# Patient Record
Sex: MALE | Race: WHITE | URBAN - METROPOLITAN AREA
[De-identification: names, ages, dates, MRNs, and addresses within clinical notes are randomized per-mention and may not be internally consistent; named-entity substitution may affect disease eponyms.]

---

## 2018-01-01 ENCOUNTER — ANESTHESIA (OUTPATIENT)
Dept: SURGERY | Facility: CLINIC | Age: 63
DRG: 003 | End: 2018-01-01
Payer: COMMERCIAL

## 2018-01-01 ENCOUNTER — APPOINTMENT (OUTPATIENT)
Dept: GENERAL RADIOLOGY | Facility: CLINIC | Age: 63
DRG: 003 | End: 2018-01-01
Attending: SURGERY
Payer: COMMERCIAL

## 2018-01-01 ENCOUNTER — ANESTHESIA EVENT (OUTPATIENT)
Dept: SURGERY | Facility: CLINIC | Age: 63
DRG: 003 | End: 2018-01-01
Payer: COMMERCIAL

## 2018-01-01 ENCOUNTER — APPOINTMENT (OUTPATIENT)
Dept: GENERAL RADIOLOGY | Facility: CLINIC | Age: 63
DRG: 003 | End: 2018-01-01
Attending: THORACIC SURGERY (CARDIOTHORACIC VASCULAR SURGERY)
Payer: COMMERCIAL

## 2018-01-01 ENCOUNTER — APPOINTMENT (OUTPATIENT)
Dept: GENERAL RADIOLOGY | Facility: CLINIC | Age: 63
DRG: 003 | End: 2018-01-01
Attending: INTERNAL MEDICINE
Payer: COMMERCIAL

## 2018-01-01 ENCOUNTER — APPOINTMENT (OUTPATIENT)
Dept: CARDIOLOGY | Facility: CLINIC | Age: 63
DRG: 003 | End: 2018-01-01
Attending: INTERNAL MEDICINE
Payer: COMMERCIAL

## 2018-01-01 ENCOUNTER — HOSPITAL ENCOUNTER (INPATIENT)
Facility: CLINIC | Age: 63
LOS: 11 days | DRG: 003 | End: 2018-11-24
Attending: SURGERY | Admitting: SURGERY
Payer: COMMERCIAL

## 2018-01-01 ENCOUNTER — APPOINTMENT (OUTPATIENT)
Dept: CARDIOLOGY | Facility: CLINIC | Age: 63
DRG: 003 | End: 2018-01-01
Attending: SURGERY
Payer: COMMERCIAL

## 2018-01-01 VITALS
OXYGEN SATURATION: 96 % | RESPIRATION RATE: 15 BRPM | DIASTOLIC BLOOD PRESSURE: 33 MMHG | BODY MASS INDEX: 34.01 KG/M2 | TEMPERATURE: 98 F | SYSTOLIC BLOOD PRESSURE: 83 MMHG | HEIGHT: 67 IN | WEIGHT: 216.71 LBS

## 2018-01-01 DIAGNOSIS — Z92.81 H/O EXTRACORPOREAL MEMBRANE OXYGENATION TREATMENT: Primary | ICD-10-CM

## 2018-01-01 LAB
ABO + RH BLD: NORMAL
ALBUMIN SERPL-MCNC: 2.1 G/DL (ref 3.4–5)
ALBUMIN SERPL-MCNC: 2.2 G/DL (ref 3.4–5)
ALBUMIN SERPL-MCNC: 2.2 G/DL (ref 3.4–5)
ALBUMIN SERPL-MCNC: 2.3 G/DL (ref 3.4–5)
ALBUMIN SERPL-MCNC: 2.4 G/DL (ref 3.4–5)
ALBUMIN SERPL-MCNC: 2.5 G/DL (ref 3.4–5)
ALBUMIN SERPL-MCNC: 2.6 G/DL (ref 3.4–5)
ALBUMIN SERPL-MCNC: 2.7 G/DL (ref 3.4–5)
ALBUMIN SERPL-MCNC: 2.8 G/DL (ref 3.4–5)
ALBUMIN SERPL-MCNC: 2.9 G/DL (ref 3.4–5)
ALBUMIN SERPL-MCNC: 3 G/DL (ref 3.4–5)
ALBUMIN SERPL-MCNC: 3.1 G/DL (ref 3.4–5)
ALBUMIN SERPL-MCNC: 3.1 G/DL (ref 3.4–5)
ALBUMIN SERPL-MCNC: 3.2 G/DL (ref 3.4–5)
ALBUMIN SERPL-MCNC: 3.3 G/DL (ref 3.4–5)
ALBUMIN SERPL-MCNC: 3.6 G/DL (ref 3.4–5)
ALBUMIN SERPL-MCNC: NORMAL G/DL (ref 3.4–5)
ALP SERPL-CCNC: 100 U/L (ref 40–150)
ALP SERPL-CCNC: 102 U/L (ref 40–150)
ALP SERPL-CCNC: 104 U/L (ref 40–150)
ALP SERPL-CCNC: 106 U/L (ref 40–150)
ALP SERPL-CCNC: 108 U/L (ref 40–150)
ALP SERPL-CCNC: 128 U/L (ref 40–150)
ALP SERPL-CCNC: 156 U/L (ref 40–150)
ALP SERPL-CCNC: 274 U/L (ref 40–150)
ALP SERPL-CCNC: 37 U/L (ref 40–150)
ALP SERPL-CCNC: 40 U/L (ref 40–150)
ALP SERPL-CCNC: 43 U/L (ref 40–150)
ALP SERPL-CCNC: 44 U/L (ref 40–150)
ALP SERPL-CCNC: 46 U/L (ref 40–150)
ALP SERPL-CCNC: 485 U/L (ref 40–150)
ALP SERPL-CCNC: 49 U/L (ref 40–150)
ALP SERPL-CCNC: 50 U/L (ref 40–150)
ALP SERPL-CCNC: 50 U/L (ref 40–150)
ALP SERPL-CCNC: 52 U/L (ref 40–150)
ALP SERPL-CCNC: 52 U/L (ref 40–150)
ALP SERPL-CCNC: 54 U/L (ref 40–150)
ALP SERPL-CCNC: 55 U/L (ref 40–150)
ALP SERPL-CCNC: 56 U/L (ref 40–150)
ALP SERPL-CCNC: 56 U/L (ref 40–150)
ALP SERPL-CCNC: 58 U/L (ref 40–150)
ALP SERPL-CCNC: 60 U/L (ref 40–150)
ALP SERPL-CCNC: 607 U/L (ref 40–150)
ALP SERPL-CCNC: 61 U/L (ref 40–150)
ALP SERPL-CCNC: 63 U/L (ref 40–150)
ALP SERPL-CCNC: 63 U/L (ref 40–150)
ALP SERPL-CCNC: 66 U/L (ref 40–150)
ALP SERPL-CCNC: 670 U/L (ref 40–150)
ALP SERPL-CCNC: 68 U/L (ref 40–150)
ALP SERPL-CCNC: 70 U/L (ref 40–150)
ALP SERPL-CCNC: 75 U/L (ref 40–150)
ALP SERPL-CCNC: 83 U/L (ref 40–150)
ALP SERPL-CCNC: 87 U/L (ref 40–150)
ALP SERPL-CCNC: 88 U/L (ref 40–150)
ALP SERPL-CCNC: 98 U/L (ref 40–150)
ALP SERPL-CCNC: 99 U/L (ref 40–150)
ALP SERPL-CCNC: NORMAL U/L (ref 40–150)
ALT SERPL W P-5'-P-CCNC: 101 U/L (ref 0–70)
ALT SERPL W P-5'-P-CCNC: 104 U/L (ref 0–70)
ALT SERPL W P-5'-P-CCNC: 113 U/L (ref 0–70)
ALT SERPL W P-5'-P-CCNC: 115 U/L (ref 0–70)
ALT SERPL W P-5'-P-CCNC: 118 U/L (ref 0–70)
ALT SERPL W P-5'-P-CCNC: 122 U/L (ref 0–70)
ALT SERPL W P-5'-P-CCNC: 124 U/L (ref 0–70)
ALT SERPL W P-5'-P-CCNC: 126 U/L (ref 0–70)
ALT SERPL W P-5'-P-CCNC: 129 U/L (ref 0–70)
ALT SERPL W P-5'-P-CCNC: 129 U/L (ref 0–70)
ALT SERPL W P-5'-P-CCNC: 133 U/L (ref 0–70)
ALT SERPL W P-5'-P-CCNC: 146 U/L (ref 0–70)
ALT SERPL W P-5'-P-CCNC: 150 U/L (ref 0–70)
ALT SERPL W P-5'-P-CCNC: 158 U/L (ref 0–70)
ALT SERPL W P-5'-P-CCNC: 170 U/L (ref 0–70)
ALT SERPL W P-5'-P-CCNC: 207 U/L (ref 0–70)
ALT SERPL W P-5'-P-CCNC: 225 U/L (ref 0–70)
ALT SERPL W P-5'-P-CCNC: 2326 U/L (ref 0–70)
ALT SERPL W P-5'-P-CCNC: 239 U/L (ref 0–70)
ALT SERPL W P-5'-P-CCNC: 275 U/L (ref 0–70)
ALT SERPL W P-5'-P-CCNC: 318 U/L (ref 0–70)
ALT SERPL W P-5'-P-CCNC: 337 U/L (ref 0–70)
ALT SERPL W P-5'-P-CCNC: 341 U/L (ref 0–70)
ALT SERPL W P-5'-P-CCNC: 396 U/L (ref 0–70)
ALT SERPL W P-5'-P-CCNC: 4059 U/L (ref 0–70)
ALT SERPL W P-5'-P-CCNC: 450 U/L (ref 0–70)
ALT SERPL W P-5'-P-CCNC: 459 U/L (ref 0–70)
ALT SERPL W P-5'-P-CCNC: 491 U/L (ref 0–70)
ALT SERPL W P-5'-P-CCNC: 544 U/L (ref 0–70)
ALT SERPL W P-5'-P-CCNC: 5566 U/L (ref 0–70)
ALT SERPL W P-5'-P-CCNC: 5805 U/L (ref 0–70)
ALT SERPL W P-5'-P-CCNC: 5913 U/L (ref 0–70)
ALT SERPL W P-5'-P-CCNC: 594 U/L (ref 0–70)
ALT SERPL W P-5'-P-CCNC: 630 U/L (ref 0–70)
ALT SERPL W P-5'-P-CCNC: 704 U/L (ref 0–70)
ALT SERPL W P-5'-P-CCNC: 762 U/L (ref 0–70)
ALT SERPL W P-5'-P-CCNC: 764 U/L (ref 0–70)
ALT SERPL W P-5'-P-CCNC: 821 U/L (ref 0–70)
ALT SERPL W P-5'-P-CCNC: 828 U/L (ref 0–70)
ALT SERPL W P-5'-P-CCNC: 869 U/L (ref 0–70)
ALT SERPL W P-5'-P-CCNC: 884 U/L (ref 0–70)
ALT SERPL W P-5'-P-CCNC: 962 U/L (ref 0–70)
ALT SERPL W P-5'-P-CCNC: NORMAL U/L (ref 0–70)
ANGLE RATE OF CLOT GROWTH: 63.8 DEG (ref 59–74)
ANGLE RATE OF CLOT GROWTH: 66.7 DEG (ref 59–74)
ANGLE RATE OF CLOT GROWTH: 68.8 DEG (ref 59–74)
ANGLE RATE OF CLOT GROWTH: 69.8 DEG (ref 59–74)
ANGLE RATE OF CLOT GROWTH: 71 DEG (ref 59–74)
ANGLE RATE OF CLOT STRENGTH: 65.6 DEGREES (ref 53–72)
ANGLE RATE OF CLOT STRENGTH: 68.1 DEGREES (ref 53–72)
ANION GAP SERPL CALCULATED.3IONS-SCNC: 10 MMOL/L (ref 3–14)
ANION GAP SERPL CALCULATED.3IONS-SCNC: 11 MMOL/L (ref 3–14)
ANION GAP SERPL CALCULATED.3IONS-SCNC: 12 MMOL/L (ref 3–14)
ANION GAP SERPL CALCULATED.3IONS-SCNC: 12 MMOL/L (ref 3–14)
ANION GAP SERPL CALCULATED.3IONS-SCNC: 13 MMOL/L (ref 3–14)
ANION GAP SERPL CALCULATED.3IONS-SCNC: 14 MMOL/L (ref 3–14)
ANION GAP SERPL CALCULATED.3IONS-SCNC: 15 MMOL/L (ref 3–14)
ANION GAP SERPL CALCULATED.3IONS-SCNC: 17 MMOL/L (ref 3–14)
ANION GAP SERPL CALCULATED.3IONS-SCNC: 18 MMOL/L (ref 3–14)
ANION GAP SERPL CALCULATED.3IONS-SCNC: 21 MMOL/L (ref 3–14)
ANION GAP SERPL CALCULATED.3IONS-SCNC: 22 MMOL/L (ref 3–14)
ANION GAP SERPL CALCULATED.3IONS-SCNC: 23 MMOL/L (ref 3–14)
ANION GAP SERPL CALCULATED.3IONS-SCNC: 24 MMOL/L (ref 3–14)
ANION GAP SERPL CALCULATED.3IONS-SCNC: 26 MMOL/L (ref 3–14)
ANION GAP SERPL CALCULATED.3IONS-SCNC: 4 MMOL/L (ref 3–14)
ANION GAP SERPL CALCULATED.3IONS-SCNC: 6 MMOL/L (ref 3–14)
ANION GAP SERPL CALCULATED.3IONS-SCNC: 7 MMOL/L (ref 3–14)
ANION GAP SERPL CALCULATED.3IONS-SCNC: 8 MMOL/L (ref 3–14)
ANION GAP SERPL CALCULATED.3IONS-SCNC: 9 MMOL/L (ref 3–14)
ANION GAP SERPL CALCULATED.3IONS-SCNC: NORMAL MMOL/L (ref 6–17)
ANISOCYTOSIS BLD QL SMEAR: ABNORMAL
ANISOCYTOSIS BLD QL SMEAR: SLIGHT
APTT PPP: 213 SEC (ref 22–37)
APTT PPP: 30 SEC (ref 22–37)
APTT PPP: 30 SEC (ref 22–37)
APTT PPP: 31 SEC (ref 22–37)
APTT PPP: 32 SEC (ref 22–37)
APTT PPP: 33 SEC (ref 22–37)
APTT PPP: 34 SEC (ref 22–37)
APTT PPP: 34 SEC (ref 22–37)
APTT PPP: 36 SEC (ref 22–37)
APTT PPP: 37 SEC (ref 22–37)
APTT PPP: 38 SEC (ref 22–37)
APTT PPP: 39 SEC (ref 22–37)
APTT PPP: 40 SEC (ref 22–37)
APTT PPP: 40 SEC (ref 22–37)
APTT PPP: 42 SEC (ref 22–37)
APTT PPP: 43 SEC (ref 22–37)
APTT PPP: 44 SEC (ref 22–37)
APTT PPP: 45 SEC (ref 22–37)
APTT PPP: 46 SEC (ref 22–37)
APTT PPP: 46 SEC (ref 22–37)
APTT PPP: 47 SEC (ref 22–37)
APTT PPP: 50 SEC (ref 22–37)
APTT PPP: 50 SEC (ref 22–37)
APTT PPP: 51 SEC (ref 22–37)
APTT PPP: 54 SEC (ref 22–37)
APTT PPP: 55 SEC (ref 22–37)
APTT PPP: 57 SEC (ref 22–37)
APTT PPP: 69 SEC (ref 22–37)
APTT PPP: 71 SEC (ref 22–37)
APTT PPP: 73 SEC (ref 22–37)
APTT PPP: 73 SEC (ref 22–37)
APTT PPP: 74 SEC (ref 22–37)
APTT PPP: 79 SEC (ref 22–37)
APTT PPP: 87 SEC (ref 22–37)
APTT PPP: 89 SEC (ref 22–37)
APTT PPP: 94 SEC (ref 22–37)
AST SERPL W P-5'-P-CCNC: 1056 U/L (ref 0–45)
AST SERPL W P-5'-P-CCNC: 1146 U/L (ref 0–45)
AST SERPL W P-5'-P-CCNC: 1484 U/L (ref 0–45)
AST SERPL W P-5'-P-CCNC: 1723 U/L (ref 0–45)
AST SERPL W P-5'-P-CCNC: 1880 U/L (ref 0–45)
AST SERPL W P-5'-P-CCNC: 1887 U/L (ref 0–45)
AST SERPL W P-5'-P-CCNC: 1940 U/L (ref 0–45)
AST SERPL W P-5'-P-CCNC: 2161 U/L (ref 0–45)
AST SERPL W P-5'-P-CCNC: 2166 U/L (ref 0–45)
AST SERPL W P-5'-P-CCNC: 2200 U/L (ref 0–45)
AST SERPL W P-5'-P-CCNC: 276 U/L (ref 0–45)
AST SERPL W P-5'-P-CCNC: 276 U/L (ref 0–45)
AST SERPL W P-5'-P-CCNC: 312 U/L (ref 0–45)
AST SERPL W P-5'-P-CCNC: 326 U/L (ref 0–45)
AST SERPL W P-5'-P-CCNC: 343 U/L (ref 0–45)
AST SERPL W P-5'-P-CCNC: 3430 U/L (ref 0–45)
AST SERPL W P-5'-P-CCNC: 372 U/L (ref 0–45)
AST SERPL W P-5'-P-CCNC: 398 U/L (ref 0–45)
AST SERPL W P-5'-P-CCNC: 399 U/L (ref 0–45)
AST SERPL W P-5'-P-CCNC: 401 U/L (ref 0–45)
AST SERPL W P-5'-P-CCNC: 419 U/L (ref 0–45)
AST SERPL W P-5'-P-CCNC: 423 U/L (ref 0–45)
AST SERPL W P-5'-P-CCNC: 439 U/L (ref 0–45)
AST SERPL W P-5'-P-CCNC: 441 U/L (ref 0–45)
AST SERPL W P-5'-P-CCNC: 442 U/L (ref 0–45)
AST SERPL W P-5'-P-CCNC: 474 U/L (ref 0–45)
AST SERPL W P-5'-P-CCNC: 559 U/L (ref 0–45)
AST SERPL W P-5'-P-CCNC: 567 U/L (ref 0–45)
AST SERPL W P-5'-P-CCNC: 585 U/L (ref 0–45)
AST SERPL W P-5'-P-CCNC: 5956 U/L (ref 0–45)
AST SERPL W P-5'-P-CCNC: 649 U/L (ref 0–45)
AST SERPL W P-5'-P-CCNC: 771 U/L (ref 0–45)
AST SERPL W P-5'-P-CCNC: 927 U/L (ref 0–45)
AST SERPL W P-5'-P-CCNC: 950 U/L (ref 0–45)
AST SERPL W P-5'-P-CCNC: ABNORMAL U/L (ref 0–45)
AST SERPL W P-5'-P-CCNC: NORMAL U/L (ref 0–45)
AT III ACT/NOR PPP CHRO: 25 % (ref 85–135)
AT III ACT/NOR PPP CHRO: 40 % (ref 85–135)
AT III ACT/NOR PPP CHRO: 43 % (ref 85–135)
AT III ACT/NOR PPP CHRO: 43 % (ref 85–135)
AT III ACT/NOR PPP CHRO: 44 % (ref 85–135)
AT III ACT/NOR PPP CHRO: 53 % (ref 85–135)
AT III ACT/NOR PPP CHRO: 58 % (ref 85–135)
AT III ACT/NOR PPP CHRO: 60 % (ref 85–135)
AT III ACT/NOR PPP CHRO: 61 % (ref 85–135)
BACTERIA SPEC CULT: ABNORMAL
BACTERIA SPEC CULT: NO GROWTH
BASE DEFICIT BLDA-SCNC: 0.1 MMOL/L
BASE DEFICIT BLDA-SCNC: 0.4 MMOL/L
BASE DEFICIT BLDA-SCNC: 0.7 MMOL/L
BASE DEFICIT BLDA-SCNC: 0.9 MMOL/L
BASE DEFICIT BLDA-SCNC: 1.1 MMOL/L
BASE DEFICIT BLDA-SCNC: 1.1 MMOL/L
BASE DEFICIT BLDA-SCNC: 1.2 MMOL/L
BASE DEFICIT BLDA-SCNC: 1.2 MMOL/L
BASE DEFICIT BLDA-SCNC: 1.6 MMOL/L
BASE DEFICIT BLDA-SCNC: 1.7 MMOL/L
BASE DEFICIT BLDA-SCNC: 1.8 MMOL/L
BASE DEFICIT BLDA-SCNC: 10.1 MMOL/L
BASE DEFICIT BLDA-SCNC: 10.7 MMOL/L
BASE DEFICIT BLDA-SCNC: 10.8 MMOL/L
BASE DEFICIT BLDA-SCNC: 11.1 MMOL/L
BASE DEFICIT BLDA-SCNC: 11.3 MMOL/L
BASE DEFICIT BLDA-SCNC: 11.5 MMOL/L
BASE DEFICIT BLDA-SCNC: 11.7 MMOL/L
BASE DEFICIT BLDA-SCNC: 12.1 MMOL/L
BASE DEFICIT BLDA-SCNC: 12.2 MMOL/L
BASE DEFICIT BLDA-SCNC: 12.2 MMOL/L
BASE DEFICIT BLDA-SCNC: 12.4 MMOL/L
BASE DEFICIT BLDA-SCNC: 12.5 MMOL/L
BASE DEFICIT BLDA-SCNC: 12.6 MMOL/L
BASE DEFICIT BLDA-SCNC: 12.7 MMOL/L
BASE DEFICIT BLDA-SCNC: 12.8 MMOL/L
BASE DEFICIT BLDA-SCNC: 12.9 MMOL/L
BASE DEFICIT BLDA-SCNC: 12.9 MMOL/L
BASE DEFICIT BLDA-SCNC: 13.1 MMOL/L
BASE DEFICIT BLDA-SCNC: 13.1 MMOL/L
BASE DEFICIT BLDA-SCNC: 13.4 MMOL/L
BASE DEFICIT BLDA-SCNC: 13.8 MMOL/L
BASE DEFICIT BLDA-SCNC: 15.5 MMOL/L
BASE DEFICIT BLDA-SCNC: 2 MMOL/L
BASE DEFICIT BLDA-SCNC: 2.1 MMOL/L
BASE DEFICIT BLDA-SCNC: 2.2 MMOL/L
BASE DEFICIT BLDA-SCNC: 2.3 MMOL/L
BASE DEFICIT BLDA-SCNC: 2.3 MMOL/L
BASE DEFICIT BLDA-SCNC: 2.4 MMOL/L
BASE DEFICIT BLDA-SCNC: 2.5 MMOL/L
BASE DEFICIT BLDA-SCNC: 2.5 MMOL/L
BASE DEFICIT BLDA-SCNC: 2.6 MMOL/L
BASE DEFICIT BLDA-SCNC: 2.6 MMOL/L
BASE DEFICIT BLDA-SCNC: 2.7 MMOL/L
BASE DEFICIT BLDA-SCNC: 2.8 MMOL/L
BASE DEFICIT BLDA-SCNC: 2.9 MMOL/L
BASE DEFICIT BLDA-SCNC: 3.3 MMOL/L
BASE DEFICIT BLDA-SCNC: 3.8 MMOL/L
BASE DEFICIT BLDA-SCNC: 4.2 MMOL/L
BASE DEFICIT BLDA-SCNC: 4.4 MMOL/L
BASE DEFICIT BLDA-SCNC: 4.4 MMOL/L
BASE DEFICIT BLDA-SCNC: 5.1 MMOL/L
BASE DEFICIT BLDA-SCNC: 5.1 MMOL/L
BASE DEFICIT BLDA-SCNC: 6.6 MMOL/L
BASE DEFICIT BLDA-SCNC: 7.4 MMOL/L
BASE DEFICIT BLDA-SCNC: 7.7 MMOL/L
BASE DEFICIT BLDA-SCNC: 7.9 MMOL/L
BASE DEFICIT BLDA-SCNC: 8.3 MMOL/L
BASE DEFICIT BLDA-SCNC: 8.3 MMOL/L
BASE DEFICIT BLDA-SCNC: 8.5 MMOL/L
BASE DEFICIT BLDA-SCNC: 8.6 MMOL/L
BASE DEFICIT BLDA-SCNC: 8.8 MMOL/L
BASE DEFICIT BLDA-SCNC: 9.1 MMOL/L
BASE DEFICIT BLDV-SCNC: 0.1 MMOL/L
BASE DEFICIT BLDV-SCNC: 1.1 MMOL/L
BASE DEFICIT BLDV-SCNC: 1.6 MMOL/L
BASE DEFICIT BLDV-SCNC: 1.9 MMOL/L
BASE DEFICIT BLDV-SCNC: 10.1 MMOL/L
BASE DEFICIT BLDV-SCNC: 12.3 MMOL/L
BASE DEFICIT BLDV-SCNC: 13.5 MMOL/L
BASE DEFICIT BLDV-SCNC: 2.5 MMOL/L
BASE DEFICIT BLDV-SCNC: 2.6 MMOL/L
BASE DEFICIT BLDV-SCNC: 5.5 MMOL/L
BASE DEFICIT BLDV-SCNC: 7.1 MMOL/L
BASE DEFICIT BLDV-SCNC: 7.1 MMOL/L
BASE EXCESS BLDA CALC-SCNC: 0 MMOL/L
BASE EXCESS BLDA CALC-SCNC: 0.5 MMOL/L
BASE EXCESS BLDA CALC-SCNC: 0.8 MMOL/L
BASE EXCESS BLDA CALC-SCNC: 0.9 MMOL/L
BASE EXCESS BLDA CALC-SCNC: 1 MMOL/L
BASE EXCESS BLDA CALC-SCNC: 1 MMOL/L
BASE EXCESS BLDA CALC-SCNC: 1.1 MMOL/L
BASE EXCESS BLDA CALC-SCNC: 1.2 MMOL/L
BASE EXCESS BLDA CALC-SCNC: 1.5 MMOL/L
BASE EXCESS BLDA CALC-SCNC: 1.6 MMOL/L
BASE EXCESS BLDA CALC-SCNC: 1.7 MMOL/L
BASE EXCESS BLDA CALC-SCNC: 1.8 MMOL/L
BASE EXCESS BLDA CALC-SCNC: 1.8 MMOL/L
BASE EXCESS BLDA CALC-SCNC: 1.9 MMOL/L
BASE EXCESS BLDA CALC-SCNC: 1.9 MMOL/L
BASE EXCESS BLDA CALC-SCNC: 10.4 MMOL/L
BASE EXCESS BLDA CALC-SCNC: 10.5 MMOL/L
BASE EXCESS BLDA CALC-SCNC: 10.9 MMOL/L
BASE EXCESS BLDA CALC-SCNC: 11 MMOL/L
BASE EXCESS BLDA CALC-SCNC: 11.3 MMOL/L
BASE EXCESS BLDA CALC-SCNC: 12.3 MMOL/L
BASE EXCESS BLDA CALC-SCNC: 2 MMOL/L
BASE EXCESS BLDA CALC-SCNC: 2.1 MMOL/L
BASE EXCESS BLDA CALC-SCNC: 2.3 MMOL/L
BASE EXCESS BLDA CALC-SCNC: 2.4 MMOL/L
BASE EXCESS BLDA CALC-SCNC: 2.4 MMOL/L
BASE EXCESS BLDA CALC-SCNC: 2.6 MMOL/L
BASE EXCESS BLDA CALC-SCNC: 2.7 MMOL/L
BASE EXCESS BLDA CALC-SCNC: 2.8 MMOL/L
BASE EXCESS BLDA CALC-SCNC: 2.8 MMOL/L
BASE EXCESS BLDA CALC-SCNC: 2.9 MMOL/L
BASE EXCESS BLDA CALC-SCNC: 2.9 MMOL/L
BASE EXCESS BLDA CALC-SCNC: 3.1 MMOL/L
BASE EXCESS BLDA CALC-SCNC: 3.1 MMOL/L
BASE EXCESS BLDA CALC-SCNC: 3.2 MMOL/L
BASE EXCESS BLDA CALC-SCNC: 3.4 MMOL/L
BASE EXCESS BLDA CALC-SCNC: 3.4 MMOL/L
BASE EXCESS BLDA CALC-SCNC: 3.5 MMOL/L
BASE EXCESS BLDA CALC-SCNC: 3.8 MMOL/L
BASE EXCESS BLDA CALC-SCNC: 4 MMOL/L
BASE EXCESS BLDA CALC-SCNC: 4.2 MMOL/L
BASE EXCESS BLDA CALC-SCNC: 4.3 MMOL/L
BASE EXCESS BLDA CALC-SCNC: 4.4 MMOL/L
BASE EXCESS BLDA CALC-SCNC: 4.4 MMOL/L
BASE EXCESS BLDA CALC-SCNC: 4.5 MMOL/L
BASE EXCESS BLDA CALC-SCNC: 4.6 MMOL/L
BASE EXCESS BLDA CALC-SCNC: 4.9 MMOL/L
BASE EXCESS BLDA CALC-SCNC: 5 MMOL/L
BASE EXCESS BLDA CALC-SCNC: 5.1 MMOL/L
BASE EXCESS BLDA CALC-SCNC: 5.4 MMOL/L
BASE EXCESS BLDA CALC-SCNC: 5.4 MMOL/L
BASE EXCESS BLDA CALC-SCNC: 5.5 MMOL/L
BASE EXCESS BLDA CALC-SCNC: 5.7 MMOL/L
BASE EXCESS BLDA CALC-SCNC: 5.8 MMOL/L
BASE EXCESS BLDA CALC-SCNC: 5.9 MMOL/L
BASE EXCESS BLDA CALC-SCNC: 6 MMOL/L
BASE EXCESS BLDA CALC-SCNC: 6.3 MMOL/L
BASE EXCESS BLDA CALC-SCNC: 6.9 MMOL/L
BASE EXCESS BLDA CALC-SCNC: 7.1 MMOL/L
BASE EXCESS BLDA CALC-SCNC: 7.2 MMOL/L
BASE EXCESS BLDA CALC-SCNC: 7.3 MMOL/L
BASE EXCESS BLDA CALC-SCNC: 7.4 MMOL/L
BASE EXCESS BLDA CALC-SCNC: 7.4 MMOL/L
BASE EXCESS BLDA CALC-SCNC: 7.6 MMOL/L
BASE EXCESS BLDA CALC-SCNC: 7.6 MMOL/L
BASE EXCESS BLDA CALC-SCNC: 7.7 MMOL/L
BASE EXCESS BLDA CALC-SCNC: 7.9 MMOL/L
BASE EXCESS BLDA CALC-SCNC: 7.9 MMOL/L
BASE EXCESS BLDA CALC-SCNC: 8.3 MMOL/L
BASE EXCESS BLDA CALC-SCNC: 8.6 MMOL/L
BASE EXCESS BLDA CALC-SCNC: 8.8 MMOL/L
BASE EXCESS BLDA CALC-SCNC: 8.9 MMOL/L
BASE EXCESS BLDA CALC-SCNC: 9.3 MMOL/L
BASE EXCESS BLDA CALC-SCNC: 9.4 MMOL/L
BASE EXCESS BLDA CALC-SCNC: 9.7 MMOL/L
BASE EXCESS BLDA CALC-SCNC: 9.8 MMOL/L
BASE EXCESS BLDA CALC-SCNC: 9.8 MMOL/L
BASE EXCESS BLDV CALC-SCNC: 0.9 MMOL/L
BASE EXCESS BLDV CALC-SCNC: 1.2 MMOL/L
BASE EXCESS BLDV CALC-SCNC: 1.9 MMOL/L
BASE EXCESS BLDV CALC-SCNC: 10.6 MMOL/L
BASE EXCESS BLDV CALC-SCNC: 12.4 MMOL/L
BASE EXCESS BLDV CALC-SCNC: 2 MMOL/L
BASE EXCESS BLDV CALC-SCNC: 2.4 MMOL/L
BASE EXCESS BLDV CALC-SCNC: 2.7 MMOL/L
BASE EXCESS BLDV CALC-SCNC: 2.9 MMOL/L
BASE EXCESS BLDV CALC-SCNC: 3 MMOL/L
BASE EXCESS BLDV CALC-SCNC: 3.1 MMOL/L
BASE EXCESS BLDV CALC-SCNC: 3.9 MMOL/L
BASE EXCESS BLDV CALC-SCNC: 4.1 MMOL/L
BASE EXCESS BLDV CALC-SCNC: 4.1 MMOL/L
BASE EXCESS BLDV CALC-SCNC: 4.3 MMOL/L
BASE EXCESS BLDV CALC-SCNC: 5.2 MMOL/L
BASE EXCESS BLDV CALC-SCNC: 5.6 MMOL/L
BASE EXCESS BLDV CALC-SCNC: 5.9 MMOL/L
BASE EXCESS BLDV CALC-SCNC: 6.6 MMOL/L
BASE EXCESS BLDV CALC-SCNC: 8.6 MMOL/L
BASOPHILS # BLD AUTO: 0 10E9/L (ref 0–0.2)
BASOPHILS # BLD AUTO: 0.1 10E9/L (ref 0–0.2)
BASOPHILS # BLD AUTO: 0.2 10E9/L (ref 0–0.2)
BASOPHILS NFR BLD AUTO: 0 %
BASOPHILS NFR BLD AUTO: 0.1 %
BASOPHILS NFR BLD AUTO: 0.9 %
BASOPHILS NFR BLD AUTO: 0.9 %
BILIRUB DIRECT SERPL-MCNC: 0.6 MG/DL (ref 0–0.2)
BILIRUB DIRECT SERPL-MCNC: 0.7 MG/DL (ref 0–0.2)
BILIRUB DIRECT SERPL-MCNC: 0.8 MG/DL (ref 0–0.2)
BILIRUB DIRECT SERPL-MCNC: 1.1 MG/DL (ref 0–0.2)
BILIRUB DIRECT SERPL-MCNC: 1.2 MG/DL (ref 0–0.2)
BILIRUB DIRECT SERPL-MCNC: 1.3 MG/DL (ref 0–0.2)
BILIRUB DIRECT SERPL-MCNC: 1.7 MG/DL (ref 0–0.2)
BILIRUB DIRECT SERPL-MCNC: 1.7 MG/DL (ref 0–0.2)
BILIRUB DIRECT SERPL-MCNC: 11.2 MG/DL (ref 0–0.2)
BILIRUB DIRECT SERPL-MCNC: 12.7 MG/DL (ref 0–0.2)
BILIRUB DIRECT SERPL-MCNC: 2.1 MG/DL (ref 0–0.2)
BILIRUB DIRECT SERPL-MCNC: 2.9 MG/DL (ref 0–0.2)
BILIRUB DIRECT SERPL-MCNC: 3.3 MG/DL (ref 0–0.2)
BILIRUB DIRECT SERPL-MCNC: 4 MG/DL (ref 0–0.2)
BILIRUB DIRECT SERPL-MCNC: 4.6 MG/DL (ref 0–0.2)
BILIRUB DIRECT SERPL-MCNC: 6.3 MG/DL (ref 0–0.2)
BILIRUB DIRECT SERPL-MCNC: 7.8 MG/DL (ref 0–0.2)
BILIRUB DIRECT SERPL-MCNC: 8.5 MG/DL (ref 0–0.2)
BILIRUB SERPL-MCNC: 10.2 MG/DL (ref 0.2–1.3)
BILIRUB SERPL-MCNC: 10.7 MG/DL (ref 0.2–1.3)
BILIRUB SERPL-MCNC: 11.1 MG/DL (ref 0.2–1.3)
BILIRUB SERPL-MCNC: 11.8 MG/DL (ref 0.2–1.3)
BILIRUB SERPL-MCNC: 13.1 MG/DL (ref 0.2–1.3)
BILIRUB SERPL-MCNC: 14.4 MG/DL (ref 0.2–1.3)
BILIRUB SERPL-MCNC: 14.7 MG/DL (ref 0.2–1.3)
BILIRUB SERPL-MCNC: 17.3 MG/DL (ref 0.2–1.3)
BILIRUB SERPL-MCNC: 17.3 MG/DL (ref 0.2–1.3)
BILIRUB SERPL-MCNC: 18.7 MG/DL (ref 0.2–1.3)
BILIRUB SERPL-MCNC: 19.5 MG/DL (ref 0.2–1.3)
BILIRUB SERPL-MCNC: 2.6 MG/DL (ref 0.2–1.3)
BILIRUB SERPL-MCNC: 3.1 MG/DL (ref 0.2–1.3)
BILIRUB SERPL-MCNC: 3.3 MG/DL (ref 0.2–1.3)
BILIRUB SERPL-MCNC: 3.7 MG/DL (ref 0.2–1.3)
BILIRUB SERPL-MCNC: 4.1 MG/DL (ref 0.2–1.3)
BILIRUB SERPL-MCNC: 4.3 MG/DL (ref 0.2–1.3)
BILIRUB SERPL-MCNC: 4.3 MG/DL (ref 0.2–1.3)
BILIRUB SERPL-MCNC: 4.4 MG/DL (ref 0.2–1.3)
BILIRUB SERPL-MCNC: 4.6 MG/DL (ref 0.2–1.3)
BILIRUB SERPL-MCNC: 4.9 MG/DL (ref 0.2–1.3)
BILIRUB SERPL-MCNC: 5 MG/DL (ref 0.2–1.3)
BILIRUB SERPL-MCNC: 5 MG/DL (ref 0.2–1.3)
BILIRUB SERPL-MCNC: 5.3 MG/DL (ref 0.2–1.3)
BILIRUB SERPL-MCNC: 5.4 MG/DL (ref 0.2–1.3)
BILIRUB SERPL-MCNC: 5.4 MG/DL (ref 0.2–1.3)
BILIRUB SERPL-MCNC: 5.5 MG/DL (ref 0.2–1.3)
BILIRUB SERPL-MCNC: 5.7 MG/DL (ref 0.2–1.3)
BILIRUB SERPL-MCNC: 5.8 MG/DL (ref 0.2–1.3)
BILIRUB SERPL-MCNC: 6 MG/DL (ref 0.2–1.3)
BILIRUB SERPL-MCNC: 6.7 MG/DL (ref 0.2–1.3)
BILIRUB SERPL-MCNC: 7 MG/DL (ref 0.2–1.3)
BILIRUB SERPL-MCNC: 7.6 MG/DL (ref 0.2–1.3)
BILIRUB SERPL-MCNC: 7.7 MG/DL (ref 0.2–1.3)
BILIRUB SERPL-MCNC: 8.3 MG/DL (ref 0.2–1.3)
BILIRUB SERPL-MCNC: 8.5 MG/DL (ref 0.2–1.3)
BILIRUB SERPL-MCNC: 8.9 MG/DL (ref 0.2–1.3)
BILIRUB SERPL-MCNC: 9.4 MG/DL (ref 0.2–1.3)
BILIRUB SERPL-MCNC: 9.6 MG/DL (ref 0.2–1.3)
BILIRUB SERPL-MCNC: NORMAL MG/DL (ref 0.2–1.3)
BLD GP AB SCN SERPL QL: NORMAL
BLD PROD TYP BPU: NORMAL
BLD UNIT ID BPU: 0
BLOOD BANK CMNT PATIENT-IMP: NORMAL
BLOOD PRODUCT CODE: NORMAL
BPU ID: NORMAL
BUN SERPL-MCNC: 24 MG/DL (ref 7–30)
BUN SERPL-MCNC: 29 MG/DL (ref 7–30)
BUN SERPL-MCNC: 38 MG/DL (ref 7–30)
BUN SERPL-MCNC: 41 MG/DL (ref 7–30)
BUN SERPL-MCNC: 42 MG/DL (ref 7–30)
BUN SERPL-MCNC: 42 MG/DL (ref 7–30)
BUN SERPL-MCNC: 44 MG/DL (ref 7–30)
BUN SERPL-MCNC: 44 MG/DL (ref 7–30)
BUN SERPL-MCNC: 45 MG/DL (ref 7–30)
BUN SERPL-MCNC: 46 MG/DL (ref 7–30)
BUN SERPL-MCNC: 47 MG/DL (ref 7–30)
BUN SERPL-MCNC: 48 MG/DL (ref 7–30)
BUN SERPL-MCNC: 50 MG/DL (ref 7–30)
BUN SERPL-MCNC: 51 MG/DL (ref 7–30)
BUN SERPL-MCNC: 51 MG/DL (ref 7–30)
BUN SERPL-MCNC: 52 MG/DL (ref 7–30)
BUN SERPL-MCNC: 52 MG/DL (ref 7–30)
BUN SERPL-MCNC: 53 MG/DL (ref 7–30)
BUN SERPL-MCNC: 53 MG/DL (ref 7–30)
BUN SERPL-MCNC: 54 MG/DL (ref 7–30)
BUN SERPL-MCNC: 54 MG/DL (ref 7–30)
BUN SERPL-MCNC: 55 MG/DL (ref 7–30)
BUN SERPL-MCNC: 56 MG/DL (ref 7–30)
BUN SERPL-MCNC: 57 MG/DL (ref 7–30)
BUN SERPL-MCNC: 59 MG/DL (ref 7–30)
BUN SERPL-MCNC: 60 MG/DL (ref 7–30)
BUN SERPL-MCNC: 61 MG/DL (ref 7–30)
BUN SERPL-MCNC: 62 MG/DL (ref 7–30)
BUN SERPL-MCNC: 65 MG/DL (ref 7–30)
BUN SERPL-MCNC: 70 MG/DL (ref 7–30)
BUN SERPL-MCNC: 74 MG/DL (ref 7–30)
BUN SERPL-MCNC: 78 MG/DL (ref 7–30)
BUN SERPL-MCNC: 83 MG/DL (ref 7–30)
BUN SERPL-MCNC: 92 MG/DL (ref 7–30)
BUN SERPL-MCNC: NORMAL MG/DL (ref 7–30)
BURR CELLS BLD QL SMEAR: ABNORMAL
BURR CELLS BLD QL SMEAR: SLIGHT
CA-I BLD-MCNC: 3.7 MG/DL (ref 4.4–5.2)
CA-I BLD-MCNC: 4 MG/DL (ref 4.4–5.2)
CA-I BLD-MCNC: 4.1 MG/DL (ref 4.4–5.2)
CA-I BLD-MCNC: 4.2 MG/DL (ref 4.4–5.2)
CA-I BLD-MCNC: 4.3 MG/DL (ref 4.4–5.2)
CA-I BLD-MCNC: 4.4 MG/DL (ref 4.4–5.2)
CA-I BLD-MCNC: 4.5 MG/DL (ref 4.4–5.2)
CA-I BLD-MCNC: 4.6 MG/DL (ref 4.4–5.2)
CA-I BLD-MCNC: 4.7 MG/DL (ref 4.4–5.2)
CA-I BLD-MCNC: 4.8 MG/DL (ref 4.4–5.2)
CA-I BLD-MCNC: 4.9 MG/DL (ref 4.4–5.2)
CA-I BLD-MCNC: 5 MG/DL (ref 4.4–5.2)
CA-I BLD-MCNC: 5.1 MG/DL (ref 4.4–5.2)
CA-I BLD-MCNC: 5.2 MG/DL (ref 4.4–5.2)
CA-I BLD-MCNC: 5.3 MG/DL (ref 4.4–5.2)
CA-I BLD-MCNC: 5.5 MG/DL (ref 4.4–5.2)
CA-I BLD-MCNC: 5.6 MG/DL (ref 4.4–5.2)
CA-I BLD-MCNC: 5.7 MG/DL (ref 4.4–5.2)
CA-I BLD-MCNC: 5.8 MG/DL (ref 4.4–5.2)
CA-I SERPL ISE-MCNC: 4.5 MG/DL (ref 4.4–5.2)
CA-I SERPL ISE-MCNC: 4.7 MG/DL (ref 4.4–5.2)
CA-I SERPL ISE-MCNC: 4.7 MG/DL (ref 4.4–5.2)
CALCIUM SERPL-MCNC: 10.2 MG/DL (ref 8.5–10.1)
CALCIUM SERPL-MCNC: 7.1 MG/DL (ref 8.5–10.1)
CALCIUM SERPL-MCNC: 7.6 MG/DL (ref 8.5–10.1)
CALCIUM SERPL-MCNC: 7.7 MG/DL (ref 8.5–10.1)
CALCIUM SERPL-MCNC: 7.8 MG/DL (ref 8.5–10.1)
CALCIUM SERPL-MCNC: 8 MG/DL (ref 8.5–10.1)
CALCIUM SERPL-MCNC: 8 MG/DL (ref 8.5–10.1)
CALCIUM SERPL-MCNC: 8.1 MG/DL (ref 8.5–10.1)
CALCIUM SERPL-MCNC: 8.2 MG/DL (ref 8.5–10.1)
CALCIUM SERPL-MCNC: 8.3 MG/DL (ref 8.5–10.1)
CALCIUM SERPL-MCNC: 8.4 MG/DL (ref 8.5–10.1)
CALCIUM SERPL-MCNC: 8.5 MG/DL (ref 8.5–10.1)
CALCIUM SERPL-MCNC: 8.6 MG/DL (ref 8.5–10.1)
CALCIUM SERPL-MCNC: 8.7 MG/DL (ref 8.5–10.1)
CALCIUM SERPL-MCNC: 8.8 MG/DL (ref 8.5–10.1)
CALCIUM SERPL-MCNC: 8.8 MG/DL (ref 8.5–10.1)
CALCIUM SERPL-MCNC: 9 MG/DL (ref 8.5–10.1)
CALCIUM SERPL-MCNC: 9.1 MG/DL (ref 8.5–10.1)
CALCIUM SERPL-MCNC: 9.4 MG/DL (ref 8.5–10.1)
CALCIUM SERPL-MCNC: 9.4 MG/DL (ref 8.5–10.1)
CALCIUM SERPL-MCNC: NORMAL MG/DL (ref 8.5–10.1)
CHLORIDE SERPL-SCNC: 100 MMOL/L (ref 94–109)
CHLORIDE SERPL-SCNC: 101 MMOL/L (ref 94–109)
CHLORIDE SERPL-SCNC: 102 MMOL/L (ref 94–109)
CHLORIDE SERPL-SCNC: 103 MMOL/L (ref 94–109)
CHLORIDE SERPL-SCNC: 103 MMOL/L (ref 94–109)
CHLORIDE SERPL-SCNC: 104 MMOL/L (ref 94–109)
CHLORIDE SERPL-SCNC: 105 MMOL/L (ref 94–109)
CHLORIDE SERPL-SCNC: 106 MMOL/L (ref 94–109)
CHLORIDE SERPL-SCNC: 107 MMOL/L (ref 94–109)
CHLORIDE SERPL-SCNC: 108 MMOL/L (ref 94–109)
CHLORIDE SERPL-SCNC: 108 MMOL/L (ref 94–109)
CHLORIDE SERPL-SCNC: 109 MMOL/L (ref 94–109)
CHLORIDE SERPL-SCNC: NORMAL MMOL/L (ref 94–109)
CI HYPERCOAGULATION INDEX: 0.2 RATIO (ref 0–3)
CI HYPERCOAGULATION INDEX: 0.4 RATIO (ref 0–3)
CI HYPERCOAGULATION INDEX: 0.5 RATIO (ref 0–3)
CI HYPERCOAGULATION INDEX: 1.1 RATIO (ref 0–3)
CI HYPERCOAGULATION INDEX: 1.9 RATIO (ref 0–3)
CI HYPOCOAGULATION INDEX: 0.7 RATIO (ref 0–3)
CI HYPOCOAGULATION INDEX: 2.3 RATIO (ref 0–3)
CK SERPL-CCNC: 1473 U/L (ref 30–300)
CK SERPL-CCNC: 1543 U/L (ref 30–300)
CK SERPL-CCNC: 1616 U/L (ref 30–300)
CK SERPL-CCNC: 1634 U/L (ref 30–300)
CK SERPL-CCNC: 1664 U/L (ref 30–300)
CK SERPL-CCNC: 1689 U/L (ref 30–300)
CK SERPL-CCNC: 1738 U/L (ref 30–300)
CK SERPL-CCNC: 1966 U/L (ref 30–300)
CK SERPL-CCNC: 2560 U/L (ref 30–300)
CK SERPL-CCNC: 2714 U/L (ref 30–300)
CK SERPL-CCNC: 2752 U/L (ref 30–300)
CK SERPL-CCNC: 3335 U/L (ref 30–300)
CK SERPL-CCNC: 3486 U/L (ref 30–300)
CK SERPL-CCNC: 3694 U/L (ref 30–300)
CK SERPL-CCNC: 3716 U/L (ref 30–300)
CK SERPL-CCNC: 3861 U/L (ref 30–300)
CK SERPL-CCNC: 3925 U/L (ref 30–300)
CK SERPL-CCNC: 3953 U/L (ref 30–300)
CK SERPL-CCNC: 4044 U/L (ref 30–300)
CK SERPL-CCNC: 4671 U/L (ref 30–300)
CK SERPL-CCNC: 5255 U/L (ref 30–300)
CK SERPL-CCNC: 5899 U/L (ref 30–300)
CK SERPL-CCNC: 6855 U/L (ref 30–300)
CK SERPL-CCNC: 7079 U/L (ref 30–300)
CK SERPL-CCNC: 7486 U/L (ref 30–300)
CK SERPL-CCNC: 7641 U/L (ref 30–300)
CK SERPL-CCNC: 8995 U/L (ref 30–300)
CK SERPL-CCNC: ABNORMAL U/L (ref 30–300)
CK SERPL-CCNC: ABNORMAL U/L (ref 30–300)
CK SERPL-CCNC: NORMAL U/L (ref 30–300)
CLOT LYSIS 30M P MA LENFR BLD TEG: 0 % (ref 0–8)
CLOT STRENGTH BLD TEG: 12.2 KD/SC (ref 5.3–13.2)
CLOT STRENGTH BLD TEG: 12.6 KD/SC (ref 5.3–13.2)
CLOT STRENGTH BLD TEG: 8.5 KD/SC (ref 5.3–13.2)
CLOT STRENGTH BLD TEG: 8.5 KD/SC (ref 5.3–13.2)
CLOT STRENGTH BLD TEG: 8.7 KD/SC (ref 5.3–13.2)
CO2 SERPL-SCNC: 11 MMOL/L (ref 20–32)
CO2 SERPL-SCNC: 11 MMOL/L (ref 20–32)
CO2 SERPL-SCNC: 12 MMOL/L (ref 20–32)
CO2 SERPL-SCNC: 13 MMOL/L (ref 20–32)
CO2 SERPL-SCNC: 13 MMOL/L (ref 20–32)
CO2 SERPL-SCNC: 15 MMOL/L (ref 20–32)
CO2 SERPL-SCNC: 15 MMOL/L (ref 20–32)
CO2 SERPL-SCNC: 18 MMOL/L (ref 20–32)
CO2 SERPL-SCNC: 19 MMOL/L (ref 20–32)
CO2 SERPL-SCNC: 21 MMOL/L (ref 20–32)
CO2 SERPL-SCNC: 22 MMOL/L (ref 20–32)
CO2 SERPL-SCNC: 22 MMOL/L (ref 20–32)
CO2 SERPL-SCNC: 23 MMOL/L (ref 20–32)
CO2 SERPL-SCNC: 24 MMOL/L (ref 20–32)
CO2 SERPL-SCNC: 24 MMOL/L (ref 20–32)
CO2 SERPL-SCNC: 26 MMOL/L (ref 20–32)
CO2 SERPL-SCNC: 27 MMOL/L (ref 20–32)
CO2 SERPL-SCNC: 28 MMOL/L (ref 20–32)
CO2 SERPL-SCNC: 29 MMOL/L (ref 20–32)
CO2 SERPL-SCNC: 30 MMOL/L (ref 20–32)
CO2 SERPL-SCNC: 31 MMOL/L (ref 20–32)
CO2 SERPL-SCNC: 32 MMOL/L (ref 20–32)
CO2 SERPL-SCNC: 32 MMOL/L (ref 20–32)
CO2 SERPL-SCNC: 33 MMOL/L (ref 20–32)
CO2 SERPL-SCNC: 34 MMOL/L (ref 20–32)
CO2 SERPL-SCNC: 34 MMOL/L (ref 20–32)
CO2 SERPL-SCNC: NORMAL MMOL/L (ref 20–32)
CORTIS SERPL-MCNC: 39.2 UG/DL (ref 4–22)
CREAT SERPL-MCNC: 0.97 MG/DL (ref 0.66–1.25)
CREAT SERPL-MCNC: 1.17 MG/DL (ref 0.66–1.25)
CREAT SERPL-MCNC: 1.39 MG/DL (ref 0.66–1.25)
CREAT SERPL-MCNC: 1.39 MG/DL (ref 0.66–1.25)
CREAT SERPL-MCNC: 1.4 MG/DL (ref 0.66–1.25)
CREAT SERPL-MCNC: 1.41 MG/DL (ref 0.66–1.25)
CREAT SERPL-MCNC: 1.42 MG/DL (ref 0.66–1.25)
CREAT SERPL-MCNC: 1.43 MG/DL (ref 0.66–1.25)
CREAT SERPL-MCNC: 1.45 MG/DL (ref 0.66–1.25)
CREAT SERPL-MCNC: 1.46 MG/DL (ref 0.66–1.25)
CREAT SERPL-MCNC: 1.49 MG/DL (ref 0.66–1.25)
CREAT SERPL-MCNC: 1.53 MG/DL (ref 0.66–1.25)
CREAT SERPL-MCNC: 1.53 MG/DL (ref 0.66–1.25)
CREAT SERPL-MCNC: 1.59 MG/DL (ref 0.66–1.25)
CREAT SERPL-MCNC: 1.63 MG/DL (ref 0.66–1.25)
CREAT SERPL-MCNC: 1.63 MG/DL (ref 0.66–1.25)
CREAT SERPL-MCNC: 1.65 MG/DL (ref 0.66–1.25)
CREAT SERPL-MCNC: 1.68 MG/DL (ref 0.66–1.25)
CREAT SERPL-MCNC: 1.69 MG/DL (ref 0.66–1.25)
CREAT SERPL-MCNC: 1.7 MG/DL (ref 0.66–1.25)
CREAT SERPL-MCNC: 1.71 MG/DL (ref 0.66–1.25)
CREAT SERPL-MCNC: 1.72 MG/DL (ref 0.66–1.25)
CREAT SERPL-MCNC: 1.72 MG/DL (ref 0.66–1.25)
CREAT SERPL-MCNC: 1.75 MG/DL (ref 0.66–1.25)
CREAT SERPL-MCNC: 1.75 MG/DL (ref 0.66–1.25)
CREAT SERPL-MCNC: 1.78 MG/DL (ref 0.66–1.25)
CREAT SERPL-MCNC: 1.83 MG/DL (ref 0.66–1.25)
CREAT SERPL-MCNC: 2.09 MG/DL (ref 0.66–1.25)
CREAT SERPL-MCNC: 2.23 MG/DL (ref 0.66–1.25)
CREAT SERPL-MCNC: 2.55 MG/DL (ref 0.66–1.25)
CREAT SERPL-MCNC: 2.62 MG/DL (ref 0.66–1.25)
CREAT SERPL-MCNC: 2.78 MG/DL (ref 0.66–1.25)
CREAT SERPL-MCNC: 2.9 MG/DL (ref 0.66–1.25)
CREAT SERPL-MCNC: 2.99 MG/DL (ref 0.66–1.25)
CREAT SERPL-MCNC: 3.21 MG/DL (ref 0.66–1.25)
CREAT SERPL-MCNC: 3.23 MG/DL (ref 0.66–1.25)
CREAT SERPL-MCNC: 3.25 MG/DL (ref 0.66–1.25)
CREAT SERPL-MCNC: 3.27 MG/DL (ref 0.66–1.25)
CREAT SERPL-MCNC: 3.36 MG/DL (ref 0.66–1.25)
CREAT SERPL-MCNC: 3.38 MG/DL (ref 0.66–1.25)
CREAT SERPL-MCNC: 3.4 MG/DL (ref 0.66–1.25)
CREAT SERPL-MCNC: 3.44 MG/DL (ref 0.66–1.25)
CREAT SERPL-MCNC: NORMAL MG/DL (ref 0.66–1.25)
CRP SERPL-MCNC: 170 MG/L (ref 0–8)
CRP SERPL-MCNC: 65 MG/L (ref 0–8)
CRP SERPL-MCNC: 77 MG/L (ref 0–8)
CRP SERPL-MCNC: 94 MG/L (ref 0–8)
D DIMER PPP FEU-MCNC: 0.9 UG/ML FEU (ref 0–0.5)
D DIMER PPP FEU-MCNC: 1 UG/ML FEU (ref 0–0.5)
D DIMER PPP FEU-MCNC: 1.2 UG/ML FEU (ref 0–0.5)
D DIMER PPP FEU-MCNC: 1.2 UG/ML FEU (ref 0–0.5)
D DIMER PPP FEU-MCNC: 1.6 UG/ML FEU (ref 0–0.5)
D DIMER PPP FEU-MCNC: 1.7 UG/ML FEU (ref 0–0.5)
D DIMER PPP FEU-MCNC: 14.3 UG/ML FEU (ref 0–0.5)
D DIMER PPP FEU-MCNC: 2.1 UG/ML FEU (ref 0–0.5)
D DIMER PPP FEU-MCNC: 2.2 UG/ML FEU (ref 0–0.5)
D DIMER PPP FEU-MCNC: 2.3 UG/ML FEU (ref 0–0.5)
D DIMER PPP FEU-MCNC: 2.7 UG/ML FEU (ref 0–0.5)
D DIMER PPP FEU-MCNC: 2.8 UG/ML FEU (ref 0–0.5)
D DIMER PPP FEU-MCNC: 3.3 UG/ML FEU (ref 0–0.5)
D DIMER PPP FEU-MCNC: 3.3 UG/ML FEU (ref 0–0.5)
D DIMER PPP FEU-MCNC: 3.6 UG/ML FEU (ref 0–0.5)
D DIMER PPP FEU-MCNC: 3.8 UG/ML FEU (ref 0–0.5)
D DIMER PPP FEU-MCNC: 6.2 UG/ML FEU (ref 0–0.5)
D DIMER PPP FEU-MCNC: 7.5 UG/ML FEU (ref 0–0.5)
DIFFERENTIAL METHOD BLD: ABNORMAL
DIFFERENTIAL METHOD BLD: NORMAL
EOSINOPHIL # BLD AUTO: 0 10E9/L (ref 0–0.7)
EOSINOPHIL # BLD AUTO: 0.1 10E9/L (ref 0–0.7)
EOSINOPHIL # BLD AUTO: 0.2 10E9/L (ref 0–0.7)
EOSINOPHIL # BLD AUTO: 0.3 10E9/L (ref 0–0.7)
EOSINOPHIL # BLD AUTO: 0.4 10E9/L (ref 0–0.7)
EOSINOPHIL # BLD AUTO: 0.4 10E9/L (ref 0–0.7)
EOSINOPHIL NFR BLD AUTO: 0 %
EOSINOPHIL NFR BLD AUTO: 0.9 %
EOSINOPHIL NFR BLD AUTO: 1 %
EOSINOPHIL NFR BLD AUTO: 1.8 %
EOSINOPHIL NFR BLD AUTO: 2 %
EOSINOPHIL NFR BLD AUTO: 2.6 %
EOSINOPHIL NFR BLD AUTO: 2.7 %
ERYTHROCYTE [DISTWIDTH] IN BLOOD BY AUTOMATED COUNT: 15.7 % (ref 10–15)
ERYTHROCYTE [DISTWIDTH] IN BLOOD BY AUTOMATED COUNT: 15.8 % (ref 10–15)
ERYTHROCYTE [DISTWIDTH] IN BLOOD BY AUTOMATED COUNT: 15.9 % (ref 10–15)
ERYTHROCYTE [DISTWIDTH] IN BLOOD BY AUTOMATED COUNT: 15.9 % (ref 10–15)
ERYTHROCYTE [DISTWIDTH] IN BLOOD BY AUTOMATED COUNT: 16 % (ref 10–15)
ERYTHROCYTE [DISTWIDTH] IN BLOOD BY AUTOMATED COUNT: 16 % (ref 10–15)
ERYTHROCYTE [DISTWIDTH] IN BLOOD BY AUTOMATED COUNT: 16.1 % (ref 10–15)
ERYTHROCYTE [DISTWIDTH] IN BLOOD BY AUTOMATED COUNT: 16.2 % (ref 10–15)
ERYTHROCYTE [DISTWIDTH] IN BLOOD BY AUTOMATED COUNT: 16.3 % (ref 10–15)
ERYTHROCYTE [DISTWIDTH] IN BLOOD BY AUTOMATED COUNT: 16.4 % (ref 10–15)
ERYTHROCYTE [DISTWIDTH] IN BLOOD BY AUTOMATED COUNT: 16.4 % (ref 10–15)
ERYTHROCYTE [DISTWIDTH] IN BLOOD BY AUTOMATED COUNT: 16.5 % (ref 10–15)
ERYTHROCYTE [DISTWIDTH] IN BLOOD BY AUTOMATED COUNT: 16.6 % (ref 10–15)
ERYTHROCYTE [DISTWIDTH] IN BLOOD BY AUTOMATED COUNT: 16.7 % (ref 10–15)
ERYTHROCYTE [DISTWIDTH] IN BLOOD BY AUTOMATED COUNT: 16.7 % (ref 10–15)
ERYTHROCYTE [DISTWIDTH] IN BLOOD BY AUTOMATED COUNT: 16.8 % (ref 10–15)
ERYTHROCYTE [DISTWIDTH] IN BLOOD BY AUTOMATED COUNT: 16.9 % (ref 10–15)
ERYTHROCYTE [DISTWIDTH] IN BLOOD BY AUTOMATED COUNT: 17 % (ref 10–15)
ERYTHROCYTE [DISTWIDTH] IN BLOOD BY AUTOMATED COUNT: 17.2 % (ref 10–15)
ERYTHROCYTE [DISTWIDTH] IN BLOOD BY AUTOMATED COUNT: 17.3 % (ref 10–15)
ERYTHROCYTE [DISTWIDTH] IN BLOOD BY AUTOMATED COUNT: 18 % (ref 10–15)
ERYTHROCYTE [DISTWIDTH] IN BLOOD BY AUTOMATED COUNT: 18.3 % (ref 10–15)
ERYTHROCYTE [DISTWIDTH] IN BLOOD BY AUTOMATED COUNT: 18.4 % (ref 10–15)
ERYTHROCYTE [DISTWIDTH] IN BLOOD BY AUTOMATED COUNT: 18.5 % (ref 10–15)
ERYTHROCYTE [DISTWIDTH] IN BLOOD BY AUTOMATED COUNT: 18.5 % (ref 10–15)
ERYTHROCYTE [DISTWIDTH] IN BLOOD BY AUTOMATED COUNT: 18.6 % (ref 10–15)
ERYTHROCYTE [DISTWIDTH] IN BLOOD BY AUTOMATED COUNT: 19.3 % (ref 10–15)
ERYTHROCYTE [DISTWIDTH] IN BLOOD BY AUTOMATED COUNT: 19.4 % (ref 10–15)
ERYTHROCYTE [DISTWIDTH] IN BLOOD BY AUTOMATED COUNT: 19.8 % (ref 10–15)
ERYTHROCYTE [DISTWIDTH] IN BLOOD BY AUTOMATED COUNT: 20.2 % (ref 10–15)
ERYTHROCYTE [DISTWIDTH] IN BLOOD BY AUTOMATED COUNT: 20.5 % (ref 10–15)
ERYTHROCYTE [DISTWIDTH] IN BLOOD BY AUTOMATED COUNT: 20.7 % (ref 10–15)
ERYTHROCYTE [DISTWIDTH] IN BLOOD BY AUTOMATED COUNT: NORMAL % (ref 10–15)
FIBRINOGEN PPP-MCNC: 162 MG/DL (ref 200–420)
FIBRINOGEN PPP-MCNC: 166 MG/DL (ref 200–420)
FIBRINOGEN PPP-MCNC: 171 MG/DL (ref 200–420)
FIBRINOGEN PPP-MCNC: 173 MG/DL (ref 200–420)
FIBRINOGEN PPP-MCNC: 176 MG/DL (ref 200–420)
FIBRINOGEN PPP-MCNC: 185 MG/DL (ref 200–420)
FIBRINOGEN PPP-MCNC: 202 MG/DL (ref 200–420)
FIBRINOGEN PPP-MCNC: 224 MG/DL (ref 200–420)
FIBRINOGEN PPP-MCNC: 227 MG/DL (ref 200–420)
FIBRINOGEN PPP-MCNC: 233 MG/DL (ref 200–420)
FIBRINOGEN PPP-MCNC: 246 MG/DL (ref 200–420)
FIBRINOGEN PPP-MCNC: 247 MG/DL (ref 200–420)
FIBRINOGEN PPP-MCNC: 261 MG/DL (ref 200–420)
FIBRINOGEN PPP-MCNC: 277 MG/DL (ref 200–420)
FIBRINOGEN PPP-MCNC: 280 MG/DL (ref 200–420)
FIBRINOGEN PPP-MCNC: 280 MG/DL (ref 200–420)
FIBRINOGEN PPP-MCNC: 286 MG/DL (ref 200–420)
FIBRINOGEN PPP-MCNC: 295 MG/DL (ref 200–420)
FIBRINOGEN PPP-MCNC: 298 MG/DL (ref 200–420)
FIBRINOGEN PPP-MCNC: 334 MG/DL (ref 200–420)
FIBRINOGEN PPP-MCNC: 353 MG/DL (ref 200–420)
FIBRINOGEN PPP-MCNC: 365 MG/DL (ref 200–420)
FIBRINOGEN PPP-MCNC: 368 MG/DL (ref 200–420)
FIBRINOGEN PPP-MCNC: 388 MG/DL (ref 200–420)
FIBRINOGEN PPP-MCNC: 391 MG/DL (ref 200–420)
FIBRINOGEN PPP-MCNC: 391 MG/DL (ref 200–420)
FIBRINOGEN PPP-MCNC: 394 MG/DL (ref 200–420)
FIBRINOGEN PPP-MCNC: 397 MG/DL (ref 200–420)
FIBRINOGEN PPP-MCNC: 400 MG/DL (ref 200–420)
FIBRINOGEN PPP-MCNC: 404 MG/DL (ref 200–420)
FIBRINOGEN PPP-MCNC: 404 MG/DL (ref 200–420)
FIBRINOGEN PPP-MCNC: 407 MG/DL (ref 200–420)
FIBRINOGEN PPP-MCNC: 417 MG/DL (ref 200–420)
FIBRINOGEN PPP-MCNC: 424 MG/DL (ref 200–420)
FIBRINOGEN PPP-MCNC: 428 MG/DL (ref 200–420)
FIBRINOGEN PPP-MCNC: 443 MG/DL (ref 200–420)
FIBRINOGEN PPP-MCNC: 468 MG/DL (ref 200–420)
FIBRINOGEN PPP-MCNC: 472 MG/DL (ref 200–420)
FIBRINOGEN PPP-MCNC: 477 MG/DL (ref 200–420)
FIBRINOGEN PPP-MCNC: 501 MG/DL (ref 200–420)
FIBRINOGEN PPP-MCNC: 506 MG/DL (ref 200–420)
FIBRINOGEN PPP-MCNC: 506 MG/DL (ref 200–420)
FIBRINOGEN PPP-MCNC: 511 MG/DL (ref 200–420)
FIBRINOGEN PPP-MCNC: 521 MG/DL (ref 200–420)
FIBRINOGEN PPP-MCNC: 533 MG/DL (ref 200–420)
G ACTUAL CLOT STRENGTH: 7.3 KD/SC (ref 4.5–11)
G ACTUAL CLOT STRENGTH: 7.8 KD/SC (ref 4.5–11)
GFR SERPL CREATININE-BSD FRML MDRD: 18 ML/MIN/1.7M2
GFR SERPL CREATININE-BSD FRML MDRD: 18 ML/MIN/1.7M2
GFR SERPL CREATININE-BSD FRML MDRD: 19 ML/MIN/1.7M2
GFR SERPL CREATININE-BSD FRML MDRD: 20 ML/MIN/1.7M2
GFR SERPL CREATININE-BSD FRML MDRD: 20 ML/MIN/1.7M2
GFR SERPL CREATININE-BSD FRML MDRD: 21 ML/MIN/1.7M2
GFR SERPL CREATININE-BSD FRML MDRD: 22 ML/MIN/1.7M2
GFR SERPL CREATININE-BSD FRML MDRD: 23 ML/MIN/1.7M2
GFR SERPL CREATININE-BSD FRML MDRD: 25 ML/MIN/1.7M2
GFR SERPL CREATININE-BSD FRML MDRD: 26 ML/MIN/1.7M2
GFR SERPL CREATININE-BSD FRML MDRD: 30 ML/MIN/1.7M2
GFR SERPL CREATININE-BSD FRML MDRD: 32 ML/MIN/1.7M2
GFR SERPL CREATININE-BSD FRML MDRD: 38 ML/MIN/1.7M2
GFR SERPL CREATININE-BSD FRML MDRD: 39 ML/MIN/1.7M2
GFR SERPL CREATININE-BSD FRML MDRD: 40 ML/MIN/1.7M2
GFR SERPL CREATININE-BSD FRML MDRD: 41 ML/MIN/1.7M2
GFR SERPL CREATININE-BSD FRML MDRD: 42 ML/MIN/1.7M2
GFR SERPL CREATININE-BSD FRML MDRD: 42 ML/MIN/1.7M2
GFR SERPL CREATININE-BSD FRML MDRD: 43 ML/MIN/1.7M2
GFR SERPL CREATININE-BSD FRML MDRD: 43 ML/MIN/1.7M2
GFR SERPL CREATININE-BSD FRML MDRD: 44 ML/MIN/1.7M2
GFR SERPL CREATININE-BSD FRML MDRD: 46 ML/MIN/1.7M2
GFR SERPL CREATININE-BSD FRML MDRD: 46 ML/MIN/1.7M2
GFR SERPL CREATININE-BSD FRML MDRD: 48 ML/MIN/1.7M2
GFR SERPL CREATININE-BSD FRML MDRD: 49 ML/MIN/1.7M2
GFR SERPL CREATININE-BSD FRML MDRD: 49 ML/MIN/1.7M2
GFR SERPL CREATININE-BSD FRML MDRD: 50 ML/MIN/1.7M2
GFR SERPL CREATININE-BSD FRML MDRD: 51 ML/MIN/1.7M2
GFR SERPL CREATININE-BSD FRML MDRD: 51 ML/MIN/1.7M2
GFR SERPL CREATININE-BSD FRML MDRD: 52 ML/MIN/1.7M2
GFR SERPL CREATININE-BSD FRML MDRD: 52 ML/MIN/1.7M2
GFR SERPL CREATININE-BSD FRML MDRD: 63 ML/MIN/1.7M2
GFR SERPL CREATININE-BSD FRML MDRD: 78 ML/MIN/1.7M2
GFR SERPL CREATININE-BSD FRML MDRD: NORMAL ML/MIN/1.7M2
GLUCOSE BLD-MCNC: 105 MG/DL (ref 70–99)
GLUCOSE BLD-MCNC: 108 MG/DL (ref 70–99)
GLUCOSE BLD-MCNC: 115 MG/DL (ref 70–99)
GLUCOSE BLD-MCNC: 121 MG/DL (ref 70–99)
GLUCOSE BLD-MCNC: 130 MG/DL (ref 70–99)
GLUCOSE BLD-MCNC: 139 MG/DL (ref 70–99)
GLUCOSE BLD-MCNC: 146 MG/DL (ref 70–99)
GLUCOSE BLD-MCNC: 147 MG/DL (ref 70–99)
GLUCOSE BLD-MCNC: 148 MG/DL (ref 70–99)
GLUCOSE BLD-MCNC: 153 MG/DL (ref 70–99)
GLUCOSE BLD-MCNC: 154 MG/DL (ref 70–99)
GLUCOSE BLD-MCNC: 156 MG/DL (ref 70–99)
GLUCOSE BLD-MCNC: 157 MG/DL (ref 70–99)
GLUCOSE BLD-MCNC: 160 MG/DL (ref 70–99)
GLUCOSE BLD-MCNC: 160 MG/DL (ref 70–99)
GLUCOSE BLD-MCNC: 164 MG/DL (ref 70–99)
GLUCOSE BLD-MCNC: 173 MG/DL (ref 70–99)
GLUCOSE BLD-MCNC: 174 MG/DL (ref 70–99)
GLUCOSE BLD-MCNC: 175 MG/DL (ref 70–99)
GLUCOSE BLD-MCNC: 188 MG/DL (ref 70–99)
GLUCOSE BLD-MCNC: 189 MG/DL (ref 70–99)
GLUCOSE BLD-MCNC: 194 MG/DL (ref 70–99)
GLUCOSE BLD-MCNC: 194 MG/DL (ref 70–99)
GLUCOSE BLD-MCNC: 198 MG/DL (ref 70–99)
GLUCOSE BLDC GLUCOMTR-MCNC: 100 MG/DL (ref 70–99)
GLUCOSE BLDC GLUCOMTR-MCNC: 100 MG/DL (ref 70–99)
GLUCOSE BLDC GLUCOMTR-MCNC: 102 MG/DL (ref 70–99)
GLUCOSE BLDC GLUCOMTR-MCNC: 103 MG/DL (ref 70–99)
GLUCOSE BLDC GLUCOMTR-MCNC: 104 MG/DL (ref 70–99)
GLUCOSE BLDC GLUCOMTR-MCNC: 105 MG/DL (ref 70–99)
GLUCOSE BLDC GLUCOMTR-MCNC: 107 MG/DL (ref 70–99)
GLUCOSE BLDC GLUCOMTR-MCNC: 109 MG/DL (ref 70–99)
GLUCOSE BLDC GLUCOMTR-MCNC: 109 MG/DL (ref 70–99)
GLUCOSE BLDC GLUCOMTR-MCNC: 110 MG/DL (ref 70–99)
GLUCOSE BLDC GLUCOMTR-MCNC: 113 MG/DL (ref 70–99)
GLUCOSE BLDC GLUCOMTR-MCNC: 113 MG/DL (ref 70–99)
GLUCOSE BLDC GLUCOMTR-MCNC: 115 MG/DL (ref 70–99)
GLUCOSE BLDC GLUCOMTR-MCNC: 117 MG/DL (ref 70–99)
GLUCOSE BLDC GLUCOMTR-MCNC: 119 MG/DL (ref 70–99)
GLUCOSE BLDC GLUCOMTR-MCNC: 120 MG/DL (ref 70–99)
GLUCOSE BLDC GLUCOMTR-MCNC: 121 MG/DL (ref 70–99)
GLUCOSE BLDC GLUCOMTR-MCNC: 122 MG/DL (ref 70–99)
GLUCOSE BLDC GLUCOMTR-MCNC: 122 MG/DL (ref 70–99)
GLUCOSE BLDC GLUCOMTR-MCNC: 123 MG/DL (ref 70–99)
GLUCOSE BLDC GLUCOMTR-MCNC: 124 MG/DL (ref 70–99)
GLUCOSE BLDC GLUCOMTR-MCNC: 125 MG/DL (ref 70–99)
GLUCOSE BLDC GLUCOMTR-MCNC: 127 MG/DL (ref 70–99)
GLUCOSE BLDC GLUCOMTR-MCNC: 127 MG/DL (ref 70–99)
GLUCOSE BLDC GLUCOMTR-MCNC: 130 MG/DL (ref 70–99)
GLUCOSE BLDC GLUCOMTR-MCNC: 131 MG/DL (ref 70–99)
GLUCOSE BLDC GLUCOMTR-MCNC: 132 MG/DL (ref 70–99)
GLUCOSE BLDC GLUCOMTR-MCNC: 133 MG/DL (ref 70–99)
GLUCOSE BLDC GLUCOMTR-MCNC: 134 MG/DL (ref 70–99)
GLUCOSE BLDC GLUCOMTR-MCNC: 135 MG/DL (ref 70–99)
GLUCOSE BLDC GLUCOMTR-MCNC: 135 MG/DL (ref 70–99)
GLUCOSE BLDC GLUCOMTR-MCNC: 136 MG/DL (ref 70–99)
GLUCOSE BLDC GLUCOMTR-MCNC: 136 MG/DL (ref 70–99)
GLUCOSE BLDC GLUCOMTR-MCNC: 137 MG/DL (ref 70–99)
GLUCOSE BLDC GLUCOMTR-MCNC: 138 MG/DL (ref 70–99)
GLUCOSE BLDC GLUCOMTR-MCNC: 139 MG/DL (ref 70–99)
GLUCOSE BLDC GLUCOMTR-MCNC: 140 MG/DL (ref 70–99)
GLUCOSE BLDC GLUCOMTR-MCNC: 140 MG/DL (ref 70–99)
GLUCOSE BLDC GLUCOMTR-MCNC: 141 MG/DL (ref 70–99)
GLUCOSE BLDC GLUCOMTR-MCNC: 141 MG/DL (ref 70–99)
GLUCOSE BLDC GLUCOMTR-MCNC: 142 MG/DL (ref 70–99)
GLUCOSE BLDC GLUCOMTR-MCNC: 143 MG/DL (ref 70–99)
GLUCOSE BLDC GLUCOMTR-MCNC: 145 MG/DL (ref 70–99)
GLUCOSE BLDC GLUCOMTR-MCNC: 145 MG/DL (ref 70–99)
GLUCOSE BLDC GLUCOMTR-MCNC: 146 MG/DL (ref 70–99)
GLUCOSE BLDC GLUCOMTR-MCNC: 147 MG/DL (ref 70–99)
GLUCOSE BLDC GLUCOMTR-MCNC: 149 MG/DL (ref 70–99)
GLUCOSE BLDC GLUCOMTR-MCNC: 149 MG/DL (ref 70–99)
GLUCOSE BLDC GLUCOMTR-MCNC: 150 MG/DL (ref 70–99)
GLUCOSE BLDC GLUCOMTR-MCNC: 151 MG/DL (ref 70–99)
GLUCOSE BLDC GLUCOMTR-MCNC: 152 MG/DL (ref 70–99)
GLUCOSE BLDC GLUCOMTR-MCNC: 152 MG/DL (ref 70–99)
GLUCOSE BLDC GLUCOMTR-MCNC: 153 MG/DL (ref 70–99)
GLUCOSE BLDC GLUCOMTR-MCNC: 153 MG/DL (ref 70–99)
GLUCOSE BLDC GLUCOMTR-MCNC: 154 MG/DL (ref 70–99)
GLUCOSE BLDC GLUCOMTR-MCNC: 154 MG/DL (ref 70–99)
GLUCOSE BLDC GLUCOMTR-MCNC: 155 MG/DL (ref 70–99)
GLUCOSE BLDC GLUCOMTR-MCNC: 155 MG/DL (ref 70–99)
GLUCOSE BLDC GLUCOMTR-MCNC: 156 MG/DL (ref 70–99)
GLUCOSE BLDC GLUCOMTR-MCNC: 156 MG/DL (ref 70–99)
GLUCOSE BLDC GLUCOMTR-MCNC: 157 MG/DL (ref 70–99)
GLUCOSE BLDC GLUCOMTR-MCNC: 158 MG/DL (ref 70–99)
GLUCOSE BLDC GLUCOMTR-MCNC: 158 MG/DL (ref 70–99)
GLUCOSE BLDC GLUCOMTR-MCNC: 159 MG/DL (ref 70–99)
GLUCOSE BLDC GLUCOMTR-MCNC: 159 MG/DL (ref 70–99)
GLUCOSE BLDC GLUCOMTR-MCNC: 162 MG/DL (ref 70–99)
GLUCOSE BLDC GLUCOMTR-MCNC: 163 MG/DL (ref 70–99)
GLUCOSE BLDC GLUCOMTR-MCNC: 163 MG/DL (ref 70–99)
GLUCOSE BLDC GLUCOMTR-MCNC: 165 MG/DL (ref 70–99)
GLUCOSE BLDC GLUCOMTR-MCNC: 165 MG/DL (ref 70–99)
GLUCOSE BLDC GLUCOMTR-MCNC: 166 MG/DL (ref 70–99)
GLUCOSE BLDC GLUCOMTR-MCNC: 166 MG/DL (ref 70–99)
GLUCOSE BLDC GLUCOMTR-MCNC: 167 MG/DL (ref 70–99)
GLUCOSE BLDC GLUCOMTR-MCNC: 167 MG/DL (ref 70–99)
GLUCOSE BLDC GLUCOMTR-MCNC: 168 MG/DL (ref 70–99)
GLUCOSE BLDC GLUCOMTR-MCNC: 169 MG/DL (ref 70–99)
GLUCOSE BLDC GLUCOMTR-MCNC: 169 MG/DL (ref 70–99)
GLUCOSE BLDC GLUCOMTR-MCNC: 170 MG/DL (ref 70–99)
GLUCOSE BLDC GLUCOMTR-MCNC: 170 MG/DL (ref 70–99)
GLUCOSE BLDC GLUCOMTR-MCNC: 171 MG/DL (ref 70–99)
GLUCOSE BLDC GLUCOMTR-MCNC: 171 MG/DL (ref 70–99)
GLUCOSE BLDC GLUCOMTR-MCNC: 173 MG/DL (ref 70–99)
GLUCOSE BLDC GLUCOMTR-MCNC: 174 MG/DL (ref 70–99)
GLUCOSE BLDC GLUCOMTR-MCNC: 177 MG/DL (ref 70–99)
GLUCOSE BLDC GLUCOMTR-MCNC: 177 MG/DL (ref 70–99)
GLUCOSE BLDC GLUCOMTR-MCNC: 182 MG/DL (ref 70–99)
GLUCOSE BLDC GLUCOMTR-MCNC: 184 MG/DL (ref 70–99)
GLUCOSE BLDC GLUCOMTR-MCNC: 186 MG/DL (ref 70–99)
GLUCOSE BLDC GLUCOMTR-MCNC: 186 MG/DL (ref 70–99)
GLUCOSE BLDC GLUCOMTR-MCNC: 187 MG/DL (ref 70–99)
GLUCOSE BLDC GLUCOMTR-MCNC: 188 MG/DL (ref 70–99)
GLUCOSE BLDC GLUCOMTR-MCNC: 191 MG/DL (ref 70–99)
GLUCOSE BLDC GLUCOMTR-MCNC: 196 MG/DL (ref 70–99)
GLUCOSE BLDC GLUCOMTR-MCNC: 197 MG/DL (ref 70–99)
GLUCOSE BLDC GLUCOMTR-MCNC: 199 MG/DL (ref 70–99)
GLUCOSE BLDC GLUCOMTR-MCNC: 208 MG/DL (ref 70–99)
GLUCOSE BLDC GLUCOMTR-MCNC: 213 MG/DL (ref 70–99)
GLUCOSE BLDC GLUCOMTR-MCNC: 214 MG/DL (ref 70–99)
GLUCOSE BLDC GLUCOMTR-MCNC: 220 MG/DL (ref 70–99)
GLUCOSE BLDC GLUCOMTR-MCNC: 224 MG/DL (ref 70–99)
GLUCOSE BLDC GLUCOMTR-MCNC: 226 MG/DL (ref 70–99)
GLUCOSE BLDC GLUCOMTR-MCNC: 227 MG/DL (ref 70–99)
GLUCOSE BLDC GLUCOMTR-MCNC: 227 MG/DL (ref 70–99)
GLUCOSE BLDC GLUCOMTR-MCNC: 234 MG/DL (ref 70–99)
GLUCOSE BLDC GLUCOMTR-MCNC: 235 MG/DL (ref 70–99)
GLUCOSE BLDC GLUCOMTR-MCNC: 238 MG/DL (ref 70–99)
GLUCOSE BLDC GLUCOMTR-MCNC: 241 MG/DL (ref 70–99)
GLUCOSE BLDC GLUCOMTR-MCNC: 245 MG/DL (ref 70–99)
GLUCOSE BLDC GLUCOMTR-MCNC: 252 MG/DL (ref 70–99)
GLUCOSE BLDC GLUCOMTR-MCNC: 254 MG/DL (ref 70–99)
GLUCOSE BLDC GLUCOMTR-MCNC: 256 MG/DL (ref 70–99)
GLUCOSE BLDC GLUCOMTR-MCNC: 265 MG/DL (ref 70–99)
GLUCOSE BLDC GLUCOMTR-MCNC: 270 MG/DL (ref 70–99)
GLUCOSE BLDC GLUCOMTR-MCNC: 271 MG/DL (ref 70–99)
GLUCOSE BLDC GLUCOMTR-MCNC: 286 MG/DL (ref 70–99)
GLUCOSE BLDC GLUCOMTR-MCNC: 301 MG/DL (ref 70–99)
GLUCOSE BLDC GLUCOMTR-MCNC: 57 MG/DL (ref 70–99)
GLUCOSE BLDC GLUCOMTR-MCNC: 59 MG/DL (ref 70–99)
GLUCOSE BLDC GLUCOMTR-MCNC: 62 MG/DL (ref 70–99)
GLUCOSE BLDC GLUCOMTR-MCNC: 70 MG/DL (ref 70–99)
GLUCOSE BLDC GLUCOMTR-MCNC: 72 MG/DL (ref 70–99)
GLUCOSE BLDC GLUCOMTR-MCNC: 75 MG/DL (ref 70–99)
GLUCOSE BLDC GLUCOMTR-MCNC: 76 MG/DL (ref 70–99)
GLUCOSE BLDC GLUCOMTR-MCNC: 78 MG/DL (ref 70–99)
GLUCOSE BLDC GLUCOMTR-MCNC: 79 MG/DL (ref 70–99)
GLUCOSE BLDC GLUCOMTR-MCNC: 80 MG/DL (ref 70–99)
GLUCOSE BLDC GLUCOMTR-MCNC: 81 MG/DL (ref 70–99)
GLUCOSE BLDC GLUCOMTR-MCNC: 83 MG/DL (ref 70–99)
GLUCOSE BLDC GLUCOMTR-MCNC: 85 MG/DL (ref 70–99)
GLUCOSE BLDC GLUCOMTR-MCNC: 86 MG/DL (ref 70–99)
GLUCOSE BLDC GLUCOMTR-MCNC: 87 MG/DL (ref 70–99)
GLUCOSE BLDC GLUCOMTR-MCNC: 87 MG/DL (ref 70–99)
GLUCOSE BLDC GLUCOMTR-MCNC: 88 MG/DL (ref 70–99)
GLUCOSE BLDC GLUCOMTR-MCNC: 89 MG/DL (ref 70–99)
GLUCOSE BLDC GLUCOMTR-MCNC: 90 MG/DL (ref 70–99)
GLUCOSE BLDC GLUCOMTR-MCNC: 90 MG/DL (ref 70–99)
GLUCOSE BLDC GLUCOMTR-MCNC: 91 MG/DL (ref 70–99)
GLUCOSE BLDC GLUCOMTR-MCNC: 95 MG/DL (ref 70–99)
GLUCOSE BLDC GLUCOMTR-MCNC: 96 MG/DL (ref 70–99)
GLUCOSE SERPL-MCNC: 103 MG/DL (ref 70–99)
GLUCOSE SERPL-MCNC: 109 MG/DL (ref 70–99)
GLUCOSE SERPL-MCNC: 110 MG/DL (ref 70–99)
GLUCOSE SERPL-MCNC: 116 MG/DL (ref 70–99)
GLUCOSE SERPL-MCNC: 116 MG/DL (ref 70–99)
GLUCOSE SERPL-MCNC: 120 MG/DL (ref 70–99)
GLUCOSE SERPL-MCNC: 122 MG/DL (ref 70–99)
GLUCOSE SERPL-MCNC: 125 MG/DL (ref 70–99)
GLUCOSE SERPL-MCNC: 130 MG/DL (ref 70–99)
GLUCOSE SERPL-MCNC: 133 MG/DL (ref 70–99)
GLUCOSE SERPL-MCNC: 137 MG/DL (ref 70–99)
GLUCOSE SERPL-MCNC: 137 MG/DL (ref 70–99)
GLUCOSE SERPL-MCNC: 140 MG/DL (ref 70–99)
GLUCOSE SERPL-MCNC: 142 MG/DL (ref 70–99)
GLUCOSE SERPL-MCNC: 142 MG/DL (ref 70–99)
GLUCOSE SERPL-MCNC: 145 MG/DL (ref 70–99)
GLUCOSE SERPL-MCNC: 147 MG/DL (ref 70–99)
GLUCOSE SERPL-MCNC: 150 MG/DL (ref 70–99)
GLUCOSE SERPL-MCNC: 151 MG/DL (ref 70–99)
GLUCOSE SERPL-MCNC: 152 MG/DL (ref 70–99)
GLUCOSE SERPL-MCNC: 153 MG/DL (ref 70–99)
GLUCOSE SERPL-MCNC: 154 MG/DL (ref 70–99)
GLUCOSE SERPL-MCNC: 155 MG/DL (ref 70–99)
GLUCOSE SERPL-MCNC: 156 MG/DL (ref 70–99)
GLUCOSE SERPL-MCNC: 167 MG/DL (ref 70–99)
GLUCOSE SERPL-MCNC: 169 MG/DL (ref 70–99)
GLUCOSE SERPL-MCNC: 179 MG/DL (ref 70–99)
GLUCOSE SERPL-MCNC: 182 MG/DL (ref 70–99)
GLUCOSE SERPL-MCNC: 187 MG/DL (ref 70–99)
GLUCOSE SERPL-MCNC: 197 MG/DL (ref 70–99)
GLUCOSE SERPL-MCNC: 199 MG/DL (ref 70–99)
GLUCOSE SERPL-MCNC: 216 MG/DL (ref 70–99)
GLUCOSE SERPL-MCNC: 221 MG/DL (ref 70–99)
GLUCOSE SERPL-MCNC: 264 MG/DL (ref 70–99)
GLUCOSE SERPL-MCNC: 267 MG/DL (ref 70–99)
GLUCOSE SERPL-MCNC: 284 MG/DL (ref 70–99)
GLUCOSE SERPL-MCNC: 64 MG/DL (ref 70–99)
GLUCOSE SERPL-MCNC: 67 MG/DL (ref 70–99)
GLUCOSE SERPL-MCNC: 69 MG/DL (ref 70–99)
GLUCOSE SERPL-MCNC: 70 MG/DL (ref 70–99)
GLUCOSE SERPL-MCNC: 77 MG/DL (ref 70–99)
GLUCOSE SERPL-MCNC: 79 MG/DL (ref 70–99)
GLUCOSE SERPL-MCNC: 90 MG/DL (ref 70–99)
GLUCOSE SERPL-MCNC: NORMAL MG/DL (ref 70–99)
HBA1C MFR BLD: 6 % (ref 0–5.6)
HCO3 BLD-SCNC: 11 MMOL/L (ref 21–28)
HCO3 BLD-SCNC: 12 MMOL/L (ref 21–28)
HCO3 BLD-SCNC: 13 MMOL/L (ref 21–28)
HCO3 BLD-SCNC: 14 MMOL/L (ref 21–28)
HCO3 BLD-SCNC: 15 MMOL/L (ref 21–28)
HCO3 BLD-SCNC: 16 MMOL/L (ref 21–28)
HCO3 BLD-SCNC: 16 MMOL/L (ref 21–28)
HCO3 BLD-SCNC: 17 MMOL/L (ref 21–28)
HCO3 BLD-SCNC: 17 MMOL/L (ref 21–28)
HCO3 BLD-SCNC: 18 MMOL/L (ref 21–28)
HCO3 BLD-SCNC: 18 MMOL/L (ref 21–28)
HCO3 BLD-SCNC: 19 MMOL/L (ref 21–28)
HCO3 BLD-SCNC: 20 MMOL/L (ref 21–28)
HCO3 BLD-SCNC: 20 MMOL/L (ref 21–28)
HCO3 BLD-SCNC: 21 MMOL/L (ref 21–28)
HCO3 BLD-SCNC: 21 MMOL/L (ref 21–28)
HCO3 BLD-SCNC: 22 MMOL/L (ref 21–28)
HCO3 BLD-SCNC: 23 MMOL/L (ref 21–28)
HCO3 BLD-SCNC: 24 MMOL/L (ref 21–28)
HCO3 BLD-SCNC: 25 MMOL/L (ref 21–28)
HCO3 BLD-SCNC: 26 MMOL/L (ref 21–28)
HCO3 BLD-SCNC: 27 MMOL/L (ref 21–28)
HCO3 BLD-SCNC: 28 MMOL/L (ref 21–28)
HCO3 BLD-SCNC: 29 MMOL/L (ref 21–28)
HCO3 BLD-SCNC: 30 MMOL/L (ref 21–28)
HCO3 BLD-SCNC: 31 MMOL/L (ref 21–28)
HCO3 BLD-SCNC: 32 MMOL/L (ref 21–28)
HCO3 BLD-SCNC: 33 MMOL/L (ref 21–28)
HCO3 BLD-SCNC: 34 MMOL/L (ref 21–28)
HCO3 BLD-SCNC: 35 MMOL/L (ref 21–28)
HCO3 BLD-SCNC: 36 MMOL/L (ref 21–28)
HCO3 BLD-SCNC: 37 MMOL/L (ref 21–28)
HCO3 BLDA-SCNC: 13 MMOL/L (ref 21–28)
HCO3 BLDA-SCNC: 17 MMOL/L (ref 21–28)
HCO3 BLDA-SCNC: 23 MMOL/L (ref 21–28)
HCO3 BLDA-SCNC: 23 MMOL/L (ref 21–28)
HCO3 BLDA-SCNC: 24 MMOL/L (ref 21–28)
HCO3 BLDA-SCNC: 25 MMOL/L (ref 21–28)
HCO3 BLDA-SCNC: 26 MMOL/L (ref 21–28)
HCO3 BLDA-SCNC: 27 MMOL/L (ref 21–28)
HCO3 BLDA-SCNC: 27 MMOL/L (ref 21–28)
HCO3 BLDA-SCNC: 28 MMOL/L (ref 21–28)
HCO3 BLDA-SCNC: 28 MMOL/L (ref 21–28)
HCO3 BLDA-SCNC: 29 MMOL/L (ref 21–28)
HCO3 BLDA-SCNC: 30 MMOL/L (ref 21–28)
HCO3 BLDA-SCNC: 31 MMOL/L (ref 21–28)
HCO3 BLDA-SCNC: 33 MMOL/L (ref 21–28)
HCO3 BLDA-SCNC: 36 MMOL/L (ref 21–28)
HCO3 BLDA-SCNC: 37 MMOL/L (ref 21–28)
HCO3 BLDV-SCNC: 12 MMOL/L (ref 21–28)
HCO3 BLDV-SCNC: 13 MMOL/L (ref 21–28)
HCO3 BLDV-SCNC: 14 MMOL/L (ref 21–28)
HCO3 BLDV-SCNC: 15 MMOL/L (ref 21–28)
HCO3 BLDV-SCNC: 18 MMOL/L (ref 21–28)
HCO3 BLDV-SCNC: 19 MMOL/L (ref 21–28)
HCO3 BLDV-SCNC: 23 MMOL/L (ref 21–28)
HCO3 BLDV-SCNC: 24 MMOL/L (ref 21–28)
HCO3 BLDV-SCNC: 26 MMOL/L (ref 21–28)
HCO3 BLDV-SCNC: 26 MMOL/L (ref 21–28)
HCO3 BLDV-SCNC: 27 MMOL/L (ref 21–28)
HCO3 BLDV-SCNC: 27 MMOL/L (ref 21–28)
HCO3 BLDV-SCNC: 28 MMOL/L (ref 21–28)
HCO3 BLDV-SCNC: 29 MMOL/L (ref 21–28)
HCO3 BLDV-SCNC: 30 MMOL/L (ref 21–28)
HCO3 BLDV-SCNC: 31 MMOL/L (ref 21–28)
HCO3 BLDV-SCNC: 32 MMOL/L (ref 21–28)
HCO3 BLDV-SCNC: 32 MMOL/L (ref 21–28)
HCO3 BLDV-SCNC: 35 MMOL/L (ref 21–28)
HCO3 BLDV-SCNC: 37 MMOL/L (ref 21–28)
HCO3 BLDV-SCNC: 39 MMOL/L (ref 21–28)
HCT VFR BLD AUTO: 21.4 % (ref 40–53)
HCT VFR BLD AUTO: 23.5 % (ref 40–53)
HCT VFR BLD AUTO: 23.8 % (ref 40–53)
HCT VFR BLD AUTO: 23.8 % (ref 40–53)
HCT VFR BLD AUTO: 24 % (ref 40–53)
HCT VFR BLD AUTO: 24.1 % (ref 40–53)
HCT VFR BLD AUTO: 24.2 % (ref 40–53)
HCT VFR BLD AUTO: 24.2 % (ref 40–53)
HCT VFR BLD AUTO: 24.4 % (ref 40–53)
HCT VFR BLD AUTO: 24.6 % (ref 40–53)
HCT VFR BLD AUTO: 24.7 % (ref 40–53)
HCT VFR BLD AUTO: 24.8 % (ref 40–53)
HCT VFR BLD AUTO: 24.9 % (ref 40–53)
HCT VFR BLD AUTO: 25.1 % (ref 40–53)
HCT VFR BLD AUTO: 25.4 % (ref 40–53)
HCT VFR BLD AUTO: 25.5 % (ref 40–53)
HCT VFR BLD AUTO: 25.6 % (ref 40–53)
HCT VFR BLD AUTO: 25.7 % (ref 40–53)
HCT VFR BLD AUTO: 25.8 % (ref 40–53)
HCT VFR BLD AUTO: 26 % (ref 40–53)
HCT VFR BLD AUTO: 26.2 % (ref 40–53)
HCT VFR BLD AUTO: 26.3 % (ref 40–53)
HCT VFR BLD AUTO: 26.3 % (ref 40–53)
HCT VFR BLD AUTO: 26.6 % (ref 40–53)
HCT VFR BLD AUTO: 26.7 % (ref 40–53)
HCT VFR BLD AUTO: 26.7 % (ref 40–53)
HCT VFR BLD AUTO: 27 % (ref 40–53)
HCT VFR BLD AUTO: 27.1 % (ref 40–53)
HCT VFR BLD AUTO: 27.2 % (ref 40–53)
HCT VFR BLD AUTO: 27.4 % (ref 40–53)
HCT VFR BLD AUTO: 27.7 % (ref 40–53)
HCT VFR BLD AUTO: 28.3 % (ref 40–53)
HCT VFR BLD AUTO: 29 % (ref 40–53)
HCT VFR BLD AUTO: 29.4 % (ref 40–53)
HCT VFR BLD AUTO: 29.9 % (ref 40–53)
HCT VFR BLD AUTO: 31.2 % (ref 40–53)
HCT VFR BLD AUTO: 31.3 % (ref 40–53)
HCT VFR BLD AUTO: 31.9 % (ref 40–53)
HCT VFR BLD AUTO: 31.9 % (ref 40–53)
HCT VFR BLD AUTO: 32.4 % (ref 40–53)
HCT VFR BLD AUTO: 33.5 % (ref 40–53)
HCT VFR BLD AUTO: NORMAL % (ref 40–53)
HGB BLD-MCNC: 10.3 G/DL (ref 13.3–17.7)
HGB BLD-MCNC: 10.4 G/DL (ref 13.3–17.7)
HGB BLD-MCNC: 10.5 G/DL (ref 13.3–17.7)
HGB BLD-MCNC: 10.6 G/DL (ref 13.3–17.7)
HGB BLD-MCNC: 10.7 G/DL (ref 13.3–17.7)
HGB BLD-MCNC: 10.9 G/DL (ref 13.3–17.7)
HGB BLD-MCNC: 6.5 G/DL (ref 13.3–17.7)
HGB BLD-MCNC: 6.9 G/DL (ref 13.3–17.7)
HGB BLD-MCNC: 7.1 G/DL (ref 13.3–17.7)
HGB BLD-MCNC: 7.3 G/DL (ref 13.3–17.7)
HGB BLD-MCNC: 7.4 G/DL (ref 13.3–17.7)
HGB BLD-MCNC: 7.6 G/DL (ref 13.3–17.7)
HGB BLD-MCNC: 7.8 G/DL (ref 13.3–17.7)
HGB BLD-MCNC: 7.8 G/DL (ref 13.3–17.7)
HGB BLD-MCNC: 7.9 G/DL (ref 13.3–17.7)
HGB BLD-MCNC: 7.9 G/DL (ref 13.3–17.7)
HGB BLD-MCNC: 8 G/DL (ref 13.3–17.7)
HGB BLD-MCNC: 8.1 G/DL (ref 13.3–17.7)
HGB BLD-MCNC: 8.2 G/DL (ref 13.3–17.7)
HGB BLD-MCNC: 8.3 G/DL (ref 13.3–17.7)
HGB BLD-MCNC: 8.4 G/DL (ref 13.3–17.7)
HGB BLD-MCNC: 8.5 G/DL (ref 13.3–17.7)
HGB BLD-MCNC: 8.6 G/DL (ref 13.3–17.7)
HGB BLD-MCNC: 8.7 G/DL (ref 13.3–17.7)
HGB BLD-MCNC: 8.7 G/DL (ref 13.3–17.7)
HGB BLD-MCNC: 8.8 G/DL (ref 13.3–17.7)
HGB BLD-MCNC: 8.9 G/DL (ref 13.3–17.7)
HGB BLD-MCNC: 9 G/DL (ref 13.3–17.7)
HGB BLD-MCNC: 9 G/DL (ref 13.3–17.7)
HGB BLD-MCNC: 9.1 G/DL (ref 13.3–17.7)
HGB BLD-MCNC: 9.2 G/DL (ref 13.3–17.7)
HGB BLD-MCNC: 9.3 G/DL (ref 13.3–17.7)
HGB BLD-MCNC: 9.4 G/DL (ref 13.3–17.7)
HGB BLD-MCNC: 9.4 G/DL (ref 13.3–17.7)
HGB BLD-MCNC: 9.5 G/DL (ref 13.3–17.7)
HGB BLD-MCNC: 9.8 G/DL (ref 13.3–17.7)
HGB BLD-MCNC: NORMAL G/DL (ref 13.3–17.7)
HGB FREE PLAS-MCNC: 110 MG/DL
HGB FREE PLAS-MCNC: 120 MG/DL
HGB FREE PLAS-MCNC: 30 MG/DL
HGB FREE PLAS-MCNC: 40 MG/DL
HGB FREE PLAS-MCNC: 50 MG/DL
HGB FREE PLAS-MCNC: 60 MG/DL
HGB FREE PLAS-MCNC: 70 MG/DL
HGB FREE PLAS-MCNC: 80 MG/DL
HGB FREE PLAS-MCNC: 90 MG/DL
HGB FREE PLAS-MCNC: <30 MG/DL
HGB FREE PLAS-MCNC: NORMAL MG/DL
IMM GRANULOCYTES # BLD: 0.1 10E9/L (ref 0–0.4)
IMM GRANULOCYTES NFR BLD: 0.6 %
INR PPP: 1.18 (ref 0.86–1.14)
INR PPP: 1.21 (ref 0.86–1.14)
INR PPP: 1.21 (ref 0.86–1.14)
INR PPP: 1.25 (ref 0.86–1.14)
INR PPP: 1.27 (ref 0.86–1.14)
INR PPP: 1.27 (ref 0.86–1.14)
INR PPP: 1.28 (ref 0.86–1.14)
INR PPP: 1.29 (ref 0.86–1.14)
INR PPP: 1.3 (ref 0.86–1.14)
INR PPP: 1.31 (ref 0.86–1.14)
INR PPP: 1.32 (ref 0.86–1.14)
INR PPP: 1.32 (ref 0.86–1.14)
INR PPP: 1.33 (ref 0.86–1.14)
INR PPP: 1.35 (ref 0.86–1.14)
INR PPP: 1.35 (ref 0.86–1.14)
INR PPP: 1.36 (ref 0.86–1.14)
INR PPP: 1.41 (ref 0.86–1.14)
INR PPP: 1.42 (ref 0.86–1.14)
INR PPP: 1.45 (ref 0.86–1.14)
INR PPP: 1.45 (ref 0.86–1.14)
INR PPP: 1.46 (ref 0.86–1.14)
INR PPP: 1.51 (ref 0.86–1.14)
INR PPP: 1.55 (ref 0.86–1.14)
INR PPP: 1.59 (ref 0.86–1.14)
INR PPP: 1.71 (ref 0.86–1.14)
INR PPP: 1.72 (ref 0.86–1.14)
INR PPP: 1.72 (ref 0.86–1.14)
INR PPP: 1.74 (ref 0.86–1.14)
INR PPP: 1.75 (ref 0.86–1.14)
INR PPP: 1.75 (ref 0.86–1.14)
INR PPP: 1.8 (ref 0.86–1.14)
INR PPP: 1.89 (ref 0.86–1.14)
INR PPP: 1.98 (ref 0.86–1.14)
INR PPP: 2.09 (ref 0.86–1.14)
INR PPP: 2.11 (ref 0.86–1.14)
INR PPP: 2.12 (ref 0.86–1.14)
INR PPP: 2.37 (ref 0.86–1.14)
INR PPP: 2.49 (ref 0.86–1.14)
INR PPP: 2.9 (ref 0.86–1.14)
INR PPP: 3.07 (ref 0.86–1.14)
INR PPP: 3.73 (ref 0.86–1.14)
INR PPP: 3.92 (ref 0.86–1.14)
INR PPP: 4.07 (ref 0.86–1.14)
INTERPRETATION ECG - MUSE: NORMAL
K TIME TO SPEC CLOT STRENGTH: 1.3 MIN (ref 1–3)
K TIME TO SPEC CLOT STRENGTH: 1.5 MIN (ref 1–3)
K TIME TO SPEC CLOT STRENGTH: 1.6 MIN (ref 1–3)
K TIME TO SPEC CLOT STRENGTH: 1.6 MIN (ref 1–3)
K TIME TO SPEC CLOT STRENGTH: 1.8 MINUTE (ref 1–3)
K TIME TO SPEC CLOT STRENGTH: 1.8 MINUTE (ref 1–3)
K TIME TO SPEC CLOT STRENGTH: 1.9 MIN (ref 1–3)
KCT BLD-ACNC: 107 SEC (ref 75–150)
KCT BLD-ACNC: 115 SEC (ref 75–150)
KCT BLD-ACNC: 115 SEC (ref 75–150)
KCT BLD-ACNC: 123 SEC (ref 75–150)
KCT BLD-ACNC: 123 SEC (ref 75–150)
KCT BLD-ACNC: 127 SEC (ref 75–150)
KCT BLD-ACNC: 127 SEC (ref 75–150)
KCT BLD-ACNC: 131 SEC (ref 75–150)
KCT BLD-ACNC: 132 SEC (ref 75–150)
KCT BLD-ACNC: 135 SEC (ref 75–150)
KCT BLD-ACNC: 139 SEC (ref 75–150)
KCT BLD-ACNC: 140 SEC (ref 75–150)
KCT BLD-ACNC: 143 SEC (ref 75–150)
KCT BLD-ACNC: 144 SEC (ref 75–150)
KCT BLD-ACNC: 148 SEC (ref 75–150)
KCT BLD-ACNC: 152 SEC (ref 75–150)
KCT BLD-ACNC: 156 SEC (ref 75–150)
KCT BLD-ACNC: 160 SEC (ref 75–150)
KCT BLD-ACNC: 164 SEC (ref 75–150)
KCT BLD-ACNC: 168 SEC (ref 75–150)
KCT BLD-ACNC: 172 SEC (ref 75–150)
KCT BLD-ACNC: 172 SEC (ref 75–150)
KCT BLD-ACNC: 176 SEC (ref 75–150)
KCT BLD-ACNC: 180 SEC (ref 75–150)
KCT BLD-ACNC: 180 SEC (ref 75–150)
KCT BLD-ACNC: 184 SEC (ref 75–150)
KCT BLD-ACNC: 188 SEC (ref 75–150)
KCT BLD-ACNC: 188 SEC (ref 75–150)
KCT BLD-ACNC: 192 SEC (ref 75–150)
KCT BLD-ACNC: 196 SEC (ref 75–150)
KCT BLD-ACNC: 200 SEC (ref 75–150)
KCT BLD-ACNC: 204 SEC (ref 75–150)
KCT BLD-ACNC: 205 SEC (ref 75–150)
KCT BLD-ACNC: 221 SEC (ref 75–150)
LACTATE BLD-SCNC: 0.8 MMOL/L (ref 0.7–2)
LACTATE BLD-SCNC: 1 MMOL/L (ref 0.7–2)
LACTATE BLD-SCNC: 1.1 MMOL/L (ref 0.7–2)
LACTATE BLD-SCNC: 1.1 MMOL/L (ref 0.7–2)
LACTATE BLD-SCNC: 1.2 MMOL/L (ref 0.7–2)
LACTATE BLD-SCNC: 1.4 MMOL/L (ref 0.7–2)
LACTATE BLD-SCNC: 1.5 MMOL/L (ref 0.7–2)
LACTATE BLD-SCNC: 1.5 MMOL/L (ref 0.7–2)
LACTATE BLD-SCNC: 1.6 MMOL/L (ref 0.7–2)
LACTATE BLD-SCNC: 1.7 MMOL/L (ref 0.7–2)
LACTATE BLD-SCNC: 1.8 MMOL/L (ref 0.7–2)
LACTATE BLD-SCNC: 1.8 MMOL/L (ref 0.7–2)
LACTATE BLD-SCNC: 1.9 MMOL/L (ref 0.7–2)
LACTATE BLD-SCNC: 10.5 MMOL/L (ref 0.7–2)
LACTATE BLD-SCNC: 11.2 MMOL/L (ref 0.7–2)
LACTATE BLD-SCNC: 11.7 MMOL/L (ref 0.7–2)
LACTATE BLD-SCNC: 11.8 MMOL/L (ref 0.7–2)
LACTATE BLD-SCNC: 11.9 MMOL/L (ref 0.7–2)
LACTATE BLD-SCNC: 12 MMOL/L (ref 0.7–2)
LACTATE BLD-SCNC: 13.8 MMOL/L (ref 0.7–2)
LACTATE BLD-SCNC: 14.4 MMOL/L (ref 0.7–2)
LACTATE BLD-SCNC: 14.7 MMOL/L (ref 0.7–2)
LACTATE BLD-SCNC: 14.8 MMOL/L (ref 0.7–2)
LACTATE BLD-SCNC: 14.9 MMOL/L (ref 0.7–2)
LACTATE BLD-SCNC: 14.9 MMOL/L (ref 0.7–2)
LACTATE BLD-SCNC: 15 MMOL/L (ref 0.7–2)
LACTATE BLD-SCNC: 16 MMOL/L (ref 0.7–2)
LACTATE BLD-SCNC: 17 MMOL/L (ref 0.7–2)
LACTATE BLD-SCNC: 17 MMOL/L (ref 0.7–2)
LACTATE BLD-SCNC: 18 MMOL/L (ref 0.7–2)
LACTATE BLD-SCNC: 18 MMOL/L (ref 0.7–2)
LACTATE BLD-SCNC: 19 MMOL/L (ref 0.7–2)
LACTATE BLD-SCNC: 2 MMOL/L (ref 0.7–2)
LACTATE BLD-SCNC: 2 MMOL/L (ref 0.7–2)
LACTATE BLD-SCNC: 2.1 MMOL/L (ref 0.7–2)
LACTATE BLD-SCNC: 2.1 MMOL/L (ref 0.7–2)
LACTATE BLD-SCNC: 2.2 MMOL/L (ref 0.7–2)
LACTATE BLD-SCNC: 2.4 MMOL/L (ref 0.7–2)
LACTATE BLD-SCNC: 2.5 MMOL/L (ref 0.7–2)
LACTATE BLD-SCNC: 2.6 MMOL/L (ref 0.7–2)
LACTATE BLD-SCNC: 2.7 MMOL/L (ref 0.7–2)
LACTATE BLD-SCNC: 2.8 MMOL/L (ref 0.7–2)
LACTATE BLD-SCNC: 2.9 MMOL/L (ref 0.7–2)
LACTATE BLD-SCNC: 20 MMOL/L (ref 0.7–2)
LACTATE BLD-SCNC: 20 MMOL/L (ref 0.7–2)
LACTATE BLD-SCNC: 3 MMOL/L (ref 0.7–2)
LACTATE BLD-SCNC: 3.2 MMOL/L (ref 0.7–2)
LACTATE BLD-SCNC: 3.2 MMOL/L (ref 0.7–2)
LACTATE BLD-SCNC: 3.3 MMOL/L (ref 0.7–2)
LACTATE BLD-SCNC: 3.6 MMOL/L (ref 0.7–2)
LACTATE BLD-SCNC: 3.7 MMOL/L (ref 0.7–2)
LACTATE BLD-SCNC: 3.9 MMOL/L (ref 0.7–2)
LACTATE BLD-SCNC: 3.9 MMOL/L (ref 0.7–2)
LACTATE BLD-SCNC: 4 MMOL/L (ref 0.7–2)
LACTATE BLD-SCNC: 4.1 MMOL/L (ref 0.7–2)
LACTATE BLD-SCNC: 4.1 MMOL/L (ref 0.7–2)
LACTATE BLD-SCNC: 4.3 MMOL/L (ref 0.7–2)
LACTATE BLD-SCNC: 4.3 MMOL/L (ref 0.7–2)
LACTATE BLD-SCNC: 4.4 MMOL/L (ref 0.7–2)
LACTATE BLD-SCNC: 4.4 MMOL/L (ref 0.7–2)
LACTATE BLD-SCNC: 4.7 MMOL/L (ref 0.7–2)
LACTATE BLD-SCNC: 5.1 MMOL/L (ref 0.7–2)
LACTATE BLD-SCNC: 5.2 MMOL/L (ref 0.7–2)
LACTATE BLD-SCNC: 5.2 MMOL/L (ref 0.7–2)
LACTATE BLD-SCNC: 5.5 MMOL/L (ref 0.7–2)
LACTATE BLD-SCNC: 5.8 MMOL/L (ref 0.7–2)
LACTATE BLD-SCNC: 6 MMOL/L (ref 0.7–2)
LACTATE BLD-SCNC: 8 MMOL/L (ref 0.7–2)
LACTATE BLD-SCNC: 9.1 MMOL/L (ref 0.7–2)
LMWH PPP CHRO-ACNC: 0.1 IU/ML
LMWH PPP CHRO-ACNC: 0.11 IU/ML
LMWH PPP CHRO-ACNC: 0.11 IU/ML
LMWH PPP CHRO-ACNC: 0.12 IU/ML
LMWH PPP CHRO-ACNC: 0.16 IU/ML
LMWH PPP CHRO-ACNC: 0.18 IU/ML
LMWH PPP CHRO-ACNC: 0.19 IU/ML
LMWH PPP CHRO-ACNC: 0.28 IU/ML
LMWH PPP CHRO-ACNC: 0.34 IU/ML
LMWH PPP CHRO-ACNC: 0.56 IU/ML
LMWH PPP CHRO-ACNC: <0.1 IU/ML
LY30 LYSIS AT 30 MINUTES: 0 % (ref 0–8)
LY30 LYSIS AT 30 MINUTES: 0 % (ref 0–8)
LY60 LYSIS AT 60 MINUTES: 0 % (ref 0–15)
LY60 LYSIS AT 60 MINUTES: 1.2 % (ref 0–15)
LY60 LYSIS AT 60 MINUTES: 2.1 % (ref 0–15)
LYMPHOCYTES # BLD AUTO: 0.4 10E9/L (ref 0.8–5.3)
LYMPHOCYTES # BLD AUTO: 0.6 10E9/L (ref 0.8–5.3)
LYMPHOCYTES # BLD AUTO: 0.7 10E9/L (ref 0.8–5.3)
LYMPHOCYTES # BLD AUTO: 0.8 10E9/L (ref 0.8–5.3)
LYMPHOCYTES # BLD AUTO: 0.9 10E9/L (ref 0.8–5.3)
LYMPHOCYTES # BLD AUTO: 1 10E9/L (ref 0.8–5.3)
LYMPHOCYTES # BLD AUTO: 1.1 10E9/L (ref 0.8–5.3)
LYMPHOCYTES # BLD AUTO: 1.2 10E9/L (ref 0.8–5.3)
LYMPHOCYTES # BLD AUTO: 1.2 10E9/L (ref 0.8–5.3)
LYMPHOCYTES # BLD AUTO: 1.3 10E9/L (ref 0.8–5.3)
LYMPHOCYTES # BLD AUTO: 1.3 10E9/L (ref 0.8–5.3)
LYMPHOCYTES # BLD AUTO: 1.4 10E9/L (ref 0.8–5.3)
LYMPHOCYTES # BLD AUTO: 1.6 10E9/L (ref 0.8–5.3)
LYMPHOCYTES # BLD AUTO: 1.6 10E9/L (ref 0.8–5.3)
LYMPHOCYTES # BLD AUTO: 1.7 10E9/L (ref 0.8–5.3)
LYMPHOCYTES # BLD AUTO: 1.8 10E9/L (ref 0.8–5.3)
LYMPHOCYTES # BLD AUTO: 2.2 10E9/L (ref 0.8–5.3)
LYMPHOCYTES # BLD AUTO: 2.5 10E9/L (ref 0.8–5.3)
LYMPHOCYTES # BLD AUTO: 2.6 10E9/L (ref 0.8–5.3)
LYMPHOCYTES # BLD AUTO: 2.6 10E9/L (ref 0.8–5.3)
LYMPHOCYTES # BLD AUTO: 4 10E9/L (ref 0.8–5.3)
LYMPHOCYTES NFR BLD AUTO: 1.9 %
LYMPHOCYTES NFR BLD AUTO: 10.4 %
LYMPHOCYTES NFR BLD AUTO: 10.5 %
LYMPHOCYTES NFR BLD AUTO: 11 %
LYMPHOCYTES NFR BLD AUTO: 12 %
LYMPHOCYTES NFR BLD AUTO: 13 %
LYMPHOCYTES NFR BLD AUTO: 14.4 %
LYMPHOCYTES NFR BLD AUTO: 16 %
LYMPHOCYTES NFR BLD AUTO: 16 %
LYMPHOCYTES NFR BLD AUTO: 2.6 %
LYMPHOCYTES NFR BLD AUTO: 2.7 %
LYMPHOCYTES NFR BLD AUTO: 2.7 %
LYMPHOCYTES NFR BLD AUTO: 2.8 %
LYMPHOCYTES NFR BLD AUTO: 20.2 %
LYMPHOCYTES NFR BLD AUTO: 26 %
LYMPHOCYTES NFR BLD AUTO: 3.7 %
LYMPHOCYTES NFR BLD AUTO: 4.3 %
LYMPHOCYTES NFR BLD AUTO: 4.4 %
LYMPHOCYTES NFR BLD AUTO: 5.6 %
LYMPHOCYTES NFR BLD AUTO: 6 %
LYMPHOCYTES NFR BLD AUTO: 6.7 %
LYMPHOCYTES NFR BLD AUTO: 7.2 %
LYMPHOCYTES NFR BLD AUTO: 7.3 %
LYMPHOCYTES NFR BLD AUTO: 7.9 %
LYMPHOCYTES NFR BLD AUTO: 8 %
LYMPHOCYTES NFR BLD AUTO: 8.8 %
LYMPHOCYTES NFR BLD AUTO: 8.9 %
LYMPHOCYTES NFR BLD AUTO: 9.3 %
Lab: ABNORMAL
Lab: NORMAL
MA MAXIMUM CLOT STRENGTH: 59.4 MM (ref 50–70)
MA MAXIMUM CLOT STRENGTH: 60.9 MM (ref 50–70)
MA MAXIMUM CLOT STRENGTH: 62.9 MM (ref 55–74)
MA MAXIMUM CLOT STRENGTH: 63 MM (ref 55–74)
MA MAXIMUM CLOT STRENGTH: 63.6 MM (ref 55–74)
MA MAXIMUM CLOT STRENGTH: 71 MM (ref 55–74)
MA MAXIMUM CLOT STRENGTH: 71.6 MM (ref 55–74)
MACROCYTES BLD QL SMEAR: PRESENT
MAGNESIUM SERPL-MCNC: 2.1 MG/DL (ref 1.6–2.3)
MAGNESIUM SERPL-MCNC: 2.2 MG/DL (ref 1.6–2.3)
MAGNESIUM SERPL-MCNC: 2.3 MG/DL (ref 1.6–2.3)
MAGNESIUM SERPL-MCNC: 2.4 MG/DL (ref 1.6–2.3)
MAGNESIUM SERPL-MCNC: 2.5 MG/DL (ref 1.6–2.3)
MAGNESIUM SERPL-MCNC: 2.6 MG/DL (ref 1.6–2.3)
MAGNESIUM SERPL-MCNC: 2.7 MG/DL (ref 1.6–2.3)
MAGNESIUM SERPL-MCNC: NORMAL MG/DL (ref 1.6–2.3)
MCH RBC QN AUTO: 29.8 PG (ref 26.5–33)
MCH RBC QN AUTO: 29.9 PG (ref 26.5–33)
MCH RBC QN AUTO: 30 PG (ref 26.5–33)
MCH RBC QN AUTO: 30 PG (ref 26.5–33)
MCH RBC QN AUTO: 30.1 PG (ref 26.5–33)
MCH RBC QN AUTO: 30.2 PG (ref 26.5–33)
MCH RBC QN AUTO: 30.3 PG (ref 26.5–33)
MCH RBC QN AUTO: 30.4 PG (ref 26.5–33)
MCH RBC QN AUTO: 30.5 PG (ref 26.5–33)
MCH RBC QN AUTO: 30.6 PG (ref 26.5–33)
MCH RBC QN AUTO: 30.7 PG (ref 26.5–33)
MCH RBC QN AUTO: 30.8 PG (ref 26.5–33)
MCH RBC QN AUTO: 30.8 PG (ref 26.5–33)
MCH RBC QN AUTO: 30.9 PG (ref 26.5–33)
MCH RBC QN AUTO: 30.9 PG (ref 26.5–33)
MCH RBC QN AUTO: 31 PG (ref 26.5–33)
MCH RBC QN AUTO: 31.2 PG (ref 26.5–33)
MCH RBC QN AUTO: 31.4 PG (ref 26.5–33)
MCH RBC QN AUTO: 31.7 PG (ref 26.5–33)
MCH RBC QN AUTO: NORMAL PG (ref 26.5–33)
MCHC RBC AUTO-ENTMCNC: 30.7 G/DL (ref 31.5–36.5)
MCHC RBC AUTO-ENTMCNC: 31.7 G/DL (ref 31.5–36.5)
MCHC RBC AUTO-ENTMCNC: 31.8 G/DL (ref 31.5–36.5)
MCHC RBC AUTO-ENTMCNC: 31.8 G/DL (ref 31.5–36.5)
MCHC RBC AUTO-ENTMCNC: 32 G/DL (ref 31.5–36.5)
MCHC RBC AUTO-ENTMCNC: 32.1 G/DL (ref 31.5–36.5)
MCHC RBC AUTO-ENTMCNC: 32.3 G/DL (ref 31.5–36.5)
MCHC RBC AUTO-ENTMCNC: 32.3 G/DL (ref 31.5–36.5)
MCHC RBC AUTO-ENTMCNC: 32.4 G/DL (ref 31.5–36.5)
MCHC RBC AUTO-ENTMCNC: 32.5 G/DL (ref 31.5–36.5)
MCHC RBC AUTO-ENTMCNC: 32.6 G/DL (ref 31.5–36.5)
MCHC RBC AUTO-ENTMCNC: 32.7 G/DL (ref 31.5–36.5)
MCHC RBC AUTO-ENTMCNC: 32.8 G/DL (ref 31.5–36.5)
MCHC RBC AUTO-ENTMCNC: 32.9 G/DL (ref 31.5–36.5)
MCHC RBC AUTO-ENTMCNC: 33.1 G/DL (ref 31.5–36.5)
MCHC RBC AUTO-ENTMCNC: 33.2 G/DL (ref 31.5–36.5)
MCHC RBC AUTO-ENTMCNC: 33.2 G/DL (ref 31.5–36.5)
MCHC RBC AUTO-ENTMCNC: 33.3 G/DL (ref 31.5–36.5)
MCHC RBC AUTO-ENTMCNC: 33.5 G/DL (ref 31.5–36.5)
MCHC RBC AUTO-ENTMCNC: 33.6 G/DL (ref 31.5–36.5)
MCHC RBC AUTO-ENTMCNC: 33.6 G/DL (ref 31.5–36.5)
MCHC RBC AUTO-ENTMCNC: 33.7 G/DL (ref 31.5–36.5)
MCHC RBC AUTO-ENTMCNC: 34 G/DL (ref 31.5–36.5)
MCHC RBC AUTO-ENTMCNC: 34.1 G/DL (ref 31.5–36.5)
MCHC RBC AUTO-ENTMCNC: 34.1 G/DL (ref 31.5–36.5)
MCHC RBC AUTO-ENTMCNC: 34.2 G/DL (ref 31.5–36.5)
MCHC RBC AUTO-ENTMCNC: 34.4 G/DL (ref 31.5–36.5)
MCHC RBC AUTO-ENTMCNC: 34.6 G/DL (ref 31.5–36.5)
MCHC RBC AUTO-ENTMCNC: NORMAL G/DL (ref 31.5–36.5)
MCV RBC AUTO: 100 FL (ref 78–100)
MCV RBC AUTO: 89 FL (ref 78–100)
MCV RBC AUTO: 90 FL (ref 78–100)
MCV RBC AUTO: 91 FL (ref 78–100)
MCV RBC AUTO: 92 FL (ref 78–100)
MCV RBC AUTO: 93 FL (ref 78–100)
MCV RBC AUTO: 94 FL (ref 78–100)
MCV RBC AUTO: 94 FL (ref 78–100)
MCV RBC AUTO: 95 FL (ref 78–100)
MCV RBC AUTO: 96 FL (ref 78–100)
MCV RBC AUTO: 97 FL (ref 78–100)
MCV RBC AUTO: 97 FL (ref 78–100)
MCV RBC AUTO: 98 FL (ref 78–100)
MCV RBC AUTO: NORMAL FL (ref 78–100)
METAMYELOCYTES # BLD: 0.1 10E9/L
METAMYELOCYTES # BLD: 0.1 10E9/L
METAMYELOCYTES # BLD: 0.2 10E9/L
METAMYELOCYTES # BLD: 0.3 10E9/L
METAMYELOCYTES # BLD: 0.3 10E9/L
METAMYELOCYTES # BLD: 0.4 10E9/L
METAMYELOCYTES # BLD: 0.5 10E9/L
METAMYELOCYTES # BLD: 0.5 10E9/L
METAMYELOCYTES # BLD: 0.7 10E9/L
METAMYELOCYTES # BLD: 1.2 10E9/L
METAMYELOCYTES # BLD: 1.2 10E9/L
METAMYELOCYTES # BLD: 2.1 10E9/L
METAMYELOCYTES NFR BLD MANUAL: 0.9 %
METAMYELOCYTES NFR BLD MANUAL: 1 %
METAMYELOCYTES NFR BLD MANUAL: 1.8 %
METAMYELOCYTES NFR BLD MANUAL: 1.9 %
METAMYELOCYTES NFR BLD MANUAL: 2 %
METAMYELOCYTES NFR BLD MANUAL: 2 %
METAMYELOCYTES NFR BLD MANUAL: 2.7 %
METAMYELOCYTES NFR BLD MANUAL: 2.8 %
METAMYELOCYTES NFR BLD MANUAL: 5 %
METAMYELOCYTES NFR BLD MANUAL: 6 %
METAMYELOCYTES NFR BLD MANUAL: 8 %
METAMYELOCYTES NFR BLD MANUAL: 9 %
MONOCYTES # BLD AUTO: 0.3 10E9/L (ref 0–1.3)
MONOCYTES # BLD AUTO: 0.3 10E9/L (ref 0–1.3)
MONOCYTES # BLD AUTO: 0.4 10E9/L (ref 0–1.3)
MONOCYTES # BLD AUTO: 0.4 10E9/L (ref 0–1.3)
MONOCYTES # BLD AUTO: 0.6 10E9/L (ref 0–1.3)
MONOCYTES # BLD AUTO: 0.7 10E9/L (ref 0–1.3)
MONOCYTES # BLD AUTO: 0.7 10E9/L (ref 0–1.3)
MONOCYTES # BLD AUTO: 0.8 10E9/L (ref 0–1.3)
MONOCYTES # BLD AUTO: 0.9 10E9/L (ref 0–1.3)
MONOCYTES # BLD AUTO: 1 10E9/L (ref 0–1.3)
MONOCYTES # BLD AUTO: 1 10E9/L (ref 0–1.3)
MONOCYTES # BLD AUTO: 1.1 10E9/L (ref 0–1.3)
MONOCYTES # BLD AUTO: 1.1 10E9/L (ref 0–1.3)
MONOCYTES # BLD AUTO: 1.2 10E9/L (ref 0–1.3)
MONOCYTES # BLD AUTO: 1.2 10E9/L (ref 0–1.3)
MONOCYTES # BLD AUTO: 1.3 10E9/L (ref 0–1.3)
MONOCYTES # BLD AUTO: 1.3 10E9/L (ref 0–1.3)
MONOCYTES # BLD AUTO: 1.4 10E9/L (ref 0–1.3)
MONOCYTES # BLD AUTO: 1.5 10E9/L (ref 0–1.3)
MONOCYTES # BLD AUTO: 1.6 10E9/L (ref 0–1.3)
MONOCYTES # BLD AUTO: 1.7 10E9/L (ref 0–1.3)
MONOCYTES # BLD AUTO: 1.9 10E9/L (ref 0–1.3)
MONOCYTES # BLD AUTO: 2 10E9/L (ref 0–1.3)
MONOCYTES NFR BLD AUTO: 1.8 %
MONOCYTES NFR BLD AUTO: 1.8 %
MONOCYTES NFR BLD AUTO: 1.9 %
MONOCYTES NFR BLD AUTO: 10.9 %
MONOCYTES NFR BLD AUTO: 13 %
MONOCYTES NFR BLD AUTO: 3.5 %
MONOCYTES NFR BLD AUTO: 4 %
MONOCYTES NFR BLD AUTO: 4.5 %
MONOCYTES NFR BLD AUTO: 4.5 %
MONOCYTES NFR BLD AUTO: 5.6 %
MONOCYTES NFR BLD AUTO: 5.7 %
MONOCYTES NFR BLD AUTO: 6 %
MONOCYTES NFR BLD AUTO: 6 %
MONOCYTES NFR BLD AUTO: 6.1 %
MONOCYTES NFR BLD AUTO: 6.1 %
MONOCYTES NFR BLD AUTO: 6.3 %
MONOCYTES NFR BLD AUTO: 6.6 %
MONOCYTES NFR BLD AUTO: 7.2 %
MONOCYTES NFR BLD AUTO: 7.4 %
MONOCYTES NFR BLD AUTO: 7.4 %
MONOCYTES NFR BLD AUTO: 7.9 %
MONOCYTES NFR BLD AUTO: 7.9 %
MONOCYTES NFR BLD AUTO: 8 %
MONOCYTES NFR BLD AUTO: 8 %
MONOCYTES NFR BLD AUTO: 8.2 %
MONOCYTES NFR BLD AUTO: 8.3 %
MONOCYTES NFR BLD AUTO: 8.8 %
MONOCYTES NFR BLD AUTO: 9 %
MONOCYTES NFR BLD AUTO: 9 %
MONOCYTES NFR BLD AUTO: 9.8 %
MRSA DNA SPEC QL NAA+PROBE: NEGATIVE
MYELOCYTES # BLD: 0.1 10E9/L
MYELOCYTES # BLD: 0.2 10E9/L
MYELOCYTES # BLD: 0.3 10E9/L
MYELOCYTES # BLD: 0.4 10E9/L
MYELOCYTES # BLD: 0.4 10E9/L
MYELOCYTES # BLD: 0.5 10E9/L
MYELOCYTES # BLD: 0.6 10E9/L
MYELOCYTES # BLD: 0.7 10E9/L
MYELOCYTES # BLD: 0.8 10E9/L
MYELOCYTES # BLD: 0.8 10E9/L
MYELOCYTES # BLD: 0.9 10E9/L
MYELOCYTES # BLD: 1 10E9/L
MYELOCYTES # BLD: 1.1 10E9/L
MYELOCYTES # BLD: 1.2 10E9/L
MYELOCYTES # BLD: 1.4 10E9/L
MYELOCYTES # BLD: 1.5 10E9/L
MYELOCYTES NFR BLD MANUAL: 0.9 %
MYELOCYTES NFR BLD MANUAL: 1 %
MYELOCYTES NFR BLD MANUAL: 1 %
MYELOCYTES NFR BLD MANUAL: 1.1 %
MYELOCYTES NFR BLD MANUAL: 1.8 %
MYELOCYTES NFR BLD MANUAL: 1.8 %
MYELOCYTES NFR BLD MANUAL: 1.9 %
MYELOCYTES NFR BLD MANUAL: 2 %
MYELOCYTES NFR BLD MANUAL: 2.7 %
MYELOCYTES NFR BLD MANUAL: 2.7 %
MYELOCYTES NFR BLD MANUAL: 2.8 %
MYELOCYTES NFR BLD MANUAL: 3 %
MYELOCYTES NFR BLD MANUAL: 3.7 %
MYELOCYTES NFR BLD MANUAL: 4.5 %
MYELOCYTES NFR BLD MANUAL: 4.6 %
MYELOCYTES NFR BLD MANUAL: 4.6 %
MYELOCYTES NFR BLD MANUAL: 5 %
MYELOCYTES NFR BLD MANUAL: 6.1 %
MYELOCYTES NFR BLD MANUAL: 7 %
MYELOCYTES NFR BLD MANUAL: 7.1 %
MYELOCYTES NFR BLD MANUAL: 8 %
NEUTROPHILS # BLD AUTO: 10.3 10E9/L (ref 1.6–8.3)
NEUTROPHILS # BLD AUTO: 10.4 10E9/L (ref 1.6–8.3)
NEUTROPHILS # BLD AUTO: 11.8 10E9/L (ref 1.6–8.3)
NEUTROPHILS # BLD AUTO: 13.1 10E9/L (ref 1.6–8.3)
NEUTROPHILS # BLD AUTO: 14.2 10E9/L (ref 1.6–8.3)
NEUTROPHILS # BLD AUTO: 14.7 10E9/L (ref 1.6–8.3)
NEUTROPHILS # BLD AUTO: 14.9 10E9/L (ref 1.6–8.3)
NEUTROPHILS # BLD AUTO: 15.1 10E9/L (ref 1.6–8.3)
NEUTROPHILS # BLD AUTO: 15.6 10E9/L (ref 1.6–8.3)
NEUTROPHILS # BLD AUTO: 15.8 10E9/L (ref 1.6–8.3)
NEUTROPHILS # BLD AUTO: 16 10E9/L (ref 1.6–8.3)
NEUTROPHILS # BLD AUTO: 18.1 10E9/L (ref 1.6–8.3)
NEUTROPHILS # BLD AUTO: 18.5 10E9/L (ref 1.6–8.3)
NEUTROPHILS # BLD AUTO: 18.6 10E9/L (ref 1.6–8.3)
NEUTROPHILS # BLD AUTO: 18.9 10E9/L (ref 1.6–8.3)
NEUTROPHILS # BLD AUTO: 19.8 10E9/L (ref 1.6–8.3)
NEUTROPHILS # BLD AUTO: 20.2 10E9/L (ref 1.6–8.3)
NEUTROPHILS # BLD AUTO: 20.7 10E9/L (ref 1.6–8.3)
NEUTROPHILS # BLD AUTO: 20.8 10E9/L (ref 1.6–8.3)
NEUTROPHILS # BLD AUTO: 21 10E9/L (ref 1.6–8.3)
NEUTROPHILS # BLD AUTO: 21.1 10E9/L (ref 1.6–8.3)
NEUTROPHILS # BLD AUTO: 6 10E9/L (ref 1.6–8.3)
NEUTROPHILS # BLD AUTO: 7.4 10E9/L (ref 1.6–8.3)
NEUTROPHILS # BLD AUTO: 7.6 10E9/L (ref 1.6–8.3)
NEUTROPHILS # BLD AUTO: 7.6 10E9/L (ref 1.6–8.3)
NEUTROPHILS # BLD AUTO: 8 10E9/L (ref 1.6–8.3)
NEUTROPHILS # BLD AUTO: 8.2 10E9/L (ref 1.6–8.3)
NEUTROPHILS # BLD AUTO: 8.6 10E9/L (ref 1.6–8.3)
NEUTROPHILS # BLD AUTO: 8.7 10E9/L (ref 1.6–8.3)
NEUTROPHILS # BLD AUTO: 8.8 10E9/L (ref 1.6–8.3)
NEUTROPHILS NFR BLD AUTO: 50 %
NEUTROPHILS NFR BLD AUTO: 64 %
NEUTROPHILS NFR BLD AUTO: 67 %
NEUTROPHILS NFR BLD AUTO: 69 %
NEUTROPHILS NFR BLD AUTO: 70.2 %
NEUTROPHILS NFR BLD AUTO: 71 %
NEUTROPHILS NFR BLD AUTO: 72.3 %
NEUTROPHILS NFR BLD AUTO: 73.4 %
NEUTROPHILS NFR BLD AUTO: 75 %
NEUTROPHILS NFR BLD AUTO: 75 %
NEUTROPHILS NFR BLD AUTO: 76 %
NEUTROPHILS NFR BLD AUTO: 76.4 %
NEUTROPHILS NFR BLD AUTO: 76.6 %
NEUTROPHILS NFR BLD AUTO: 78.8 %
NEUTROPHILS NFR BLD AUTO: 80.7 %
NEUTROPHILS NFR BLD AUTO: 81 %
NEUTROPHILS NFR BLD AUTO: 81.5 %
NEUTROPHILS NFR BLD AUTO: 82.5 %
NEUTROPHILS NFR BLD AUTO: 83.2 %
NEUTROPHILS NFR BLD AUTO: 83.3 %
NEUTROPHILS NFR BLD AUTO: 84.1 %
NEUTROPHILS NFR BLD AUTO: 84.2 %
NEUTROPHILS NFR BLD AUTO: 84.7 %
NEUTROPHILS NFR BLD AUTO: 84.8 %
NEUTROPHILS NFR BLD AUTO: 85.2 %
NEUTROPHILS NFR BLD AUTO: 86.1 %
NEUTROPHILS NFR BLD AUTO: 87.3 %
NEUTROPHILS NFR BLD AUTO: 88.5 %
NEUTROPHILS NFR BLD AUTO: 88.8 %
NEUTROPHILS NFR BLD AUTO: 90.1 %
NRBC # BLD AUTO: 0.1 10*3/UL
NRBC # BLD AUTO: 0.3 10*3/UL
NRBC # BLD AUTO: 0.3 10*3/UL
NRBC # BLD AUTO: 0.4 10*3/UL
NRBC # BLD AUTO: 0.5 10*3/UL
NRBC # BLD AUTO: 0.5 10*3/UL
NRBC # BLD AUTO: 0.6 10*3/UL
NRBC # BLD AUTO: 0.8 10*3/UL
NRBC # BLD AUTO: 1.4 10*3/UL
NRBC # BLD AUTO: 1.9 10*3/UL
NRBC # BLD AUTO: 2.2 10*3/UL
NRBC # BLD AUTO: 2.2 10*3/UL
NRBC # BLD AUTO: 2.5 10*3/UL
NRBC # BLD AUTO: 3.2 10*3/UL
NRBC # BLD AUTO: 3.7 10*3/UL
NRBC # BLD AUTO: 3.9 10*3/UL
NRBC # BLD AUTO: 4.2 10*3/UL
NRBC # BLD AUTO: 4.4 10*3/UL
NRBC # BLD AUTO: 4.5 10*3/UL
NRBC # BLD AUTO: 4.8 10*3/UL
NRBC # BLD AUTO: 5.2 10*3/UL
NRBC # BLD AUTO: 5.9 10*3/UL
NRBC # BLD AUTO: 6.3 10*3/UL
NRBC # BLD AUTO: 6.4 10*3/UL
NRBC # BLD AUTO: 6.9 10*3/UL
NRBC # BLD AUTO: 7.1 10*3/UL
NRBC # BLD AUTO: 7.3 10*3/UL
NRBC # BLD AUTO: 7.4 10*3/UL
NRBC # BLD AUTO: 7.5 10*3/UL
NRBC # BLD AUTO: 8.6 10*3/UL
NRBC # BLD AUTO: 8.8 10*3/UL
NRBC BLD AUTO-RTO: 1 /100
NRBC BLD AUTO-RTO: 10 /100
NRBC BLD AUTO-RTO: 10 /100
NRBC BLD AUTO-RTO: 14 /100
NRBC BLD AUTO-RTO: 16 /100
NRBC BLD AUTO-RTO: 16 /100
NRBC BLD AUTO-RTO: 17 /100
NRBC BLD AUTO-RTO: 20 /100
NRBC BLD AUTO-RTO: 20 /100
NRBC BLD AUTO-RTO: 21 /100
NRBC BLD AUTO-RTO: 23 /100
NRBC BLD AUTO-RTO: 25 /100
NRBC BLD AUTO-RTO: 25 /100
NRBC BLD AUTO-RTO: 26 /100
NRBC BLD AUTO-RTO: 27 /100
NRBC BLD AUTO-RTO: 27 /100
NRBC BLD AUTO-RTO: 3 /100
NRBC BLD AUTO-RTO: 32 /100
NRBC BLD AUTO-RTO: 32 /100
NRBC BLD AUTO-RTO: 33 /100
NRBC BLD AUTO-RTO: 36 /100
NRBC BLD AUTO-RTO: 37 /100
NRBC BLD AUTO-RTO: 4 /100
NRBC BLD AUTO-RTO: 4 /100
NRBC BLD AUTO-RTO: 48 /100
NRBC BLD AUTO-RTO: 49 /100
NRBC BLD AUTO-RTO: 5 /100
NRBC BLD AUTO-RTO: 5 /100
NRBC BLD AUTO-RTO: 6 /100
NRBC BLD AUTO-RTO: 8 /100
NRBC BLD AUTO-RTO: 9 /100
NUM BPU REQUESTED: 0
NUM BPU REQUESTED: 1
NUM BPU REQUESTED: 14
NUM BPU REQUESTED: 2
NUM BPU REQUESTED: 4
NUM BPU REQUESTED: 5
NUM BPU REQUESTED: 5
NUM BPU REQUESTED: 6
NUM BPU REQUESTED: 8
NUM BPU REQUESTED: 8
O2/TOTAL GAS SETTING VFR VENT: 100 %
O2/TOTAL GAS SETTING VFR VENT: 40 %
O2/TOTAL GAS SETTING VFR VENT: 50 %
O2/TOTAL GAS SETTING VFR VENT: 60 %
O2/TOTAL GAS SETTING VFR VENT: 70 %
O2/TOTAL GAS SETTING VFR VENT: 80 %
O2/TOTAL GAS SETTING VFR VENT: 85 %
O2/TOTAL GAS SETTING VFR VENT: 90 %
O2/TOTAL GAS SETTING VFR VENT: 91 %
O2/TOTAL GAS SETTING VFR VENT: 98 %
O2/TOTAL GAS SETTING VFR VENT: 98 %
O2/TOTAL GAS SETTING VFR VENT: ABNORMAL %
OSMOLALITY SERPL: 342 MMOL/KG (ref 280–301)
OSMOLALITY UR: 351 MMOL/KG (ref 100–1200)
OVALOCYTES BLD QL SMEAR: SLIGHT
OXYHGB MFR BLD: 80 % (ref 92–100)
OXYHGB MFR BLD: 92 % (ref 92–100)
OXYHGB MFR BLD: 93 % (ref 92–100)
OXYHGB MFR BLD: 94 % (ref 92–100)
OXYHGB MFR BLD: 95 % (ref 92–100)
OXYHGB MFR BLD: 96 % (ref 92–100)
OXYHGB MFR BLD: 97 % (ref 92–100)
OXYHGB MFR BLD: 98 % (ref 92–100)
OXYHGB MFR BLDA: 94 % (ref 75–100)
OXYHGB MFR BLDA: 95 % (ref 75–100)
OXYHGB MFR BLDA: 96 % (ref 75–100)
OXYHGB MFR BLDA: 97 % (ref 75–100)
OXYHGB MFR BLDV: 57 %
OXYHGB MFR BLDV: 59 %
OXYHGB MFR BLDV: 60 %
OXYHGB MFR BLDV: 61 %
OXYHGB MFR BLDV: 61 %
OXYHGB MFR BLDV: 63 %
OXYHGB MFR BLDV: 64 %
OXYHGB MFR BLDV: 64 %
OXYHGB MFR BLDV: 65 %
OXYHGB MFR BLDV: 65 %
OXYHGB MFR BLDV: 66 %
OXYHGB MFR BLDV: 67 %
OXYHGB MFR BLDV: 68 %
OXYHGB MFR BLDV: 69 %
OXYHGB MFR BLDV: 70 %
OXYHGB MFR BLDV: 71 %
OXYHGB MFR BLDV: 73 %
OXYHGB MFR BLDV: 73 %
OXYHGB MFR BLDV: 76 %
OXYHGB MFR BLDV: 77 %
OXYHGB MFR BLDV: 80 %
OXYHGB MFR BLDV: 98 %
PCO2 BLD: 23 MM HG (ref 35–45)
PCO2 BLD: 23 MM HG (ref 35–45)
PCO2 BLD: 24 MM HG (ref 35–45)
PCO2 BLD: 25 MM HG (ref 35–45)
PCO2 BLD: 26 MM HG (ref 35–45)
PCO2 BLD: 27 MM HG (ref 35–45)
PCO2 BLD: 28 MM HG (ref 35–45)
PCO2 BLD: 29 MM HG (ref 35–45)
PCO2 BLD: 30 MM HG (ref 35–45)
PCO2 BLD: 31 MM HG (ref 35–45)
PCO2 BLD: 32 MM HG (ref 35–45)
PCO2 BLD: 33 MM HG (ref 35–45)
PCO2 BLD: 35 MM HG (ref 35–45)
PCO2 BLD: 36 MM HG (ref 35–45)
PCO2 BLD: 37 MM HG (ref 35–45)
PCO2 BLD: 38 MM HG (ref 35–45)
PCO2 BLD: 39 MM HG (ref 35–45)
PCO2 BLD: 40 MM HG (ref 35–45)
PCO2 BLD: 41 MM HG (ref 35–45)
PCO2 BLD: 42 MM HG (ref 35–45)
PCO2 BLD: 43 MM HG (ref 35–45)
PCO2 BLD: 44 MM HG (ref 35–45)
PCO2 BLD: 45 MM HG (ref 35–45)
PCO2 BLD: 46 MM HG (ref 35–45)
PCO2 BLD: 47 MM HG (ref 35–45)
PCO2 BLD: 48 MM HG (ref 35–45)
PCO2 BLD: 49 MM HG (ref 35–45)
PCO2 BLD: 50 MM HG (ref 35–45)
PCO2 BLD: 51 MM HG (ref 35–45)
PCO2 BLD: 51 MM HG (ref 35–45)
PCO2 BLD: 52 MM HG (ref 35–45)
PCO2 BLD: 53 MM HG (ref 35–45)
PCO2 BLD: 55 MM HG (ref 35–45)
PCO2 BLD: 55 MM HG (ref 35–45)
PCO2 BLD: 57 MM HG (ref 35–45)
PCO2 BLD: 60 MM HG (ref 35–45)
PCO2 BLD: 65 MM HG (ref 35–45)
PCO2 BLDA: 24 MM HG (ref 35–45)
PCO2 BLDA: 26 MM HG (ref 35–45)
PCO2 BLDA: 27 MM HG (ref 35–45)
PCO2 BLDA: 31 MM HG (ref 35–45)
PCO2 BLDA: 36 MM HG (ref 35–45)
PCO2 BLDA: 37 MM HG (ref 35–45)
PCO2 BLDA: 38 MM HG (ref 35–45)
PCO2 BLDA: 42 MM HG (ref 35–45)
PCO2 BLDA: 42 MM HG (ref 35–45)
PCO2 BLDA: 45 MM HG (ref 35–45)
PCO2 BLDA: 46 MM HG (ref 35–45)
PCO2 BLDA: 48 MM HG (ref 35–45)
PCO2 BLDA: 49 MM HG (ref 35–45)
PCO2 BLDA: 50 MM HG (ref 35–45)
PCO2 BLDA: 50 MM HG (ref 35–45)
PCO2 BLDA: 52 MM HG (ref 35–45)
PCO2 BLDA: 52 MM HG (ref 35–45)
PCO2 BLDA: 53 MM HG (ref 35–45)
PCO2 BLDA: 55 MM HG (ref 35–45)
PCO2 BLDA: 55 MM HG (ref 35–45)
PCO2 BLDA: 56 MM HG (ref 35–45)
PCO2 BLDV: 13 MM HG (ref 40–50)
PCO2 BLDV: 28 MM HG (ref 40–50)
PCO2 BLDV: 29 MM HG (ref 40–50)
PCO2 BLDV: 30 MM HG (ref 40–50)
PCO2 BLDV: 36 MM HG (ref 40–50)
PCO2 BLDV: 41 MM HG (ref 40–50)
PCO2 BLDV: 41 MM HG (ref 40–50)
PCO2 BLDV: 42 MM HG (ref 40–50)
PCO2 BLDV: 42 MM HG (ref 40–50)
PCO2 BLDV: 43 MM HG (ref 40–50)
PCO2 BLDV: 44 MM HG (ref 40–50)
PCO2 BLDV: 44 MM HG (ref 40–50)
PCO2 BLDV: 45 MM HG (ref 40–50)
PCO2 BLDV: 47 MM HG (ref 40–50)
PCO2 BLDV: 48 MM HG (ref 40–50)
PCO2 BLDV: 49 MM HG (ref 40–50)
PCO2 BLDV: 50 MM HG (ref 40–50)
PCO2 BLDV: 51 MM HG (ref 40–50)
PCO2 BLDV: 52 MM HG (ref 40–50)
PCO2 BLDV: 54 MM HG (ref 40–50)
PCO2 BLDV: 55 MM HG (ref 40–50)
PCO2 BLDV: 56 MM HG (ref 40–50)
PCO2 BLDV: 57 MM HG (ref 40–50)
PCO2 BLDV: 57 MM HG (ref 40–50)
PCO2 BLDV: 58 MM HG (ref 40–50)
PCO2 BLDV: 61 MM HG (ref 40–50)
PH BLD: 7.22 PH (ref 7.35–7.45)
PH BLD: 7.25 PH (ref 7.35–7.45)
PH BLD: 7.26 PH (ref 7.35–7.45)
PH BLD: 7.28 PH (ref 7.35–7.45)
PH BLD: 7.29 PH (ref 7.35–7.45)
PH BLD: 7.3 PH (ref 7.35–7.45)
PH BLD: 7.31 PH (ref 7.35–7.45)
PH BLD: 7.32 PH (ref 7.35–7.45)
PH BLD: 7.32 PH (ref 7.35–7.45)
PH BLD: 7.33 PH (ref 7.35–7.45)
PH BLD: 7.34 PH (ref 7.35–7.45)
PH BLD: 7.35 PH (ref 7.35–7.45)
PH BLD: 7.36 PH (ref 7.35–7.45)
PH BLD: 7.37 PH (ref 7.35–7.45)
PH BLD: 7.38 PH (ref 7.35–7.45)
PH BLD: 7.39 PH (ref 7.35–7.45)
PH BLD: 7.4 PH (ref 7.35–7.45)
PH BLD: 7.41 PH (ref 7.35–7.45)
PH BLD: 7.42 PH (ref 7.35–7.45)
PH BLD: 7.43 PH (ref 7.35–7.45)
PH BLD: 7.44 PH (ref 7.35–7.45)
PH BLD: 7.45 PH (ref 7.35–7.45)
PH BLD: 7.46 PH (ref 7.35–7.45)
PH BLD: 7.47 PH (ref 7.35–7.45)
PH BLD: 7.48 PH (ref 7.35–7.45)
PH BLD: 7.48 PH (ref 7.35–7.45)
PH BLD: 7.49 PH (ref 7.35–7.45)
PH BLD: 7.5 PH (ref 7.35–7.45)
PH BLD: 7.51 PH (ref 7.35–7.45)
PH BLD: 7.54 PH (ref 7.35–7.45)
PH BLD: 7.54 PH (ref 7.35–7.45)
PH BLD: 7.55 PH (ref 7.35–7.45)
PH BLD: 7.55 PH (ref 7.35–7.45)
PH BLD: 7.58 PH (ref 7.35–7.45)
PH BLDA: 7.27 PH (ref 7.35–7.45)
PH BLDA: 7.3 PH (ref 7.35–7.45)
PH BLDA: 7.35 PH (ref 7.35–7.45)
PH BLDA: 7.36 PH (ref 7.35–7.45)
PH BLDA: 7.37 PH (ref 7.35–7.45)
PH BLDA: 7.37 PH (ref 7.35–7.45)
PH BLDA: 7.38 PH (ref 7.35–7.45)
PH BLDA: 7.38 PH (ref 7.35–7.45)
PH BLDA: 7.39 PH (ref 7.35–7.45)
PH BLDA: 7.39 PH (ref 7.35–7.45)
PH BLDA: 7.41 PH (ref 7.35–7.45)
PH BLDA: 7.42 PH (ref 7.35–7.45)
PH BLDA: 7.42 PH (ref 7.35–7.45)
PH BLDA: 7.44 PH (ref 7.35–7.45)
PH BLDA: 7.45 PH (ref 7.35–7.45)
PH BLDA: 7.47 PH (ref 7.35–7.45)
PH BLDV: 7.26 PH (ref 7.32–7.43)
PH BLDV: 7.27 PH (ref 7.32–7.43)
PH BLDV: 7.28 PH (ref 7.32–7.43)
PH BLDV: 7.3 PH (ref 7.32–7.43)
PH BLDV: 7.3 PH (ref 7.32–7.43)
PH BLDV: 7.31 PH (ref 7.32–7.43)
PH BLDV: 7.32 PH (ref 7.32–7.43)
PH BLDV: 7.33 PH (ref 7.32–7.43)
PH BLDV: 7.33 PH (ref 7.32–7.43)
PH BLDV: 7.34 PH (ref 7.32–7.43)
PH BLDV: 7.35 PH (ref 7.32–7.43)
PH BLDV: 7.35 PH (ref 7.32–7.43)
PH BLDV: 7.36 PH (ref 7.32–7.43)
PH BLDV: 7.36 PH (ref 7.32–7.43)
PH BLDV: 7.37 PH (ref 7.32–7.43)
PH BLDV: 7.38 PH (ref 7.32–7.43)
PH BLDV: 7.4 PH (ref 7.32–7.43)
PH BLDV: 7.41 PH (ref 7.32–7.43)
PH BLDV: 7.42 PH (ref 7.32–7.43)
PH BLDV: 7.43 PH (ref 7.32–7.43)
PH BLDV: 7.63 PH (ref 7.32–7.43)
PHOSPHATE SERPL-MCNC: 3.2 MG/DL (ref 2.5–4.5)
PHOSPHATE SERPL-MCNC: 3.3 MG/DL (ref 2.5–4.5)
PHOSPHATE SERPL-MCNC: 3.4 MG/DL (ref 2.5–4.5)
PHOSPHATE SERPL-MCNC: 3.6 MG/DL (ref 2.5–4.5)
PHOSPHATE SERPL-MCNC: 3.8 MG/DL (ref 2.5–4.5)
PHOSPHATE SERPL-MCNC: 3.8 MG/DL (ref 2.5–4.5)
PHOSPHATE SERPL-MCNC: 4.3 MG/DL (ref 2.5–4.5)
PHOSPHATE SERPL-MCNC: 4.3 MG/DL (ref 2.5–4.5)
PHOSPHATE SERPL-MCNC: 4.4 MG/DL (ref 2.5–4.5)
PHOSPHATE SERPL-MCNC: 4.5 MG/DL (ref 2.5–4.5)
PHOSPHATE SERPL-MCNC: 4.6 MG/DL (ref 2.5–4.5)
PHOSPHATE SERPL-MCNC: 4.7 MG/DL (ref 2.5–4.5)
PHOSPHATE SERPL-MCNC: 4.9 MG/DL (ref 2.5–4.5)
PHOSPHATE SERPL-MCNC: 5.1 MG/DL (ref 2.5–4.5)
PHOSPHATE SERPL-MCNC: 5.2 MG/DL (ref 2.5–4.5)
PHOSPHATE SERPL-MCNC: 5.2 MG/DL (ref 2.5–4.5)
PHOSPHATE SERPL-MCNC: 5.5 MG/DL (ref 2.5–4.5)
PHOSPHATE SERPL-MCNC: 5.6 MG/DL (ref 2.5–4.5)
PHOSPHATE SERPL-MCNC: 5.6 MG/DL (ref 2.5–4.5)
PHOSPHATE SERPL-MCNC: 5.8 MG/DL (ref 2.5–4.5)
PHOSPHATE SERPL-MCNC: 6 MG/DL (ref 2.5–4.5)
PHOSPHATE SERPL-MCNC: 6 MG/DL (ref 2.5–4.5)
PHOSPHATE SERPL-MCNC: 6.1 MG/DL (ref 2.5–4.5)
PHOSPHATE SERPL-MCNC: 6.2 MG/DL (ref 2.5–4.5)
PHOSPHATE SERPL-MCNC: 6.4 MG/DL (ref 2.5–4.5)
PHOSPHATE SERPL-MCNC: 6.4 MG/DL (ref 2.5–4.5)
PHOSPHATE SERPL-MCNC: 6.5 MG/DL (ref 2.5–4.5)
PHOSPHATE SERPL-MCNC: 6.6 MG/DL (ref 2.5–4.5)
PHOSPHATE SERPL-MCNC: 6.7 MG/DL (ref 2.5–4.5)
PHOSPHATE SERPL-MCNC: 7.1 MG/DL (ref 2.5–4.5)
PHOSPHATE SERPL-MCNC: 7.2 MG/DL (ref 2.5–4.5)
PHOSPHATE SERPL-MCNC: 7.3 MG/DL (ref 2.5–4.5)
PHOSPHATE SERPL-MCNC: 7.5 MG/DL (ref 2.5–4.5)
PHOSPHATE SERPL-MCNC: 7.6 MG/DL (ref 2.5–4.5)
PHOSPHATE SERPL-MCNC: 7.8 MG/DL (ref 2.5–4.5)
PHOSPHATE SERPL-MCNC: 8.2 MG/DL (ref 2.5–4.5)
PHOSPHATE SERPL-MCNC: 8.7 MG/DL (ref 2.5–4.5)
PHOSPHATE SERPL-MCNC: 8.8 MG/DL (ref 2.5–4.5)
PHOSPHATE SERPL-MCNC: 8.9 MG/DL (ref 2.5–4.5)
PHOSPHATE SERPL-MCNC: NORMAL MG/DL (ref 2.5–4.5)
PLATELET # BLD AUTO: 100 10E9/L (ref 150–450)
PLATELET # BLD AUTO: 101 10E9/L (ref 150–450)
PLATELET # BLD AUTO: 101 10E9/L (ref 150–450)
PLATELET # BLD AUTO: 102 10E9/L (ref 150–450)
PLATELET # BLD AUTO: 104 10E9/L (ref 150–450)
PLATELET # BLD AUTO: 105 10E9/L (ref 150–450)
PLATELET # BLD AUTO: 106 10E9/L (ref 150–450)
PLATELET # BLD AUTO: 107 10E9/L (ref 150–450)
PLATELET # BLD AUTO: 107 10E9/L (ref 150–450)
PLATELET # BLD AUTO: 112 10E9/L (ref 150–450)
PLATELET # BLD AUTO: 113 10E9/L (ref 150–450)
PLATELET # BLD AUTO: 113 10E9/L (ref 150–450)
PLATELET # BLD AUTO: 115 10E9/L (ref 150–450)
PLATELET # BLD AUTO: 116 10E9/L (ref 150–450)
PLATELET # BLD AUTO: 117 10E9/L (ref 150–450)
PLATELET # BLD AUTO: 120 10E9/L (ref 150–450)
PLATELET # BLD AUTO: 123 10E9/L (ref 150–450)
PLATELET # BLD AUTO: 126 10E9/L (ref 150–450)
PLATELET # BLD AUTO: 131 10E9/L (ref 150–450)
PLATELET # BLD AUTO: 145 10E9/L (ref 150–450)
PLATELET # BLD AUTO: 150 10E9/L (ref 150–450)
PLATELET # BLD AUTO: 168 10E9/L (ref 150–450)
PLATELET # BLD AUTO: 59 10E9/L (ref 150–450)
PLATELET # BLD AUTO: 65 10E9/L (ref 150–450)
PLATELET # BLD AUTO: 69 10E9/L (ref 150–450)
PLATELET # BLD AUTO: 75 10E9/L (ref 150–450)
PLATELET # BLD AUTO: 81 10E9/L (ref 150–450)
PLATELET # BLD AUTO: 85 10E9/L (ref 150–450)
PLATELET # BLD AUTO: 85 10E9/L (ref 150–450)
PLATELET # BLD AUTO: 86 10E9/L (ref 150–450)
PLATELET # BLD AUTO: 86 10E9/L (ref 150–450)
PLATELET # BLD AUTO: 87 10E9/L (ref 150–450)
PLATELET # BLD AUTO: 87 10E9/L (ref 150–450)
PLATELET # BLD AUTO: 88 10E9/L (ref 150–450)
PLATELET # BLD AUTO: 88 10E9/L (ref 150–450)
PLATELET # BLD AUTO: 92 10E9/L (ref 150–450)
PLATELET # BLD AUTO: 93 10E9/L (ref 150–450)
PLATELET # BLD AUTO: 93 10E9/L (ref 150–450)
PLATELET # BLD AUTO: 94 10E9/L (ref 150–450)
PLATELET # BLD AUTO: 98 10E9/L (ref 150–450)
PLATELET # BLD AUTO: 98 10E9/L (ref 150–450)
PLATELET # BLD AUTO: 99 10E9/L (ref 150–450)
PLATELET # BLD AUTO: NORMAL 10E9/L (ref 150–450)
PLATELET # BLD EST: ABNORMAL 10*3/UL
PO2 BLD: 100 MM HG (ref 80–105)
PO2 BLD: 100 MM HG (ref 80–105)
PO2 BLD: 101 MM HG (ref 80–105)
PO2 BLD: 103 MM HG (ref 80–105)
PO2 BLD: 103 MM HG (ref 80–105)
PO2 BLD: 106 MM HG (ref 80–105)
PO2 BLD: 108 MM HG (ref 80–105)
PO2 BLD: 109 MM HG (ref 80–105)
PO2 BLD: 110 MM HG (ref 80–105)
PO2 BLD: 113 MM HG (ref 80–105)
PO2 BLD: 114 MM HG (ref 80–105)
PO2 BLD: 116 MM HG (ref 80–105)
PO2 BLD: 118 MM HG (ref 80–105)
PO2 BLD: 120 MM HG (ref 80–105)
PO2 BLD: 121 MM HG (ref 80–105)
PO2 BLD: 122 MM HG (ref 80–105)
PO2 BLD: 122 MM HG (ref 80–105)
PO2 BLD: 123 MM HG (ref 80–105)
PO2 BLD: 124 MM HG (ref 80–105)
PO2 BLD: 126 MM HG (ref 80–105)
PO2 BLD: 127 MM HG (ref 80–105)
PO2 BLD: 128 MM HG (ref 80–105)
PO2 BLD: 128 MM HG (ref 80–105)
PO2 BLD: 129 MM HG (ref 80–105)
PO2 BLD: 130 MM HG (ref 80–105)
PO2 BLD: 131 MM HG (ref 80–105)
PO2 BLD: 131 MM HG (ref 80–105)
PO2 BLD: 132 MM HG (ref 80–105)
PO2 BLD: 134 MM HG (ref 80–105)
PO2 BLD: 135 MM HG (ref 80–105)
PO2 BLD: 137 MM HG (ref 80–105)
PO2 BLD: 138 MM HG (ref 80–105)
PO2 BLD: 139 MM HG (ref 80–105)
PO2 BLD: 139 MM HG (ref 80–105)
PO2 BLD: 140 MM HG (ref 80–105)
PO2 BLD: 142 MM HG (ref 80–105)
PO2 BLD: 142 MM HG (ref 80–105)
PO2 BLD: 143 MM HG (ref 80–105)
PO2 BLD: 145 MM HG (ref 80–105)
PO2 BLD: 145 MM HG (ref 80–105)
PO2 BLD: 146 MM HG (ref 80–105)
PO2 BLD: 148 MM HG (ref 80–105)
PO2 BLD: 148 MM HG (ref 80–105)
PO2 BLD: 149 MM HG (ref 80–105)
PO2 BLD: 150 MM HG (ref 80–105)
PO2 BLD: 152 MM HG (ref 80–105)
PO2 BLD: 153 MM HG (ref 80–105)
PO2 BLD: 154 MM HG (ref 80–105)
PO2 BLD: 154 MM HG (ref 80–105)
PO2 BLD: 155 MM HG (ref 80–105)
PO2 BLD: 155 MM HG (ref 80–105)
PO2 BLD: 156 MM HG (ref 80–105)
PO2 BLD: 157 MM HG (ref 80–105)
PO2 BLD: 158 MM HG (ref 80–105)
PO2 BLD: 158 MM HG (ref 80–105)
PO2 BLD: 160 MM HG (ref 80–105)
PO2 BLD: 161 MM HG (ref 80–105)
PO2 BLD: 161 MM HG (ref 80–105)
PO2 BLD: 163 MM HG (ref 80–105)
PO2 BLD: 164 MM HG (ref 80–105)
PO2 BLD: 164 MM HG (ref 80–105)
PO2 BLD: 167 MM HG (ref 80–105)
PO2 BLD: 168 MM HG (ref 80–105)
PO2 BLD: 168 MM HG (ref 80–105)
PO2 BLD: 169 MM HG (ref 80–105)
PO2 BLD: 170 MM HG (ref 80–105)
PO2 BLD: 170 MM HG (ref 80–105)
PO2 BLD: 171 MM HG (ref 80–105)
PO2 BLD: 172 MM HG (ref 80–105)
PO2 BLD: 173 MM HG (ref 80–105)
PO2 BLD: 175 MM HG (ref 80–105)
PO2 BLD: 176 MM HG (ref 80–105)
PO2 BLD: 177 MM HG (ref 80–105)
PO2 BLD: 178 MM HG (ref 80–105)
PO2 BLD: 185 MM HG (ref 80–105)
PO2 BLD: 188 MM HG (ref 80–105)
PO2 BLD: 188 MM HG (ref 80–105)
PO2 BLD: 191 MM HG (ref 80–105)
PO2 BLD: 191 MM HG (ref 80–105)
PO2 BLD: 192 MM HG (ref 80–105)
PO2 BLD: 198 MM HG (ref 80–105)
PO2 BLD: 199 MM HG (ref 80–105)
PO2 BLD: 205 MM HG (ref 80–105)
PO2 BLD: 207 MM HG (ref 80–105)
PO2 BLD: 207 MM HG (ref 80–105)
PO2 BLD: 213 MM HG (ref 80–105)
PO2 BLD: 216 MM HG (ref 80–105)
PO2 BLD: 218 MM HG (ref 80–105)
PO2 BLD: 222 MM HG (ref 80–105)
PO2 BLD: 223 MM HG (ref 80–105)
PO2 BLD: 227 MM HG (ref 80–105)
PO2 BLD: 229 MM HG (ref 80–105)
PO2 BLD: 229 MM HG (ref 80–105)
PO2 BLD: 230 MM HG (ref 80–105)
PO2 BLD: 230 MM HG (ref 80–105)
PO2 BLD: 231 MM HG (ref 80–105)
PO2 BLD: 234 MM HG (ref 80–105)
PO2 BLD: 235 MM HG (ref 80–105)
PO2 BLD: 240 MM HG (ref 80–105)
PO2 BLD: 241 MM HG (ref 80–105)
PO2 BLD: 247 MM HG (ref 80–105)
PO2 BLD: 247 MM HG (ref 80–105)
PO2 BLD: 252 MM HG (ref 80–105)
PO2 BLD: 262 MM HG (ref 80–105)
PO2 BLD: 271 MM HG (ref 80–105)
PO2 BLD: 272 MM HG (ref 80–105)
PO2 BLD: 273 MM HG (ref 80–105)
PO2 BLD: 276 MM HG (ref 80–105)
PO2 BLD: 289 MM HG (ref 80–105)
PO2 BLD: 298 MM HG (ref 80–105)
PO2 BLD: 298 MM HG (ref 80–105)
PO2 BLD: 317 MM HG (ref 80–105)
PO2 BLD: 324 MM HG (ref 80–105)
PO2 BLD: 324 MM HG (ref 80–105)
PO2 BLD: 334 MM HG (ref 80–105)
PO2 BLD: 336 MM HG (ref 80–105)
PO2 BLD: 345 MM HG (ref 80–105)
PO2 BLD: 349 MM HG (ref 80–105)
PO2 BLD: 351 MM HG (ref 80–105)
PO2 BLD: 365 MM HG (ref 80–105)
PO2 BLD: 370 MM HG (ref 80–105)
PO2 BLD: 392 MM HG (ref 80–105)
PO2 BLD: 397 MM HG (ref 80–105)
PO2 BLD: 81 MM HG (ref 80–105)
PO2 BLD: 81 MM HG (ref 80–105)
PO2 BLD: 84 MM HG (ref 80–105)
PO2 BLD: 87 MM HG (ref 80–105)
PO2 BLD: 89 MM HG (ref 80–105)
PO2 BLD: 89 MM HG (ref 80–105)
PO2 BLD: 90 MM HG (ref 80–105)
PO2 BLD: 93 MM HG (ref 80–105)
PO2 BLD: 95 MM HG (ref 80–105)
PO2 BLD: 97 MM HG (ref 80–105)
PO2 BLD: 99 MM HG (ref 80–105)
PO2 BLDA: 110 MM HG (ref 80–105)
PO2 BLDA: 118 MM HG (ref 80–105)
PO2 BLDA: 125 MM HG (ref 80–105)
PO2 BLDA: 143 MM HG (ref 80–105)
PO2 BLDA: 157 MM HG (ref 80–105)
PO2 BLDA: 162 MM HG (ref 80–105)
PO2 BLDA: 172 MM HG (ref 80–105)
PO2 BLDA: 180 MM HG (ref 80–105)
PO2 BLDA: 193 MM HG (ref 80–105)
PO2 BLDA: 209 MM HG (ref 80–105)
PO2 BLDA: 214 MM HG (ref 80–105)
PO2 BLDA: 214 MM HG (ref 80–105)
PO2 BLDA: 219 MM HG (ref 80–105)
PO2 BLDA: 231 MM HG (ref 80–105)
PO2 BLDA: 240 MM HG (ref 80–105)
PO2 BLDA: 251 MM HG (ref 80–105)
PO2 BLDA: 275 MM HG (ref 80–105)
PO2 BLDA: 280 MM HG (ref 80–105)
PO2 BLDA: 284 MM HG (ref 80–105)
PO2 BLDA: 332 MM HG (ref 80–105)
PO2 BLDA: 344 MM HG (ref 80–105)
PO2 BLDA: 408 MM HG (ref 80–105)
PO2 BLDA: 81 MM HG (ref 80–105)
PO2 BLDV: 211 MM HG (ref 25–47)
PO2 BLDV: 27 MM HG (ref 25–47)
PO2 BLDV: 30 MM HG (ref 25–47)
PO2 BLDV: 33 MM HG (ref 25–47)
PO2 BLDV: 35 MM HG (ref 25–47)
PO2 BLDV: 36 MM HG (ref 25–47)
PO2 BLDV: 37 MM HG (ref 25–47)
PO2 BLDV: 38 MM HG (ref 25–47)
PO2 BLDV: 39 MM HG (ref 25–47)
PO2 BLDV: 39 MM HG (ref 25–47)
PO2 BLDV: 40 MM HG (ref 25–47)
PO2 BLDV: 41 MM HG (ref 25–47)
PO2 BLDV: 42 MM HG (ref 25–47)
PO2 BLDV: 42 MM HG (ref 25–47)
PO2 BLDV: 43 MM HG (ref 25–47)
PO2 BLDV: 43 MM HG (ref 25–47)
PO2 BLDV: 45 MM HG (ref 25–47)
PO2 BLDV: 47 MM HG (ref 25–47)
PO2 BLDV: 48 MM HG (ref 25–47)
PO2 BLDV: 49 MM HG (ref 25–47)
POIKILOCYTOSIS BLD QL SMEAR: ABNORMAL
POIKILOCYTOSIS BLD QL SMEAR: SLIGHT
POLYCHROMASIA BLD QL SMEAR: SLIGHT
POTASSIUM BLD-SCNC: 3.5 MMOL/L (ref 3.4–5.3)
POTASSIUM BLD-SCNC: 3.5 MMOL/L (ref 3.4–5.3)
POTASSIUM BLD-SCNC: 3.6 MMOL/L (ref 3.4–5.3)
POTASSIUM BLD-SCNC: 3.8 MMOL/L (ref 3.4–5.3)
POTASSIUM BLD-SCNC: 4 MMOL/L (ref 3.4–5.3)
POTASSIUM BLD-SCNC: 4 MMOL/L (ref 3.4–5.3)
POTASSIUM BLD-SCNC: 4.3 MMOL/L (ref 3.4–5.3)
POTASSIUM BLD-SCNC: 4.5 MMOL/L (ref 3.4–5.3)
POTASSIUM BLD-SCNC: 4.6 MMOL/L (ref 3.4–5.3)
POTASSIUM BLD-SCNC: 4.6 MMOL/L (ref 3.4–5.3)
POTASSIUM BLD-SCNC: 4.7 MMOL/L (ref 3.4–5.3)
POTASSIUM BLD-SCNC: 4.8 MMOL/L (ref 3.4–5.3)
POTASSIUM BLD-SCNC: 4.9 MMOL/L (ref 3.4–5.3)
POTASSIUM BLD-SCNC: 5.8 MMOL/L (ref 3.4–5.3)
POTASSIUM BLD-SCNC: 5.8 MMOL/L (ref 3.4–5.3)
POTASSIUM BLD-SCNC: 6 MMOL/L (ref 3.4–5.3)
POTASSIUM BLD-SCNC: 6.1 MMOL/L (ref 3.4–5.3)
POTASSIUM BLD-SCNC: 6.4 MMOL/L (ref 3.4–5.3)
POTASSIUM BLD-SCNC: 6.5 MMOL/L (ref 3.4–5.3)
POTASSIUM BLD-SCNC: 6.5 MMOL/L (ref 3.4–5.3)
POTASSIUM BLD-SCNC: 6.6 MMOL/L (ref 3.4–5.3)
POTASSIUM SERPL-SCNC: 3.6 MMOL/L (ref 3.4–5.3)
POTASSIUM SERPL-SCNC: 3.9 MMOL/L (ref 3.4–5.3)
POTASSIUM SERPL-SCNC: 4 MMOL/L (ref 3.4–5.3)
POTASSIUM SERPL-SCNC: 4.1 MMOL/L (ref 3.4–5.3)
POTASSIUM SERPL-SCNC: 4.2 MMOL/L (ref 3.4–5.3)
POTASSIUM SERPL-SCNC: 4.3 MMOL/L (ref 3.4–5.3)
POTASSIUM SERPL-SCNC: 4.4 MMOL/L (ref 3.4–5.3)
POTASSIUM SERPL-SCNC: 4.5 MMOL/L (ref 3.4–5.3)
POTASSIUM SERPL-SCNC: 4.6 MMOL/L (ref 3.4–5.3)
POTASSIUM SERPL-SCNC: 4.7 MMOL/L (ref 3.4–5.3)
POTASSIUM SERPL-SCNC: 4.8 MMOL/L (ref 3.4–5.3)
POTASSIUM SERPL-SCNC: 4.9 MMOL/L (ref 3.4–5.3)
POTASSIUM SERPL-SCNC: 5 MMOL/L (ref 3.4–5.3)
POTASSIUM SERPL-SCNC: 5.1 MMOL/L (ref 3.4–5.3)
POTASSIUM SERPL-SCNC: 5.2 MMOL/L (ref 3.4–5.3)
POTASSIUM SERPL-SCNC: 5.2 MMOL/L (ref 3.4–5.3)
POTASSIUM SERPL-SCNC: 5.3 MMOL/L (ref 3.4–5.3)
POTASSIUM SERPL-SCNC: 5.6 MMOL/L (ref 3.4–5.3)
POTASSIUM SERPL-SCNC: 6 MMOL/L (ref 3.4–5.3)
POTASSIUM SERPL-SCNC: 6 MMOL/L (ref 3.4–5.3)
POTASSIUM SERPL-SCNC: 6.1 MMOL/L (ref 3.4–5.3)
POTASSIUM SERPL-SCNC: 6.2 MMOL/L (ref 3.4–5.3)
POTASSIUM SERPL-SCNC: 6.6 MMOL/L (ref 3.4–5.3)
POTASSIUM SERPL-SCNC: NORMAL MMOL/L (ref 3.4–5.3)
PROMYELOCYTES # BLD MANUAL: 0.1 10E9/L
PROMYELOCYTES # BLD MANUAL: 0.2 10E9/L
PROMYELOCYTES # BLD MANUAL: 0.3 10E9/L
PROMYELOCYTES # BLD MANUAL: 0.4 10E9/L
PROMYELOCYTES # BLD MANUAL: 0.4 10E9/L
PROMYELOCYTES # BLD MANUAL: 0.5 10E9/L
PROMYELOCYTES # BLD MANUAL: 1.2 10E9/L
PROMYELOCYTES NFR BLD MANUAL: 0.9 %
PROMYELOCYTES NFR BLD MANUAL: 1 %
PROMYELOCYTES NFR BLD MANUAL: 1.1 %
PROMYELOCYTES NFR BLD MANUAL: 1.7 %
PROMYELOCYTES NFR BLD MANUAL: 1.8 %
PROMYELOCYTES NFR BLD MANUAL: 1.9 %
PROMYELOCYTES NFR BLD MANUAL: 1.9 %
PROMYELOCYTES NFR BLD MANUAL: 3.9 %
PROMYELOCYTES NFR BLD MANUAL: 5.6 %
PROT SERPL-MCNC: 4 G/DL (ref 6.8–8.8)
PROT SERPL-MCNC: 4.3 G/DL (ref 6.8–8.8)
PROT SERPL-MCNC: 4.3 G/DL (ref 6.8–8.8)
PROT SERPL-MCNC: 4.4 G/DL (ref 6.8–8.8)
PROT SERPL-MCNC: 4.5 G/DL (ref 6.8–8.8)
PROT SERPL-MCNC: 4.6 G/DL (ref 6.8–8.8)
PROT SERPL-MCNC: 4.7 G/DL (ref 6.8–8.8)
PROT SERPL-MCNC: 4.8 G/DL (ref 6.8–8.8)
PROT SERPL-MCNC: 4.9 G/DL (ref 6.8–8.8)
PROT SERPL-MCNC: 5 G/DL (ref 6.8–8.8)
PROT SERPL-MCNC: 5.1 G/DL (ref 6.8–8.8)
PROT SERPL-MCNC: 5.2 G/DL (ref 6.8–8.8)
PROT SERPL-MCNC: 5.4 G/DL (ref 6.8–8.8)
PROT SERPL-MCNC: 5.4 G/DL (ref 6.8–8.8)
PROT SERPL-MCNC: 5.5 G/DL (ref 6.8–8.8)
PROT SERPL-MCNC: 5.5 G/DL (ref 6.8–8.8)
PROT SERPL-MCNC: 5.6 G/DL (ref 6.8–8.8)
PROT SERPL-MCNC: 5.7 G/DL (ref 6.8–8.8)
PROT SERPL-MCNC: 6 G/DL (ref 6.8–8.8)
PROT SERPL-MCNC: NORMAL G/DL (ref 6.8–8.8)
R TIME UNTIL CLOT FORMS: 10.3 MIN (ref 4–9)
R TIME UNTIL CLOT FORMS: 4.7 MINUTE (ref 5–10)
R TIME UNTIL CLOT FORMS: 5.5 MINUTE (ref 5–10)
R TIME UNTIL CLOT FORMS: 6 MIN (ref 4–9)
R TIME UNTIL CLOT FORMS: 6.3 MIN (ref 4–9)
R TIME UNTIL CLOT FORMS: 6.4 MIN (ref 4–9)
R TIME UNTIL CLOT FORMS: 8.8 MIN (ref 4–9)
RBC # BLD AUTO: 2.38 10E12/L (ref 4.4–5.9)
RBC # BLD AUTO: 2.42 10E12/L (ref 4.4–5.9)
RBC # BLD AUTO: 2.56 10E12/L (ref 4.4–5.9)
RBC # BLD AUTO: 2.61 10E12/L (ref 4.4–5.9)
RBC # BLD AUTO: 2.62 10E12/L (ref 4.4–5.9)
RBC # BLD AUTO: 2.63 10E12/L (ref 4.4–5.9)
RBC # BLD AUTO: 2.63 10E12/L (ref 4.4–5.9)
RBC # BLD AUTO: 2.65 10E12/L (ref 4.4–5.9)
RBC # BLD AUTO: 2.66 10E12/L (ref 4.4–5.9)
RBC # BLD AUTO: 2.67 10E12/L (ref 4.4–5.9)
RBC # BLD AUTO: 2.67 10E12/L (ref 4.4–5.9)
RBC # BLD AUTO: 2.7 10E12/L (ref 4.4–5.9)
RBC # BLD AUTO: 2.71 10E12/L (ref 4.4–5.9)
RBC # BLD AUTO: 2.71 10E12/L (ref 4.4–5.9)
RBC # BLD AUTO: 2.72 10E12/L (ref 4.4–5.9)
RBC # BLD AUTO: 2.74 10E12/L (ref 4.4–5.9)
RBC # BLD AUTO: 2.74 10E12/L (ref 4.4–5.9)
RBC # BLD AUTO: 2.81 10E12/L (ref 4.4–5.9)
RBC # BLD AUTO: 2.82 10E12/L (ref 4.4–5.9)
RBC # BLD AUTO: 2.84 10E12/L (ref 4.4–5.9)
RBC # BLD AUTO: 2.84 10E12/L (ref 4.4–5.9)
RBC # BLD AUTO: 2.85 10E12/L (ref 4.4–5.9)
RBC # BLD AUTO: 2.87 10E12/L (ref 4.4–5.9)
RBC # BLD AUTO: 2.89 10E12/L (ref 4.4–5.9)
RBC # BLD AUTO: 2.91 10E12/L (ref 4.4–5.9)
RBC # BLD AUTO: 2.94 10E12/L (ref 4.4–5.9)
RBC # BLD AUTO: 2.95 10E12/L (ref 4.4–5.9)
RBC # BLD AUTO: 2.96 10E12/L (ref 4.4–5.9)
RBC # BLD AUTO: 2.97 10E12/L (ref 4.4–5.9)
RBC # BLD AUTO: 3.03 10E12/L (ref 4.4–5.9)
RBC # BLD AUTO: 3.04 10E12/L (ref 4.4–5.9)
RBC # BLD AUTO: 3.07 10E12/L (ref 4.4–5.9)
RBC # BLD AUTO: 3.09 10E12/L (ref 4.4–5.9)
RBC # BLD AUTO: 3.13 10E12/L (ref 4.4–5.9)
RBC # BLD AUTO: 3.33 10E12/L (ref 4.4–5.9)
RBC # BLD AUTO: 3.44 10E12/L (ref 4.4–5.9)
RBC # BLD AUTO: 3.46 10E12/L (ref 4.4–5.9)
RBC # BLD AUTO: 3.47 10E12/L (ref 4.4–5.9)
RBC # BLD AUTO: 3.48 10E12/L (ref 4.4–5.9)
RBC # BLD AUTO: 3.61 10E12/L (ref 4.4–5.9)
RBC # BLD AUTO: NORMAL 10E12/L (ref 4.4–5.9)
RBC AGGLUT BLD QL: PRESENT
SODIUM BLD-SCNC: 138 MMOL/L (ref 133–144)
SODIUM BLD-SCNC: 139 MMOL/L (ref 133–144)
SODIUM BLD-SCNC: 140 MMOL/L (ref 133–144)
SODIUM BLD-SCNC: 141 MMOL/L (ref 133–144)
SODIUM BLD-SCNC: 142 MMOL/L (ref 133–144)
SODIUM BLD-SCNC: 143 MMOL/L (ref 133–144)
SODIUM SERPL-SCNC: 136 MMOL/L (ref 133–144)
SODIUM SERPL-SCNC: 136 MMOL/L (ref 133–144)
SODIUM SERPL-SCNC: 137 MMOL/L (ref 133–144)
SODIUM SERPL-SCNC: 138 MMOL/L (ref 133–144)
SODIUM SERPL-SCNC: 138 MMOL/L (ref 133–144)
SODIUM SERPL-SCNC: 139 MMOL/L (ref 133–144)
SODIUM SERPL-SCNC: 140 MMOL/L (ref 133–144)
SODIUM SERPL-SCNC: 141 MMOL/L (ref 133–144)
SODIUM SERPL-SCNC: 142 MMOL/L (ref 133–144)
SODIUM SERPL-SCNC: 143 MMOL/L (ref 133–144)
SODIUM SERPL-SCNC: 143 MMOL/L (ref 133–144)
SODIUM SERPL-SCNC: 145 MMOL/L (ref 133–144)
SODIUM SERPL-SCNC: 147 MMOL/L (ref 133–144)
SODIUM SERPL-SCNC: 150 MMOL/L (ref 133–144)
SODIUM SERPL-SCNC: 152 MMOL/L (ref 133–144)
SODIUM SERPL-SCNC: 152 MMOL/L (ref 133–144)
SODIUM SERPL-SCNC: 154 MMOL/L (ref 133–144)
SODIUM SERPL-SCNC: NORMAL MMOL/L (ref 133–144)
SODIUM UR-SCNC: 139 MMOL/L
SPECIMEN EXP DATE BLD: NORMAL
SPECIMEN SOURCE: ABNORMAL
SPECIMEN SOURCE: NORMAL
T3FREE SERPL-MCNC: 0.9 PG/ML (ref 2.3–4.2)
T4 FREE SERPL-MCNC: 0.86 NG/DL (ref 0.76–1.46)
TARGETS BLD QL SMEAR: SLIGHT
TARGETS BLD QL SMEAR: SLIGHT
TRANSFUSION STATUS PATIENT QL: NORMAL
TRIGL SERPL-MCNC: 200 MG/DL
TRIGL SERPL-MCNC: 343 MG/DL
TRIGL SERPL-MCNC: NORMAL MG/DL
TROPONIN I SERPL-MCNC: 109.65 UG/L (ref 0–0.04)
TROPONIN I SERPL-MCNC: 120.07 UG/L (ref 0–0.04)
TROPONIN I SERPL-MCNC: 123.57 UG/L (ref 0–0.04)
TROPONIN I SERPL-MCNC: 144.92 UG/L (ref 0–0.04)
TSH SERPL DL<=0.005 MIU/L-ACNC: 0.92 MU/L (ref 0.4–4)
VANCOMYCIN SERPL-MCNC: 14.3 MG/L
VANCOMYCIN SERPL-MCNC: 15.8 MG/L
WBC # BLD AUTO: 10.6 10E9/L (ref 4–11)
WBC # BLD AUTO: 10.8 10E9/L (ref 4–11)
WBC # BLD AUTO: 10.9 10E9/L (ref 4–11)
WBC # BLD AUTO: 11 10E9/L (ref 4–11)
WBC # BLD AUTO: 11.6 10E9/L (ref 4–11)
WBC # BLD AUTO: 13.7 10E9/L (ref 4–11)
WBC # BLD AUTO: 13.8 10E9/L (ref 4–11)
WBC # BLD AUTO: 14.2 10E9/L (ref 4–11)
WBC # BLD AUTO: 14.6 10E9/L (ref 4–11)
WBC # BLD AUTO: 14.7 10E9/L (ref 4–11)
WBC # BLD AUTO: 14.7 10E9/L (ref 4–11)
WBC # BLD AUTO: 15.2 10E9/L (ref 4–11)
WBC # BLD AUTO: 15.2 10E9/L (ref 4–11)
WBC # BLD AUTO: 15.4 10E9/L (ref 4–11)
WBC # BLD AUTO: 15.4 10E9/L (ref 4–11)
WBC # BLD AUTO: 15.7 10E9/L (ref 4–11)
WBC # BLD AUTO: 15.8 10E9/L (ref 4–11)
WBC # BLD AUTO: 17.1 10E9/L (ref 4–11)
WBC # BLD AUTO: 17.5 10E9/L (ref 4–11)
WBC # BLD AUTO: 19.2 10E9/L (ref 4–11)
WBC # BLD AUTO: 19.9 10E9/L (ref 4–11)
WBC # BLD AUTO: 20.1 10E9/L (ref 4–11)
WBC # BLD AUTO: 20.8 10E9/L (ref 4–11)
WBC # BLD AUTO: 21 10E9/L (ref 4–11)
WBC # BLD AUTO: 21.4 10E9/L (ref 4–11)
WBC # BLD AUTO: 22 10E9/L (ref 4–11)
WBC # BLD AUTO: 22.1 10E9/L (ref 4–11)
WBC # BLD AUTO: 22.7 10E9/L (ref 4–11)
WBC # BLD AUTO: 22.9 10E9/L (ref 4–11)
WBC # BLD AUTO: 23.1 10E9/L (ref 4–11)
WBC # BLD AUTO: 23.2 10E9/L (ref 4–11)
WBC # BLD AUTO: 23.6 10E9/L (ref 4–11)
WBC # BLD AUTO: 23.8 10E9/L (ref 4–11)
WBC # BLD AUTO: 23.8 10E9/L (ref 4–11)
WBC # BLD AUTO: 23.9 10E9/L (ref 4–11)
WBC # BLD AUTO: 24.2 10E9/L (ref 4–11)
WBC # BLD AUTO: 24.3 10E9/L (ref 4–11)
WBC # BLD AUTO: 8.6 10E9/L (ref 4–11)
WBC # BLD AUTO: 9.1 10E9/L (ref 4–11)
WBC # BLD AUTO: 9.3 10E9/L (ref 4–11)
WBC # BLD AUTO: 9.4 10E9/L (ref 4–11)
WBC # BLD AUTO: 9.4 10E9/L (ref 4–11)
WBC # BLD AUTO: 9.6 10E9/L (ref 4–11)
WBC # BLD AUTO: 9.7 10E9/L (ref 4–11)
WBC # BLD AUTO: NORMAL 10E9/L (ref 4–11)

## 2018-01-01 PROCEDURE — 41000018 ZZH PER-PERFUSION 1ST 30 MIN: Performed by: THORACIC SURGERY (CARDIOTHORACIC VASCULAR SURGERY)

## 2018-01-01 PROCEDURE — 85025 COMPLETE CBC W/AUTO DIFF WBC: CPT | Performed by: CLINICAL NURSE SPECIALIST

## 2018-01-01 PROCEDURE — 40000014 ZZH STATISTIC ARTERIAL MONITORING DAILY

## 2018-01-01 PROCEDURE — 84100 ASSAY OF PHOSPHORUS: CPT | Performed by: CLINICAL NURSE SPECIALIST

## 2018-01-01 PROCEDURE — 85730 THROMBOPLASTIN TIME PARTIAL: CPT | Performed by: CLINICAL NURSE SPECIALIST

## 2018-01-01 PROCEDURE — 85379 FIBRIN DEGRADATION QUANT: CPT | Performed by: CLINICAL NURSE SPECIALIST

## 2018-01-01 PROCEDURE — 93454 CORONARY ARTERY ANGIO S&I: CPT

## 2018-01-01 PROCEDURE — 25000128 H RX IP 250 OP 636: Performed by: STUDENT IN AN ORGANIZED HEALTH CARE EDUCATION/TRAINING PROGRAM

## 2018-01-01 PROCEDURE — 94003 VENT MGMT INPAT SUBQ DAY: CPT

## 2018-01-01 PROCEDURE — 82330 ASSAY OF CALCIUM: CPT | Performed by: THORACIC SURGERY (CARDIOTHORACIC VASCULAR SURGERY)

## 2018-01-01 PROCEDURE — 83036 HEMOGLOBIN GLYCOSYLATED A1C: CPT | Performed by: SURGERY

## 2018-01-01 PROCEDURE — 71045 X-RAY EXAM CHEST 1 VIEW: CPT

## 2018-01-01 PROCEDURE — 40000076 ZZH STATISTIC IABP MONITORING

## 2018-01-01 PROCEDURE — 85384 FIBRINOGEN ACTIVITY: CPT | Performed by: SURGERY

## 2018-01-01 PROCEDURE — 40000048 ZZH STATISTIC DAILY SWAN MONITORING

## 2018-01-01 PROCEDURE — 84443 ASSAY THYROID STIM HORMONE: CPT | Performed by: CLINICAL NURSE SPECIALIST

## 2018-01-01 PROCEDURE — 99291 CRITICAL CARE FIRST HOUR: CPT | Mod: GC | Performed by: INTERNAL MEDICINE

## 2018-01-01 PROCEDURE — 25000128 H RX IP 250 OP 636: Performed by: SURGERY

## 2018-01-01 PROCEDURE — 0W9B00Z DRAINAGE OF LEFT PLEURAL CAVITY WITH DRAINAGE DEVICE, OPEN APPROACH: ICD-10-PCS | Performed by: THORACIC SURGERY (CARDIOTHORACIC VASCULAR SURGERY)

## 2018-01-01 PROCEDURE — 27210460 ZZH PUMP APP ADULT PERFUSION: Performed by: THORACIC SURGERY (CARDIOTHORACIC VASCULAR SURGERY)

## 2018-01-01 PROCEDURE — 83735 ASSAY OF MAGNESIUM: CPT | Performed by: CLINICAL NURSE SPECIALIST

## 2018-01-01 PROCEDURE — 85027 COMPLETE CBC AUTOMATED: CPT | Performed by: SURGERY

## 2018-01-01 PROCEDURE — 84295 ASSAY OF SERUM SODIUM: CPT

## 2018-01-01 PROCEDURE — 83605 ASSAY OF LACTIC ACID: CPT | Performed by: SURGERY

## 2018-01-01 PROCEDURE — 27210946 ZZH KIT HC TOTES DISP CR8

## 2018-01-01 PROCEDURE — 82330 ASSAY OF CALCIUM: CPT | Performed by: SURGERY

## 2018-01-01 PROCEDURE — 87040 BLOOD CULTURE FOR BACTERIA: CPT | Performed by: SURGERY

## 2018-01-01 PROCEDURE — 33949 ECMO/ECLS DAILY MGMT ARTERY: CPT

## 2018-01-01 PROCEDURE — 36000068 ZZH SURGERY LEVEL 5 1ST 30 MIN - UMMC: Performed by: THORACIC SURGERY (CARDIOTHORACIC VASCULAR SURGERY)

## 2018-01-01 PROCEDURE — 82805 BLOOD GASES W/O2 SATURATION: CPT | Performed by: SURGERY

## 2018-01-01 PROCEDURE — 85049 AUTOMATED PLATELET COUNT: CPT | Performed by: SURGERY

## 2018-01-01 PROCEDURE — 44500 INTRO GASTROINTESTINAL TUBE: CPT

## 2018-01-01 PROCEDURE — 40000344 ZZHCL STATISTIC THAWING COMPONENT: Performed by: SURGERY

## 2018-01-01 PROCEDURE — 85347 COAGULATION TIME ACTIVATED: CPT

## 2018-01-01 PROCEDURE — 40000275 ZZH STATISTIC RCP TIME EA 10 MIN

## 2018-01-01 PROCEDURE — 82435 ASSAY OF BLOOD CHLORIDE: CPT

## 2018-01-01 PROCEDURE — 25000132 ZZH RX MED GY IP 250 OP 250 PS 637: Performed by: SURGERY

## 2018-01-01 PROCEDURE — 85520 HEPARIN ASSAY: CPT | Performed by: SURGERY

## 2018-01-01 PROCEDURE — 25800025 ZZH RX 258: Performed by: SURGERY

## 2018-01-01 PROCEDURE — 20000004 ZZH R&B ICU UMMC

## 2018-01-01 PROCEDURE — P9041 ALBUMIN (HUMAN),5%, 50ML: HCPCS | Performed by: SURGERY

## 2018-01-01 PROCEDURE — 25000125 ZZHC RX 250: Performed by: STUDENT IN AN ORGANIZED HEALTH CARE EDUCATION/TRAINING PROGRAM

## 2018-01-01 PROCEDURE — 83735 ASSAY OF MAGNESIUM: CPT | Performed by: SURGERY

## 2018-01-01 PROCEDURE — 86901 BLOOD TYPING SEROLOGIC RH(D): CPT | Performed by: INTERNAL MEDICINE

## 2018-01-01 PROCEDURE — P9016 RBC LEUKOCYTES REDUCED: HCPCS | Performed by: SURGERY

## 2018-01-01 PROCEDURE — 84132 ASSAY OF SERUM POTASSIUM: CPT | Performed by: SURGERY

## 2018-01-01 PROCEDURE — 27210447 ZZH PACK CELL SAVER CSP: Performed by: THORACIC SURGERY (CARDIOTHORACIC VASCULAR SURGERY)

## 2018-01-01 PROCEDURE — 40000556 ZZH STATISTIC PERIPHERAL IV START W US GUIDANCE

## 2018-01-01 PROCEDURE — 25000125 ZZHC RX 250: Performed by: CLINICAL NURSE SPECIALIST

## 2018-01-01 PROCEDURE — 0WCD0ZZ EXTIRPATION OF MATTER FROM PERICARDIAL CAVITY, OPEN APPROACH: ICD-10-PCS | Performed by: THORACIC SURGERY (CARDIOTHORACIC VASCULAR SURGERY)

## 2018-01-01 PROCEDURE — 25000132 ZZH RX MED GY IP 250 OP 250 PS 637: Performed by: ANESTHESIOLOGY

## 2018-01-01 PROCEDURE — 84295 ASSAY OF SERUM SODIUM: CPT | Performed by: SURGERY

## 2018-01-01 PROCEDURE — 87640 STAPH A DNA AMP PROBE: CPT | Performed by: SURGERY

## 2018-01-01 PROCEDURE — 94645 CONT INHLJ TX EACH ADDL HOUR: CPT

## 2018-01-01 PROCEDURE — 36415 COLL VENOUS BLD VENIPUNCTURE: CPT

## 2018-01-01 PROCEDURE — 85379 FIBRIN DEGRADATION QUANT: CPT | Performed by: SURGERY

## 2018-01-01 PROCEDURE — 27211040 ZZH CONTINUOUS NEBULIZER MICRO PUMP

## 2018-01-01 PROCEDURE — 85610 PROTHROMBIN TIME: CPT | Performed by: CLINICAL NURSE SPECIALIST

## 2018-01-01 PROCEDURE — 36000070 ZZH SURGERY LEVEL 5 EA 15 ADDTL MIN - UMMC: Performed by: THORACIC SURGERY (CARDIOTHORACIC VASCULAR SURGERY)

## 2018-01-01 PROCEDURE — P9059 PLASMA, FRZ BETWEEN 8-24HOUR: HCPCS | Performed by: SURGERY

## 2018-01-01 PROCEDURE — P9073 PLATELETS PHERESIS PATH REDU: HCPCS | Performed by: SURGERY

## 2018-01-01 PROCEDURE — 80048 BASIC METABOLIC PNL TOTAL CA: CPT | Performed by: SURGERY

## 2018-01-01 PROCEDURE — 86900 BLOOD TYPING SEROLOGIC ABO: CPT | Performed by: SURGERY

## 2018-01-01 PROCEDURE — P9037 PLATE PHERES LEUKOREDU IRRAD: HCPCS | Performed by: SURGERY

## 2018-01-01 PROCEDURE — 85730 THROMBOPLASTIN TIME PARTIAL: CPT | Performed by: SURGERY

## 2018-01-01 PROCEDURE — 84075 ASSAY ALKALINE PHOSPHATASE: CPT

## 2018-01-01 PROCEDURE — B2111ZZ FLUOROSCOPY OF MULTIPLE CORONARY ARTERIES USING LOW OSMOLAR CONTRAST: ICD-10-PCS | Performed by: INTERNAL MEDICINE

## 2018-01-01 PROCEDURE — 82310 ASSAY OF CALCIUM: CPT

## 2018-01-01 PROCEDURE — 27210437 ZZH NUTRITION PRODUCT SEMIELEM INTERMED LITER

## 2018-01-01 PROCEDURE — 40000281 ZZH STATISTIC TRANSPORT TIME EA 15 MIN

## 2018-01-01 PROCEDURE — 83605 ASSAY OF LACTIC ACID: CPT | Performed by: ANESTHESIOLOGY

## 2018-01-01 PROCEDURE — 27210794 ZZH OR GENERAL SUPPLY STERILE: Performed by: THORACIC SURGERY (CARDIOTHORACIC VASCULAR SURGERY)

## 2018-01-01 PROCEDURE — 25000128 H RX IP 250 OP 636

## 2018-01-01 PROCEDURE — 27210762 ZZH DEVICE SUTURELESS SECUREMENT EA CR2

## 2018-01-01 PROCEDURE — C9113 INJ PANTOPRAZOLE SODIUM, VIA: HCPCS | Performed by: SURGERY

## 2018-01-01 PROCEDURE — P9012 CRYOPRECIPITATE EACH UNIT: HCPCS | Performed by: SURGERY

## 2018-01-01 PROCEDURE — 25000125 ZZHC RX 250: Performed by: SURGERY

## 2018-01-01 PROCEDURE — 83051 HEMOGLOBIN PLASMA: CPT | Performed by: CLINICAL NURSE SPECIALIST

## 2018-01-01 PROCEDURE — 84295 ASSAY OF SERUM SODIUM: CPT | Performed by: ANESTHESIOLOGY

## 2018-01-01 PROCEDURE — 90947 DIALYSIS REPEATED EVAL: CPT

## 2018-01-01 PROCEDURE — 82248 BILIRUBIN DIRECT: CPT | Performed by: CLINICAL NURSE SPECIALIST

## 2018-01-01 PROCEDURE — 99291 CRITICAL CARE FIRST HOUR: CPT | Mod: GC | Performed by: ANESTHESIOLOGY

## 2018-01-01 PROCEDURE — 40000986 XR ABDOMEN PORT 1 VW

## 2018-01-01 PROCEDURE — 33947 ECMO/ECLS INITIATION ARTERY: CPT

## 2018-01-01 PROCEDURE — 82947 ASSAY GLUCOSE BLOOD QUANT: CPT

## 2018-01-01 PROCEDURE — 84460 ALANINE AMINO (ALT) (SGPT): CPT | Performed by: SURGERY

## 2018-01-01 PROCEDURE — 99152 MOD SED SAME PHYS/QHP 5/>YRS: CPT

## 2018-01-01 PROCEDURE — 25000125 ZZHC RX 250: Performed by: NURSE ANESTHETIST, CERTIFIED REGISTERED

## 2018-01-01 PROCEDURE — 27210136 ZZH KIT CATH ARTERIAL EXT SUPPLY

## 2018-01-01 PROCEDURE — 82040 ASSAY OF SERUM ALBUMIN: CPT

## 2018-01-01 PROCEDURE — 82330 ASSAY OF CALCIUM: CPT | Performed by: CLINICAL NURSE SPECIALIST

## 2018-01-01 PROCEDURE — 00000146 ZZHCL STATISTIC GLUCOSE BY METER IP

## 2018-01-01 PROCEDURE — 83935 ASSAY OF URINE OSMOLALITY: CPT | Performed by: INTERNAL MEDICINE

## 2018-01-01 PROCEDURE — 25000128 H RX IP 250 OP 636: Performed by: THORACIC SURGERY (CARDIOTHORACIC VASCULAR SURGERY)

## 2018-01-01 PROCEDURE — 5A1D90Z PERFORMANCE OF URINARY FILTRATION, CONTINUOUS, GREATER THAN 18 HOURS PER DAY: ICD-10-PCS | Performed by: INTERNAL MEDICINE

## 2018-01-01 PROCEDURE — 25000565 ZZH ISOFLURANE, EA 15 MIN: Performed by: THORACIC SURGERY (CARDIOTHORACIC VASCULAR SURGERY)

## 2018-01-01 PROCEDURE — 82374 ASSAY BLOOD CARBON DIOXIDE: CPT

## 2018-01-01 PROCEDURE — 84520 ASSAY OF UREA NITROGEN: CPT

## 2018-01-01 PROCEDURE — 40000196 ZZH STATISTIC RAPCV CVP MONITORING

## 2018-01-01 PROCEDURE — 25000128 H RX IP 250 OP 636: Performed by: INTERNAL MEDICINE

## 2018-01-01 PROCEDURE — 5A02210 ASSISTANCE WITH CARDIAC OUTPUT USING BALLOON PUMP, CONTINUOUS: ICD-10-PCS | Performed by: SURGERY

## 2018-01-01 PROCEDURE — 82947 ASSAY GLUCOSE BLOOD QUANT: CPT | Performed by: SURGERY

## 2018-01-01 PROCEDURE — 80069 RENAL FUNCTION PANEL: CPT | Performed by: SURGERY

## 2018-01-01 PROCEDURE — 86923 COMPATIBILITY TEST ELECTRIC: CPT | Performed by: SURGERY

## 2018-01-01 PROCEDURE — 86140 C-REACTIVE PROTEIN: CPT | Performed by: CLINICAL NURSE SPECIALIST

## 2018-01-01 PROCEDURE — 85384 FIBRINOGEN ACTIVITY: CPT | Performed by: CLINICAL NURSE SPECIALIST

## 2018-01-01 PROCEDURE — 27210787 ZZH MANIFOLD CR2

## 2018-01-01 PROCEDURE — 82550 ASSAY OF CK (CPK): CPT | Performed by: CLINICAL NURSE SPECIALIST

## 2018-01-01 PROCEDURE — 40000986 XR CHEST PORT 1 VW

## 2018-01-01 PROCEDURE — 80053 COMPREHEN METABOLIC PANEL: CPT | Performed by: CLINICAL NURSE SPECIALIST

## 2018-01-01 PROCEDURE — 25000128 H RX IP 250 OP 636: Performed by: ANESTHESIOLOGY

## 2018-01-01 PROCEDURE — P9041 ALBUMIN (HUMAN),5%, 50ML: HCPCS

## 2018-01-01 PROCEDURE — 85610 PROTHROMBIN TIME: CPT | Performed by: SURGERY

## 2018-01-01 PROCEDURE — 36415 COLL VENOUS BLD VENIPUNCTURE: CPT | Performed by: SURGERY

## 2018-01-01 PROCEDURE — 27211184 ZZH CARDIOHELP CIRCUIT

## 2018-01-01 PROCEDURE — 5A1955Z RESPIRATORY VENTILATION, GREATER THAN 96 CONSECUTIVE HOURS: ICD-10-PCS | Performed by: SURGERY

## 2018-01-01 PROCEDURE — 93325 DOPPLER ECHO COLOR FLOW MAPG: CPT | Mod: 26 | Performed by: INTERNAL MEDICINE

## 2018-01-01 PROCEDURE — 85520 HEPARIN ASSAY: CPT | Performed by: CLINICAL NURSE SPECIALIST

## 2018-01-01 PROCEDURE — 99233 SBSQ HOSP IP/OBS HIGH 50: CPT | Mod: GC | Performed by: INTERNAL MEDICINE

## 2018-01-01 PROCEDURE — 82947 ASSAY GLUCOSE BLOOD QUANT: CPT | Performed by: THORACIC SURGERY (CARDIOTHORACIC VASCULAR SURGERY)

## 2018-01-01 PROCEDURE — 37000009 ZZH ANESTHESIA TECHNICAL FEE, EACH ADDTL 15 MIN: Performed by: THORACIC SURGERY (CARDIOTHORACIC VASCULAR SURGERY)

## 2018-01-01 PROCEDURE — 84100 ASSAY OF PHOSPHORUS: CPT | Performed by: SURGERY

## 2018-01-01 PROCEDURE — P9016 RBC LEUKOCYTES REDUCED: HCPCS | Performed by: INTERNAL MEDICINE

## 2018-01-01 PROCEDURE — 27210545 ZZH PUMP CENTRIMAG ADULT

## 2018-01-01 PROCEDURE — 93320 DOPPLER ECHO COMPLETE: CPT

## 2018-01-01 PROCEDURE — 25000125 ZZHC RX 250: Performed by: THORACIC SURGERY (CARDIOTHORACIC VASCULAR SURGERY)

## 2018-01-01 PROCEDURE — 021009W BYPASS CORONARY ARTERY, ONE ARTERY FROM AORTA WITH AUTOLOGOUS VENOUS TISSUE, OPEN APPROACH: ICD-10-PCS | Performed by: THORACIC SURGERY (CARDIOTHORACIC VASCULAR SURGERY)

## 2018-01-01 PROCEDURE — 87077 CULTURE AEROBIC IDENTIFY: CPT | Performed by: SURGERY

## 2018-01-01 PROCEDURE — 27211402 ZZH SENSOR NIRS OXIMETER, ADULT

## 2018-01-01 PROCEDURE — 85300 ANTITHROMBIN III ACTIVITY: CPT | Performed by: CLINICAL NURSE SPECIALIST

## 2018-01-01 PROCEDURE — 25000132 ZZH RX MED GY IP 250 OP 250 PS 637: Performed by: NURSE ANESTHETIST, CERTIFIED REGISTERED

## 2018-01-01 PROCEDURE — 41000019 ZZH PERA-PERFUSION EACH ADDTL 15 MIN: Performed by: THORACIC SURGERY (CARDIOTHORACIC VASCULAR SURGERY)

## 2018-01-01 PROCEDURE — 83605 ASSAY OF LACTIC ACID: CPT | Performed by: THORACIC SURGERY (CARDIOTHORACIC VASCULAR SURGERY)

## 2018-01-01 PROCEDURE — 25000125 ZZHC RX 250

## 2018-01-01 PROCEDURE — 27211329 ZZ H DEVICE OXYGENATOR QUADROX ECMO, ADULT

## 2018-01-01 PROCEDURE — 0W3D0ZZ CONTROL BLEEDING IN PERICARDIAL CAVITY, OPEN APPROACH: ICD-10-PCS | Performed by: THORACIC SURGERY (CARDIOTHORACIC VASCULAR SURGERY)

## 2018-01-01 PROCEDURE — 85004 AUTOMATED DIFF WBC COUNT: CPT | Performed by: SURGERY

## 2018-01-01 PROCEDURE — 84439 ASSAY OF FREE THYROXINE: CPT | Performed by: CLINICAL NURSE SPECIALIST

## 2018-01-01 PROCEDURE — 84484 ASSAY OF TROPONIN QUANT: CPT | Performed by: CLINICAL NURSE SPECIALIST

## 2018-01-01 PROCEDURE — 80076 HEPATIC FUNCTION PANEL: CPT | Performed by: SURGERY

## 2018-01-01 PROCEDURE — 25000132 ZZH RX MED GY IP 250 OP 250 PS 637: Performed by: STUDENT IN AN ORGANIZED HEALTH CARE EDUCATION/TRAINING PROGRAM

## 2018-01-01 PROCEDURE — 27210843 ZZH DEVICE COMPRESSION CR12

## 2018-01-01 PROCEDURE — P9041 ALBUMIN (HUMAN),5%, 50ML: HCPCS | Performed by: NURSE ANESTHETIST, CERTIFIED REGISTERED

## 2018-01-01 PROCEDURE — XW043H4 INTRODUCTION OF SYNTHETIC HUMAN ANGIOTENSIN II INTO CENTRAL VEIN, PERCUTANEOUS APPROACH, NEW TECHNOLOGY GROUP 4: ICD-10-PCS | Performed by: ANESTHESIOLOGY

## 2018-01-01 PROCEDURE — 75605 CONTRAST EXAM THORACIC AORTA: CPT

## 2018-01-01 PROCEDURE — 25000125 ZZHC RX 250: Performed by: INTERNAL MEDICINE

## 2018-01-01 PROCEDURE — 36000059 ZZH SURGERY LEVEL 3 EA 15 ADDTL MIN UMMC: Performed by: THORACIC SURGERY (CARDIOTHORACIC VASCULAR SURGERY)

## 2018-01-01 PROCEDURE — 27211327 ZZ H KIT ECMO CIRCUIT ONLY, ADULT

## 2018-01-01 PROCEDURE — C1769 GUIDE WIRE: HCPCS

## 2018-01-01 PROCEDURE — 82565 ASSAY OF CREATININE: CPT

## 2018-01-01 PROCEDURE — 86850 RBC ANTIBODY SCREEN: CPT | Performed by: SURGERY

## 2018-01-01 PROCEDURE — 84295 ASSAY OF SERUM SODIUM: CPT | Performed by: THORACIC SURGERY (CARDIOTHORACIC VASCULAR SURGERY)

## 2018-01-01 PROCEDURE — 25000128 H RX IP 250 OP 636: Performed by: CLINICAL NURSE SPECIALIST

## 2018-01-01 PROCEDURE — 27210893 ZZH CATH CR5

## 2018-01-01 PROCEDURE — 25000128 H RX IP 250 OP 636: Performed by: NURSE ANESTHETIST, CERTIFIED REGISTERED

## 2018-01-01 PROCEDURE — 85396 CLOTTING ASSAY WHOLE BLOOD: CPT | Performed by: STUDENT IN AN ORGANIZED HEALTH CARE EDUCATION/TRAINING PROGRAM

## 2018-01-01 PROCEDURE — 27210794 ZZH OR GENERAL SUPPLY STERILE: Performed by: SURGERY

## 2018-01-01 PROCEDURE — 27210995 ZZH RX 272: Performed by: THORACIC SURGERY (CARDIOTHORACIC VASCULAR SURGERY)

## 2018-01-01 PROCEDURE — 36000076 ZZH SURGERY LEVEL 6 EA 15 ADDTL MIN - UMMC: Performed by: THORACIC SURGERY (CARDIOTHORACIC VASCULAR SURGERY)

## 2018-01-01 PROCEDURE — 99233 SBSQ HOSP IP/OBS HIGH 50: CPT | Mod: 25 | Performed by: INTERNAL MEDICINE

## 2018-01-01 PROCEDURE — 94799 UNLISTED PULMONARY SVC/PX: CPT

## 2018-01-01 PROCEDURE — G0463 HOSPITAL OUTPT CLINIC VISIT: HCPCS

## 2018-01-01 PROCEDURE — 83051 HEMOGLOBIN PLASMA: CPT | Performed by: SURGERY

## 2018-01-01 PROCEDURE — 25000128 H RX IP 250 OP 636: Performed by: NEUROLOGICAL SURGERY

## 2018-01-01 PROCEDURE — 84478 ASSAY OF TRIGLYCERIDES: CPT | Performed by: CLINICAL NURSE SPECIALIST

## 2018-01-01 PROCEDURE — 84132 ASSAY OF SERUM POTASSIUM: CPT

## 2018-01-01 PROCEDURE — 93320 DOPPLER ECHO COMPLETE: CPT | Mod: 26 | Performed by: INTERNAL MEDICINE

## 2018-01-01 PROCEDURE — 27211089 ZZH KIT ACIST INJECTOR CR3

## 2018-01-01 PROCEDURE — 80202 ASSAY OF VANCOMYCIN: CPT | Performed by: SURGERY

## 2018-01-01 PROCEDURE — 99153 MOD SED SAME PHYS/QHP EA: CPT

## 2018-01-01 PROCEDURE — 84300 ASSAY OF URINE SODIUM: CPT | Performed by: INTERNAL MEDICINE

## 2018-01-01 PROCEDURE — C1894 INTRO/SHEATH, NON-LASER: HCPCS

## 2018-01-01 PROCEDURE — 82803 BLOOD GASES ANY COMBINATION: CPT | Performed by: SURGERY

## 2018-01-01 PROCEDURE — 5A1522F EXTRACORPOREAL OXYGENATION, MEMBRANE, CENTRAL: ICD-10-PCS | Performed by: THORACIC SURGERY (CARDIOTHORACIC VASCULAR SURGERY)

## 2018-01-01 PROCEDURE — 93454 CORONARY ARTERY ANGIO S&I: CPT | Mod: 26 | Performed by: INTERNAL MEDICINE

## 2018-01-01 PROCEDURE — 84132 ASSAY OF SERUM POTASSIUM: CPT | Performed by: THORACIC SURGERY (CARDIOTHORACIC VASCULAR SURGERY)

## 2018-01-01 PROCEDURE — P9041 ALBUMIN (HUMAN),5%, 50ML: HCPCS | Performed by: THORACIC SURGERY (CARDIOTHORACIC VASCULAR SURGERY)

## 2018-01-01 PROCEDURE — 93005 ELECTROCARDIOGRAM TRACING: CPT

## 2018-01-01 PROCEDURE — 82247 BILIRUBIN TOTAL: CPT

## 2018-01-01 PROCEDURE — C1781 MESH (IMPLANTABLE): HCPCS | Performed by: THORACIC SURGERY (CARDIOTHORACIC VASCULAR SURGERY)

## 2018-01-01 PROCEDURE — 82550 ASSAY OF CK (CPK): CPT | Performed by: SURGERY

## 2018-01-01 PROCEDURE — 93010 ELECTROCARDIOGRAM REPORT: CPT | Performed by: INTERNAL MEDICINE

## 2018-01-01 PROCEDURE — 25000125 ZZHC RX 250: Performed by: PHYSICIAN ASSISTANT

## 2018-01-01 PROCEDURE — 85300 ANTITHROMBIN III ACTIVITY: CPT | Performed by: SURGERY

## 2018-01-01 PROCEDURE — 37000008 ZZH ANESTHESIA TECHNICAL FEE, 1ST 30 MIN: Performed by: THORACIC SURGERY (CARDIOTHORACIC VASCULAR SURGERY)

## 2018-01-01 PROCEDURE — P9041 ALBUMIN (HUMAN),5%, 50ML: HCPCS | Performed by: ANESTHESIOLOGY

## 2018-01-01 PROCEDURE — 82805 BLOOD GASES W/O2 SATURATION: CPT | Performed by: INTERNAL MEDICINE

## 2018-01-01 PROCEDURE — 74018 RADEX ABDOMEN 1 VIEW: CPT

## 2018-01-01 PROCEDURE — 82803 BLOOD GASES ANY COMBINATION: CPT | Performed by: THORACIC SURGERY (CARDIOTHORACIC VASCULAR SURGERY)

## 2018-01-01 PROCEDURE — 84460 ALANINE AMINO (ALT) (SGPT): CPT

## 2018-01-01 PROCEDURE — 3E043XZ INTRODUCTION OF VASOPRESSOR INTO CENTRAL VEIN, PERCUTANEOUS APPROACH: ICD-10-PCS | Performed by: SURGERY

## 2018-01-01 PROCEDURE — 82248 BILIRUBIN DIRECT: CPT | Performed by: SURGERY

## 2018-01-01 PROCEDURE — 94002 VENT MGMT INPAT INIT DAY: CPT

## 2018-01-01 PROCEDURE — 82330 ASSAY OF CALCIUM: CPT | Performed by: ANESTHESIOLOGY

## 2018-01-01 PROCEDURE — C1887 CATHETER, GUIDING: HCPCS

## 2018-01-01 PROCEDURE — 80202 ASSAY OF VANCOMYCIN: CPT | Performed by: ANESTHESIOLOGY

## 2018-01-01 PROCEDURE — 87800 DETECT AGNT MULT DNA DIREC: CPT | Performed by: SURGERY

## 2018-01-01 PROCEDURE — 25000125 ZZHC RX 250: Performed by: GENERAL ACUTE CARE HOSPITAL

## 2018-01-01 PROCEDURE — 86901 BLOOD TYPING SEROLOGIC RH(D): CPT | Performed by: SURGERY

## 2018-01-01 PROCEDURE — 5A1522F EXTRACORPOREAL OXYGENATION, MEMBRANE, CENTRAL: ICD-10-PCS | Performed by: SURGERY

## 2018-01-01 PROCEDURE — 99221 1ST HOSP IP/OBS SF/LOW 40: CPT | Performed by: NURSE PRACTITIONER

## 2018-01-01 PROCEDURE — 84132 ASSAY OF SERUM POTASSIUM: CPT | Performed by: ANESTHESIOLOGY

## 2018-01-01 PROCEDURE — 80053 COMPREHEN METABOLIC PANEL: CPT | Performed by: SURGERY

## 2018-01-01 PROCEDURE — 84481 FREE ASSAY (FT-3): CPT | Performed by: CLINICAL NURSE SPECIALIST

## 2018-01-01 PROCEDURE — 25000125 ZZHC RX 250: Performed by: RADIOLOGY

## 2018-01-01 PROCEDURE — 93567 NJX CAR CTH SPRVLV AORTGRPHY: CPT

## 2018-01-01 PROCEDURE — 85396 CLOTTING ASSAY WHOLE BLOOD: CPT | Performed by: SURGERY

## 2018-01-01 PROCEDURE — 06BQ4ZZ EXCISION OF LEFT SAPHENOUS VEIN, PERCUTANEOUS ENDOSCOPIC APPROACH: ICD-10-PCS | Performed by: THORACIC SURGERY (CARDIOTHORACIC VASCULAR SURGERY)

## 2018-01-01 PROCEDURE — 87181 SC STD AGAR DILUTION PER AGT: CPT | Performed by: SURGERY

## 2018-01-01 PROCEDURE — 36000057 ZZH SURGERY LEVEL 3 1ST 30 MIN - UMMC: Performed by: THORACIC SURGERY (CARDIOTHORACIC VASCULAR SURGERY)

## 2018-01-01 PROCEDURE — 71045 X-RAY EXAM CHEST 1 VIEW: CPT | Mod: 76

## 2018-01-01 PROCEDURE — 86850 RBC ANTIBODY SCREEN: CPT | Performed by: INTERNAL MEDICINE

## 2018-01-01 PROCEDURE — 86900 BLOOD TYPING SEROLOGIC ABO: CPT | Performed by: INTERNAL MEDICINE

## 2018-01-01 PROCEDURE — 82533 TOTAL CORTISOL: CPT | Performed by: SURGERY

## 2018-01-01 PROCEDURE — 87641 MR-STAPH DNA AMP PROBE: CPT | Performed by: SURGERY

## 2018-01-01 PROCEDURE — 36000074 ZZH SURGERY LEVEL 6 1ST 30 MIN - UMMC: Performed by: THORACIC SURGERY (CARDIOTHORACIC VASCULAR SURGERY)

## 2018-01-01 PROCEDURE — 84484 ASSAY OF TROPONIN QUANT: CPT | Performed by: ANESTHESIOLOGY

## 2018-01-01 PROCEDURE — 94644 CONT INHLJ TX 1ST HOUR: CPT

## 2018-01-01 PROCEDURE — 84155 ASSAY OF PROTEIN SERUM: CPT

## 2018-01-01 PROCEDURE — 99291 CRITICAL CARE FIRST HOUR: CPT | Mod: 25 | Performed by: INTERNAL MEDICINE

## 2018-01-01 PROCEDURE — 86923 COMPATIBILITY TEST ELECTRIC: CPT | Performed by: INTERNAL MEDICINE

## 2018-01-01 PROCEDURE — 40000985 XR CHEST PORT 1 VW

## 2018-01-01 PROCEDURE — 27810325 ZZHC OR IMPLANT OTHER OPNP: Performed by: THORACIC SURGERY (CARDIOTHORACIC VASCULAR SURGERY)

## 2018-01-01 PROCEDURE — 86140 C-REACTIVE PROTEIN: CPT | Performed by: SURGERY

## 2018-01-01 PROCEDURE — 83930 ASSAY OF BLOOD OSMOLALITY: CPT | Performed by: SURGERY

## 2018-01-01 PROCEDURE — 82803 BLOOD GASES ANY COMBINATION: CPT | Performed by: ANESTHESIOLOGY

## 2018-01-01 PROCEDURE — 84450 TRANSFERASE (AST) (SGOT): CPT | Performed by: SURGERY

## 2018-01-01 PROCEDURE — 93312 ECHO TRANSESOPHAGEAL: CPT | Mod: 26 | Performed by: INTERNAL MEDICINE

## 2018-01-01 PROCEDURE — 82947 ASSAY GLUCOSE BLOOD QUANT: CPT | Performed by: ANESTHESIOLOGY

## 2018-01-01 PROCEDURE — 84132 ASSAY OF SERUM POTASSIUM: CPT | Performed by: INTERNAL MEDICINE

## 2018-01-01 PROCEDURE — 84484 ASSAY OF TROPONIN QUANT: CPT | Performed by: SURGERY

## 2018-01-01 PROCEDURE — 5A1221Z PERFORMANCE OF CARDIAC OUTPUT, CONTINUOUS: ICD-10-PCS | Performed by: THORACIC SURGERY (CARDIOTHORACIC VASCULAR SURGERY)

## 2018-01-01 PROCEDURE — C1729 CATH, DRAINAGE: HCPCS | Performed by: THORACIC SURGERY (CARDIOTHORACIC VASCULAR SURGERY)

## 2018-01-01 DEVICE — GRAFT PTFE FELT 6X6" 007837: Type: IMPLANTABLE DEVICE | Site: CHEST | Status: FUNCTIONAL

## 2018-01-01 DEVICE — IMPLANTABLE DEVICE: Type: IMPLANTABLE DEVICE | Site: CHEST | Status: FUNCTIONAL

## 2018-01-01 DEVICE — LEAD PACER MYOCARDIAL BIPOLAR TEMPORARY 53CM 6495F: Type: IMPLANTABLE DEVICE | Site: CHEST | Status: FUNCTIONAL

## 2018-01-01 RX ORDER — GLIPIZIDE 10 MG/1
10 TABLET ORAL DAILY
COMMUNITY

## 2018-01-01 RX ORDER — PROPOFOL 10 MG/ML
INJECTION, EMULSION INTRAVENOUS
Status: COMPLETED
Start: 2018-01-01 | End: 2018-01-01

## 2018-01-01 RX ORDER — MAGNESIUM SULFATE HEPTAHYDRATE 40 MG/ML
4 INJECTION, SOLUTION INTRAVENOUS EVERY 4 HOURS PRN
Status: DISCONTINUED | OUTPATIENT
Start: 2018-01-01 | End: 2018-01-01

## 2018-01-01 RX ORDER — NALOXONE HYDROCHLORIDE 0.4 MG/ML
.1-.4 INJECTION, SOLUTION INTRAMUSCULAR; INTRAVENOUS; SUBCUTANEOUS
Status: DISCONTINUED | OUTPATIENT
Start: 2018-01-01 | End: 2018-01-01

## 2018-01-01 RX ORDER — ATORVASTATIN CALCIUM 20 MG/1
20 TABLET, FILM COATED ORAL DAILY
Status: DISCONTINUED | OUTPATIENT
Start: 2018-01-01 | End: 2018-01-01

## 2018-01-01 RX ORDER — CEFAZOLIN SODIUM 1 G/3ML
INJECTION, POWDER, FOR SOLUTION INTRAMUSCULAR; INTRAVENOUS PRN
Status: DISCONTINUED | OUTPATIENT
Start: 2018-01-01 | End: 2018-01-01

## 2018-01-01 RX ORDER — EPTIFIBATIDE 2 MG/ML
1 INJECTION, SOLUTION INTRAVENOUS CONTINUOUS PRN
Status: DISCONTINUED | OUTPATIENT
Start: 2018-01-01 | End: 2018-01-01

## 2018-01-01 RX ORDER — ALBUMIN, HUMAN INJ 5% 5 %
12.5 SOLUTION INTRAVENOUS ONCE
Status: COMPLETED | OUTPATIENT
Start: 2018-01-01 | End: 2018-01-01

## 2018-01-01 RX ORDER — PROPOFOL 10 MG/ML
10-20 INJECTION, EMULSION INTRAVENOUS EVERY 30 MIN PRN
Status: DISCONTINUED | OUTPATIENT
Start: 2018-01-01 | End: 2018-01-01

## 2018-01-01 RX ORDER — ALBUMIN, HUMAN INJ 5% 5 %
SOLUTION INTRAVENOUS
Status: COMPLETED
Start: 2018-01-01 | End: 2018-01-01

## 2018-01-01 RX ORDER — PROPOFOL 10 MG/ML
5-75 INJECTION, EMULSION INTRAVENOUS CONTINUOUS
Status: DISCONTINUED | OUTPATIENT
Start: 2018-01-01 | End: 2018-01-01

## 2018-01-01 RX ORDER — NICOTINE POLACRILEX 4 MG
15-30 LOZENGE BUCCAL
Status: DISCONTINUED | OUTPATIENT
Start: 2018-01-01 | End: 2018-01-01

## 2018-01-01 RX ORDER — SODIUM NITROPRUSSIDE 25 MG/ML
100-200 INJECTION INTRAVENOUS
Status: DISCONTINUED | OUTPATIENT
Start: 2018-01-01 | End: 2018-01-01

## 2018-01-01 RX ORDER — NICARDIPINE HYDROCHLORIDE 2.5 MG/ML
100-300 INJECTION INTRAVENOUS
Status: DISCONTINUED | OUTPATIENT
Start: 2018-01-01 | End: 2018-01-01

## 2018-01-01 RX ORDER — NIFEDIPINE 10 MG/1
10 CAPSULE ORAL
Status: DISCONTINUED | OUTPATIENT
Start: 2018-01-01 | End: 2018-01-01

## 2018-01-01 RX ORDER — DEXTROSE MONOHYDRATE 25 G/50ML
25-50 INJECTION, SOLUTION INTRAVENOUS
Status: DISCONTINUED | OUTPATIENT
Start: 2018-01-01 | End: 2018-01-01

## 2018-01-01 RX ORDER — POTASSIUM CHLORIDE 7.45 MG/ML
10 INJECTION INTRAVENOUS
Status: DISCONTINUED | OUTPATIENT
Start: 2018-01-01 | End: 2018-01-01

## 2018-01-01 RX ORDER — AMOXICILLIN 250 MG
1-2 CAPSULE ORAL 2 TIMES DAILY PRN
Status: DISCONTINUED | OUTPATIENT
Start: 2018-01-01 | End: 2018-01-01

## 2018-01-01 RX ORDER — PROTAMINE SULFATE 10 MG/ML
INJECTION, SOLUTION INTRAVENOUS
Status: DISCONTINUED
Start: 2018-01-01 | End: 2018-01-01

## 2018-01-01 RX ORDER — MIDAZOLAM (PF) 1 MG/ML IN 0.9 % SODIUM CHLORIDE INTRAVENOUS SOLUTION
1-8 CONTINUOUS
Status: DISCONTINUED | OUTPATIENT
Start: 2018-01-01 | End: 2018-01-01

## 2018-01-01 RX ORDER — FENTANYL CITRATE 50 UG/ML
INJECTION, SOLUTION INTRAMUSCULAR; INTRAVENOUS
Status: COMPLETED
Start: 2018-01-01 | End: 2018-01-01

## 2018-01-01 RX ORDER — HEPARIN SODIUM (PORCINE) LOCK FLUSH IV SOLN 100 UNIT/ML 100 UNIT/ML
5-10 SOLUTION INTRAVENOUS EVERY 30 MIN PRN
Status: DISCONTINUED | OUTPATIENT
Start: 2018-01-01 | End: 2018-01-01

## 2018-01-01 RX ORDER — PROPOFOL 10 MG/ML
10-20 INJECTION, EMULSION INTRAVENOUS EVERY 30 MIN PRN
Status: CANCELLED | OUTPATIENT
Start: 2018-01-01

## 2018-01-01 RX ORDER — DIPHENHYDRAMINE HYDROCHLORIDE 50 MG/ML
25-50 INJECTION INTRAMUSCULAR; INTRAVENOUS
Status: DISCONTINUED | OUTPATIENT
Start: 2018-01-01 | End: 2018-01-01

## 2018-01-01 RX ORDER — PIPERACILLIN SODIUM, TAZOBACTAM SODIUM 3; .375 G/15ML; G/15ML
3.38 INJECTION, POWDER, LYOPHILIZED, FOR SOLUTION INTRAVENOUS EVERY 6 HOURS
Status: DISCONTINUED | OUTPATIENT
Start: 2018-01-01 | End: 2018-01-01

## 2018-01-01 RX ORDER — ESCITALOPRAM OXALATE 20 MG/1
20 TABLET ORAL DAILY
COMMUNITY

## 2018-01-01 RX ORDER — ADENOSINE 3 MG/ML
12-12000 INJECTION, SOLUTION INTRAVENOUS
Status: DISCONTINUED | OUTPATIENT
Start: 2018-01-01 | End: 2018-01-01

## 2018-01-01 RX ORDER — NALOXONE HYDROCHLORIDE 0.4 MG/ML
0.4 INJECTION, SOLUTION INTRAMUSCULAR; INTRAVENOUS; SUBCUTANEOUS EVERY 5 MIN PRN
Status: DISCONTINUED | OUTPATIENT
Start: 2018-01-01 | End: 2018-01-01

## 2018-01-01 RX ORDER — LISINOPRIL 30 MG/1
30 TABLET ORAL DAILY
COMMUNITY

## 2018-01-01 RX ORDER — PIPERACILLIN SODIUM, TAZOBACTAM SODIUM 4; .5 G/20ML; G/20ML
4.5 INJECTION, POWDER, LYOPHILIZED, FOR SOLUTION INTRAVENOUS EVERY 6 HOURS
Status: DISCONTINUED | OUTPATIENT
Start: 2018-01-01 | End: 2018-01-01

## 2018-01-01 RX ORDER — HYDRALAZINE HYDROCHLORIDE 20 MG/ML
10-20 INJECTION INTRAMUSCULAR; INTRAVENOUS
Status: DISCONTINUED | OUTPATIENT
Start: 2018-01-01 | End: 2018-01-01

## 2018-01-01 RX ORDER — TIROFIBAN HYDROCHLORIDE 50 UG/ML
0.07 INJECTION INTRAVENOUS CONTINUOUS PRN
Status: DISCONTINUED | OUTPATIENT
Start: 2018-01-01 | End: 2018-01-01

## 2018-01-01 RX ORDER — ATROPINE SULFATE 10 MG/ML
1-2 SOLUTION/ DROPS OPHTHALMIC
Status: DISCONTINUED | OUTPATIENT
Start: 2018-01-01 | End: 2018-01-01 | Stop reason: HOSPADM

## 2018-01-01 RX ORDER — ONDANSETRON 2 MG/ML
4 INJECTION INTRAMUSCULAR; INTRAVENOUS EVERY 4 HOURS PRN
Status: DISCONTINUED | OUTPATIENT
Start: 2018-01-01 | End: 2018-01-01

## 2018-01-01 RX ORDER — HEPARIN SODIUM 1000 [USP'U]/ML
1000-10000 INJECTION, SOLUTION INTRAVENOUS; SUBCUTANEOUS EVERY 5 MIN PRN
Status: DISCONTINUED | OUTPATIENT
Start: 2018-01-01 | End: 2018-01-01

## 2018-01-01 RX ORDER — FUROSEMIDE 10 MG/ML
80 INJECTION INTRAMUSCULAR; INTRAVENOUS ONCE
Status: DISCONTINUED | OUTPATIENT
Start: 2018-01-01 | End: 2018-01-01

## 2018-01-01 RX ORDER — FLUMAZENIL 0.1 MG/ML
0.2 INJECTION, SOLUTION INTRAVENOUS
Status: DISCONTINUED | OUTPATIENT
Start: 2018-01-01 | End: 2018-01-01

## 2018-01-01 RX ORDER — ATROPINE SULFATE 0.1 MG/ML
.5-1 INJECTION INTRAVENOUS
Status: DISCONTINUED | OUTPATIENT
Start: 2018-01-01 | End: 2018-01-01

## 2018-01-01 RX ORDER — BISACODYL 10 MG
10 SUPPOSITORY, RECTAL RECTAL DAILY PRN
Status: DISCONTINUED | OUTPATIENT
Start: 2018-01-01 | End: 2018-01-01

## 2018-01-01 RX ORDER — MEPERIDINE HYDROCHLORIDE 50 MG/ML
25 INJECTION INTRAMUSCULAR; INTRAVENOUS; SUBCUTANEOUS
Status: DISCONTINUED | OUTPATIENT
Start: 2018-01-01 | End: 2018-01-01 | Stop reason: HOSPADM

## 2018-01-01 RX ORDER — ALBUMIN, HUMAN INJ 5% 5 %
25 SOLUTION INTRAVENOUS ONCE
Status: COMPLETED | OUTPATIENT
Start: 2018-01-01 | End: 2018-01-01

## 2018-01-01 RX ORDER — METHYLPREDNISOLONE SODIUM SUCCINATE 125 MG/2ML
125 INJECTION, POWDER, LYOPHILIZED, FOR SOLUTION INTRAMUSCULAR; INTRAVENOUS
Status: DISCONTINUED | OUTPATIENT
Start: 2018-01-01 | End: 2018-01-01

## 2018-01-01 RX ORDER — HEPARIN SODIUM 1000 [USP'U]/ML
INJECTION, SOLUTION INTRAVENOUS; SUBCUTANEOUS PRN
Status: DISCONTINUED | OUTPATIENT
Start: 2018-01-01 | End: 2018-01-01

## 2018-01-01 RX ORDER — PHENYLEPHRINE HCL IN 0.9% NACL 1 MG/10 ML
20-100 SYRINGE (ML) INTRAVENOUS
Status: DISCONTINUED | OUTPATIENT
Start: 2018-01-01 | End: 2018-01-01

## 2018-01-01 RX ORDER — PROTAMINE SULFATE 10 MG/ML
1-5 INJECTION, SOLUTION INTRAVENOUS
Status: DISCONTINUED | OUTPATIENT
Start: 2018-01-01 | End: 2018-01-01

## 2018-01-01 RX ORDER — GLYCOPYRROLATE 0.2 MG/ML
.1-.2 INJECTION, SOLUTION INTRAMUSCULAR; INTRAVENOUS EVERY 4 HOURS PRN
Status: DISCONTINUED | OUTPATIENT
Start: 2018-01-01 | End: 2018-01-01 | Stop reason: HOSPADM

## 2018-01-01 RX ORDER — EPTIFIBATIDE 2 MG/ML
180 INJECTION, SOLUTION INTRAVENOUS EVERY 10 MIN PRN
Status: DISCONTINUED | OUTPATIENT
Start: 2018-01-01 | End: 2018-01-01

## 2018-01-01 RX ORDER — ARGATROBAN 1 MG/ML
350 INJECTION, SOLUTION INTRAVENOUS
Status: DISCONTINUED | OUTPATIENT
Start: 2018-01-01 | End: 2018-01-01

## 2018-01-01 RX ORDER — LORAZEPAM 2 MG/ML
.5-2 INJECTION INTRAMUSCULAR EVERY 4 HOURS PRN
Status: DISCONTINUED | OUTPATIENT
Start: 2018-01-01 | End: 2018-01-01

## 2018-01-01 RX ORDER — FENTANYL CITRATE 50 UG/ML
50-100 INJECTION, SOLUTION INTRAMUSCULAR; INTRAVENOUS EVERY 10 MIN PRN
Status: DISCONTINUED | OUTPATIENT
Start: 2018-01-01 | End: 2018-01-01 | Stop reason: HOSPADM

## 2018-01-01 RX ORDER — DOPAMINE HYDROCHLORIDE 160 MG/100ML
2-20 INJECTION, SOLUTION INTRAVENOUS CONTINUOUS PRN
Status: DISCONTINUED | OUTPATIENT
Start: 2018-01-01 | End: 2018-01-01

## 2018-01-01 RX ORDER — SODIUM CHLORIDE, SODIUM LACTATE, POTASSIUM CHLORIDE, CALCIUM CHLORIDE 600; 310; 30; 20 MG/100ML; MG/100ML; MG/100ML; MG/100ML
INJECTION, SOLUTION INTRAVENOUS CONTINUOUS PRN
Status: DISCONTINUED | OUTPATIENT
Start: 2018-01-01 | End: 2018-01-01

## 2018-01-01 RX ORDER — PROTAMINE SULFATE 10 MG/ML
25-100 INJECTION, SOLUTION INTRAVENOUS EVERY 5 MIN PRN
Status: DISCONTINUED | OUTPATIENT
Start: 2018-01-01 | End: 2018-01-01

## 2018-01-01 RX ORDER — CLOPIDOGREL BISULFATE 75 MG/1
75 TABLET ORAL
Status: DISCONTINUED | OUTPATIENT
Start: 2018-01-01 | End: 2018-01-01

## 2018-01-01 RX ORDER — VERAPAMIL HYDROCHLORIDE 2.5 MG/ML
1-5 INJECTION, SOLUTION INTRAVENOUS
Status: DISCONTINUED | OUTPATIENT
Start: 2018-01-01 | End: 2018-01-01

## 2018-01-01 RX ORDER — NITROGLYCERIN 5 MG/ML
100-200 VIAL (ML) INTRAVENOUS
Status: DISCONTINUED | OUTPATIENT
Start: 2018-01-01 | End: 2018-01-01

## 2018-01-01 RX ORDER — MIDAZOLAM (PF) 1 MG/ML IN 0.9 % SODIUM CHLORIDE INTRAVENOUS SOLUTION
1-5 CONTINUOUS
Status: DISCONTINUED | OUTPATIENT
Start: 2018-01-01 | End: 2018-01-01

## 2018-01-01 RX ORDER — METOPROLOL TARTRATE 1 MG/ML
5 INJECTION, SOLUTION INTRAVENOUS EVERY 5 MIN PRN
Status: DISCONTINUED | OUTPATIENT
Start: 2018-01-01 | End: 2018-01-01

## 2018-01-01 RX ORDER — ALBUMIN, HUMAN INJ 5% 5 %
SOLUTION INTRAVENOUS
Status: DISPENSED
Start: 2018-01-01 | End: 2018-01-01

## 2018-01-01 RX ORDER — DEXTROSE MONOHYDRATE 50 MG/ML
INJECTION, SOLUTION INTRAVENOUS CONTINUOUS
Status: DISCONTINUED | OUTPATIENT
Start: 2018-01-01 | End: 2018-01-01

## 2018-01-01 RX ORDER — METOPROLOL TARTRATE 25 MG/1
12.5 TABLET, FILM COATED ORAL EVERY 12 HOURS
COMMUNITY

## 2018-01-01 RX ORDER — FUROSEMIDE 10 MG/ML
80 INJECTION INTRAMUSCULAR; INTRAVENOUS ONCE
Status: COMPLETED | OUTPATIENT
Start: 2018-01-01 | End: 2018-01-01

## 2018-01-01 RX ORDER — EPINEPHRINE 1 MG/ML
0.3 INJECTION, SOLUTION, CONCENTRATE INTRAVENOUS
Status: DISCONTINUED | OUTPATIENT
Start: 2018-01-01 | End: 2018-01-01

## 2018-01-01 RX ORDER — SODIUM CHLORIDE 9 MG/ML
INJECTION, SOLUTION INTRAVENOUS CONTINUOUS PRN
Status: DISCONTINUED | OUTPATIENT
Start: 2018-01-01 | End: 2018-01-01

## 2018-01-01 RX ORDER — NICOTINE 21 MG/24HR
1 PATCH, TRANSDERMAL 24 HOURS TRANSDERMAL SEE ADMIN INSTRUCTIONS
COMMUNITY

## 2018-01-01 RX ORDER — ASPIRIN 325 MG
325 TABLET ORAL
Status: DISCONTINUED | OUTPATIENT
Start: 2018-01-01 | End: 2018-01-01

## 2018-01-01 RX ORDER — LIDOCAINE 40 MG/G
CREAM TOPICAL
Status: DISCONTINUED | OUTPATIENT
Start: 2018-01-01 | End: 2018-01-01 | Stop reason: HOSPADM

## 2018-01-01 RX ORDER — VASOPRESSIN 20 U/ML
INJECTION PARENTERAL PRN
Status: DISCONTINUED | OUTPATIENT
Start: 2018-01-01 | End: 2018-01-01

## 2018-01-01 RX ORDER — ATORVASTATIN CALCIUM 40 MG/1
20 TABLET, FILM COATED ORAL DAILY
COMMUNITY

## 2018-01-01 RX ORDER — CALCIUM CHLORIDE 100 MG/ML
INJECTION INTRAVENOUS; INTRAVENTRICULAR PRN
Status: DISCONTINUED | OUTPATIENT
Start: 2018-01-01 | End: 2018-01-01

## 2018-01-01 RX ORDER — CLOPIDOGREL BISULFATE 75 MG/1
300-600 TABLET ORAL
Status: DISCONTINUED | OUTPATIENT
Start: 2018-01-01 | End: 2018-01-01

## 2018-01-01 RX ORDER — PROPOFOL 10 MG/ML
5-75 INJECTION, EMULSION INTRAVENOUS CONTINUOUS
Status: CANCELLED | OUTPATIENT
Start: 2018-01-01

## 2018-01-01 RX ORDER — ENALAPRILAT 1.25 MG/ML
1.25-2.5 INJECTION INTRAVENOUS
Status: DISCONTINUED | OUTPATIENT
Start: 2018-01-01 | End: 2018-01-01

## 2018-01-01 RX ORDER — DOBUTAMINE HYDROCHLORIDE 200 MG/100ML
INJECTION INTRAVENOUS CONTINUOUS PRN
Status: DISCONTINUED | OUTPATIENT
Start: 2018-01-01 | End: 2018-01-01

## 2018-01-01 RX ORDER — ALBUMIN, HUMAN INJ 5% 5 %
SOLUTION INTRAVENOUS
Status: DISCONTINUED
Start: 2018-01-01 | End: 2018-01-01 | Stop reason: HOSPADM

## 2018-01-01 RX ORDER — CARBOXYMETHYLCELLULOSE SODIUM 5 MG/ML
1 SOLUTION/ DROPS OPHTHALMIC 3 TIMES DAILY PRN
Status: DISCONTINUED | OUTPATIENT
Start: 2018-01-01 | End: 2018-01-01 | Stop reason: HOSPADM

## 2018-01-01 RX ORDER — ASPIRIN 81 MG/1
81-324 TABLET, CHEWABLE ORAL
Status: DISCONTINUED | OUTPATIENT
Start: 2018-01-01 | End: 2018-01-01

## 2018-01-01 RX ORDER — DEXTROSE MONOHYDRATE 25 G/50ML
12.5-5 INJECTION, SOLUTION INTRAVENOUS EVERY 30 MIN PRN
Status: DISCONTINUED | OUTPATIENT
Start: 2018-01-01 | End: 2018-01-01

## 2018-01-01 RX ORDER — FENTANYL CITRATE 50 UG/ML
50 INJECTION, SOLUTION INTRAMUSCULAR; INTRAVENOUS
Status: DISCONTINUED | OUTPATIENT
Start: 2018-01-01 | End: 2018-01-01

## 2018-01-01 RX ORDER — POTASSIUM CHLORIDE 1500 MG/1
20 TABLET, EXTENDED RELEASE ORAL DAILY
COMMUNITY

## 2018-01-01 RX ORDER — METFORMIN HCL 500 MG
2000 TABLET, EXTENDED RELEASE 24 HR ORAL EVERY EVENING
COMMUNITY

## 2018-01-01 RX ORDER — PROTAMINE SULFATE 10 MG/ML
50 INJECTION, SOLUTION INTRAVENOUS ONCE
Status: COMPLETED | OUTPATIENT
Start: 2018-01-01 | End: 2018-01-01

## 2018-01-01 RX ORDER — NITROGLYCERIN 0.4 MG/1
0.4 TABLET SUBLINGUAL SEE ADMIN INSTRUCTIONS
COMMUNITY

## 2018-01-01 RX ORDER — ALBUMIN, HUMAN INJ 5% 5 %
37.5 SOLUTION INTRAVENOUS ONCE
Status: DISCONTINUED | OUTPATIENT
Start: 2018-01-01 | End: 2018-01-01

## 2018-01-01 RX ORDER — NITROGLYCERIN 5 MG/ML
100-500 VIAL (ML) INTRAVENOUS
Status: DISCONTINUED | OUTPATIENT
Start: 2018-01-01 | End: 2018-01-01

## 2018-01-01 RX ORDER — IOPAMIDOL 755 MG/ML
217 INJECTION, SOLUTION INTRAVASCULAR ONCE
Status: COMPLETED | OUTPATIENT
Start: 2018-01-01 | End: 2018-01-01

## 2018-01-01 RX ORDER — ALBUMIN HUMAN 5 %
INTRAVENOUS SOLUTION INTRAVENOUS PRN
Status: DISCONTINUED | OUTPATIENT
Start: 2018-01-01 | End: 2018-01-01

## 2018-01-01 RX ORDER — AMINO AC/PROTEIN HYDR/WHEY PRO 10G-100/30
1 LIQUID (ML) ORAL 2 TIMES DAILY
Status: DISCONTINUED | OUTPATIENT
Start: 2018-01-01 | End: 2018-01-01

## 2018-01-01 RX ORDER — CEFAZOLIN SODIUM 2 G/100ML
2 INJECTION, SOLUTION INTRAVENOUS
Status: DISCONTINUED | OUTPATIENT
Start: 2018-01-01 | End: 2018-01-01

## 2018-01-01 RX ORDER — PROTAMINE SULFATE 10 MG/ML
INJECTION, SOLUTION INTRAVENOUS PRN
Status: DISCONTINUED | OUTPATIENT
Start: 2018-01-01 | End: 2018-01-01

## 2018-01-01 RX ORDER — FENTANYL CITRATE 50 UG/ML
50-100 INJECTION, SOLUTION INTRAMUSCULAR; INTRAVENOUS
Status: DISCONTINUED | OUTPATIENT
Start: 2018-01-01 | End: 2018-01-01 | Stop reason: HOSPADM

## 2018-01-01 RX ORDER — NITROGLYCERIN 20 MG/100ML
.07-2 INJECTION INTRAVENOUS CONTINUOUS PRN
Status: DISCONTINUED | OUTPATIENT
Start: 2018-01-01 | End: 2018-01-01

## 2018-01-01 RX ORDER — PRASUGREL 10 MG/1
10-60 TABLET, FILM COATED ORAL
Status: DISCONTINUED | OUTPATIENT
Start: 2018-01-01 | End: 2018-01-01

## 2018-01-01 RX ORDER — POLYETHYLENE GLYCOL 3350 17 G/17G
17 POWDER, FOR SOLUTION ORAL DAILY
Status: DISCONTINUED | OUTPATIENT
Start: 2018-01-01 | End: 2018-01-01

## 2018-01-01 RX ORDER — POTASSIUM CHLORIDE 29.8 MG/ML
20 INJECTION INTRAVENOUS EVERY 6 HOURS PRN
Status: DISCONTINUED | OUTPATIENT
Start: 2018-01-01 | End: 2018-01-01

## 2018-01-01 RX ORDER — ARGATROBAN 1 MG/ML
150 INJECTION, SOLUTION INTRAVENOUS
Status: DISCONTINUED | OUTPATIENT
Start: 2018-01-01 | End: 2018-01-01

## 2018-01-01 RX ORDER — B COMPLEX C NO.10/FOLIC ACID 900MCG/5ML
5 LIQUID (ML) ORAL DAILY
Status: DISCONTINUED | OUTPATIENT
Start: 2018-01-01 | End: 2018-01-01

## 2018-01-01 RX ORDER — ALBUMIN (HUMAN) 12.5 G/50ML
SOLUTION INTRAVENOUS
Status: DISCONTINUED
Start: 2018-01-01 | End: 2018-01-01 | Stop reason: HOSPADM

## 2018-01-01 RX ORDER — EPTIFIBATIDE 2 MG/ML
2 INJECTION, SOLUTION INTRAVENOUS CONTINUOUS PRN
Status: DISCONTINUED | OUTPATIENT
Start: 2018-01-01 | End: 2018-01-01

## 2018-01-01 RX ORDER — MILRINONE LACTATE 0.2 MG/ML
INJECTION, SOLUTION INTRAVENOUS CONTINUOUS PRN
Status: DISCONTINUED | OUTPATIENT
Start: 2018-01-01 | End: 2018-01-01

## 2018-01-01 RX ORDER — MAGNESIUM HYDROXIDE 1200 MG/15ML
LIQUID ORAL PRN
Status: DISCONTINUED | OUTPATIENT
Start: 2018-01-01 | End: 2018-01-01 | Stop reason: HOSPADM

## 2018-01-01 RX ORDER — MAGNESIUM SULFATE HEPTAHYDRATE 40 MG/ML
2 INJECTION, SOLUTION INTRAVENOUS EVERY 6 HOURS PRN
Status: DISCONTINUED | OUTPATIENT
Start: 2018-01-01 | End: 2018-01-01

## 2018-01-01 RX ORDER — FENTANYL CITRATE 50 UG/ML
50-100 INJECTION, SOLUTION INTRAMUSCULAR; INTRAVENOUS EVERY 30 MIN PRN
Status: DISCONTINUED | OUTPATIENT
Start: 2018-01-01 | End: 2018-01-01 | Stop reason: HOSPADM

## 2018-01-01 RX ORDER — AMOXICILLIN 250 MG
1-2 CAPSULE ORAL 2 TIMES DAILY
Status: DISCONTINUED | OUTPATIENT
Start: 2018-01-01 | End: 2018-01-01

## 2018-01-01 RX ORDER — DOBUTAMINE HYDROCHLORIDE 200 MG/100ML
2-20 INJECTION INTRAVENOUS CONTINUOUS PRN
Status: DISCONTINUED | OUTPATIENT
Start: 2018-01-01 | End: 2018-01-01

## 2018-01-01 RX ORDER — POTASSIUM CHLORIDE 29.8 MG/ML
INJECTION INTRAVENOUS PRN
Status: DISCONTINUED | OUTPATIENT
Start: 2018-01-01 | End: 2018-01-01

## 2018-01-01 RX ORDER — FUROSEMIDE 20 MG
60 TABLET ORAL EVERY MORNING
COMMUNITY

## 2018-01-01 RX ORDER — ESCITALOPRAM OXALATE 5 MG/1
20 TABLET ORAL DAILY
Status: DISCONTINUED | OUTPATIENT
Start: 2018-01-01 | End: 2018-01-01

## 2018-01-01 RX ORDER — PROPOFOL 10 MG/ML
INJECTION, EMULSION INTRAVENOUS CONTINUOUS PRN
Status: DISCONTINUED | OUTPATIENT
Start: 2018-01-01 | End: 2018-01-01

## 2018-01-01 RX ORDER — FUROSEMIDE 10 MG/ML
20-100 INJECTION INTRAMUSCULAR; INTRAVENOUS
Status: DISCONTINUED | OUTPATIENT
Start: 2018-01-01 | End: 2018-01-01

## 2018-01-01 RX ORDER — ESCITALOPRAM OXALATE 5 MG/1
10 TABLET ORAL DAILY
Status: DISCONTINUED | OUTPATIENT
Start: 2018-01-01 | End: 2018-01-01

## 2018-01-01 RX ORDER — MILRINONE LACTATE 0.2 MG/ML
.2-.5 INJECTION, SOLUTION INTRAVENOUS CONTINUOUS
Status: DISCONTINUED | OUTPATIENT
Start: 2018-01-01 | End: 2018-01-01 | Stop reason: HOSPADM

## 2018-01-01 RX ORDER — LIDOCAINE HYDROCHLORIDE 10 MG/ML
30 INJECTION, SOLUTION EPIDURAL; INFILTRATION; INTRACAUDAL; PERINEURAL
Status: DISCONTINUED | OUTPATIENT
Start: 2018-01-01 | End: 2018-01-01

## 2018-01-01 RX ORDER — NITROGLYCERIN 0.4 MG/1
0.4 TABLET SUBLINGUAL EVERY 5 MIN PRN
Status: DISCONTINUED | OUTPATIENT
Start: 2018-01-01 | End: 2018-01-01

## 2018-01-01 RX ORDER — CALCIUM CHLORIDE 100 MG/ML
1 INJECTION INTRAVENOUS; INTRAVENTRICULAR ONCE
Status: DISCONTINUED | OUTPATIENT
Start: 2018-01-01 | End: 2018-01-01

## 2018-01-01 RX ORDER — MUPIROCIN 20 MG/G
1 OINTMENT TOPICAL 2 TIMES DAILY
Status: DISPENSED | OUTPATIENT
Start: 2018-01-01 | End: 2018-01-01

## 2018-01-01 RX ORDER — CEFAZOLIN SODIUM 1 G/3ML
1 INJECTION, POWDER, FOR SOLUTION INTRAMUSCULAR; INTRAVENOUS SEE ADMIN INSTRUCTIONS
Status: DISCONTINUED | OUTPATIENT
Start: 2018-01-01 | End: 2018-01-01 | Stop reason: CLARIF

## 2018-01-01 RX ORDER — HEPARIN SODIUM 10000 [USP'U]/100ML
0-3500 INJECTION, SOLUTION INTRAVENOUS CONTINUOUS
Status: DISCONTINUED | OUTPATIENT
Start: 2018-01-01 | End: 2018-01-01

## 2018-01-01 RX ORDER — POTASSIUM CHLORIDE 29.8 MG/ML
20 INJECTION INTRAVENOUS
Status: DISCONTINUED | OUTPATIENT
Start: 2018-01-01 | End: 2018-01-01

## 2018-01-01 RX ADMIN — HUMAN INSULIN 3 UNITS/HR: 100 INJECTION, SOLUTION SUBCUTANEOUS at 12:00

## 2018-01-01 RX ADMIN — Medication 8 MG/HR: at 22:14

## 2018-01-01 RX ADMIN — CALCIUM CHLORIDE, MAGNESIUM CHLORIDE, DEXTROSE MONOHYDRATE, LACTIC ACID, SODIUM CHLORIDE, SODIUM BICARBONATE AND POTASSIUM CHLORIDE: 5.15; 2.03; 22; 5.4; 6.46; 3.09; .157 INJECTION INTRAVENOUS at 18:21

## 2018-01-01 RX ADMIN — ROCURONIUM BROMIDE 20 MG: 10 INJECTION INTRAVENOUS at 10:10

## 2018-01-01 RX ADMIN — MULTIVITAMIN 15 ML: LIQUID ORAL at 09:23

## 2018-01-01 RX ADMIN — CALCIUM CHLORIDE, MAGNESIUM CHLORIDE, DEXTROSE MONOHYDRATE, LACTIC ACID, SODIUM CHLORIDE, SODIUM BICARBONATE AND POTASSIUM CHLORIDE 12.5 ML/KG/HR: 5.15; 2.03; 22; 5.4; 6.46; 3.09; .157 INJECTION INTRAVENOUS at 05:14

## 2018-01-01 RX ADMIN — ANGIOTENSIN II 40 NG/KG/MIN: 2.5 INJECTION INTRAVENOUS at 11:08

## 2018-01-01 RX ADMIN — PROPOFOL 30 MCG/KG/MIN: 10 INJECTION, EMULSION INTRAVENOUS at 05:21

## 2018-01-01 RX ADMIN — CALCIUM CHLORIDE, MAGNESIUM CHLORIDE, DEXTROSE MONOHYDRATE, LACTIC ACID, SODIUM CHLORIDE, SODIUM BICARBONATE AND POTASSIUM CHLORIDE 25 ML/KG/HR: 5.15; 2.03; 22; 5.4; 6.46; 3.09; .157 INJECTION INTRAVENOUS at 05:30

## 2018-01-01 RX ADMIN — SENNOSIDES AND DOCUSATE SODIUM 2 TABLET: 8.6; 5 TABLET ORAL at 21:24

## 2018-01-01 RX ADMIN — CALCIUM CHLORIDE 1 G: 100 INJECTION, SOLUTION INTRAVENOUS at 12:30

## 2018-01-01 RX ADMIN — CALCIUM CHLORIDE, MAGNESIUM CHLORIDE, SODIUM CHLORIDE, SODIUM BICARBONATE, POTASSIUM CHLORIDE AND SODIUM PHOSPHATE DIBASIC DIHYDRATE 12.5 ML/KG/HR: 3.68; 3.05; 6.34; 3.09; .314; .187 INJECTION INTRAVENOUS at 06:49

## 2018-01-01 RX ADMIN — EPINEPHRINE 50 MCG: 0.1 INJECTION, SOLUTION ENDOTRACHEAL; INTRACARDIAC; INTRAVENOUS at 11:01

## 2018-01-01 RX ADMIN — HEPARIN SODIUM 600 UNITS/HR: 10000 INJECTION, SOLUTION INTRAVENOUS at 10:30

## 2018-01-01 RX ADMIN — MIDAZOLAM 2 MG: 1 INJECTION INTRAMUSCULAR; INTRAVENOUS at 16:00

## 2018-01-01 RX ADMIN — MULTIVITAMIN 15 ML: LIQUID ORAL at 14:47

## 2018-01-01 RX ADMIN — PROTAMINE SULFATE 50 MG: 10 INJECTION, SOLUTION INTRAVENOUS at 22:30

## 2018-01-01 RX ADMIN — Medication 1 PACKET: at 08:00

## 2018-01-01 RX ADMIN — HUMAN INSULIN 2 UNITS/HR: 100 INJECTION, SOLUTION SUBCUTANEOUS at 23:58

## 2018-01-01 RX ADMIN — HEPARIN SODIUM 5000 UNITS: 1000 INJECTION, SOLUTION INTRAVENOUS; SUBCUTANEOUS at 08:55

## 2018-01-01 RX ADMIN — DEXTROSE MONOHYDRATE: 50 INJECTION, SOLUTION INTRAVENOUS at 04:45

## 2018-01-01 RX ADMIN — HEPARIN SODIUM 18.21 UNITS/KG/HR: 10000 INJECTION, SOLUTION INTRAVENOUS at 03:55

## 2018-01-01 RX ADMIN — MILRINONE LACTATE IN DEXTROSE 0.38 MCG/KG/MIN: 200 INJECTION, SOLUTION INTRAVENOUS at 09:30

## 2018-01-01 RX ADMIN — CALCIUM CHLORIDE, MAGNESIUM CHLORIDE, DEXTROSE MONOHYDRATE, LACTIC ACID, SODIUM CHLORIDE, SODIUM BICARBONATE AND POTASSIUM CHLORIDE 25 ML/KG/HR: 5.15; 2.03; 22; 5.4; 6.46; 3.09; .157 INJECTION INTRAVENOUS at 09:13

## 2018-01-01 RX ADMIN — Medication 2.5 MCG/KG/MIN: at 13:43

## 2018-01-01 RX ADMIN — PIPERACILLIN SODIUM,TAZOBACTAM SODIUM 3.38 G: 3; .375 INJECTION, POWDER, FOR SOLUTION INTRAVENOUS at 02:30

## 2018-01-01 RX ADMIN — Medication 50 MCG/HR: at 06:18

## 2018-01-01 RX ADMIN — Medication 5 ML: at 07:41

## 2018-01-01 RX ADMIN — CALCIUM CHLORIDE, MAGNESIUM CHLORIDE, SODIUM CHLORIDE, SODIUM BICARBONATE, POTASSIUM CHLORIDE AND SODIUM PHOSPHATE DIBASIC DIHYDRATE 12.5 ML/KG/HR: 3.68; 3.05; 6.34; 3.09; .314; .187 INJECTION INTRAVENOUS at 05:14

## 2018-01-01 RX ADMIN — PANTOPRAZOLE SODIUM 40 MG: 40 INJECTION, POWDER, FOR SOLUTION INTRAVENOUS at 23:04

## 2018-01-01 RX ADMIN — CALCIUM CHLORIDE, MAGNESIUM CHLORIDE, SODIUM CHLORIDE, SODIUM BICARBONATE, POTASSIUM CHLORIDE AND SODIUM PHOSPHATE DIBASIC DIHYDRATE 12.5 ML/KG/HR: 3.68; 3.05; 6.34; 3.09; .314; .187 INJECTION INTRAVENOUS at 00:18

## 2018-01-01 RX ADMIN — HUMAN INSULIN 6 UNITS/HR: 100 INJECTION, SOLUTION SUBCUTANEOUS at 23:42

## 2018-01-01 RX ADMIN — ALBUMIN HUMAN 25 G: 0.05 INJECTION, SOLUTION INTRAVENOUS at 21:59

## 2018-01-01 RX ADMIN — Medication 100 MCG/HR: at 08:16

## 2018-01-01 RX ADMIN — CALCIUM CHLORIDE, MAGNESIUM CHLORIDE, DEXTROSE MONOHYDRATE, LACTIC ACID, SODIUM CHLORIDE, SODIUM BICARBONATE AND POTASSIUM CHLORIDE 17.5 ML/KG/HR: 5.15; 2.03; 22; 5.4; 6.46; 3.09; .157 INJECTION INTRAVENOUS at 03:40

## 2018-01-01 RX ADMIN — SENNOSIDES AND DOCUSATE SODIUM 1 TABLET: 8.6; 5 TABLET ORAL at 11:44

## 2018-01-01 RX ADMIN — EPINEPHRINE 0.06 MCG/KG/MIN: 1 INJECTION PARENTERAL at 05:57

## 2018-01-01 RX ADMIN — ALBUMIN HUMAN 12.5 G: 50 SOLUTION INTRAVENOUS at 23:43

## 2018-01-01 RX ADMIN — MIDAZOLAM 2 MG: 1 INJECTION INTRAMUSCULAR; INTRAVENOUS at 12:40

## 2018-01-01 RX ADMIN — PIPERACILLIN SODIUM,TAZOBACTAM SODIUM 3.38 G: 3; .375 INJECTION, POWDER, FOR SOLUTION INTRAVENOUS at 20:14

## 2018-01-01 RX ADMIN — CALCIUM CHLORIDE 3 G: 100 INJECTION, SOLUTION INTRAVENOUS at 03:08

## 2018-01-01 RX ADMIN — ALBUMIN HUMAN 12.5 G: 0.05 INJECTION, SOLUTION INTRAVENOUS at 17:12

## 2018-01-01 RX ADMIN — CALCIUM CHLORIDE, MAGNESIUM CHLORIDE, DEXTROSE MONOHYDRATE, LACTIC ACID, SODIUM CHLORIDE, SODIUM BICARBONATE AND POTASSIUM CHLORIDE 12.5 ML/KG/HR: 5.15; 2.03; 22; 5.4; 6.46; 3.09; .157 INJECTION INTRAVENOUS at 20:19

## 2018-01-01 RX ADMIN — HYDROCORTISONE SODIUM SUCCINATE 50 MG: 100 INJECTION, POWDER, FOR SOLUTION INTRAMUSCULAR; INTRAVENOUS at 19:35

## 2018-01-01 RX ADMIN — EPINEPHRINE 20 MCG: 0.1 INJECTION, SOLUTION ENDOTRACHEAL; INTRACARDIAC; INTRAVENOUS at 13:01

## 2018-01-01 RX ADMIN — SODIUM CHLORIDE, POTASSIUM CHLORIDE, SODIUM LACTATE AND CALCIUM CHLORIDE: 600; 310; 30; 20 INJECTION, SOLUTION INTRAVENOUS at 09:55

## 2018-01-01 RX ADMIN — CALCIUM CHLORIDE, MAGNESIUM CHLORIDE, DEXTROSE MONOHYDRATE, LACTIC ACID, SODIUM CHLORIDE, SODIUM BICARBONATE AND POTASSIUM CHLORIDE 17.5 ML/KG/HR: 5.15; 2.03; 22; 5.4; 6.46; 3.09; .157 INJECTION INTRAVENOUS at 20:53

## 2018-01-01 RX ADMIN — HYDROCORTISONE SODIUM SUCCINATE 50 MG: 100 INJECTION, POWDER, FOR SOLUTION INTRAMUSCULAR; INTRAVENOUS at 08:11

## 2018-01-01 RX ADMIN — PROPOFOL 40 MCG/KG/MIN: 10 INJECTION, EMULSION INTRAVENOUS at 21:31

## 2018-01-01 RX ADMIN — PANTOPRAZOLE SODIUM 40 MG: 40 INJECTION, POWDER, FOR SOLUTION INTRAVENOUS at 21:51

## 2018-01-01 RX ADMIN — HUMAN INSULIN 3 UNITS/HR: 100 INJECTION, SOLUTION SUBCUTANEOUS at 17:02

## 2018-01-01 RX ADMIN — ALBUMIN HUMAN 12.5 G: 0.05 INJECTION, SOLUTION INTRAVENOUS at 06:50

## 2018-01-01 RX ADMIN — CALCIUM CHLORIDE 0.5 G: 100 INJECTION, SOLUTION INTRAVENOUS at 14:36

## 2018-01-01 RX ADMIN — EPOPROSTENOL 20 NG/KG/MIN: 1.5 INJECTION, POWDER, LYOPHILIZED, FOR SOLUTION INTRAVENOUS at 20:14

## 2018-01-01 RX ADMIN — CALCIUM CHLORIDE, MAGNESIUM CHLORIDE, SODIUM CHLORIDE, SODIUM BICARBONATE, POTASSIUM CHLORIDE AND SODIUM PHOSPHATE DIBASIC DIHYDRATE 12.5 ML/KG/HR: 3.68; 3.05; 6.34; 3.09; .314; .187 INJECTION INTRAVENOUS at 17:52

## 2018-01-01 RX ADMIN — HUMAN INSULIN 14 UNITS/HR: 100 INJECTION, SOLUTION SUBCUTANEOUS at 05:20

## 2018-01-01 RX ADMIN — ROCURONIUM BROMIDE 30 MG: 10 INJECTION INTRAVENOUS at 11:03

## 2018-01-01 RX ADMIN — ALBUMIN HUMAN 12.5 G: 0.05 INJECTION, SOLUTION INTRAVENOUS at 20:19

## 2018-01-01 RX ADMIN — Medication 50 MEQ: at 23:56

## 2018-01-01 RX ADMIN — FENTANYL CITRATE 50 MCG: 50 INJECTION, SOLUTION INTRAMUSCULAR; INTRAVENOUS at 10:00

## 2018-01-01 RX ADMIN — HYDROCORTISONE SODIUM SUCCINATE 50 MG: 100 INJECTION, POWDER, FOR SOLUTION INTRAMUSCULAR; INTRAVENOUS at 06:14

## 2018-01-01 RX ADMIN — CALCIUM CHLORIDE, MAGNESIUM CHLORIDE, DEXTROSE MONOHYDRATE, LACTIC ACID, SODIUM CHLORIDE, SODIUM BICARBONATE AND POTASSIUM CHLORIDE 12.5 ML/KG/HR: 5.15; 2.03; 22; 5.4; 6.46; 3.09; .157 INJECTION INTRAVENOUS at 00:35

## 2018-01-01 RX ADMIN — PIPERACILLIN SODIUM,TAZOBACTAM SODIUM 3.38 G: 3; .375 INJECTION, POWDER, FOR SOLUTION INTRAVENOUS at 01:51

## 2018-01-01 RX ADMIN — PROPOFOL 30 MCG/KG/MIN: 10 INJECTION, EMULSION INTRAVENOUS at 00:13

## 2018-01-01 RX ADMIN — CALCIUM CHLORIDE 0.5 G: 100 INJECTION, SOLUTION INTRAVENOUS at 11:32

## 2018-01-01 RX ADMIN — CALCIUM CHLORIDE, MAGNESIUM CHLORIDE, DEXTROSE MONOHYDRATE, LACTIC ACID, SODIUM CHLORIDE, SODIUM BICARBONATE AND POTASSIUM CHLORIDE 12.5 ML/KG/HR: 5.15; 2.03; 22; 5.4; 6.46; 3.09; .157 INJECTION INTRAVENOUS at 10:29

## 2018-01-01 RX ADMIN — HUMAN INSULIN 22 UNITS/HR: 100 INJECTION, SOLUTION SUBCUTANEOUS at 15:00

## 2018-01-01 RX ADMIN — NOREPINEPHRINE BITARTRATE 12.8 MCG: 1 INJECTION INTRAVENOUS at 12:38

## 2018-01-01 RX ADMIN — HYDROCORTISONE SODIUM SUCCINATE 50 MG: 100 INJECTION, POWDER, FOR SOLUTION INTRAMUSCULAR; INTRAVENOUS at 00:24

## 2018-01-01 RX ADMIN — Medication 5 ML: at 07:59

## 2018-01-01 RX ADMIN — ROCURONIUM BROMIDE 50 MG: 10 INJECTION INTRAVENOUS at 08:48

## 2018-01-01 RX ADMIN — AMINOCAPROIC ACID 1 G/HR: 250 INJECTION, SOLUTION INTRAVENOUS at 10:08

## 2018-01-01 RX ADMIN — DEXTROSE MONOHYDRATE 25 ML: 500 INJECTION PARENTERAL at 14:16

## 2018-01-01 RX ADMIN — CALCIUM CHLORIDE, MAGNESIUM CHLORIDE, DEXTROSE MONOHYDRATE, LACTIC ACID, SODIUM CHLORIDE, SODIUM BICARBONATE AND POTASSIUM CHLORIDE 12.5 ML/KG/HR: 5.15; 2.03; 22; 5.4; 6.46; 3.09; .157 INJECTION INTRAVENOUS at 13:50

## 2018-01-01 RX ADMIN — FENTANYL CITRATE 100 MCG: 50 INJECTION, SOLUTION INTRAMUSCULAR; INTRAVENOUS at 05:48

## 2018-01-01 RX ADMIN — Medication 1 PACKET: at 08:14

## 2018-01-01 RX ADMIN — SODIUM BICARBONATE 25 MEQ/HR: 84 INJECTION, SOLUTION INTRAVENOUS at 10:04

## 2018-01-01 RX ADMIN — CALCIUM CHLORIDE, MAGNESIUM CHLORIDE, DEXTROSE MONOHYDRATE, LACTIC ACID, SODIUM CHLORIDE, SODIUM BICARBONATE AND POTASSIUM CHLORIDE 12.5 ML/KG/HR: 5.15; 2.03; 22; 5.4; 6.46; 3.09; .157 INJECTION INTRAVENOUS at 00:53

## 2018-01-01 RX ADMIN — PANTOPRAZOLE SODIUM 40 MG: 40 INJECTION, POWDER, FOR SOLUTION INTRAVENOUS at 21:49

## 2018-01-01 RX ADMIN — ALBUMIN HUMAN 12.5 G: 0.05 INJECTION, SOLUTION INTRAVENOUS at 14:42

## 2018-01-01 RX ADMIN — PIPERACILLIN SODIUM,TAZOBACTAM SODIUM 3.38 G: 3; .375 INJECTION, POWDER, FOR SOLUTION INTRAVENOUS at 19:51

## 2018-01-01 RX ADMIN — PANTOPRAZOLE SODIUM 40 MG: 40 INJECTION, POWDER, FOR SOLUTION INTRAVENOUS at 22:33

## 2018-01-01 RX ADMIN — CALCIUM CHLORIDE, MAGNESIUM CHLORIDE, DEXTROSE MONOHYDRATE, LACTIC ACID, SODIUM CHLORIDE, SODIUM BICARBONATE AND POTASSIUM CHLORIDE 12.5 ML/KG/HR: 5.15; 2.03; 22; 5.4; 6.46; 3.09; .157 INJECTION INTRAVENOUS at 20:11

## 2018-01-01 RX ADMIN — PHENYLEPHRINE HYDROCHLORIDE 1.8 MCG/KG/MIN: 10 INJECTION, SOLUTION INTRAMUSCULAR; INTRAVENOUS; SUBCUTANEOUS at 08:12

## 2018-01-01 RX ADMIN — DEXTRAN 70 AND HYPROMELLOSE 2910 1 DROP: 1; 3 SOLUTION/ DROPS OPHTHALMIC at 18:49

## 2018-01-01 RX ADMIN — SODIUM BICARBONATE 25 MEQ/HR: 84 INJECTION, SOLUTION INTRAVENOUS at 13:08

## 2018-01-01 RX ADMIN — EPINEPHRINE 0.07 MCG/KG/MIN: 1 INJECTION PARENTERAL at 21:38

## 2018-01-01 RX ADMIN — MULTIVITAMIN 15 ML: LIQUID ORAL at 07:51

## 2018-01-01 RX ADMIN — NOREPINEPHRINE BITARTRATE 0.08 MCG/KG/MIN: 1 INJECTION INTRAVENOUS at 22:36

## 2018-01-01 RX ADMIN — CALCIUM CHLORIDE, MAGNESIUM CHLORIDE, DEXTROSE MONOHYDRATE, LACTIC ACID, SODIUM CHLORIDE, SODIUM BICARBONATE AND POTASSIUM CHLORIDE 12.5 ML/KG/HR: 5.15; 2.03; 22; 5.4; 6.46; 3.09; .157 INJECTION INTRAVENOUS at 14:46

## 2018-01-01 RX ADMIN — SODIUM CHLORIDE: 9 INJECTION, SOLUTION INTRAVENOUS at 12:40

## 2018-01-01 RX ADMIN — ROCURONIUM BROMIDE 40 MG: 10 INJECTION INTRAVENOUS at 11:21

## 2018-01-01 RX ADMIN — FENTANYL CITRATE 100 MCG: 50 INJECTION, SOLUTION INTRAMUSCULAR; INTRAVENOUS at 03:05

## 2018-01-01 RX ADMIN — HYDROCORTISONE SODIUM SUCCINATE 50 MG: 100 INJECTION, POWDER, FOR SOLUTION INTRAMUSCULAR; INTRAVENOUS at 17:28

## 2018-01-01 RX ADMIN — EPINEPHRINE 10 MCG: 1 INJECTION PARENTERAL at 14:12

## 2018-01-01 RX ADMIN — POLYETHYLENE GLYCOL 3350 17 G: 17 POWDER, FOR SOLUTION ORAL at 08:34

## 2018-01-01 RX ADMIN — SODIUM CHLORIDE, POTASSIUM CHLORIDE, SODIUM LACTATE AND CALCIUM CHLORIDE: 600; 310; 30; 20 INJECTION, SOLUTION INTRAVENOUS at 10:13

## 2018-01-01 RX ADMIN — CALCIUM CHLORIDE, MAGNESIUM CHLORIDE, DEXTROSE MONOHYDRATE, LACTIC ACID, SODIUM CHLORIDE, SODIUM BICARBONATE AND POTASSIUM CHLORIDE 12.5 ML/KG/HR: 5.15; 2.03; 22; 5.4; 6.46; 3.09; .157 INJECTION INTRAVENOUS at 18:21

## 2018-01-01 RX ADMIN — ALBUMIN HUMAN 12.5 G: 50 SOLUTION INTRAVENOUS at 02:17

## 2018-01-01 RX ADMIN — CALCIUM CHLORIDE, MAGNESIUM CHLORIDE, DEXTROSE MONOHYDRATE, LACTIC ACID, SODIUM CHLORIDE, SODIUM BICARBONATE AND POTASSIUM CHLORIDE 12.5 ML/KG/HR: 5.15; 2.03; 22; 5.4; 6.46; 3.09; .157 INJECTION INTRAVENOUS at 18:52

## 2018-01-01 RX ADMIN — HYDROCORTISONE SODIUM SUCCINATE 50 MG: 100 INJECTION, POWDER, FOR SOLUTION INTRAMUSCULAR; INTRAVENOUS at 18:55

## 2018-01-01 RX ADMIN — VASOPRESSIN 1 UNITS: 20 INJECTION INTRAVENOUS at 11:23

## 2018-01-01 RX ADMIN — CALCIUM CHLORIDE, MAGNESIUM CHLORIDE, DEXTROSE MONOHYDRATE, LACTIC ACID, SODIUM CHLORIDE, SODIUM BICARBONATE AND POTASSIUM CHLORIDE 12.5 ML/KG/HR: 5.15; 2.03; 22; 5.4; 6.46; 3.09; .157 INJECTION INTRAVENOUS at 21:27

## 2018-01-01 RX ADMIN — ROCURONIUM BROMIDE 50 MG: 10 INJECTION INTRAVENOUS at 15:42

## 2018-01-01 RX ADMIN — Medication 100 MCG/HR: at 04:56

## 2018-01-01 RX ADMIN — HYDROCORTISONE SODIUM SUCCINATE 50 MG: 100 INJECTION, POWDER, FOR SOLUTION INTRAMUSCULAR; INTRAVENOUS at 00:09

## 2018-01-01 RX ADMIN — NOREPINEPHRINE BITARTRATE 12.8 MCG: 1 INJECTION INTRAVENOUS at 12:30

## 2018-01-01 RX ADMIN — EPOPROSTENOL 20 NG/KG/MIN: 1.5 INJECTION, POWDER, LYOPHILIZED, FOR SOLUTION INTRAVENOUS at 09:09

## 2018-01-01 RX ADMIN — CALCIUM CHLORIDE, MAGNESIUM CHLORIDE, DEXTROSE MONOHYDRATE, LACTIC ACID, SODIUM CHLORIDE, SODIUM BICARBONATE AND POTASSIUM CHLORIDE 12.5 ML/KG/HR: 5.15; 2.03; 22; 5.4; 6.46; 3.09; .157 INJECTION INTRAVENOUS at 00:58

## 2018-01-01 RX ADMIN — PANTOPRAZOLE SODIUM 40 MG: 40 INJECTION, POWDER, FOR SOLUTION INTRAVENOUS at 21:31

## 2018-01-01 RX ADMIN — PROPOFOL 20 MG: 10 INJECTION, EMULSION INTRAVENOUS at 11:15

## 2018-01-01 RX ADMIN — HUMAN INSULIN 1 UNITS/HR: 100 INJECTION, SOLUTION SUBCUTANEOUS at 08:17

## 2018-01-01 RX ADMIN — VANCOMYCIN HYDROCHLORIDE 1750 MG: 10 INJECTION, POWDER, LYOPHILIZED, FOR SOLUTION INTRAVENOUS at 11:41

## 2018-01-01 RX ADMIN — EPINEPHRINE 20 MCG: 1 INJECTION PARENTERAL at 14:05

## 2018-01-01 RX ADMIN — ROCURONIUM BROMIDE 50 MG: 10 INJECTION INTRAVENOUS at 11:24

## 2018-01-01 RX ADMIN — PROPOFOL 30 MCG/KG/MIN: 10 INJECTION, EMULSION INTRAVENOUS at 02:22

## 2018-01-01 RX ADMIN — NOREPINEPHRINE BITARTRATE 6.4 MCG: 1 INJECTION INTRAVENOUS at 16:16

## 2018-01-01 RX ADMIN — PANTOPRAZOLE SODIUM 40 MG: 40 INJECTION, POWDER, FOR SOLUTION INTRAVENOUS at 23:24

## 2018-01-01 RX ADMIN — FENTANYL CITRATE 100 MCG: 50 INJECTION, SOLUTION INTRAMUSCULAR; INTRAVENOUS at 16:00

## 2018-01-01 RX ADMIN — HYDROCORTISONE SODIUM SUCCINATE 50 MG: 100 INJECTION, POWDER, FOR SOLUTION INTRAMUSCULAR; INTRAVENOUS at 05:42

## 2018-01-01 RX ADMIN — MIDAZOLAM 2 MG: 1 INJECTION INTRAMUSCULAR; INTRAVENOUS at 11:49

## 2018-01-01 RX ADMIN — HEPARIN SODIUM 5000 UNITS: 1000 INJECTION, SOLUTION INTRAVENOUS; SUBCUTANEOUS at 15:40

## 2018-01-01 RX ADMIN — SODIUM BICARBONATE 100 MEQ: 84 INJECTION INTRAVENOUS at 09:15

## 2018-01-01 RX ADMIN — CALCIUM CHLORIDE 1 G: 100 INJECTION, SOLUTION INTRAVENOUS at 21:37

## 2018-01-01 RX ADMIN — CEFAZOLIN 3 G: 1 INJECTION, POWDER, FOR SOLUTION INTRAMUSCULAR; INTRAVENOUS at 09:15

## 2018-01-01 RX ADMIN — LIDOCAINE HYDROCHLORIDE 5 ML: 20 SOLUTION ORAL; TOPICAL at 13:15

## 2018-01-01 RX ADMIN — SODIUM CHLORIDE, PRESERVATIVE FREE 250 UNITS: 5 INJECTION INTRAVENOUS at 17:18

## 2018-01-01 RX ADMIN — VASOPRESSIN 1 UNITS: 20 INJECTION, SOLUTION INTRAMUSCULAR; SUBCUTANEOUS at 16:23

## 2018-01-01 RX ADMIN — NOREPINEPHRINE BITARTRATE 0.14 MCG/KG/MIN: 1 INJECTION INTRAVENOUS at 23:17

## 2018-01-01 RX ADMIN — CALCIUM CHLORIDE, MAGNESIUM CHLORIDE, DEXTROSE MONOHYDRATE, LACTIC ACID, SODIUM CHLORIDE, SODIUM BICARBONATE AND POTASSIUM CHLORIDE 12.5 ML/KG/HR: 5.15; 2.03; 22; 5.4; 6.46; 3.09; .157 INJECTION INTRAVENOUS at 04:36

## 2018-01-01 RX ADMIN — HUMAN INSULIN 8 UNITS/HR: 100 INJECTION, SOLUTION SUBCUTANEOUS at 02:03

## 2018-01-01 RX ADMIN — DEXTRAN 70 AND HYPROMELLOSE 2910 1 DROP: 1; 3 SOLUTION/ DROPS OPHTHALMIC at 09:20

## 2018-01-01 RX ADMIN — CALCIUM CHLORIDE, MAGNESIUM CHLORIDE, DEXTROSE MONOHYDRATE, LACTIC ACID, SODIUM CHLORIDE, SODIUM BICARBONATE AND POTASSIUM CHLORIDE 17.5 ML/KG/HR: 5.15; 2.03; 22; 5.4; 6.46; 3.09; .157 INJECTION INTRAVENOUS at 00:25

## 2018-01-01 RX ADMIN — MIDAZOLAM 2 MG: 1 INJECTION INTRAMUSCULAR; INTRAVENOUS at 04:04

## 2018-01-01 RX ADMIN — CALCIUM CHLORIDE, MAGNESIUM CHLORIDE, DEXTROSE MONOHYDRATE, LACTIC ACID, SODIUM CHLORIDE, SODIUM BICARBONATE AND POTASSIUM CHLORIDE 12.5 ML/KG/HR: 5.15; 2.03; 22; 5.4; 6.46; 3.09; .157 INJECTION INTRAVENOUS at 15:08

## 2018-01-01 RX ADMIN — PROPOFOL 5 MCG/KG/MIN: 10 INJECTION, EMULSION INTRAVENOUS at 10:30

## 2018-01-01 RX ADMIN — CALCIUM CHLORIDE, MAGNESIUM CHLORIDE, DEXTROSE MONOHYDRATE, LACTIC ACID, SODIUM CHLORIDE, SODIUM BICARBONATE AND POTASSIUM CHLORIDE 12.5 ML/KG/HR: 5.15; 2.03; 22; 5.4; 6.46; 3.09; .157 INJECTION INTRAVENOUS at 00:59

## 2018-01-01 RX ADMIN — SENNOSIDES AND DOCUSATE SODIUM 2 TABLET: 8.6; 5 TABLET ORAL at 08:32

## 2018-01-01 RX ADMIN — EPINEPHRINE 0.1 MCG/KG/MIN: 1 INJECTION PARENTERAL at 21:51

## 2018-01-01 RX ADMIN — FENTANYL CITRATE 100 MCG: 50 INJECTION, SOLUTION INTRAMUSCULAR; INTRAVENOUS at 04:07

## 2018-01-01 RX ADMIN — NOREPINEPHRINE BITARTRATE 12.8 MCG: 1 INJECTION INTRAVENOUS at 12:55

## 2018-01-01 RX ADMIN — HYDROCORTISONE SODIUM SUCCINATE 50 MG: 100 INJECTION, POWDER, FOR SOLUTION INTRAMUSCULAR; INTRAVENOUS at 11:25

## 2018-01-01 RX ADMIN — VASOPRESSIN 2.4 UNITS/HR: 20 INJECTION INTRAVENOUS at 14:45

## 2018-01-01 RX ADMIN — Medication 5 MG/HR: at 16:45

## 2018-01-01 RX ADMIN — HUMAN INSULIN 2 UNITS/HR: 100 INJECTION, SOLUTION SUBCUTANEOUS at 12:43

## 2018-01-01 RX ADMIN — FENTANYL CITRATE 100 MCG: 50 INJECTION, SOLUTION INTRAMUSCULAR; INTRAVENOUS at 11:15

## 2018-01-01 RX ADMIN — CALCIUM CHLORIDE, MAGNESIUM CHLORIDE, SODIUM CHLORIDE, SODIUM BICARBONATE, POTASSIUM CHLORIDE AND SODIUM PHOSPHATE DIBASIC DIHYDRATE 2.22 ML/KG/HR: 3.68; 3.05; 6.34; 3.09; .314; .187 INJECTION INTRAVENOUS at 15:42

## 2018-01-01 RX ADMIN — FENTANYL CITRATE 100 MCG: 50 INJECTION, SOLUTION INTRAMUSCULAR; INTRAVENOUS at 20:35

## 2018-01-01 RX ADMIN — FUROSEMIDE 80 MG: 10 INJECTION, SOLUTION INTRAVENOUS at 10:59

## 2018-01-01 RX ADMIN — ALBUMIN HUMAN 37.5 G: 50 SOLUTION INTRAVENOUS at 01:39

## 2018-01-01 RX ADMIN — EPINEPHRINE 0.06 MCG/KG/MIN: 1 INJECTION PARENTERAL at 03:53

## 2018-01-01 RX ADMIN — PROPOFOL 40 MCG/KG/MIN: 10 INJECTION, EMULSION INTRAVENOUS at 23:48

## 2018-01-01 RX ADMIN — VASOPRESSIN 2 UNITS/HR: 20 INJECTION INTRAVENOUS at 08:14

## 2018-01-01 RX ADMIN — PIPERACILLIN AND TAZOBACTAM 4.5 G: 4; .5 INJECTION, POWDER, FOR SOLUTION INTRAVENOUS at 08:07

## 2018-01-01 RX ADMIN — CALCIUM CHLORIDE, MAGNESIUM CHLORIDE, DEXTROSE MONOHYDRATE, LACTIC ACID, SODIUM CHLORIDE, SODIUM BICARBONATE AND POTASSIUM CHLORIDE 12.5 ML/KG/HR: 5.15; 2.03; 22; 5.4; 6.46; 3.09; .157 INJECTION INTRAVENOUS at 05:42

## 2018-01-01 RX ADMIN — Medication 100 MCG/HR: at 00:17

## 2018-01-01 RX ADMIN — VASOPRESSIN 2 UNITS: 20 INJECTION INTRAVENOUS at 12:32

## 2018-01-01 RX ADMIN — EPOPROSTENOL 20 NG/KG/MIN: 1.5 INJECTION, POWDER, LYOPHILIZED, FOR SOLUTION INTRAVENOUS at 16:18

## 2018-01-01 RX ADMIN — CALCIUM CHLORIDE, MAGNESIUM CHLORIDE, DEXTROSE MONOHYDRATE, LACTIC ACID, SODIUM CHLORIDE, SODIUM BICARBONATE AND POTASSIUM CHLORIDE 12.5 ML/KG/HR: 5.15; 2.03; 22; 5.4; 6.46; 3.09; .157 INJECTION INTRAVENOUS at 10:36

## 2018-01-01 RX ADMIN — PIPERACILLIN SODIUM,TAZOBACTAM SODIUM 3.38 G: 3; .375 INJECTION, POWDER, FOR SOLUTION INTRAVENOUS at 15:39

## 2018-01-01 RX ADMIN — EPOPROSTENOL 20 NG/KG/MIN: 1.5 INJECTION, POWDER, LYOPHILIZED, FOR SOLUTION INTRAVENOUS at 06:31

## 2018-01-01 RX ADMIN — VASOPRESSIN 2 UNITS: 20 INJECTION INTRAVENOUS at 12:48

## 2018-01-01 RX ADMIN — VASOPRESSIN 2 UNITS/HR: 20 INJECTION INTRAVENOUS at 09:21

## 2018-01-01 RX ADMIN — CALCIUM CHLORIDE, MAGNESIUM CHLORIDE, DEXTROSE MONOHYDRATE, LACTIC ACID, SODIUM CHLORIDE, SODIUM BICARBONATE AND POTASSIUM CHLORIDE: 5.15; 2.03; 22; 5.4; 6.46; 3.09; .157 INJECTION INTRAVENOUS at 10:45

## 2018-01-01 RX ADMIN — VASOPRESSIN 2 UNITS/HR: 20 INJECTION INTRAVENOUS at 22:01

## 2018-01-01 RX ADMIN — DEXTROSE MONOHYDRATE 50 ML: 500 INJECTION PARENTERAL at 23:10

## 2018-01-01 RX ADMIN — ESCITALOPRAM 10 MG: 5 TABLET, FILM COATED ORAL at 07:58

## 2018-01-01 RX ADMIN — PANTOPRAZOLE SODIUM 40 MG: 40 INJECTION, POWDER, FOR SOLUTION INTRAVENOUS at 23:52

## 2018-01-01 RX ADMIN — SODIUM CHLORIDE, PRESERVATIVE FREE 330 UNITS: 5 INJECTION INTRAVENOUS at 10:54

## 2018-01-01 RX ADMIN — PHENYLEPHRINE HYDROCHLORIDE 3 MCG/KG/MIN: 10 INJECTION, SOLUTION INTRAMUSCULAR; INTRAVENOUS; SUBCUTANEOUS at 23:23

## 2018-01-01 RX ADMIN — ALBUMIN HUMAN 25 G: 0.05 INJECTION, SOLUTION INTRAVENOUS at 17:59

## 2018-01-01 RX ADMIN — MEPERIDINE HYDROCHLORIDE 25 MG: 50 INJECTION, SOLUTION INTRAMUSCULAR; INTRAVENOUS; SUBCUTANEOUS at 20:57

## 2018-01-01 RX ADMIN — HEPARIN SODIUM 7.59 UNITS/KG/HR: 10000 INJECTION, SOLUTION INTRAVENOUS at 11:52

## 2018-01-01 RX ADMIN — DEXTROSE MONOHYDRATE 2000 MCG: 50 INJECTION, SOLUTION INTRAVENOUS at 09:26

## 2018-01-01 RX ADMIN — SODIUM BICARBONATE 150 MEQ: 84 INJECTION INTRAVENOUS at 22:00

## 2018-01-01 RX ADMIN — HUMAN INSULIN 16 UNITS/HR: 100 INJECTION, SOLUTION SUBCUTANEOUS at 10:45

## 2018-01-01 RX ADMIN — CALCIUM CHLORIDE, MAGNESIUM CHLORIDE, DEXTROSE MONOHYDRATE, LACTIC ACID, SODIUM CHLORIDE, SODIUM BICARBONATE AND POTASSIUM CHLORIDE 12.5 ML/KG/HR: 5.15; 2.03; 22; 5.4; 6.46; 3.09; .157 INJECTION INTRAVENOUS at 15:14

## 2018-01-01 RX ADMIN — PROPOFOL 30 MCG/KG/MIN: 10 INJECTION, EMULSION INTRAVENOUS at 03:54

## 2018-01-01 RX ADMIN — CALCIUM CHLORIDE, MAGNESIUM CHLORIDE, DEXTROSE MONOHYDRATE, LACTIC ACID, SODIUM CHLORIDE, SODIUM BICARBONATE AND POTASSIUM CHLORIDE: 5.15; 2.03; 22; 5.4; 6.46; 3.09; .157 INJECTION INTRAVENOUS at 15:14

## 2018-01-01 RX ADMIN — ESCITALOPRAM 10 MG: 5 TABLET, FILM COATED ORAL at 09:23

## 2018-01-01 RX ADMIN — HYDROCORTISONE SODIUM SUCCINATE 50 MG: 100 INJECTION, POWDER, FOR SOLUTION INTRAMUSCULAR; INTRAVENOUS at 05:46

## 2018-01-01 RX ADMIN — SODIUM CHLORIDE, PRESERVATIVE FREE 400 UNITS: 5 INJECTION INTRAVENOUS at 05:07

## 2018-01-01 RX ADMIN — SODIUM BICARBONATE 50 MEQ: 84 INJECTION, SOLUTION INTRAVENOUS at 23:56

## 2018-01-01 RX ADMIN — Medication 100 MCG/HR: at 02:18

## 2018-01-01 RX ADMIN — ALBUMIN HUMAN 12.5 G: 0.05 INJECTION, SOLUTION INTRAVENOUS at 10:18

## 2018-01-01 RX ADMIN — VANCOMYCIN HYDROCHLORIDE 1750 MG: 10 INJECTION, POWDER, LYOPHILIZED, FOR SOLUTION INTRAVENOUS at 12:56

## 2018-01-01 RX ADMIN — POLYETHYLENE GLYCOL 3350 17 G: 17 POWDER, FOR SOLUTION ORAL at 11:43

## 2018-01-01 RX ADMIN — Medication 1 PACKET: at 19:55

## 2018-01-01 RX ADMIN — EPINEPHRINE 0.04 MCG/KG/MIN: 1 INJECTION PARENTERAL at 16:16

## 2018-01-01 RX ADMIN — CALCIUM CHLORIDE, MAGNESIUM CHLORIDE, DEXTROSE MONOHYDRATE, LACTIC ACID, SODIUM CHLORIDE, SODIUM BICARBONATE AND POTASSIUM CHLORIDE 12.5 ML/KG/HR: 5.15; 2.03; 22; 5.4; 6.46; 3.09; .157 INJECTION INTRAVENOUS at 09:16

## 2018-01-01 RX ADMIN — CALCIUM CHLORIDE, MAGNESIUM CHLORIDE, DEXTROSE MONOHYDRATE, LACTIC ACID, SODIUM CHLORIDE, SODIUM BICARBONATE AND POTASSIUM CHLORIDE 25 ML/KG/HR: 5.15; 2.03; 22; 5.4; 6.46; 3.09; .157 INJECTION INTRAVENOUS at 13:56

## 2018-01-01 RX ADMIN — Medication 1 PACKET: at 08:37

## 2018-01-01 RX ADMIN — ESCITALOPRAM 10 MG: 5 TABLET, FILM COATED ORAL at 07:52

## 2018-01-01 RX ADMIN — VANCOMYCIN HYDROCHLORIDE 1750 MG: 10 INJECTION, POWDER, LYOPHILIZED, FOR SOLUTION INTRAVENOUS at 11:18

## 2018-01-01 RX ADMIN — ESCITALOPRAM 10 MG: 5 TABLET, FILM COATED ORAL at 08:31

## 2018-01-01 RX ADMIN — PROPOFOL 40 MCG/KG/MIN: 10 INJECTION, EMULSION INTRAVENOUS at 12:19

## 2018-01-01 RX ADMIN — CALCIUM CHLORIDE, MAGNESIUM CHLORIDE, SODIUM CHLORIDE, SODIUM BICARBONATE, POTASSIUM CHLORIDE AND SODIUM PHOSPHATE DIBASIC DIHYDRATE 12.5 ML/KG/HR: 3.68; 3.05; 6.34; 3.09; .314; .187 INJECTION INTRAVENOUS at 15:42

## 2018-01-01 RX ADMIN — PIPERACILLIN SODIUM,TAZOBACTAM SODIUM 3.38 G: 3; .375 INJECTION, POWDER, FOR SOLUTION INTRAVENOUS at 07:58

## 2018-01-01 RX ADMIN — Medication 1 PACKET: at 20:09

## 2018-01-01 RX ADMIN — PROPOFOL 20 MCG/KG/MIN: 10 INJECTION, EMULSION INTRAVENOUS at 18:41

## 2018-01-01 RX ADMIN — MUPIROCIN 1 G: 20 OINTMENT TOPICAL at 20:44

## 2018-01-01 RX ADMIN — HUMAN INSULIN 5 UNITS/HR: 100 INJECTION, SOLUTION SUBCUTANEOUS at 02:22

## 2018-01-01 RX ADMIN — Medication 5 MG/HR: at 23:06

## 2018-01-01 RX ADMIN — PHENYLEPHRINE HYDROCHLORIDE 2.5 MCG/KG/MIN: 10 INJECTION, SOLUTION INTRAMUSCULAR; INTRAVENOUS; SUBCUTANEOUS at 14:45

## 2018-01-01 RX ADMIN — CALCIUM CHLORIDE, MAGNESIUM CHLORIDE, DEXTROSE MONOHYDRATE, LACTIC ACID, SODIUM CHLORIDE, SODIUM BICARBONATE AND POTASSIUM CHLORIDE 25 ML/KG/HR: 5.15; 2.03; 22; 5.4; 6.46; 3.09; .157 INJECTION INTRAVENOUS at 13:55

## 2018-01-01 RX ADMIN — VASOPRESSIN 2.5 UNITS/HR: 20 INJECTION INTRAVENOUS at 02:40

## 2018-01-01 RX ADMIN — VASOPRESSIN 4 UNITS/HR: 20 INJECTION INTRAVENOUS at 05:14

## 2018-01-01 RX ADMIN — Medication 50 MCG/HR: at 01:02

## 2018-01-01 RX ADMIN — CALCIUM CHLORIDE, MAGNESIUM CHLORIDE, DEXTROSE MONOHYDRATE, LACTIC ACID, SODIUM CHLORIDE, SODIUM BICARBONATE AND POTASSIUM CHLORIDE 12.5 ML/KG/HR: 5.15; 2.03; 22; 5.4; 6.46; 3.09; .157 INJECTION INTRAVENOUS at 00:52

## 2018-01-01 RX ADMIN — Medication 100 MCG/HR: at 18:59

## 2018-01-01 RX ADMIN — PIPERACILLIN SODIUM,TAZOBACTAM SODIUM 3.38 G: 3; .375 INJECTION, POWDER, FOR SOLUTION INTRAVENOUS at 14:59

## 2018-01-01 RX ADMIN — SODIUM BICARBONATE 50 MEQ: 84 INJECTION, SOLUTION INTRAVENOUS at 23:05

## 2018-01-01 RX ADMIN — ROCURONIUM BROMIDE 50 MG: 10 INJECTION INTRAVENOUS at 15:32

## 2018-01-01 RX ADMIN — EPINEPHRINE 0.1 MCG/KG/MIN: 1 INJECTION PARENTERAL at 06:40

## 2018-01-01 RX ADMIN — CALCIUM CHLORIDE, MAGNESIUM CHLORIDE, DEXTROSE MONOHYDRATE, LACTIC ACID, SODIUM CHLORIDE, SODIUM BICARBONATE AND POTASSIUM CHLORIDE 12.5 ML/KG/HR: 5.15; 2.03; 22; 5.4; 6.46; 3.09; .157 INJECTION INTRAVENOUS at 05:28

## 2018-01-01 RX ADMIN — CALCIUM CHLORIDE, MAGNESIUM CHLORIDE, DEXTROSE MONOHYDRATE, LACTIC ACID, SODIUM CHLORIDE, SODIUM BICARBONATE AND POTASSIUM CHLORIDE 12.5 ML/KG/HR: 5.15; 2.03; 22; 5.4; 6.46; 3.09; .157 INJECTION INTRAVENOUS at 04:40

## 2018-01-01 RX ADMIN — CALCIUM CHLORIDE, MAGNESIUM CHLORIDE, DEXTROSE MONOHYDRATE, LACTIC ACID, SODIUM CHLORIDE, SODIUM BICARBONATE AND POTASSIUM CHLORIDE 35 ML/KG/HR: 5.15; 2.03; 22; 5.4; 6.46; 3.09; .157 INJECTION INTRAVENOUS at 07:27

## 2018-01-01 RX ADMIN — CALCIUM CHLORIDE, MAGNESIUM CHLORIDE, SODIUM CHLORIDE, SODIUM BICARBONATE, POTASSIUM CHLORIDE AND SODIUM PHOSPHATE DIBASIC DIHYDRATE 12.5 ML/KG/HR: 3.68; 3.05; 6.34; 3.09; .314; .187 INJECTION INTRAVENOUS at 01:02

## 2018-01-01 RX ADMIN — DEXTROSE MONOHYDRATE: 100 INJECTION, SOLUTION INTRAVENOUS at 00:31

## 2018-01-01 RX ADMIN — NOREPINEPHRINE BITARTRATE 0.06 MCG/KG/MIN: 1 INJECTION INTRAVENOUS at 06:41

## 2018-01-01 RX ADMIN — DEXTRAN 70 AND HYPROMELLOSE 2910 1 DROP: 1; 3 SOLUTION/ DROPS OPHTHALMIC at 14:19

## 2018-01-01 RX ADMIN — HYDROCORTISONE SODIUM SUCCINATE 50 MG: 100 INJECTION, POWDER, FOR SOLUTION INTRAMUSCULAR; INTRAVENOUS at 00:14

## 2018-01-01 RX ADMIN — IOPAMIDOL 217 ML: 755 INJECTION, SOLUTION INTRAVASCULAR at 18:45

## 2018-01-01 RX ADMIN — MILRINONE LACTATE IN DEXTROSE 0.25 MCG/KG/MIN: 200 INJECTION, SOLUTION INTRAVENOUS at 11:13

## 2018-01-01 RX ADMIN — HUMAN INSULIN 7 UNITS/HR: 100 INJECTION, SOLUTION SUBCUTANEOUS at 06:32

## 2018-01-01 RX ADMIN — MICAFUNGIN SODIUM 100 MG: 10 INJECTION, POWDER, LYOPHILIZED, FOR SOLUTION INTRAVENOUS at 09:18

## 2018-01-01 RX ADMIN — CALCIUM CHLORIDE, MAGNESIUM CHLORIDE, DEXTROSE MONOHYDRATE, LACTIC ACID, SODIUM CHLORIDE, SODIUM BICARBONATE AND POTASSIUM CHLORIDE 25 ML/KG/HR: 5.15; 2.03; 22; 5.4; 6.46; 3.09; .157 INJECTION INTRAVENOUS at 11:39

## 2018-01-01 RX ADMIN — HYDROCORTISONE SODIUM SUCCINATE 50 MG: 100 INJECTION, POWDER, FOR SOLUTION INTRAMUSCULAR; INTRAVENOUS at 00:32

## 2018-01-01 RX ADMIN — HUMAN INSULIN 8 UNITS/HR: 100 INJECTION, SOLUTION SUBCUTANEOUS at 07:51

## 2018-01-01 RX ADMIN — Medication 100 MCG/HR: at 21:03

## 2018-01-01 RX ADMIN — PHENYLEPHRINE HYDROCHLORIDE 2.5 MCG/KG/MIN: 10 INJECTION, SOLUTION INTRAMUSCULAR; INTRAVENOUS; SUBCUTANEOUS at 10:02

## 2018-01-01 RX ADMIN — SODIUM CHLORIDE, POTASSIUM CHLORIDE, SODIUM LACTATE AND CALCIUM CHLORIDE 1000 ML: 600; 310; 30; 20 INJECTION, SOLUTION INTRAVENOUS at 09:47

## 2018-01-01 RX ADMIN — CALCIUM CHLORIDE, MAGNESIUM CHLORIDE, DEXTROSE MONOHYDRATE, LACTIC ACID, SODIUM CHLORIDE, SODIUM BICARBONATE AND POTASSIUM CHLORIDE 12.5 ML/KG/HR: 5.15; 2.03; 22; 5.4; 6.46; 3.09; .157 INJECTION INTRAVENOUS at 17:59

## 2018-01-01 RX ADMIN — Medication 100 MCG/HR: at 17:30

## 2018-01-01 RX ADMIN — PROPOFOL 40 MCG/KG/MIN: 10 INJECTION, EMULSION INTRAVENOUS at 16:19

## 2018-01-01 RX ADMIN — CALCIUM CHLORIDE, MAGNESIUM CHLORIDE, DEXTROSE MONOHYDRATE, LACTIC ACID, SODIUM CHLORIDE, SODIUM BICARBONATE AND POTASSIUM CHLORIDE 12.5 ML/KG/HR: 5.15; 2.03; 22; 5.4; 6.46; 3.09; .157 INJECTION INTRAVENOUS at 06:48

## 2018-01-01 RX ADMIN — ALBUMIN HUMAN 12.5 G: 0.05 INJECTION, SOLUTION INTRAVENOUS at 21:20

## 2018-01-01 RX ADMIN — EPOPROSTENOL 20 NG/KG/MIN: 1.5 INJECTION, POWDER, LYOPHILIZED, FOR SOLUTION INTRAVENOUS at 12:35

## 2018-01-01 RX ADMIN — Medication 7 MG/HR: at 08:00

## 2018-01-01 RX ADMIN — ALBUMIN HUMAN 12.5 G: 0.05 INJECTION, SOLUTION INTRAVENOUS at 14:00

## 2018-01-01 RX ADMIN — FENTANYL CITRATE 100 MCG: 50 INJECTION, SOLUTION INTRAMUSCULAR; INTRAVENOUS at 08:21

## 2018-01-01 RX ADMIN — EPINEPHRINE 0.04 MCG/KG/MIN: 1 INJECTION PARENTERAL at 08:13

## 2018-01-01 RX ADMIN — CALCIUM CHLORIDE, MAGNESIUM CHLORIDE, DEXTROSE MONOHYDRATE, LACTIC ACID, SODIUM CHLORIDE, SODIUM BICARBONATE AND POTASSIUM CHLORIDE 12.5 ML/KG/HR: 5.15; 2.03; 22; 5.4; 6.46; 3.09; .157 INJECTION INTRAVENOUS at 00:18

## 2018-01-01 RX ADMIN — PIPERACILLIN SODIUM,TAZOBACTAM SODIUM 3.38 G: 3; .375 INJECTION, POWDER, FOR SOLUTION INTRAVENOUS at 13:30

## 2018-01-01 RX ADMIN — VANCOMYCIN HYDROCHLORIDE 1750 MG: 10 INJECTION, POWDER, LYOPHILIZED, FOR SOLUTION INTRAVENOUS at 12:47

## 2018-01-01 RX ADMIN — SODIUM CHLORIDE, PRESERVATIVE FREE 500 UNITS: 5 INJECTION INTRAVENOUS at 10:30

## 2018-01-01 RX ADMIN — PIPERACILLIN AND TAZOBACTAM 4.5 G: 4; .5 INJECTION, POWDER, FOR SOLUTION INTRAVENOUS at 14:14

## 2018-01-01 RX ADMIN — CALCIUM CHLORIDE 1 G: 100 INJECTION, SOLUTION INTRAVENOUS at 21:15

## 2018-01-01 RX ADMIN — VASOPRESSIN 4 UNITS/HR: 20 INJECTION INTRAVENOUS at 21:30

## 2018-01-01 RX ADMIN — CALCIUM CHLORIDE 2 G: 100 INJECTION, SOLUTION INTRAVENOUS at 12:37

## 2018-01-01 RX ADMIN — Medication 100 MCG/HR: at 09:48

## 2018-01-01 RX ADMIN — PROPOFOL 40 MCG/KG/MIN: 10 INJECTION, EMULSION INTRAVENOUS at 03:54

## 2018-01-01 RX ADMIN — EPINEPHRINE 20 MCG: 0.1 INJECTION, SOLUTION ENDOTRACHEAL; INTRACARDIAC; INTRAVENOUS at 12:54

## 2018-01-01 RX ADMIN — HEPARIN SODIUM 10.02 UNITS/KG/HR: 10000 INJECTION, SOLUTION INTRAVENOUS at 02:23

## 2018-01-01 RX ADMIN — CALCIUM CHLORIDE, MAGNESIUM CHLORIDE, DEXTROSE MONOHYDRATE, LACTIC ACID, SODIUM CHLORIDE, SODIUM BICARBONATE AND POTASSIUM CHLORIDE: 5.15; 2.03; 22; 5.4; 6.46; 3.09; .157 INJECTION INTRAVENOUS at 20:53

## 2018-01-01 RX ADMIN — POTASSIUM CHLORIDE 20 MEQ: 400 INJECTION, SOLUTION INTRAVENOUS at 14:15

## 2018-01-01 RX ADMIN — SENNOSIDES AND DOCUSATE SODIUM 2 TABLET: 8.6; 5 TABLET ORAL at 07:52

## 2018-01-01 RX ADMIN — DEXTROSE MONOHYDRATE: 50 INJECTION, SOLUTION INTRAVENOUS at 06:38

## 2018-01-01 RX ADMIN — EPINEPHRINE 50 MCG: 0.1 INJECTION, SOLUTION ENDOTRACHEAL; INTRACARDIAC; INTRAVENOUS at 09:26

## 2018-01-01 RX ADMIN — VANCOMYCIN HYDROCHLORIDE 1750 MG: 10 INJECTION, POWDER, LYOPHILIZED, FOR SOLUTION INTRAVENOUS at 11:26

## 2018-01-01 RX ADMIN — MICAFUNGIN SODIUM 100 MG: 10 INJECTION, POWDER, LYOPHILIZED, FOR SOLUTION INTRAVENOUS at 10:30

## 2018-01-01 RX ADMIN — DEXTRAN 70 AND HYPROMELLOSE 2910 1 DROP: 1; 3 SOLUTION/ DROPS OPHTHALMIC at 11:12

## 2018-01-01 RX ADMIN — SENNOSIDES AND DOCUSATE SODIUM 1 TABLET: 8.6; 5 TABLET ORAL at 08:34

## 2018-01-01 RX ADMIN — FENTANYL CITRATE 100 MCG: 50 INJECTION, SOLUTION INTRAMUSCULAR; INTRAVENOUS at 16:10

## 2018-01-01 RX ADMIN — SODIUM CHLORIDE, PRESERVATIVE FREE 400 UNITS: 5 INJECTION INTRAVENOUS at 18:31

## 2018-01-01 RX ADMIN — MIDAZOLAM 2 MG: 1 INJECTION INTRAMUSCULAR; INTRAVENOUS at 11:38

## 2018-01-01 RX ADMIN — POLYETHYLENE GLYCOL 3350 17 G: 17 POWDER, FOR SOLUTION ORAL at 09:23

## 2018-01-01 RX ADMIN — Medication 1 PACKET: at 07:41

## 2018-01-01 RX ADMIN — PHENYLEPHRINE HYDROCHLORIDE 2 MCG/KG/MIN: 10 INJECTION, SOLUTION INTRAMUSCULAR; INTRAVENOUS; SUBCUTANEOUS at 10:44

## 2018-01-01 RX ADMIN — DEXTROSE MONOHYDRATE 25 ML: 500 INJECTION PARENTERAL at 20:17

## 2018-01-01 RX ADMIN — ALBUMIN HUMAN 12.5 G: 0.05 INJECTION, SOLUTION INTRAVENOUS at 14:43

## 2018-01-01 RX ADMIN — MULTIVITAMIN 15 ML: LIQUID ORAL at 08:31

## 2018-01-01 RX ADMIN — ROCURONIUM BROMIDE 20 MG: 10 INJECTION INTRAVENOUS at 12:49

## 2018-01-01 RX ADMIN — HUMAN INSULIN 12 UNITS/HR: 100 INJECTION, SOLUTION SUBCUTANEOUS at 06:24

## 2018-01-01 RX ADMIN — HUMAN INSULIN 12 UNITS/HR: 100 INJECTION, SOLUTION SUBCUTANEOUS at 18:40

## 2018-01-01 RX ADMIN — ROCURONIUM BROMIDE 50 MG: 10 INJECTION INTRAVENOUS at 09:31

## 2018-01-01 RX ADMIN — SODIUM CHLORIDE, PRESERVATIVE FREE 400 UNITS: 5 INJECTION INTRAVENOUS at 01:06

## 2018-01-01 RX ADMIN — EPINEPHRINE 50 MCG: 0.1 INJECTION, SOLUTION ENDOTRACHEAL; INTRACARDIAC; INTRAVENOUS at 10:57

## 2018-01-01 RX ADMIN — SODIUM CHLORIDE, PRESERVATIVE FREE 400 UNITS: 5 INJECTION INTRAVENOUS at 13:06

## 2018-01-01 RX ADMIN — HEPARIN SODIUM 5000 UNITS: 1000 INJECTION, SOLUTION INTRAVENOUS; SUBCUTANEOUS at 17:18

## 2018-01-01 RX ADMIN — SODIUM BICARBONATE 50 MEQ: 84 INJECTION, SOLUTION INTRAVENOUS at 12:00

## 2018-01-01 RX ADMIN — PIPERACILLIN SODIUM,TAZOBACTAM SODIUM 3.38 G: 3; .375 INJECTION, POWDER, FOR SOLUTION INTRAVENOUS at 19:49

## 2018-01-01 RX ADMIN — HUMAN INSULIN 14 UNITS/HR: 100 INJECTION, SOLUTION SUBCUTANEOUS at 08:30

## 2018-01-01 RX ADMIN — Medication 100 MCG/HR: at 11:26

## 2018-01-01 RX ADMIN — ALBUMIN HUMAN 12.5 G: 0.05 INJECTION, SOLUTION INTRAVENOUS at 23:43

## 2018-01-01 RX ADMIN — PHENYLEPHRINE HYDROCHLORIDE 2.5 MCG/KG/MIN: 10 INJECTION, SOLUTION INTRAMUSCULAR; INTRAVENOUS; SUBCUTANEOUS at 00:36

## 2018-01-01 RX ADMIN — NOREPINEPHRINE BITARTRATE 0.1 MCG/KG/MIN: 1 INJECTION INTRAVENOUS at 03:25

## 2018-01-01 RX ADMIN — CALCIUM CHLORIDE, MAGNESIUM CHLORIDE, DEXTROSE MONOHYDRATE, LACTIC ACID, SODIUM CHLORIDE, SODIUM BICARBONATE AND POTASSIUM CHLORIDE 12.5 ML/KG/HR: 5.15; 2.03; 22; 5.4; 6.46; 3.09; .157 INJECTION INTRAVENOUS at 10:46

## 2018-01-01 RX ADMIN — SODIUM CHLORIDE, POTASSIUM CHLORIDE, SODIUM LACTATE AND CALCIUM CHLORIDE: 600; 310; 30; 20 INJECTION, SOLUTION INTRAVENOUS at 07:14

## 2018-01-01 RX ADMIN — VANCOMYCIN HYDROCHLORIDE 1750 MG: 10 INJECTION, POWDER, LYOPHILIZED, FOR SOLUTION INTRAVENOUS at 11:39

## 2018-01-01 RX ADMIN — SENNOSIDES AND DOCUSATE SODIUM 2 TABLET: 8.6; 5 TABLET ORAL at 19:55

## 2018-01-01 RX ADMIN — ROCURONIUM BROMIDE 50 MG: 10 INJECTION INTRAVENOUS at 12:40

## 2018-01-01 RX ADMIN — EPOPROSTENOL 20 NG/KG/MIN: 1.5 INJECTION, POWDER, LYOPHILIZED, FOR SOLUTION INTRAVENOUS at 18:56

## 2018-01-01 RX ADMIN — ANGIOTENSIN II 40 NG/KG/MIN: 2.5 INJECTION INTRAVENOUS at 22:54

## 2018-01-01 RX ADMIN — NITROGLYCERIN 200 MCG: 5 INJECTION, SOLUTION INTRAVENOUS at 17:19

## 2018-01-01 RX ADMIN — HUMAN INSULIN 1 UNITS/HR: 100 INJECTION, SOLUTION SUBCUTANEOUS at 05:16

## 2018-01-01 RX ADMIN — CALCIUM GLUCONATE 2 G: 98 INJECTION, SOLUTION INTRAVENOUS at 05:41

## 2018-01-01 RX ADMIN — SENNOSIDES AND DOCUSATE SODIUM 1 TABLET: 8.6; 5 TABLET ORAL at 20:12

## 2018-01-01 RX ADMIN — EPOPROSTENOL 20 NG/KG/MIN: 1.5 INJECTION, POWDER, LYOPHILIZED, FOR SOLUTION INTRAVENOUS at 10:54

## 2018-01-01 RX ADMIN — SENNOSIDES AND DOCUSATE SODIUM 1 TABLET: 8.6; 5 TABLET ORAL at 08:07

## 2018-01-01 RX ADMIN — CALCIUM CHLORIDE, MAGNESIUM CHLORIDE, DEXTROSE MONOHYDRATE, LACTIC ACID, SODIUM CHLORIDE, SODIUM BICARBONATE AND POTASSIUM CHLORIDE 12.5 ML/KG/HR: 5.15; 2.03; 22; 5.4; 6.46; 3.09; .157 INJECTION INTRAVENOUS at 10:03

## 2018-01-01 RX ADMIN — SODIUM CHLORIDE: 9 INJECTION, SOLUTION INTRAVENOUS at 15:20

## 2018-01-01 RX ADMIN — NOREPINEPHRINE BITARTRATE 0.1 MCG/KG/MIN: 1 INJECTION INTRAVENOUS at 21:30

## 2018-01-01 RX ADMIN — GLYCOPYRROLATE 0.2 MG: 0.2 INJECTION, SOLUTION INTRAMUSCULAR; INTRAVENOUS at 16:06

## 2018-01-01 RX ADMIN — SODIUM BICARBONATE 50 MEQ: 84 INJECTION, SOLUTION INTRAVENOUS at 11:34

## 2018-01-01 RX ADMIN — HYDROCORTISONE SODIUM SUCCINATE 50 MG: 100 INJECTION, POWDER, FOR SOLUTION INTRAMUSCULAR; INTRAVENOUS at 17:56

## 2018-01-01 RX ADMIN — EPINEPHRINE 0.06 MCG/KG/MIN: 1 INJECTION PARENTERAL at 20:42

## 2018-01-01 RX ADMIN — Medication 1 PACKET: at 07:40

## 2018-01-01 RX ADMIN — PROPOFOL 30 MCG/KG/MIN: 10 INJECTION, EMULSION INTRAVENOUS at 08:21

## 2018-01-01 RX ADMIN — HUMAN INSULIN 3 UNITS/HR: 100 INJECTION, SOLUTION SUBCUTANEOUS at 06:34

## 2018-01-01 RX ADMIN — PROTAMINE SULFATE 40 MG: 10 INJECTION, SOLUTION INTRAVENOUS at 12:19

## 2018-01-01 RX ADMIN — HYDROCORTISONE SODIUM SUCCINATE 50 MG: 100 INJECTION, POWDER, FOR SOLUTION INTRAMUSCULAR; INTRAVENOUS at 12:52

## 2018-01-01 RX ADMIN — CEFAZOLIN 1 G: 1 INJECTION, POWDER, FOR SOLUTION INTRAMUSCULAR; INTRAVENOUS at 11:05

## 2018-01-01 RX ADMIN — HYDROCORTISONE SODIUM SUCCINATE 50 MG: 100 INJECTION, POWDER, FOR SOLUTION INTRAMUSCULAR; INTRAVENOUS at 18:21

## 2018-01-01 RX ADMIN — PANTOPRAZOLE SODIUM 40 MG: 40 INJECTION, POWDER, FOR SOLUTION INTRAVENOUS at 22:22

## 2018-01-01 RX ADMIN — EPOPROSTENOL 20 NG/KG/MIN: 1.5 INJECTION, POWDER, LYOPHILIZED, FOR SOLUTION INTRAVENOUS at 13:19

## 2018-01-01 RX ADMIN — VASOPRESSIN 2 UNITS: 20 INJECTION INTRAVENOUS at 11:01

## 2018-01-01 RX ADMIN — VASOPRESSIN 2 UNITS: 20 INJECTION INTRAVENOUS at 12:36

## 2018-01-01 RX ADMIN — Medication 5 ML: at 07:40

## 2018-01-01 RX ADMIN — PHENYLEPHRINE HYDROCHLORIDE 2.5 MCG/KG/MIN: 10 INJECTION, SOLUTION INTRAMUSCULAR; INTRAVENOUS; SUBCUTANEOUS at 16:19

## 2018-01-01 RX ADMIN — VASOPRESSIN 2 UNITS/HR: 20 INJECTION INTRAVENOUS at 01:01

## 2018-01-01 RX ADMIN — EPOPROSTENOL 20 NG/KG/MIN: 1.5 INJECTION, POWDER, LYOPHILIZED, FOR SOLUTION INTRAVENOUS at 23:00

## 2018-01-01 RX ADMIN — ALBUMIN HUMAN 12.5 G: 50 SOLUTION INTRAVENOUS at 10:18

## 2018-01-01 RX ADMIN — HUMAN INSULIN 1 UNITS/HR: 100 INJECTION, SOLUTION SUBCUTANEOUS at 23:44

## 2018-01-01 RX ADMIN — ALBUMIN HUMAN 12.5 G: 0.05 INJECTION, SOLUTION INTRAVENOUS at 02:17

## 2018-01-01 RX ADMIN — NOREPINEPHRINE BITARTRATE 6.4 MCG: 1 INJECTION INTRAVENOUS at 11:26

## 2018-01-01 RX ADMIN — CALCIUM CHLORIDE, MAGNESIUM CHLORIDE, DEXTROSE MONOHYDRATE, LACTIC ACID, SODIUM CHLORIDE, SODIUM BICARBONATE AND POTASSIUM CHLORIDE 12.5 ML/KG/HR: 5.15; 2.03; 22; 5.4; 6.46; 3.09; .157 INJECTION INTRAVENOUS at 10:38

## 2018-01-01 RX ADMIN — ALBUMIN HUMAN 250 ML: 50 SOLUTION INTRAVENOUS at 14:26

## 2018-01-01 RX ADMIN — CALCIUM CHLORIDE, MAGNESIUM CHLORIDE, DEXTROSE MONOHYDRATE, LACTIC ACID, SODIUM CHLORIDE, SODIUM BICARBONATE AND POTASSIUM CHLORIDE 12.5 ML/KG/HR: 5.15; 2.03; 22; 5.4; 6.46; 3.09; .157 INJECTION INTRAVENOUS at 15:32

## 2018-01-01 RX ADMIN — AMINOCAPROIC ACID 5 G: 250 INJECTION, SOLUTION INTRAVENOUS at 09:15

## 2018-01-01 RX ADMIN — ALBUMIN HUMAN 12.5 G: 0.05 INJECTION, SOLUTION INTRAVENOUS at 16:03

## 2018-01-01 RX ADMIN — PANTOPRAZOLE SODIUM 40 MG: 40 INJECTION, POWDER, FOR SOLUTION INTRAVENOUS at 22:10

## 2018-01-01 RX ADMIN — MIDAZOLAM 2 MG: 1 INJECTION INTRAMUSCULAR; INTRAVENOUS at 09:56

## 2018-01-01 RX ADMIN — CALCIUM CHLORIDE, MAGNESIUM CHLORIDE, DEXTROSE MONOHYDRATE, LACTIC ACID, SODIUM CHLORIDE, SODIUM BICARBONATE AND POTASSIUM CHLORIDE: 5.15; 2.03; 22; 5.4; 6.46; 3.09; .157 INJECTION INTRAVENOUS at 16:55

## 2018-01-01 RX ADMIN — CALCIUM CHLORIDE, MAGNESIUM CHLORIDE, SODIUM CHLORIDE, SODIUM BICARBONATE, POTASSIUM CHLORIDE AND SODIUM PHOSPHATE DIBASIC DIHYDRATE 12.5 ML/KG/HR: 3.68; 3.05; 6.34; 3.09; .314; .187 INJECTION INTRAVENOUS at 10:39

## 2018-01-01 RX ADMIN — HYDROCORTISONE SODIUM SUCCINATE 50 MG: 100 INJECTION, POWDER, FOR SOLUTION INTRAMUSCULAR; INTRAVENOUS at 11:49

## 2018-01-01 RX ADMIN — DEXTROSE MONOHYDRATE: 100 INJECTION, SOLUTION INTRAVENOUS at 06:45

## 2018-01-01 RX ADMIN — PHENYLEPHRINE HYDROCHLORIDE 300 MCG: 10 INJECTION, SOLUTION INTRAMUSCULAR; INTRAVENOUS; SUBCUTANEOUS at 11:01

## 2018-01-01 RX ADMIN — Medication 100 MCG/HR: at 23:06

## 2018-01-01 RX ADMIN — CEFAZOLIN 2 G: 1 INJECTION, POWDER, FOR SOLUTION INTRAMUSCULAR; INTRAVENOUS at 13:23

## 2018-01-01 RX ADMIN — PHENYLEPHRINE HYDROCHLORIDE 2 MCG/KG/MIN: 10 INJECTION, SOLUTION INTRAMUSCULAR; INTRAVENOUS; SUBCUTANEOUS at 01:55

## 2018-01-01 RX ADMIN — CALCIUM CHLORIDE, MAGNESIUM CHLORIDE, DEXTROSE MONOHYDRATE, LACTIC ACID, SODIUM CHLORIDE, SODIUM BICARBONATE AND POTASSIUM CHLORIDE 12.5 ML/KG/HR: 5.15; 2.03; 22; 5.4; 6.46; 3.09; .157 INJECTION INTRAVENOUS at 15:33

## 2018-01-01 RX ADMIN — HUMAN INSULIN 20 UNITS/HR: 100 INJECTION, SOLUTION SUBCUTANEOUS at 12:00

## 2018-01-01 RX ADMIN — PROPOFOL 10 MCG/KG/MIN: 10 INJECTION, EMULSION INTRAVENOUS at 19:41

## 2018-01-01 RX ADMIN — ALBUMIN HUMAN 37.5 G: 0.05 INJECTION, SOLUTION INTRAVENOUS at 01:39

## 2018-01-01 RX ADMIN — Medication 50 MCG/HR: at 12:59

## 2018-01-01 RX ADMIN — HYDROCORTISONE SODIUM SUCCINATE 50 MG: 100 INJECTION, POWDER, FOR SOLUTION INTRAMUSCULAR; INTRAVENOUS at 12:35

## 2018-01-01 RX ADMIN — HYDROCORTISONE SODIUM SUCCINATE 50 MG: 100 INJECTION, POWDER, FOR SOLUTION INTRAMUSCULAR; INTRAVENOUS at 11:39

## 2018-01-01 RX ADMIN — PROPOFOL 30 MCG/KG/MIN: 10 INJECTION, EMULSION INTRAVENOUS at 07:14

## 2018-01-01 RX ADMIN — PIPERACILLIN SODIUM,TAZOBACTAM SODIUM 3.38 G: 3; .375 INJECTION, POWDER, FOR SOLUTION INTRAVENOUS at 19:56

## 2018-01-01 RX ADMIN — SODIUM CHLORIDE, POTASSIUM CHLORIDE, SODIUM LACTATE AND CALCIUM CHLORIDE: 600; 310; 30; 20 INJECTION, SOLUTION INTRAVENOUS at 12:38

## 2018-01-01 RX ADMIN — EPINEPHRINE 5 MCG: 1 INJECTION PARENTERAL at 14:19

## 2018-01-01 RX ADMIN — EPOPROSTENOL 20 NG/KG/MIN: 1.5 INJECTION, POWDER, LYOPHILIZED, FOR SOLUTION INTRAVENOUS at 01:22

## 2018-01-01 RX ADMIN — SENNOSIDES AND DOCUSATE SODIUM 2 TABLET: 8.6; 5 TABLET ORAL at 09:23

## 2018-01-01 RX ADMIN — ESCITALOPRAM 10 MG: 5 TABLET, FILM COATED ORAL at 08:34

## 2018-01-01 RX ADMIN — CALCIUM CHLORIDE 1 G: 100 INJECTION, SOLUTION INTRAVENOUS at 09:48

## 2018-01-01 RX ADMIN — PROPOFOL 30 MCG/KG/MIN: 10 INJECTION, EMULSION INTRAVENOUS at 08:16

## 2018-01-01 RX ADMIN — NOREPINEPHRINE BITARTRATE 0.05 MCG/KG/MIN: 1 INJECTION INTRAVENOUS at 08:14

## 2018-01-01 RX ADMIN — Medication 100 MCG/HR: at 12:55

## 2018-01-01 RX ADMIN — NOREPINEPHRINE BITARTRATE 6.4 MCG: 1 INJECTION INTRAVENOUS at 14:12

## 2018-01-01 RX ADMIN — EPOPROSTENOL 20 NG/KG/MIN: 1.5 INJECTION, POWDER, LYOPHILIZED, FOR SOLUTION INTRAVENOUS at 00:05

## 2018-01-01 RX ADMIN — HUMAN INSULIN 8 UNITS/HR: 100 INJECTION, SOLUTION SUBCUTANEOUS at 05:22

## 2018-01-01 RX ADMIN — VASOPRESSIN 1 UNITS: 20 INJECTION INTRAVENOUS at 11:03

## 2018-01-01 RX ADMIN — SODIUM CHLORIDE, PRESERVATIVE FREE 400 UNITS: 5 INJECTION INTRAVENOUS at 23:12

## 2018-01-01 RX ADMIN — MIDAZOLAM 4 MG: 1 INJECTION INTRAMUSCULAR; INTRAVENOUS at 16:10

## 2018-01-01 RX ADMIN — SODIUM CHLORIDE, POTASSIUM CHLORIDE, SODIUM LACTATE AND CALCIUM CHLORIDE 1000 ML: 600; 310; 30; 20 INJECTION, SOLUTION INTRAVENOUS at 08:46

## 2018-01-01 RX ADMIN — VASOPRESSIN 2 UNITS/HR: 20 INJECTION INTRAVENOUS at 06:18

## 2018-01-01 RX ADMIN — CALCIUM CHLORIDE, MAGNESIUM CHLORIDE, DEXTROSE MONOHYDRATE, LACTIC ACID, SODIUM CHLORIDE, SODIUM BICARBONATE AND POTASSIUM CHLORIDE 12.5 ML/KG/HR: 5.15; 2.03; 22; 5.4; 6.46; 3.09; .157 INJECTION INTRAVENOUS at 05:27

## 2018-01-01 RX ADMIN — Medication 100 MCG/HR: at 03:06

## 2018-01-01 RX ADMIN — POLYETHYLENE GLYCOL 3350 17 G: 17 POWDER, FOR SOLUTION ORAL at 08:31

## 2018-01-01 RX ADMIN — CALCIUM CHLORIDE, MAGNESIUM CHLORIDE, SODIUM CHLORIDE, SODIUM BICARBONATE, POTASSIUM CHLORIDE AND SODIUM PHOSPHATE DIBASIC DIHYDRATE 12.5 ML/KG/HR: 3.68; 3.05; 6.34; 3.09; .314; .187 INJECTION INTRAVENOUS at 02:31

## 2018-01-01 RX ADMIN — VASOPRESSIN 1 UNITS: 20 INJECTION INTRAVENOUS at 12:24

## 2018-01-01 RX ADMIN — CALCIUM CHLORIDE, MAGNESIUM CHLORIDE, DEXTROSE MONOHYDRATE, LACTIC ACID, SODIUM CHLORIDE, SODIUM BICARBONATE AND POTASSIUM CHLORIDE 12.5 ML/KG/HR: 5.15; 2.03; 22; 5.4; 6.46; 3.09; .157 INJECTION INTRAVENOUS at 20:12

## 2018-01-01 RX ADMIN — NOREPINEPHRINE BITARTRATE 12.8 MCG: 1 INJECTION INTRAVENOUS at 12:23

## 2018-01-01 RX ADMIN — HYDROCORTISONE SODIUM SUCCINATE 50 MG: 100 INJECTION, POWDER, FOR SOLUTION INTRAMUSCULAR; INTRAVENOUS at 01:00

## 2018-01-01 RX ADMIN — SODIUM CHLORIDE: 9 INJECTION, SOLUTION INTRAVENOUS at 13:15

## 2018-01-01 RX ADMIN — CALCIUM CHLORIDE, MAGNESIUM CHLORIDE, DEXTROSE MONOHYDRATE, LACTIC ACID, SODIUM CHLORIDE, SODIUM BICARBONATE AND POTASSIUM CHLORIDE 12.5 ML/KG/HR: 5.15; 2.03; 22; 5.4; 6.46; 3.09; .157 INJECTION INTRAVENOUS at 19:56

## 2018-01-01 RX ADMIN — HYDROCORTISONE SODIUM SUCCINATE 50 MG: 100 INJECTION, POWDER, FOR SOLUTION INTRAMUSCULAR; INTRAVENOUS at 00:36

## 2018-01-01 RX ADMIN — EPINEPHRINE 0.03 MCG/KG/MIN: 1 INJECTION PARENTERAL at 00:29

## 2018-01-01 RX ADMIN — EPINEPHRINE 10 MCG: 1 INJECTION PARENTERAL at 14:11

## 2018-01-01 RX ADMIN — CALCIUM CHLORIDE 0.5 G: 100 INJECTION, SOLUTION INTRAVENOUS at 11:24

## 2018-01-01 RX ADMIN — EPINEPHRINE 20 MCG: 0.1 INJECTION, SOLUTION ENDOTRACHEAL; INTRACARDIAC; INTRAVENOUS at 12:25

## 2018-01-01 RX ADMIN — EPOPROSTENOL 20 NG/KG/MIN: 1.5 INJECTION, POWDER, LYOPHILIZED, FOR SOLUTION INTRAVENOUS at 05:34

## 2018-01-01 RX ADMIN — VASOPRESSIN 2 UNITS/HR: 20 INJECTION INTRAVENOUS at 01:30

## 2018-01-01 RX ADMIN — POLYETHYLENE GLYCOL 400 AND PROPYLENE GLYCOL 1 DROP: 4; 3 SOLUTION/ DROPS OPHTHALMIC at 12:54

## 2018-01-01 RX ADMIN — CALCIUM CHLORIDE, MAGNESIUM CHLORIDE, DEXTROSE MONOHYDRATE, LACTIC ACID, SODIUM CHLORIDE, SODIUM BICARBONATE AND POTASSIUM CHLORIDE: 5.15; 2.03; 22; 5.4; 6.46; 3.09; .157 INJECTION INTRAVENOUS at 12:51

## 2018-01-01 RX ADMIN — PIPERACILLIN SODIUM,TAZOBACTAM SODIUM 3.38 G: 3; .375 INJECTION, POWDER, FOR SOLUTION INTRAVENOUS at 02:50

## 2018-01-01 RX ADMIN — PROPOFOL 25 MCG/KG/MIN: 10 INJECTION, EMULSION INTRAVENOUS at 03:18

## 2018-01-01 RX ADMIN — SODIUM CHLORIDE, PRESERVATIVE FREE 400 UNITS: 5 INJECTION INTRAVENOUS at 17:09

## 2018-01-01 RX ADMIN — CALCIUM CHLORIDE, MAGNESIUM CHLORIDE, SODIUM CHLORIDE, SODIUM BICARBONATE, POTASSIUM CHLORIDE AND SODIUM PHOSPHATE DIBASIC DIHYDRATE 12.5 ML/KG/HR: 3.68; 3.05; 6.34; 3.09; .314; .187 INJECTION INTRAVENOUS at 21:28

## 2018-01-01 RX ADMIN — DEXTROSE MONOHYDRATE: 50 INJECTION, SOLUTION INTRAVENOUS at 13:26

## 2018-01-01 RX ADMIN — PIPERACILLIN SODIUM,TAZOBACTAM SODIUM 3.38 G: 3; .375 INJECTION, POWDER, FOR SOLUTION INTRAVENOUS at 13:13

## 2018-01-01 RX ADMIN — Medication 100 MCG/HR: at 06:02

## 2018-01-01 RX ADMIN — Medication 1 PACKET: at 20:12

## 2018-01-01 RX ADMIN — SODIUM BICARBONATE 50 MEQ: 84 INJECTION INTRAVENOUS at 13:34

## 2018-01-01 RX ADMIN — ROCURONIUM BROMIDE 40 MG: 10 INJECTION INTRAVENOUS at 10:26

## 2018-01-01 RX ADMIN — HYDROCORTISONE SODIUM SUCCINATE 50 MG: 100 INJECTION, POWDER, FOR SOLUTION INTRAMUSCULAR; INTRAVENOUS at 06:07

## 2018-01-01 RX ADMIN — HUMAN INSULIN 6 UNITS/HR: 100 INJECTION, SOLUTION SUBCUTANEOUS at 21:42

## 2018-01-01 RX ADMIN — ESCITALOPRAM 10 MG: 5 TABLET, FILM COATED ORAL at 07:40

## 2018-01-01 RX ADMIN — SENNOSIDES AND DOCUSATE SODIUM 2 TABLET: 8.6; 5 TABLET ORAL at 20:50

## 2018-01-01 RX ADMIN — DEXTROSE MONOHYDRATE 50 ML: 500 INJECTION PARENTERAL at 14:12

## 2018-01-01 RX ADMIN — CALCIUM CHLORIDE, MAGNESIUM CHLORIDE, DEXTROSE MONOHYDRATE, LACTIC ACID, SODIUM CHLORIDE, SODIUM BICARBONATE AND POTASSIUM CHLORIDE 12.5 ML/KG/HR: 5.15; 2.03; 22; 5.4; 6.46; 3.09; .157 INJECTION INTRAVENOUS at 15:43

## 2018-01-01 RX ADMIN — CALCIUM CHLORIDE, MAGNESIUM CHLORIDE, SODIUM CHLORIDE, SODIUM BICARBONATE, POTASSIUM CHLORIDE AND SODIUM PHOSPHATE DIBASIC DIHYDRATE 12.5 ML/KG/HR: 3.68; 3.05; 6.34; 3.09; .314; .187 INJECTION INTRAVENOUS at 11:30

## 2018-01-01 RX ADMIN — PROTAMINE SULFATE 200 MG: 10 INJECTION, SOLUTION INTRAVENOUS at 12:21

## 2018-01-01 RX ADMIN — PROPOFOL 30 MCG/KG/MIN: 10 INJECTION, EMULSION INTRAVENOUS at 22:17

## 2018-01-01 RX ADMIN — PIPERACILLIN SODIUM,TAZOBACTAM SODIUM 3.38 G: 3; .375 INJECTION, POWDER, FOR SOLUTION INTRAVENOUS at 02:08

## 2018-01-01 RX ADMIN — HUMAN INSULIN 10 UNITS/HR: 100 INJECTION, SOLUTION SUBCUTANEOUS at 01:07

## 2018-01-01 RX ADMIN — VASOPRESSIN 3 UNITS/HR: 20 INJECTION INTRAVENOUS at 21:51

## 2018-01-01 RX ADMIN — FENTANYL CITRATE 100 MCG: 50 INJECTION, SOLUTION INTRAMUSCULAR; INTRAVENOUS at 06:58

## 2018-01-01 RX ADMIN — HUMAN INSULIN 20 UNITS/HR: 100 INJECTION, SOLUTION SUBCUTANEOUS at 13:00

## 2018-01-01 RX ADMIN — CALCIUM CHLORIDE, MAGNESIUM CHLORIDE, DEXTROSE MONOHYDRATE, LACTIC ACID, SODIUM CHLORIDE, SODIUM BICARBONATE AND POTASSIUM CHLORIDE 25 ML/KG/HR: 5.15; 2.03; 22; 5.4; 6.46; 3.09; .157 INJECTION INTRAVENOUS at 03:11

## 2018-01-01 RX ADMIN — DEXTROSE MONOHYDRATE 50 ML: 500 INJECTION PARENTERAL at 07:48

## 2018-01-01 RX ADMIN — CALCIUM CHLORIDE, MAGNESIUM CHLORIDE, DEXTROSE MONOHYDRATE, LACTIC ACID, SODIUM CHLORIDE, SODIUM BICARBONATE AND POTASSIUM CHLORIDE 17.5 ML/KG/HR: 5.15; 2.03; 22; 5.4; 6.46; 3.09; .157 INJECTION INTRAVENOUS at 06:46

## 2018-01-01 RX ADMIN — ROCURONIUM BROMIDE 50 MG: 10 INJECTION INTRAVENOUS at 08:07

## 2018-01-01 RX ADMIN — HYDROCORTISONE SODIUM SUCCINATE 50 MG: 100 INJECTION, POWDER, FOR SOLUTION INTRAMUSCULAR; INTRAVENOUS at 18:49

## 2018-01-01 RX ADMIN — EPINEPHRINE 0.03 MCG/KG/MIN: 1 INJECTION PARENTERAL at 16:19

## 2018-01-01 RX ADMIN — VASOPRESSIN 2 UNITS/HR: 20 INJECTION INTRAVENOUS at 01:53

## 2018-01-01 RX ADMIN — CALCIUM CHLORIDE, MAGNESIUM CHLORIDE, SODIUM CHLORIDE, SODIUM BICARBONATE, POTASSIUM CHLORIDE AND SODIUM PHOSPHATE DIBASIC DIHYDRATE 12.5 ML/KG/HR: 3.68; 3.05; 6.34; 3.09; .314; .187 INJECTION INTRAVENOUS at 04:36

## 2018-01-01 RX ADMIN — MULTIVITAMIN 15 ML: LIQUID ORAL at 08:34

## 2018-01-01 RX ADMIN — EPOPROSTENOL 20 NG/KG/MIN: 1.5 INJECTION, POWDER, LYOPHILIZED, FOR SOLUTION INTRAVENOUS at 02:31

## 2018-01-01 RX ADMIN — PHENYLEPHRINE HYDROCHLORIDE 1 MCG/KG/MIN: 10 INJECTION, SOLUTION INTRAMUSCULAR; INTRAVENOUS; SUBCUTANEOUS at 20:19

## 2018-01-01 RX ADMIN — ANGIOTENSIN II 20 NG/KG/MIN: 2.5 INJECTION INTRAVENOUS at 11:00

## 2018-01-01 RX ADMIN — VASOPRESSIN 1 UNITS/HR: 20 INJECTION INTRAVENOUS at 18:22

## 2018-01-01 RX ADMIN — CALCIUM CHLORIDE, MAGNESIUM CHLORIDE, SODIUM CHLORIDE, SODIUM BICARBONATE, POTASSIUM CHLORIDE AND SODIUM PHOSPHATE DIBASIC DIHYDRATE 2.22 ML/KG/HR: 3.68; 3.05; 6.34; 3.09; .314; .187 INJECTION INTRAVENOUS at 17:51

## 2018-01-01 RX ADMIN — HEPARIN SODIUM 18.21 UNITS/KG/HR: 10000 INJECTION, SOLUTION INTRAVENOUS at 00:13

## 2018-01-01 RX ADMIN — CEFAZOLIN 3 G: 1 INJECTION, POWDER, FOR SOLUTION INTRAMUSCULAR; INTRAVENOUS at 15:42

## 2018-01-01 RX ADMIN — HEPARIN SODIUM 33000 UNITS: 1000 INJECTION, SOLUTION INTRAVENOUS; SUBCUTANEOUS at 09:08

## 2018-01-01 RX ADMIN — CALCIUM CHLORIDE, MAGNESIUM CHLORIDE, DEXTROSE MONOHYDRATE, LACTIC ACID, SODIUM CHLORIDE, SODIUM BICARBONATE AND POTASSIUM CHLORIDE 12.5 ML/KG/HR: 5.15; 2.03; 22; 5.4; 6.46; 3.09; .157 INJECTION INTRAVENOUS at 22:20

## 2018-01-01 RX ADMIN — CALCIUM CHLORIDE, MAGNESIUM CHLORIDE, DEXTROSE MONOHYDRATE, LACTIC ACID, SODIUM CHLORIDE, SODIUM BICARBONATE AND POTASSIUM CHLORIDE 12.5 ML/KG/HR: 5.15; 2.03; 22; 5.4; 6.46; 3.09; .157 INJECTION INTRAVENOUS at 20:57

## 2018-01-01 RX ADMIN — PIPERACILLIN SODIUM,TAZOBACTAM SODIUM 3.38 G: 3; .375 INJECTION, POWDER, FOR SOLUTION INTRAVENOUS at 01:46

## 2018-01-01 RX ADMIN — VASOPRESSIN 1 UNITS: 20 INJECTION INTRAVENOUS at 11:39

## 2018-01-01 RX ADMIN — VASOPRESSIN 0.5 UNITS/HR: 20 INJECTION INTRAVENOUS at 19:40

## 2018-01-01 RX ADMIN — FENTANYL CITRATE 100 MCG: 50 INJECTION, SOLUTION INTRAMUSCULAR; INTRAVENOUS at 01:59

## 2018-01-01 RX ADMIN — EPINEPHRINE 20 MCG: 1 INJECTION PARENTERAL at 14:06

## 2018-01-01 RX ADMIN — ALBUMIN HUMAN 25 G: 0.05 INJECTION, SOLUTION INTRAVENOUS at 18:40

## 2018-01-01 RX ADMIN — CALCIUM CHLORIDE, MAGNESIUM CHLORIDE, DEXTROSE MONOHYDRATE, LACTIC ACID, SODIUM CHLORIDE, SODIUM BICARBONATE AND POTASSIUM CHLORIDE 12.5 ML/KG/HR: 5.15; 2.03; 22; 5.4; 6.46; 3.09; .157 INJECTION INTRAVENOUS at 01:01

## 2018-01-01 RX ADMIN — PHENYLEPHRINE HYDROCHLORIDE 2 MCG/KG/MIN: 10 INJECTION, SOLUTION INTRAMUSCULAR; INTRAVENOUS; SUBCUTANEOUS at 19:42

## 2018-01-01 RX ADMIN — PIPERACILLIN SODIUM,TAZOBACTAM SODIUM 3.38 G: 3; .375 INJECTION, POWDER, FOR SOLUTION INTRAVENOUS at 08:13

## 2018-01-01 RX ADMIN — HYDROCORTISONE SODIUM SUCCINATE 50 MG: 100 INJECTION, POWDER, FOR SOLUTION INTRAMUSCULAR; INTRAVENOUS at 05:43

## 2018-01-01 RX ADMIN — CALCIUM CHLORIDE, MAGNESIUM CHLORIDE, DEXTROSE MONOHYDRATE, LACTIC ACID, SODIUM CHLORIDE, SODIUM BICARBONATE AND POTASSIUM CHLORIDE 12.5 ML/KG/HR: 5.15; 2.03; 22; 5.4; 6.46; 3.09; .157 INJECTION INTRAVENOUS at 10:28

## 2018-01-01 RX ADMIN — VASOPRESSIN 4 UNITS/HR: 20 INJECTION INTRAVENOUS at 03:08

## 2018-01-01 RX ADMIN — ROCURONIUM BROMIDE 30 MG: 10 INJECTION INTRAVENOUS at 13:36

## 2018-01-01 RX ADMIN — ATROPINE SULFATE 2 DROP: 10 SOLUTION/ DROPS OPHTHALMIC at 16:16

## 2018-01-01 RX ADMIN — CALCIUM CHLORIDE, MAGNESIUM CHLORIDE, SODIUM CHLORIDE, SODIUM BICARBONATE, POTASSIUM CHLORIDE AND SODIUM PHOSPHATE DIBASIC DIHYDRATE 12.5 ML/KG/HR: 3.68; 3.05; 6.34; 3.09; .314; .187 INJECTION INTRAVENOUS at 20:57

## 2018-01-01 RX ADMIN — PANTOPRAZOLE SODIUM 40 MG: 40 INJECTION, POWDER, FOR SOLUTION INTRAVENOUS at 22:42

## 2018-01-01 RX ADMIN — CALCIUM CHLORIDE, MAGNESIUM CHLORIDE, DEXTROSE MONOHYDRATE, LACTIC ACID, SODIUM CHLORIDE, SODIUM BICARBONATE AND POTASSIUM CHLORIDE 12.5 ML/KG/HR: 5.15; 2.03; 22; 5.4; 6.46; 3.09; .157 INJECTION INTRAVENOUS at 05:35

## 2018-01-01 RX ADMIN — VASOPRESSIN 1.8 UNITS/HR: 20 INJECTION INTRAVENOUS at 10:25

## 2018-01-01 RX ADMIN — EPOPROSTENOL 20 NG/KG/MIN: 1.5 INJECTION, POWDER, LYOPHILIZED, FOR SOLUTION INTRAVENOUS at 07:42

## 2018-01-01 RX ADMIN — NOREPINEPHRINE BITARTRATE 12.8 MCG: 1 INJECTION INTRAVENOUS at 09:27

## 2018-01-01 RX ADMIN — VASOPRESSIN 1 UNITS: 20 INJECTION, SOLUTION INTRAMUSCULAR; SUBCUTANEOUS at 16:17

## 2018-01-01 RX ADMIN — HYDROCORTISONE SODIUM SUCCINATE 50 MG: 100 INJECTION, POWDER, FOR SOLUTION INTRAMUSCULAR; INTRAVENOUS at 15:05

## 2018-01-01 RX ADMIN — DEXTROSE MONOHYDRATE: 50 INJECTION, SOLUTION INTRAVENOUS at 01:14

## 2018-01-01 RX ADMIN — MEPERIDINE HYDROCHLORIDE 25 MG: 50 INJECTION, SOLUTION INTRAMUSCULAR; INTRAVENOUS; SUBCUTANEOUS at 02:43

## 2018-01-01 RX ADMIN — PANTOPRAZOLE SODIUM 40 MG: 40 INJECTION, POWDER, FOR SOLUTION INTRAVENOUS at 22:19

## 2018-01-01 RX ADMIN — ROCURONIUM BROMIDE 20 MG: 10 INJECTION INTRAVENOUS at 14:44

## 2018-01-01 RX ADMIN — VASOPRESSIN 2 UNITS/HR: 20 INJECTION INTRAVENOUS at 20:09

## 2018-01-01 RX ADMIN — EPOPROSTENOL 20 NG/KG/MIN: 1.5 INJECTION, POWDER, LYOPHILIZED, FOR SOLUTION INTRAVENOUS at 18:49

## 2018-01-01 RX ADMIN — CALCIUM CHLORIDE, MAGNESIUM CHLORIDE, DEXTROSE MONOHYDRATE, LACTIC ACID, SODIUM CHLORIDE, SODIUM BICARBONATE AND POTASSIUM CHLORIDE 12.5 ML/KG/HR: 5.15; 2.03; 22; 5.4; 6.46; 3.09; .157 INJECTION INTRAVENOUS at 02:31

## 2018-01-01 RX ADMIN — VASOPRESSIN 1 UNITS: 20 INJECTION, SOLUTION INTRAMUSCULAR; SUBCUTANEOUS at 16:32

## 2018-01-01 RX ADMIN — CALCIUM CHLORIDE 1 G: 100 INJECTION, SOLUTION INTRAVENOUS at 08:42

## 2018-01-01 RX ADMIN — Medication 1 PACKET: at 20:24

## 2018-01-01 RX ADMIN — HUMAN INSULIN 8 UNITS/HR: 100 INJECTION, SOLUTION SUBCUTANEOUS at 21:54

## 2018-01-01 RX ADMIN — HEPARIN SODIUM 900 UNITS/HR: 10000 INJECTION, SOLUTION INTRAVENOUS at 04:29

## 2018-01-01 RX ADMIN — ROCURONIUM BROMIDE 50 MG: 10 INJECTION INTRAVENOUS at 11:36

## 2018-01-01 RX ADMIN — CALCIUM CHLORIDE 2 G: 100 INJECTION, SOLUTION INTRAVENOUS at 13:09

## 2018-01-01 RX ADMIN — SODIUM CHLORIDE, PRESERVATIVE FREE 400 UNITS: 5 INJECTION INTRAVENOUS at 22:07

## 2018-01-01 RX ADMIN — PIPERACILLIN SODIUM,TAZOBACTAM SODIUM 3.38 G: 3; .375 INJECTION, POWDER, FOR SOLUTION INTRAVENOUS at 07:40

## 2018-01-01 RX ADMIN — EPINEPHRINE 10 MCG: 0.1 INJECTION, SOLUTION ENDOTRACHEAL; INTRACARDIAC; INTRAVENOUS at 12:53

## 2018-01-01 RX ADMIN — PIPERACILLIN SODIUM,TAZOBACTAM SODIUM 3.38 G: 3; .375 INJECTION, POWDER, FOR SOLUTION INTRAVENOUS at 08:23

## 2018-01-01 RX ADMIN — CALCIUM CHLORIDE, MAGNESIUM CHLORIDE, DEXTROSE MONOHYDRATE, LACTIC ACID, SODIUM CHLORIDE, SODIUM BICARBONATE AND POTASSIUM CHLORIDE 12.5 ML/KG/HR: 5.15; 2.03; 22; 5.4; 6.46; 3.09; .157 INJECTION INTRAVENOUS at 11:31

## 2018-01-01 RX ADMIN — PROPOFOL 30 MCG/KG/MIN: 10 INJECTION, EMULSION INTRAVENOUS at 08:40

## 2018-01-01 RX ADMIN — CALCIUM CHLORIDE, MAGNESIUM CHLORIDE, DEXTROSE MONOHYDRATE, LACTIC ACID, SODIUM CHLORIDE, SODIUM BICARBONATE AND POTASSIUM CHLORIDE 12.5 ML/KG/HR: 5.15; 2.03; 22; 5.4; 6.46; 3.09; .157 INJECTION INTRAVENOUS at 22:19

## 2018-01-01 RX ADMIN — HYDROCORTISONE SODIUM SUCCINATE 50 MG: 100 INJECTION, POWDER, FOR SOLUTION INTRAMUSCULAR; INTRAVENOUS at 12:02

## 2018-01-01 RX ADMIN — HEPARIN SODIUM 3000 UNITS: 1000 INJECTION, SOLUTION INTRAVENOUS; SUBCUTANEOUS at 15:53

## 2018-01-01 RX ADMIN — DEXTROSE MONOHYDRATE: 50 INJECTION, SOLUTION INTRAVENOUS at 19:10

## 2018-01-01 RX ADMIN — Medication 100 MCG/HR: at 14:56

## 2018-01-01 RX ADMIN — ESCITALOPRAM 20 MG: 5 TABLET, FILM COATED ORAL at 14:47

## 2018-01-01 ASSESSMENT — ACTIVITIES OF DAILY LIVING (ADL)
ADLS_ACUITY_SCORE: 16
ADLS_ACUITY_SCORE: 20
ADLS_ACUITY_SCORE: 16
ADLS_ACUITY_SCORE: 20
ADLS_ACUITY_SCORE: 20
ADLS_ACUITY_SCORE: 17
ADLS_ACUITY_SCORE: 16
ADLS_ACUITY_SCORE: 20
ADLS_ACUITY_SCORE: 16
ADLS_ACUITY_SCORE: 20
ADLS_ACUITY_SCORE: 20
ADLS_ACUITY_SCORE: 16
ADLS_ACUITY_SCORE: 20
ADLS_ACUITY_SCORE: 16
ADLS_ACUITY_SCORE: 16
ADLS_ACUITY_SCORE: 20
ADLS_ACUITY_SCORE: 16
ADLS_ACUITY_SCORE: 20
ADLS_ACUITY_SCORE: 16
ADLS_ACUITY_SCORE: 20
ADLS_ACUITY_SCORE: 16
ADLS_ACUITY_SCORE: 20
ADLS_ACUITY_SCORE: 16
ADLS_ACUITY_SCORE: 16
ADLS_ACUITY_SCORE: 20
ADLS_ACUITY_SCORE: 20
ADLS_ACUITY_SCORE: 16
ADLS_ACUITY_SCORE: 20
ADLS_ACUITY_SCORE: 20
ADLS_ACUITY_SCORE: 16
ADLS_ACUITY_SCORE: 20
ADLS_ACUITY_SCORE: 16
ADLS_ACUITY_SCORE: 20
ADLS_ACUITY_SCORE: 20
ADLS_ACUITY_SCORE: 16
ADLS_ACUITY_SCORE: 20
ADLS_ACUITY_SCORE: 16
ADLS_ACUITY_SCORE: 20

## 2018-01-01 ASSESSMENT — LIFESTYLE VARIABLES
TOBACCO_USE: 1

## 2018-11-13 PROBLEM — Z92.81 H/O EXTRACORPOREAL MEMBRANE OXYGENATION TREATMENT: Status: ACTIVE | Noted: 2018-01-01

## 2018-11-14 NOTE — PROGRESS NOTES
[facility] Nurse Inpatient Adult Pressure Injury Prevention Assessment: ECMO  Initial     Positioning Tolerance: Fair  [facility]  Presence of Ischemia: No  Location of ischemia: n/a    Pressure Injury Prevention Interventions In Place:  Optifoam Dressing under ECMO Cannula, Z flow Positioner under head, TAPs Wedge Positioners in use, Heel off-loading boots, Pillows under calves for heel suspension and Mepilex Sacral Dressing   Current support surface: Standard  Low air loss mattress with pulsation        Pressure Injury Prevention Interventions Added:  None        Plan of Care for Positioning and Pressure Injury Prevention  Reposition patient every 1-2 hours using TAP Wedges  Position head on Z flow positioner, mold indentation at areas of pressure points.  Pad ECMO IJ cannula with Optifoam (#[id number]) along face and scalp between skin and cannula and under Coban head wraps    Pad ECMO groin and chest cannula under rigid connectors with Optifoam or Soft cloth  Heel off-loading Boots at all times  Sacral Mepilex for Prevention, change every 5 days and prn  Low Air loss mattress    Patient History:   According to medical record: Johan Spence is a 63 year old male now s/p [facility]. Unable to wean off pump at the end of the case in addition to developing AYE, cannulated centrally for V-A ecmo.    Current Diet / Nutrition:     None      Output:    I/O last 3 completed shifts:  In: 3788.96 [I.V.:604.96; NG/GT:90]  Out: 4395 [Urine:3225; Other:80; Chest Tube:1090]  Containment: of urine/stool: Incontinence Protocol and Urinary Catheter    Risk Assessment:   Sensory Perception: 1-->completely limited  Moisture: 3-->occasionally moist  Activity: 1-->bedfast  Mobility: 1-->completely immobile  Nutrition: 1-->very poor  Friction and Shear: 1-->problem  [place] Score: 8    Labs:    Recent Labs  Lab 11/14/18  9499  11/13/18  2239   ALBUMIN 3.6 2.7*   HGB 9.3* 8.9*   INR 1.75* 2.11*   WBC 15.7* 13.8*   A1C  --  6.0*       Focused Assessment: right Chest ECMO cannula    Pressure Injury Present::No    Education provided to: nurse  Discussed importance of:repositioning every 2 hours  Discussed plan of care with Nurse  WOC Nurse follow-up plan:weekly    Audra Dudley RN

## 2018-11-14 NOTE — PLAN OF CARE
Problem: Patient Care Overview  Goal: Plan of Care/Patient Progress Review  Outcome: No Change  Pt arrived from OSH around 2100 last night on VA ECMO with open chest and IABP, hypotensive upon arrival, OSH Anesthesia MD pushing vasopressin to keep MAPs >60. Total 750 ml 5% albumin given while waiting for vasoactive drips to arrive from pharmacy. Three amps sodium bicarb given per order of Dr. Neri for bicarb of 14. Norepinephrine, vasopressin and epinephrine drips titrated to keep MAPs >65, IABP means >65. ECMO flows <2 briefly, responded well to fluid resuscitation. Paced at 80 BPM via temporary pacing wires, occasional PVCs. VA ECMO ongoing, please refer to ECMO progress note for details. Unable to obtain accurate PAP due to flattened waveform, CVP 7-13 but also with flattened waveform. Unable to detect DP pulses in BLE, Dr. Hyde notified. Able to detect BLE post tibial pulses with doppler. NIRS in LLE 40s while NIRS in RLE 70s, Dr. Hyde notified. IABP means 60s-70s, augmented pressure 80s-100s. Small to moderate amount of sanguinous drainage from open chest dressing and increased chest tube output (from ~60 ml/hr to 100-150 ml/hr) after initiation of heparin drip to meet ACT goals, Dr. Hyde notified, ACT goals lowered to 160-180. Current vent settings: CMV, FiO2 50%, RR 18, PEEP 8, . Lactic acid 16-20 and , Dr. Hyde notified. Pt sedated on a continuous versed drip and fentanyl drip infusing for pain management. UOP ~100-600 ml/hr, initially pink-tinged but clearing to moises throughout the night. D5W infusion started per orders, will check sodium every four hours. One unit PRBCs, one unit FFP and two packs of platelets given per orders. Insulin drip titrated to keep blood glucose within target range per orders.

## 2018-11-14 NOTE — PROGRESS NOTES
CV ICU PROGRESS NOTE  11/14/2018    CO-MORBIDITIES:   No diagnosis found.    ASSESSMENT: Johan Spence is a 63 year old male now s/p AVR and root [facility]. Was unable to be weaned from CPB so placed on VA ECMO and sent to [facility] for further cares.     TODAY'S PROGRESS:   - Wean pressors as able.   - ACT goal: 160-180  - SANDRA to be discussed with Cardiology    - Plan to leave IABP in to help wean off ecmo.   - Goal of 1L positive today  - 1L LR bolus now  - Trophic tube feeds.   - Add bowel regimen.     PLAN:  Neuro/pain/sedation:  - Monitor neurological status. Notify the MD for any acute changes in exam.  - Fentanyl gtt for pain.  - Versed gtt  - Tylenol scheduled. Oxy/dil PRN. Robaxin PRN.   - Lidoderm.     Pulmonary:   #MV postop  #ONOFRE   - Supplemental oxygen to keep saturation above 92 %.  - Incentive spirometer every 15- 30 minutes when awake.  - Mechanically ventilated, will maintain PEEP of 8 while resuscitation.     Cardiovascular:   #AVR and [facility].    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   #HTN.    - Monitor hemodynamic status.   - Epi/norepi/vaso gtts. Wean vaso as able and then NE.   - MAP > 65.  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 Limited information available in chart. Would like to get SANDRA which we will discuss with cards.   - IABP for coronary augmentation.   - Holding PTA Lisinopril and Metoprolol.     GI care:   - Miralax/senna-colace, suppository PRN    Fluids/ Electrolytes/ Nutrition:   - TKO  - NPO  - NJ ok to start trophic tube feeds.    - BMP q6h.  - 1L bolus LR.   - No indication for parenteral nutrition.    Renal:    - Urine output is adequate so far.  - Will continue to monitor intake and output.  - [place]  - Goal positive 1 liter for the day.    Endocrine:    - Insulin gtt.    ID/ Antibiotics:  - Periop antibiotics.  - No signs of infection.     Heme:     - Hemoglobin stable.   -  CBC/coags q6h.  - Anticoagulation: Hep gtt.      Prophylaxis:    - Mechanical prophylaxis for DVT.   - Hep gtt for ECMO -180  - PPI    Lines/tubes/drains:  - R internal jugular MAC/swan  - ETT  - IABP  - VA ECMO  - Chest tubes: 3 meds.    - [place]  - NJ feeding tube.     Disposition:  - CV ICU.    Patient seen, findings and plan discussed with CV ICU staff, Dr. Ball.    Davide Escalante MD  CA-3/PGY-4    ====================================    SUBJECTIVE:   Transferred from [facility] yesterday after inability to be weaned from CPB, placed on VA ECMO and sent to [facility]. Overnight some soft pressures requiring Epi, NE, Vaso.      OBJECTIVE:   1. VITAL SIGNS:   Temp:  [94.5  F (34.7  C)-98.2  F (36.8  C)] 94.5  F (34.7  C)  Heart Rate:  [77-96] 86  Resp:  [18] 18  MAP:  [48 mmHg-75 mmHg] 69 mmHg  Arterial Line BP: ()/(21-48) 103/44  FiO2 (%):  [50 %-60 %] 50 %  SpO2:  [91 %-100 %] 100 %  Ventilation Mode: CMV/AC  (Continuous Mandatory Ventilation/ Assist Control)  FiO2 (%): 50 %  Rate Set (breaths/minute): 18 breaths/min  Tidal Volume Set (mL): 450 mL  PEEP (cm H2O): 8 cmH2O  Oxygen Concentration (%): 40 %  Resp: 18    2. INTAKE/ OUTPUT:   I/O last 3 completed shifts:  In: 3788.96 [I.V.:604.96; NG/GT:90]  Out: 4395 [Urine:3225; Other:80; Chest Tube:1090]    3. PHYSICAL EXAMINATION:   General: laying in bed on ECMO and ventilator.   Neuro: sedated and intubated.   Resp: Breathing non-labored on vent.   CV: Paced at 80.   Abdomen: Soft, Non-distended,  Incisions: c/d/i.   Extremities: warm and well perfused    4. INVESTIGATIONS:   Arterial Blood Gases     Recent Labs  Lab 11/14/18  0618 11/14/18  0354 11/14/18  0145 11/13/18  2340   PH 7.40 7.40 7.36 7.50*   PCO2 36 36 42 31*   PO2 218* 271* 298* 392*   HCO3 22 22 24 25     Complete Blood Count     Recent Labs  Lab 11/14/18  0354 11/13/18  2239 11/13/18  2100   WBC 15.7* 13.8* Canceled, Test credited   HGB 9.3* 8.9* Canceled, Test credited   * 65*  Canceled, Test credited     Basic Metabolic Panel    Recent Labs  Lab 11/14/18  0354 11/13/18 2239 11/13/18  2100   * 154* Canceled, Test credited   POTASSIUM 4.5 5.2 Canceled, Test credited   CHLORIDE 105 106 Canceled, Test credited   CO2 21 24 Canceled, Test credited   BUN 48* 41* Canceled, Test credited   CR 2.55* 2.23* Canceled, Test credited   * 153* Canceled, Test credited     Liver Function Tests    Recent Labs  Lab 11/14/18  0354 11/13/18 2239 11/13/18  2100   AST  --  Canceled, Test credited Canceled, Test credited   * 341* Canceled, Test credited   ALKPHOS 52 37* Canceled, Test credited   BILITOTAL 3.3* 2.6* Canceled, Test credited   ALBUMIN 3.6 2.7* Canceled, Test credited   INR 1.75* 2.11* 1.98*     Pancreatic Enzymes  No lab results found in last 7 days.  Coagulation Profile    Recent Labs  Lab 11/14/18  0354 11/13/18 2239 11/13/18  2100   INR 1.75* 2.11* 1.98*   PTT 87*  --  94*         5. RADIOLOGY:   Recent Results (from the past 24 hour(s))   XR Abdomen Port 1 View    Narrative    Exam: XR ABDOMEN PORT 1 VW, 11/13/2018 10:50 PM    Indication: OG placement    Comparison: None available    Findings:   A single portable supine AP view of the upper abdomen was obtained.  Chest tubes, cannulae, [place]-Jenn catheter, epicardial pacing wires,  and surgical instruments /clamps project over the lower chest. A right  femoral approach catheter tip projects over the distal IVC. The  enteric tube tip projects over the stomach with sidehole at or just  beyond the gastroesophageal junction. Nonobstructive bowel gas  pattern.      Impression    Impression:   The enteric tube tip projects of the stomach with the sidehole at or  just beyond the gastroesophageal junction. Consider slight  advancement.     I have personally reviewed the examination and initial interpretation  and I agree with the findings.    JOVANNY ROBERT MD   XR Chest Port 1 View    Narrative     Examination:  XR CHEST PORT 1       Date:  11/13/2018 9:42 PM      Clinical Information: check ETT and line placement;      Additional Information: none    Comparison: none    Findings:     Multiple Ene type clamps are projecting over the mid chest. The ET  tube tip is approximately 4 cm above the rudy.    There is a right IJ [place]-Jenn catheter and the tip is in main right  pulmonary artery. There is a right-sided chest tube with complete  expansion of the right lung. There is a catheter projected over the  right heart border, exact position is uncertain.      Impression    Impression:    1. ET tube in good position.  2. Operative instruments projecting over the midline of the chest..    JVOANNY ROBERT MD   XR Chest Port 1 View    Narrative    Exam: XR CHEST PORT 1 , 11/14/2018 3:26 AM    Indication: Central ECMO    Comparison: 11/13/2018    Findings:   A single portable AP view of the chest was obtained. The endotracheal  tube tip projects over the mid trachea. The right IJ [place]-Jenn  catheter tip projects over the proximal main pulmonary artery,  slightly retracted since the previous exam. The enteric tube tip and  sidehole project over the stomach. Stable right apical chest tube.  Unchanged central ECMO cannulae. Prosthetic aortic valve. The cardiac  silhouette remains enlarged. Low lung volumes. No pneumothorax or  pleural effusion. Mild left basilar opacities.      Impression    Impression:   1. Slight retraction of the [place]-Jenn catheter with the tip projecting  over the proximal main pulmonary artery. Otherwise stable support  devices.  2. Minimal left basilar opacities, likely atelectasis.    I have personally reviewed the examination and initial interpretation  and I agree with the findings.    JOVANNY ROBERT MD       =========================================

## 2018-11-14 NOTE — PROGRESS NOTES
Patient arrived from an outside hospital with an intra-aortic balloon pump already inserted. Indication: patient unable to come off of cardiopulmonary bypass following cardiothoracic surgery. A fiberoptic 7.5 fr, 40 ml catheter was inserted with an arterial sheath at the left femoral artery site. The IABP frequency is 1:1 with 100% augmentation, timing: auto.

## 2018-11-14 NOTE — PROGRESS NOTES
ECMO Shift Summary:    Patient remains on VA ECMO, all equipment is functioning and alarms are appropriately set. RPM's 3090 with flow range 4.01-4.06 L/min. Sweep gas is at 2 LPM and FiO2 60%. Circuit remains free of air, clot and fibrin. Cannulas are secure with some oozing from the open chest site and the swan site. Chest tube output has varied between  ml/hr. Extremities are cool to touch but perfused with bilateral lower extremity pulses via doppler. Suctioned ETT for a scant amount of red-tinged secretions.    Significant Shift Events: Patient cannulated for VA ECMO with an IABP at [facility]. There was ECMO circuit chugging upon arrival to the ICU. Rapidly gave 750 ml of 5% albumin, which corrected the chugging. Subsequent product given included two units of RBCs, two units of platelets, and one unit of FFP. Nephrology consulted, and no CRRT was started at this time. Heparin titrated overnight. Patient's urine was red towards the beginning of the shift and changed to moises in color by the end of the shift.    Vent settings:  Ventilation Mode: CMV/AC  (Continuous Mandatory Ventilation/ Assist Control)  FiO2 (%): 50 %  Rate Set (breaths/minute): 18 breaths/min  Tidal Volume Set (mL): 450 mL  PEEP (cm H2O): 8 cmH2O  Oxygen Concentration (%): 50 %  Resp: 18.    Heparin is running at 400 u/hr, ACT range 160-196.    Urine output varied from 200-300 ml/hr, blood loss was a moderate amount from the chest tube drainage and previously mentioned oozing. Product given included two units of RBCs, two units of platelets, and one unit of FFP.      Intake/Output Summary (Last 24 hours) at 11/14/18 0607  Last data filed at 11/14/18 0600   Gross per 24 hour   Intake          3325.11 ml   Output             4223 ml   Net          -897.89 ml       ECHO:  No results found for this or any previous visit.No results found for this or any previous visit.    CXR:  Recent Results (from the past 24 hour(s))   XR Abdomen Port  1 View    Narrative    The enteric tube tip projects of the stomach with the sidehole at or  just beyond the gastroesophageal junction. Consider slight  advancement.    XR Chest Port 1 View    Narrative     Examination:  XR CHEST PORT 1 VW     Date:  11/13/2018 9:42 PM      Clinical Information: check ETT and line placement;      Additional Information: none    Comparison: none    Findings:     Multiple Ene type clamps are projecting over the mid chest. The ET  tube tip is approximately 4 cm above the rudy.    There is a right IJ [place]-Jenn catheter and the tip is in main right  pulmonary artery. There is a right-sided chest tube with complete  expansion of the right lung. There is a catheter projected over the  right heart border, exact position is uncertain.      Impression    Impression:    1. ET tube in good position.  2. Operative instruments projecting over the midline of the chest..    JOVANNY ROBERT MD   XR Chest Port 1 View    Narrative    1. Slight retraction of the [place]-Jenn catheter with the tip projecting  over the proximal main pulmonary artery. Otherwise stable support  devices.  2. Minimal left basilar opacities, likely atelectasis.       Labs:    Recent Labs  Lab 11/14/18  0354 11/14/18  0145 11/13/18  2340 11/13/18  2239   PH 7.40 7.36 7.50* 7.58*   PCO2 36 42 31* 30*   PO2 271* 298* 392* 370*   HCO3 22 24 25 28   O2PER 50.0  50.0 50.0 60.0 70       Lab Results   Component Value Date    HGB 9.3 (L) 11/14/2018    PHGB 80 (H) 11/14/2018     (L) 11/14/2018    FIBR 261 11/14/2018    INR 1.75 (H) 11/14/2018    PTT 87 (H) 11/14/2018    DD 6.2 (H) 11/14/2018    AXA 0.34 11/14/2018         Plan is to reassess kidney function during the day. Will continue VA ECMO at this time.      Steve Garcia, RRT  11/14/2018 6:07 AM

## 2018-11-14 NOTE — PLAN OF CARE
Admitted/transferred from: OSH  Reason for admission/transfer: VA ECMO d/t failure to wean off bypass after redo AVR  Patient status upon admission/transfer: On VA ECMO, IABP, intubated, sedated, open chest  Interventions: VA ECMO, fluid resuscitation, hemodynamic support  Plan: VA ECMO support until stable enough to decannulate, possible CRRT if indicated  2 RN skin assessment: completed by Araceli Quezada RN/Gaurav Sexton RN  Result of skin assessment and interventions/actions: Open chest with central VA ECMO cannulation, please refer to EPIC flowsheet for interventions  Height, weight, drug calc weight: done  Patient belongings: N/A  MDRO education (if applicable): N/A

## 2018-11-14 NOTE — PROGRESS NOTES
ECLS Cannulation Note:    Date on: 11/13/2018  Patient initially cannulated at outside hospital at 18:38.  Patient arrived here at 21:10.  Surgeon: Mely Luque    Arterial Cannula: 32/40 Fr. In the Right Atrium      Venous Cannula: 20 Fr. In the Aorta      ECMO components include [employer] Lot # [id number]    Cannulation was performed in the OR, placement was verified by CXR, cannulas are in good position.  ISTAT and blood cooler are at bedside. Patient's blood type pending, type and screen was sent down to lab.    Steve Garcia, RRT  11/13/2018 09:10 PM

## 2018-11-14 NOTE — PROGRESS NOTES
"  Nephrology Progress Note  11/14/2018         Assessment & Recommendations:   Johan Spence is a 63 year old year old male      Cardiogenic shock on ECMO + 3 pressors.     AYE: baseline Cr 1.1, inc to 2.3likely ATN from cardiogenic shock on multiple pressors started on ECMO. Nonoliguric, UOP is 100 to 300/hr, might be in diuretics phase of ATN.  -- no indication for CRRT today   -- cont supportive care for ATN diuretics phase cont IVF replacement  -- On ECMO and Pressors keep BP MAP >65  -- avoid nephrotoxic     BP and volume: on cardiogenic shock on VA ECMO now, pressors EPI/Norepi/Vaso    Electrolyte and acid base: K normal 4.0,  Hypernatremia: Na 152, Free water deficit is 2.3 adding 1/2 UOP and insensible loss will equal to 4liter will correct with D5 water 175cc/hr and try to maintain Na close to 145.   Triple acid base disorder: high anion gap acidosis due to lactic acidosis, metabolic alkalosis's ?might be bicarb use during resuscitation and respiratory alkalosis's.  Improved PH now normal.     Hyperphosphatemia: phos 4.9 will monitor now.     Recommendations were communicated to primary team via Phone    Seen and discussed with Dr. Kirill Morrison MD   [phone]    Interval History :   Nursing and provider notes from last 24 hours reviewed.  In the last 24 hours Johan Spence intubated on ECMO,  to 300/hr, still hypotensive requiring pressors.   Review of Systems:   I reviewed the following systems:  Intubated   Physical Exam:   I/O last 3 completed shifts:  In: 3868.96 [I.V.:684.96; NG/GT:90]  Out: 4315 [Urine:3225; Chest Tube:1090]   /55 (BP Location: Right arm)  Temp 98.2  F (36.8  C)  Resp 18  Ht 1.7 m (5' 6.93\")  Wt 89.8 kg (197 lb 15.6 oz)  SpO2 100%  BMI 31.07 kg/m2     GENERAL APPEARANCE: intubated   EYES:  no scleral icterus, pupils equal  HENT: mouth without ulcers or lesions  PULM: lungs coarse to auscultation bilaterally, equal air movement, no clubbing  CV: " regular rhythm, normal rate, no rub     -JVD not elevated      -edema trace   GI: soft, notender, nodistended, bowel sounds are-ve  INTEGUMENT: no cyanosis, no rash  NEURO:  Intubated    Access ECMO     Labs:   All labs reviewed by me  Electrolytes/Renal -   Recent Labs   Lab Test  11/14/18   1210  11/14/18   0800  11/14/18   0354  11/13/18   2239   NA  150*  152*  152*  154*   POTASSIUM  4.0   --   4.5  5.2   CHLORIDE  104   --   105  106   CO2  30   --   21  24   BUN  46*   --   48*  41*   CR  2.78*   --   2.55*  2.23*   GLC  264*   --   221*  153*   TERESA  8.1*   --   9.0  8.8   MAG  2.3   --   2.4*  2.4*   PHOS  4.9*   --   6.6*  4.3       CBC -   Recent Labs   Lab Test  11/14/18   1020  11/14/18   0354  11/13/18   2239   WBC  15.2*  15.7*  13.8*   HGB  8.1*  9.3*  8.9*   PLT  93*  126*  65*       LFTs -   Recent Labs   Lab Test  11/14/18   1210  11/14/18   0354  11/13/18   2239   ALKPHOS  40  52  37*   BILITOTAL  3.1*  3.3*  2.6*   ALT  821*  630*  341*   AST  2166*  1880*  Canceled, Test credited   PROTTOTAL  4.9*  6.0*  4.8*   ALBUMIN  2.9*  3.6  2.7*       Iron Panel - No lab results found.      Imaging:  All imaging studies reviewed by me.     Current Medications:    escitalopram  20 mg Oral Daily     multivitamins with minerals  15 mL Per Feeding Tube Daily     pantoprazole (PROTONIX) IV  40 mg Intravenous Q24H     polyethylene glycol  17 g Oral Daily     senna-docusate  1-2 tablet Oral or Feeding Tube BID       IV fluid REPLACEMENT ONLY       IV fluid REPLACEMENT ONLY       D5W 175 mL/hr at 11/14/18 1400     EPINEPHrine IV infusion ADULT 0.1 mcg/kg/min (11/14/18 1300)     fentaNYL 100 mcg/hr (11/14/18 1300)     HEParin Stopped (11/14/18 0653)     insulin (regular) 20 Units/hr (11/14/18 1300)     midazolam 5 mg/hr (11/14/18 1300)     norepinephrine 0.1 mcg/kg/min (11/14/18 1300)     vasopressin (PITRESSIN) infusion ADULT (40 mL) 2.4 Units/hr (11/14/18 1300)     Manfred Morrison MD

## 2018-11-14 NOTE — H&P
CV ICU PROGRESS NOTE  11/13/2018    CO-MORBIDITIES:   HTN  HLD  Obesity  ONOFRE  Diabetes  Former smoker  Intermittent heavy alcohol use    ASSESSMENT: Johan Spence is a 63 year old male now s/p [facility]. Unable to wean off pump at the end of the case in addition to developing AYE, cannulated centrally for V-A ecmo.    PLAN:  Neuro/pain/sedation:  - Fentanyl gtt for pain.  - Propofol possibly contributing to hypotension, change to versed     Pulmonary:   -VA ecmo  -History of ONOFRE    Cardiovascular:    - Epi/norepi/vaso gtts. Wean as tolerated.  - MAP > 60.  - ELS     GI care:   - NPO for now    Fluids/ Electrolytes/ Nutrition:   - Electrolyte replacements    Renal:    - Nephrology consult for AYE, now with large-volume urine and hypernatremia    Endocrine:    - Insulin gtt as needed.    ID/ Antibiotics:  - Periop antibiotics were vancomycin and cefuroxime. Hold further antibiotics for now due to elevated creatinine; will need renal dosing of antibiotics for open chest    Heme:     - Hemoglobin stable.   - ACT goal 160-180 due to postop bleeding  - ELS labs    Prophylaxis:    - Mechanical prophylaxis for DVT.   - PPI    Lines/tubes/drains:  - R internal jugular PA catheter  - L groin balloon pump  - R groin dialysis catheter  - R radial art line (dampened)   - Chest tubes: meds  - Coronado    Disposition:  -  CV ICU.     ====================================    SUBJECTIVE:   History of AVR 3 years ago, presented in CHF as outpatient from  [facility] Coded at the time of PA catheter placement in OR (pulm artery pressures eric to 80-90), crashed on pump. Aorta replaced with gel weave graft and aortic valve replaced. Unable to wean off pump with several attempts, including after placing balloon pump. Urine output trailed off near the end of the case; dialysis catheter placed. Central ecmo cannulation and transfer to [facility].     OBJECTIVE:   1. VITAL SIGNS:   Heart  Rate:  [79-80] 80  MAP:  [49 mmHg-75 mmHg] 75 mmHg  Arterial Line BP: ()/(21-48) 121/48  SpO2:  [74 %-97 %] 74 %  No Data Recorded    2. INTAKE/ OUTPUT:        3. PHYSICAL EXAMINATION:   General: Intubated, sedated  Neuro: pupils equally round, reactive to light  Resp: breath sounds equal  CV: RRR  Abdomen: Soft, Non-distended, Non-tender  Incisions: temporary closure   Extremities: warm and well perfused    4. INVESTIGATIONS:   Arterial Blood Gases     Recent Labs  Lab 11/13/18  2100   PH 7.40   PCO2 24*   PO2 317*   HCO3 15*     Complete Blood Count   No lab results found in last 7 days.  Basic Metabolic Panel  No lab results found in last 7 days.  Liver Function Tests  No lab results found in last 7 days.  Pancreatic Enzymes  No lab results found in last 7 days.  Coagulation Profile  No lab results found in last 7 days.      5. RADIOLOGY:   Recent Results (from the past 24 hour(s))   XR Chest Port 1 View    Narrative     Examination:  XR CHEST PORT 1 VW     Date:  11/13/2018 9:42 PM      Clinical Information: check ETT and line placement;      Additional Information: none    Comparison: none    Findings:     Multiple Ene type clamps are projecting over the mid chest. The ET  tube tip is approximately 4 cm above the rudy.    There is a right IJ [place]-Jenn catheter and the tip is in main right  pulmonary artery. There is a right-sided chest tube with complete  expansion of the right lung. There is a catheter projected over the  right heart border, exact position is uncertain.      Impression    Impression:    1. ET tube in good position.  2. Operative instruments projecting over the midline of the chest..    JOVANNY ROBERT MD       =========================================  Patient seen and examined. Investigations reviewed. Will continue current VA ECMO support for now.Continue current management plans.

## 2018-11-14 NOTE — PROGRESS NOTES
CVTS PROGRESS NOTE  11/14/2018    CO-MORBIDITIES:   No diagnosis found.    ASSESSMENT: Johan Spence is a 63 year old male now s/p AVR and root [facility]. Was unable to be weaned from CPB so placed on VA ECMO and sent to [facility] for further cares.     TODAY'S PROGRESS:   - Wean pressors as able.   - ACT goal: 160-180  - SANDRA to be discussed with Cardiology    - Plan to leave IABP in to help wean off ecmo.   - Goal of 1L positive today  - 1L LR bolus now  - Trophic tube feeds.   - Add bowel regimen.     PLAN:  Neuro/pain/sedation:  - Monitor neurological status. Notify the MD for any acute changes in exam.  - Fentanyl gtt for pain.  - Versed gtt  - Tylenol scheduled. Oxy/dil PRN. Robaxin PRN.   - Lidoderm.     Pulmonary:   #MV postop  #ONOFRE   - Supplemental oxygen to keep saturation above 92 %.  - Incentive spirometer every 15- 30 minutes when awake.  - Mechanically ventilated, will maintain PEEP of 8 while resuscitation.     Cardiovascular:   #AV[facility]    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   #HTN.    - Monitor hemodynamic status.   - Epi/norepi/vaso gtts. Wean vaso as able and then NE.   - MAP > 65.  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 Limited information available in chart. Would like to get SANDRA which we will discuss with cards.   - IABP for coronary augmentation.   - Holding PTA Lisinopril and Metoprolol.     GI care:   - Miralax/senna-colace, suppository PRN    Fluids/ Electrolytes/ Nutrition:   - TKO  - NPO  - NJ ok to start trophic tube feeds.    - BMP q6h.  - 1L bolus LR.   - No indication for parenteral nutrition.    Renal:    - Urine output is adequate so far.  - Will continue to monitor intake and output.  - Coronado  - Goal positive 1 liter for the day.    Endocrine:    - Insulin gtt.    ID/ Antibiotics:  - Periop antibiotics.  - No signs of infection.     Heme:     - Hemoglobin stable.   -  CBC/coags q6h.  - Anticoagulation: Hep gtt.      Prophylaxis:    - Mechanical prophylaxis for DVT.   - Hep gtt for ECMO -180  - PPI    Lines/tubes/drains:  - R internal jugular MAC/swan  - ETT  - IABP  - VA ECMO  - Chest tubes: 3 meds.    - Coronado  - NJ feeding tube.     Disposition:  - CV ICU.    Patient seen, findings and plan discussed with CVTS fellow.    Davide Escalante MD  CA-3/PGY-4    ====================================    SUBJECTIVE:   Transferred from [facility] yesterday after inability to be weaned from CPB, placed on VA ECMO and sent to [facility]. Overnight some soft pressures requiring Epi, NE, Vaso.      OBJECTIVE:   1. VITAL SIGNS:   Temp:  [96.3  F (35.7  C)-99.5  F (37.5  C)] 97.7  F (36.5  C)  Heart Rate:  [77-96] 89  Resp:  [18] 18  BP: (111)/(55) 111/55  MAP:  [48 mmHg-76 mmHg] 70 mmHg  Arterial Line BP: ()/(21-50) 131/36  FiO2 (%):  [40 %-60 %] 40 %  SpO2:  [91 %-100 %] 100 %  Ventilation Mode: CMV/AC  (Continuous Mandatory Ventilation/ Assist Control)  FiO2 (%): 40 %  Rate Set (breaths/minute): 18 breaths/min  Tidal Volume Set (mL): 450 mL  PEEP (cm H2O): 8 cmH2O  Oxygen Concentration (%): 40 %  Resp: 18    2. INTAKE/ OUTPUT:   I/O last 3 completed shifts:  In: 3868.96 [I.V.:684.96; NG/GT:90]  Out: 4315 [Urine:3225; Chest Tube:1090]    3. PHYSICAL EXAMINATION:   General: laying in bed on ECMO and ventilator.   Neuro: sedated and intubated.   Resp: Breathing non-labored on vent.   CV: Paced at 80.   Abdomen: Soft, Non-distended,  Incisions: c/d/i.   Extremities: warm and well perfused    4. INVESTIGATIONS:   Arterial Blood Gases     Recent Labs  Lab 11/14/18  1210 11/14/18  1020 11/14/18  0800 11/14/18  0618   PH 7.45 7.51* 7.42 7.40   PCO2 46* 33* 36 36   PO2 140* 171* 164* 218*   HCO3 32* 27 23 22     Complete Blood Count     Recent Labs  Lab 11/14/18  1020 11/14/18  0354 11/13/18  2239 11/13/18  2100   WBC 15.2* 15.7* 13.8* Canceled, Test credited   HGB 8.1* 9.3* 8.9* Canceled, Test  credited   PLT 93* 126* 65* Canceled, Test credited     Basic Metabolic Panel    Recent Labs  Lab 11/14/18  1210 11/14/18  0800 11/14/18  0354 11/13/18 2239 11/13/18  2100   * 152* 152* 154* Canceled, Test credited   POTASSIUM 4.0  --  4.5 5.2 Canceled, Test credited   CHLORIDE 104  --  105 106 Canceled, Test credited   CO2 PENDING  --  21 24 Canceled, Test credited   BUN 46*  --  48* 41* Canceled, Test credited   CR 2.78*  --  2.55* 2.23* Canceled, Test credited   *  --  221* 153* Canceled, Test credited     Liver Function Tests    Recent Labs  Lab 11/14/18  1210 11/14/18  1020 11/14/18  0354 11/13/18 2239 11/13/18  2100   AST PENDING  --  1880* Canceled, Test credited Canceled, Test credited   ALT PENDING  --  630* 341* Canceled, Test credited   ALKPHOS 40  --  52 37* Canceled, Test credited   BILITOTAL 3.1*  --  3.3* 2.6* Canceled, Test credited   ALBUMIN 2.9*  --  3.6 2.7* Canceled, Test credited   INR  --  2.12* 1.75* 2.11* 1.98*     Pancreatic Enzymes  No lab results found in last 7 days.  Coagulation Profile    Recent Labs  Lab 11/14/18  1020 11/14/18  0354 11/13/18 2239 11/13/18  2100   INR 2.12* 1.75* 2.11* 1.98*   PTT 44* 87*  --  94*         5. RADIOLOGY:   Recent Results (from the past 24 hour(s))   XR Abdomen Port 1 View    Narrative    Exam: XR ABDOMEN PORT 1 VW, 11/13/2018 10:50 PM    Indication: OG placement    Comparison: None available    Findings:   A single portable supine AP view of the upper abdomen was obtained.  Chest tubes, cannulae, Sycam[place]-Jenn catheter, epicardial pacing wires,  and surgical instruments /clamps project over the lower chest. A right  femoral approach catheter tip projects over the distal IVC. The  enteric tube tip projects over the stomach with sidehole at or just  beyond the gastroesophageal junction. Nonobstructive bowel gas  pattern.      Impression    Impression:   The enteric tube tip projects of the stomach with the sidehole at or  just beyond the  gastroesophageal junction. Consider slight  advancement.     I have personally reviewed the examination and initial interpretation  and I agree with the findings.    JOVANNY ROBERT MD   XR Chest Port 1 View    Narrative     Examination:  XR CHEST PORT 1 VW     Date:  11/13/2018 9:42 PM      Clinical Information: check ETT and line placement;      Additional Information: none    Comparison: none    Findings:     Multiple Ene type clamps are projecting over the mid chest. The ET  tube tip is approximately 4 cm above the rudy.    There is a right IJ [place]-Jenn catheter and the tip is in main right  pulmonary artery. There is a right-sided chest tube with complete  expansion of the right lung. There is a catheter projected over the  right heart border, exact position is uncertain.      Impression    Impression:    1. ET tube in good position.  2. Operative instruments projecting over the midline of the chest..    JOVANNY ROBERT MD   XR Chest Port 1 View    Narrative    Exam: XR CHEST PORT 1 VW, 11/14/2018 3:26 AM    Indication: Central ECMO    Comparison: 11/13/2018    Findings:   A single portable AP view of the chest was obtained. The endotracheal  tube tip projects over the mid trachea. The right IJ Chatsworth-Jenn  catheter tip projects over the proximal main pulmonary artery,  slightly retracted since the previous exam. The enteric tube tip and  sidehole project over the stomach. Stable right apical chest tube.  Unchanged central ECMO cannulae. Prosthetic aortic valve. The cardiac  silhouette remains enlarged. Low lung volumes. No pneumothorax or  pleural effusion. Mild left basilar opacities.      Impression    Impression:   1. Slight retraction of the Chatsworth-Jenn catheter with the tip projecting  over the proximal main pulmonary artery. Otherwise stable support  devices.  2. Minimal left basilar opacities, likely atelectasis.    I have personally reviewed the examination and initial interpretation  and I agree with  the findings.    JOVANNY ROBERT MD       =========================================

## 2018-11-14 NOTE — PHARMACY-CONSULT NOTE
Pharmacy Tube Feeding Consult    Medication reviewed for administration by feeding tube and for potential food/drug interactions.    Recommendation: No changes are needed at this time.     Pharmacy will continue to follow as new medications are ordered.    Digna Ruiz, PharmD  November 14, 2018

## 2018-11-14 NOTE — CONSULTS
Nephrology Initial Consult  November 13, 2018    Johan Spence MRN:[id number] YOB: 1955  Date of Admission:11/13/2018  Primary care provider: No primary care provider on file.  Requesting physician: Moises Saba MD    ASSESSMENT AND RECOMMENDATIONS:   # AYE-baseline serum creatinine of 1.1 mg/dL and GFR of >60 ml/min/1.73m2 BSA . Recent serum creatinine was 1.1 mg/dl on 11/12. Currently patient's serum creatinine is 2.23 mg/dl and BUN is 41 mg/dl. Etiology of patient's acute renal failure is likely prerenal from cardiogenic shock. Patient have been  putting out large amount of urine ~500 ml/h in last 2 hours.  -no indications for acute hemodialysis. We will reevaluate daily. Risks and benefits of hemodialysis were discussed in length with the patient's spouse. She decided to proceed with hemodialysis/CRRT if indicated. Consent was obtained.  -monitor BMP and UOP daily  -avoid nephrotoxins  -renal dose medications      #Electrolytes:  -Hypernatremia-patient's serum sodium  Was 139 on 11/12. Currently serum sodium is 154. Calculated free water deficit is 5.4 Liters +500 ml -1000 ml of insensible losses.   Recommend to check Serum Osm, Urine Osm and Urine sodium   Recommend to start on D5W at 100 ml/h. Monitor serum sodium Q4H for now. Goal the serum sodium concentration should be corrected slowly (no more than 10 mEq/L per day).    - Serum potassium is in high end of normal range 5.2.    -Mild hypermagnesemia of 2.4 is likely due to YAE    -Normal corrected for hypoalbuminemia serum Ca~9.4    -Normal serum phosphorus    Monitor electrolytes Q6H    # Acid/Base status:mixed respiratory and metabolic alkalosis. HCO3 is 24. PH is 7.58, pCO2 is 30. Lactic acid is 20    # BP/Volume -patient appears euvolemic on exam. He requires Levophed and Vassopressine drips to keep MAP>65 mmHG. On VA ECHMO    # Normocytic Anemia-patient presented with HGb of 8.8 g/dL. He received 4U of PRBC intra-op. Current HGb is  8.9 g/dl. Transfusion as per primary service    # Transaminities-Bili 2.5,     Patient was discussed with .  Patient will be staffed with  in the morning    Thank you for allowing us to participate in the care of your patient. We will follow with you. Please do not hesitate to contact us with questions.    Rebecca Monroe MD  Nephrology Fellow  [facility]  Department of Medicine  Division of Renal Disease and Hypertension  [phone]      REASON FOR CONSULT:     HISTORY OF PRESENT ILLNESS:  Admitting provider and nursing notes reviewed  Johan Spence is a 63 year old male with PMHx significant for metabolic syndrome ( hypertension, hyperlipidemia,obesity, type II DM), aortic stenosis s/p AVR in 2015, ONOFRE, prostate cancer s/p prostatectomy who presented as a transfer from [facility]. The patient is unable to provide any history. He is sedated and intubated. No patient's family present at the bedside. Therefore, all patient's history was obtained from patient's chart and primary team. Also I spoke with patient's spouse Candice over-the-phone. The patient was recently in 9/2018 diagnosed with severe stenosis of the aortic prothesis. On 10/06/18 the patient presented to the [facility]  With chest pain and was ruled out for MI by EKG and cardiac enzymes. Patient was transferred to the [facility]. Subsequently he underwent cardiac catheterization which showed non-obstructive CAD. The patient was referred to the [facility] for elective aortic valve replacement.  Intra-op he developed cardiogenic shock, severe acidosis and acute renal failure. Also he coded at the time of PA catheter placement in OR (pulm artery pressures eric to 80-90), crashed on pump. Aorta replaced with gel weave graft and aortic valve replaced. Patient was started on Levophed and Vassopressin. CVT surgery was unable to wean him off pump with several attempts, including after placing balloon pump. As per  anesthesia report, the patient put out about 640 ml of urine in 10 hours of surgery, then urine output trailed off near the end of the case. Patient received 4U of pRBC, 4U of FFP's, 2U of Platelets, Lasix 40 mg IV and Bumex 2.5 mg IV. Right femoral dialysis catheter was placed by cardiothoracic surgery team intra-op. Patient underwent Central cannulation for V-A ECHMO cannulation and transfrred to [facility] for further management. During his transport he was started on Vassopressine drip, then on admission he was started on Norepi  drip. Patient received 750 ml of 5% albumin.  Nephrology service consulted for possible CRRT initiation.     Off Note: as per patient's spouse report, the patient had acute renal failure and required one hemodialysis treatment after his first AVR surgery in 2015.    PAST MEDICAL HISTORY:  Reviewed on 11/13/2018   Hypertension  Hyperlipidemia  Obesity  Aortic stenosis  Heart Failure  Type II DM  Obstructive sleep apnea  Malignant neoplasm of prostate    PAST SURGICAL HISTORY:  S/p aortic  Valve replacement in 2015  Prostatectomy        MEDICATIONS:  PTA Meds  Prior to Admission medications    Not on File      Current Meds    pantoprazole (PROTONIX) IV  40 mg Intravenous Q24H     sodium bicarbonate         Infusion Meds    IV fluid REPLACEMENT ONLY       EPINEPHrine IV infusion ADULT Stopped (11/13/18 2303)     fentaNYL 100 mcg/hr (11/13/18 2306)     HEParin Stopped (11/13/18 2200)     insulin (regular)       midazolam 5 mg/hr (11/13/18 2306)     norepinephrine 0.05 mcg/kg/min (11/13/18 2300)     propofol (DIPRIVAN) infusion Stopped (11/13/18 2306)     vasopressin (PITRESSIN) infusion ADULT (40 mL) 2 Units/hr (11/13/18 2300)       ALLERGIES:    Allergies not on file    REVIEW OF SYSTEMS:  Unable to review. The patient is sedated and intubated    SOCIAL HISTORY:   Social History     Social History     Marital status: N/A     Spouse name: N/A     Number of children: N/A     Years of education:  "N/A     Occupational History     Not on file.     Social History Main Topics     Smoking status: Not on file     Smokeless tobacco: Not on file     Alcohol use Not on file     Drug use: Not on file     Sexual activity: Not on file     Other Topics Concern     Not on file     Social History Narrative     Reviewed      FAMILY MEDICAL HISTORY:   No family history of kidney disease  Reviewed with patient's spouse over the phone    PHYSICAL EXAM:   Arterial Line MAP (mmHg)  Av.6 mmHg  Min: 49 mmHg  Max: 75 mmHg  Arterial Line BP  Min: 72/35  Max: 121/48      No Data Recorded SpO2  Av.8 %  Min: 74 %  Max: 100 %       Ht 1.7 m (5' 6.93\")  Wt 90 kg (198 lb 6.6 oz)  SpO2 98%  BMI 31.14 kg/m2   Date 18 07 - 18 0659   Shift 4933-3967 7848-2662 4062-3895 24 Hour Total   I  N  T  A  K  E   I.V.  174 12 186    Colloid  7297 947 7009    Shift Total  (mL/kg)  1224  (13.6) 237  (2.63) 1461  (16.23)   O  U  T  P  U  T   Urine  1000  1000    Other  8  8    Chest Tube  530  (5.89)  530  (5.89)    Shift Total  (mL/kg)  1538  (17.09)  1538  (17.09)   Weight (kg)  90 90 90        Admit Weight: 90 kg (198 lb 6.6 oz) (per OSH)     GENERAL APPEARANCE: NAD,sedated, intubated on VA ECHMO  EYES: no scleral icterus, pupils equal  Endo: no moon facies  Lymphatics: no cervical or supraclavicular LAD  Pulmonary:ETT in place, lungs clear to auscultation with equal breath sounds bilaterally  CV: regular rhythm, normal rate, no rub   - JVD unable to assess   - No Edema   GI: soft, nontender, normal bowel sounds  MS: no evidence of inflammation in joints, no muscle tenderness  :   Catheter in place, making large volume yellow color urine  SKIN: no rash, warm, dry, no cyanosis  NEURO: unable to assess, the patient is sedated  c: R internal jugular PA catheter  - L groin balloon pump  - R groin dialysis catheter  - R radial art line     LABS:   CMP    Recent Labs  Lab 18  2239   *   POTASSIUM 5.2   CHLORIDE 106 "   CO2 24   ANIONGAP 24*   *   BUN 41*   CR 2.23*   GFRESTIMATED 30*   GFRESTBLACK 36*   TERESA 8.8   MAG 2.4*   PHOS 4.3   PROTTOTAL 4.8*   ALBUMIN 2.7*   BILITOTAL 2.6*   ALKPHOS 37*   AST Canceled, Test credited   *     CBC    Recent Labs  Lab 11/13/18 2239   HGB 8.9*   WBC 13.8*   RBC 2.91*   HCT 26.6*   MCV 91   MCH 30.6   MCHC 33.5   RDW 16.2*   PLT 65*     INR    Recent Labs  Lab 11/13/18 2239 11/13/18  2100   INR 2.11* 1.98*   PTT  --  94*     ABG    Recent Labs  Lab 11/13/18 2239 11/13/18 2100   PH 7.58* 7.40   PCO2 30* 24*   PO2 370* 317*   HCO3 28 15*   O2PER 70 100  100      URINE STUDIES  No lab results found.  No lab results found.  PTH  No lab results found.  IRON STUDIES  No lab results found.    IMAGING:  All imaging studies reviewed by me.       Rebecca Monroe MD        I have discussed this patient with the resident.  This note reflects our joint assessment and plan. Does not need CRT. Ressess by Dr Kirill car,m,    Lexie Robles MD

## 2018-11-14 NOTE — PHARMACY-ADMISSION MEDICATION HISTORY
Admission medication history interview status for the 11/13/2018 admission is complete. See Epic admission navigator for allergy information, pharmacy, prior to admission medications and immunization status.     Medication history interview sources:  [facility] med list    Changes made to [facility] medication list (reason)  Added: all  Deleted: none  Changed: none    Additional medication history information (including reliability of information, actions taken by pharmacist): Patient was unable to be interviewed. Prior to Admission medications were added based on the [facility] med list.       Prior to Admission medications    Medication Sig Last Dose Taking? Auth Provider   aspirin 81 MG tablet Take 81 mg by mouth daily  Yes Unknown, Entered By History   atorvastatin (LIPITOR) 40 MG tablet Take 20 mg by mouth daily Take half of a 40 mg tablet.  Yes Unknown, Entered By History   escitalopram (LEXAPRO) 20 MG tablet Take 20 mg by mouth daily  Yes Unknown, Entered By History   furosemide (LASIX) 20 MG tablet Take 60 mg by mouth every morning  Yes Unknown, Entered By History   glipiZIDE (GLUCOTROL) 10 MG tablet Take 10 mg by mouth daily  Yes Unknown, Entered By History   lisinopril (PRINIVIL,ZESTRIL) 30 MG tablet Take 30 mg by mouth daily  Yes Unknown, Entered By History   metFORMIN (GLUCOPHAGE-XR) 500 MG 24 hr tablet Take 2,000 mg by mouth every evening  Yes Unknown, Entered By History   metoprolol tartrate (LOPRESSOR) 25 MG tablet Take 12.5 mg by mouth every 12 hours  Yes Unknown, Entered By History   nicotine (NICODERM CQ) 14 MG/24HR 24 hr patch Place 1 patch onto the skin See Admin Instructions  Yes Unknown, Entered By History   nitroGLYcerin (NITROSTAT) 0.4 MG sublingual tablet Place 0.4 mg under the tongue See Admin Instructions  Yes Unknown, Entered By History   potassium chloride SA (K-DUR/KLOR-CON M) 20 MEQ CR tablet Take 20 mEq by mouth daily  Yes Unknown, Entered By History         Medication history completed by: Dalia Trivedi  PharmD 4 Student

## 2018-11-14 NOTE — PROGRESS NOTES
SPIRITUAL HEALTH SERVICES  SPIRITUAL ASSESSMENT Progress Note (Palliative Focus)  [facility] ([place]) 4E    REFERRAL SOURCE: Palliative care spiritual assessment.    Attempted to visit with family of patient Johan Spence; per bedside RN, wife called for updates and did not visit hospital today.     Plan: I will attempt to follow to assess spiritual needs.    Chantal Chiu  Palliative   Pager [phone]  [facility] Inpatient Team Consult pager [phone] (M-F 8-4:30)  After-hours Answering Service [phone]

## 2018-11-15 NOTE — ANESTHESIA PREPROCEDURE EVALUATION
Anesthesia Pre-Procedure Evaluation    Patient: Johan Spence   MRN:     [id number] Gender:   male   Age:    63 year old :      1955        Preoperative Diagnosis: Open chest   Procedure(s):  Chest washout, perfusion needed     No past medical history on file.   No past surgical history on file.       Anesthesia Evaluation     .             ROS/MED HX    ENT/Pulmonary:     (+)sleep apnea, tobacco use, Past use , . .    Neurologic:       Cardiovascular:     (+) Dyslipidemia, hypertension----. : . . . :. .       METS/Exercise Tolerance:     Hematologic:         Musculoskeletal:         GI/Hepatic:         Renal/Genitourinary:     (+) chronic renal disease, type: ARF, Pt does not require dialysis,       Endo:     (+) type II DM .      Psychiatric:         Infectious Disease:         Malignancy:         Other:                         PHYSICAL EXAM:   Mental Status/Neuro:    Airway: Facies: Feasible  Mallampati: Not Assessed  Mouth/Opening: Not Assessed  TM distance: Not Assessed  Neck ROM: Not Assessed  Device in place: ETT   Respiratory: Auscultation: Decreased BS     Resp. Support: SIMV      CV:   CV Other: unable to assess; open chest on ECMO   Comments:      Dental:  Dental Comments: Many missing teeth; ETT in situ, complete exam not possible                Lab Results   Component Value Date    WBC 14.7 (H) 11/15/2018    HGB 8.7 (L) 11/15/2018    HCT 25.8 (L) 11/15/2018     (L) 11/15/2018    CRP 65.0 (H) 2018     11/15/2018    POTASSIUM 4.7 11/15/2018    CHLORIDE 100 11/15/2018    CO2 34 (H) 11/15/2018    BUN 57 (H) 11/15/2018    CR 3.44 (H) 11/15/2018     (H) 11/15/2018    TERESA 8.4 (L) 11/15/2018    PHOS 7.1 (H) 11/15/2018    MAG 2.4 (H) 11/15/2018    ALBUMIN 3.0 (L) 11/15/2018    PROTTOTAL 5.2 (L) 11/15/2018     (HH) 11/15/2018    AST 1723 (HH) 11/15/2018    ALKPHOS 50 11/15/2018    BILITOTAL 4.4 (H) 11/15/2018    PTT 57 (H) 11/15/2018    INR 1.72 (H) 11/15/2018    FIBR  "391 11/15/2018       Preop Vitals  BP Readings from Last 3 Encounters:   11/15/18 (!) 82/48    Pulse Readings from Last 3 Encounters:   No data found for Pulse      Resp Readings from Last 3 Encounters:   11/15/18 18    SpO2 Readings from Last 3 Encounters:   11/15/18 97%      Temp Readings from Last 1 Encounters:   11/15/18 37  C (98.6  F)    Ht Readings from Last 1 Encounters:   11/13/18 1.7 m (5' 6.93\")      Wt Readings from Last 1 Encounters:   11/15/18 93 kg (205 lb 0.4 oz)    Estimated body mass index is 32.18 kg/(m^2) as calculated from the following:    Height as of this encounter: 1.7 m (5' 6.93\").    Weight as of this encounter: 93 kg (205 lb 0.4 oz).     LDA:  Peripheral IV 11/13/18 Right Upper forearm (Active)   Site Assessment [facility] 11/15/2018  8:00 AM   Line Status Infusing 11/15/2018  8:00 AM   Phlebitis Scale 0-->no symptoms 11/15/2018  8:00 AM   Infiltration Scale 0 11/15/2018  8:00 AM   Extravasation? No 11/15/2018  8:00 AM   IV Site Rotation Due Date 11/17/18 11/15/2018  8:00 AM   Number of days:2       Arterial Line 11/13/18 Radial (Active)   Site Assessment [facility] 11/15/2018  8:00 AM   Line Status Pulsatile blood flow 11/15/2018  8:00 AM   Art Line Waveform Appropriate;Square wave test performed 11/15/2018  8:00 AM   Art Line Interventions Connections checked and tightened;Flushed per protocol;Zeroed and calibrated;Leveled 11/15/2018  8:00 AM   Color/Movement/Sensation Capillary refill less than 3 sec 11/15/2018  8:00 AM   Dressing Type Transparent;Other (Comment) 11/15/2018  8:00 AM   Dressing Status Clean, dry, intact 11/15/2018  8:00 AM   Dressing Intervention Dressing changed/new dressing 11/14/2018 12:00 AM   Dressing Change Due 11/19/18 11/15/2018  8:00 AM   Number of days:2       CVC Double Lumen 11/13/18 (Active)   Site Assessment [facility] 11/15/2018  8:00 AM   Extravasation? No 11/15/2018  8:00 AM   Dressing Intervention Chlorhexidine sponge;Transparent 11/15/2018  8:00 AM   Dressing Change Due " 11/19/18 11/15/2018  8:00 AM   CVC Comment SWAN 11/15/2018  8:00 AM   CVC Lumen Assessment White;Brown 11/13/2018 10:00 PM   Number of days:2       Arterial Sheath  (Active)   Specific Qualities Sutured 11/15/2018  8:00 AM   Site Assessment UTV 11/15/2018  8:00 AM   Dressing Type Transparent 11/15/2018  8:00 AM   Arterial Sheath Comment Arterial ECMO 11/15/2018  8:00 AM   Number of days:2       Arterial Sheath  (Active)   Specific Qualities Sutured 11/15/2018  8:00 AM   Site Assessment WDL 11/15/2018  8:00 AM   Dressing Type Biopatch;Transparent 11/15/2018  8:00 AM   Dressing Intervention Dressing changed/new dressing 11/14/2018  4:00 AM   Arterial Sheath Comment IABP 11/15/2018  8:00 AM   Number of days:2       Venous Sheath (Active)   Specific Qualities Sutured 11/15/2018  8:00 AM   Site Assessment UTV 11/15/2018  8:00 AM   Dressing Type Transparent 11/15/2018  8:00 AM   Venous Sheath Comment Venous ECMO 11/15/2018  8:00 AM   Number of days:2       Pulmonary Artery Catheter - Single Lumen 11/13/18 1200 Right internal jugular vein (Active)   Dressing dressing dry and intact 11/15/2018  8:00 AM   Securement secured with sutures 11/15/2018  8:00 AM   PA Catheter Markings (cm) 48 11/15/2018  8:00 AM   Phlebitis 0-->no symptoms 11/15/2018  8:00 AM   Infiltration 0-->no symptoms 11/15/2018  8:00 AM   Waveform flattened 11/15/2018  8:00 AM   Pressure Catheter Interventions line leveled/zeroed;chest x-ray to confirm placement;system flushed 11/15/2018  8:00 AM   Daily Review Of Necessity completed 11/15/2018  8:00 AM   Number of days:2       Airway - Adult/Peds 8 endotracheal tube (Active)   Site Appearance Clean and dry 11/15/2018  8:20 AM   Secured By Commercial tube amtt 11/15/2018  8:20 AM   Secured at (cm) to lip 25 cm 11/15/2018 10:41 AM   Cuff Pressure - Type minimal leak technique 11/15/2018  8:20 AM   Tube Care/Reposition repositioned tube center of mouth 11/15/2018  3:50 AM   Bite Block Secure and Patent  11/15/2018  8:20 AM   Safety Measures manual resuscitator/mask/valve in room 11/15/2018  8:20 AM   Number of days:2       ETT (adult) (Active)   Number of days:0       Chest Tube 1 Mediastinal (Active)   Site Assessment [place] 11/15/2018  8:00 AM   Suction -20 cm H2O 11/15/2018  8:00 AM   Chest Tube Airleak No 11/15/2018  8:00 AM   Drainage Description Sanguinous 11/15/2018  8:00 AM   Dressing Status Normal: Clean, Dry & Intact 11/15/2018  8:00 AM   Dressing Intervention Transparent 11/15/2018  8:00 AM   Patency Intervention Stripped;Tip/Tilt 11/15/2018  8:00 AM   Chest Tube Clamps at Bedside present 11/15/2018  8:00 AM   Container Amount 220 11/15/2018 11:00 AM   Output (ml) 30 ml 11/15/2018 11:00 AM   Number of days:2       Chest Tube 2 Mediastinal (Active)   Site Assessment [facility] 11/15/2018  8:00 AM   Suction -20 cm H2O 11/15/2018  8:00 AM   Chest Tube Airleak No 11/15/2018  8:00 AM   Drainage Description Sanguinous 11/15/2018  8:00 AM   Dressing Status Normal: Clean, Dry & Intact 11/15/2018  8:00 AM   Dressing Intervention Transparent 11/15/2018  8:00 AM   Patency Intervention Stripped;Tip/Tilt 11/15/2018  8:00 AM   Chest Tube Clamps at Bedside present 11/15/2018  8:00 AM   Number of days:2       Chest Tube 3 Mediastinal (Active)   Site Assessment [place] 11/15/2018  8:00 AM   Suction -20 cm H2O 11/15/2018  8:00 AM   Chest Tube Airleak No 11/15/2018  8:00 AM   Drainage Description Sanguinous 11/15/2018  8:00 AM   Dressing Status Normal: Clean, Dry & Intact 11/15/2018  8:00 AM   Dressing Intervention Transparent 11/15/2018  8:00 AM   Patency Intervention Stripped;Tip/Tilt 11/15/2018  8:00 AM   Chest Tube Clamps at Bedside present 11/15/2018  8:00 AM   Number of days:2       NG/OG Tube Orogastric (Active)   Site Description WDL 11/15/2018  8:00 AM   Status Suction-low intermittent 11/15/2018  8:00 AM   Drainage Appearance Brown 11/15/2018  8:00 AM   Placement Check [place] unchanged 11/15/2018  8:00 AM   [place] (cm  marking) at nare/mouth 68 cm 11/15/2018  8:00 AM   Intake (ml) 30 ml 11/14/2018 12:00 PM   Flush/Free Water (mL) 30 mL 11/15/2018  8:00 AM   Residual (mL) 0 mL 11/14/2018 12:00 PM   Output (ml) 0 ml 11/15/2018  8:00 AM   Number of days:2       NG/OG Tube Nasogastric Right nostril (Active)   Site Description WD 11/15/2018  8:00 AM   Status Enteral Feedings 11/15/2018  8:00 AM   Placement Check [place] unchanged 11/15/2018  8:00 AM   [place] (cm marking) at nare/mouth 92 cm 11/15/2018  8:00 AM   Intake (ml) 40 ml 11/15/2018  8:00 AM   Flush/Free Water (mL) 30 mL 11/15/2018  8:00 AM   Number of days:1       Urethral Catheter (Active)   Tube Description Positional 11/15/2018  8:00 AM   Catheter Care Done;Catheter wipes 11/15/2018  8:00 AM   Collection Container Standard;Patent 11/15/2018  8:00 AM   Securement Method Securing device (Describe) 11/15/2018  8:00 AM   Rationale for Continued Use Strict 1-2 Hour I&O;Deep Sedation/Paralysis 11/15/2018  8:00 AM   Urine Output 40 mL 11/15/2018 11:00 AM   Number of days:2       Hemodialysis Vascular Access Femoral vein catheter Right Femoral vein (Active)   Site Assessment [facility] 11/15/2018  8:00 AM   Dressing Intervention Dressing changed 11/14/2018  4:00 AM   Dressing Status Clean, dry, intact 11/15/2018  8:00 AM   Number of days:2            Assessment:   ASA SCORE: 4    NPO Status: > 6 hours since completed Solid Foods   Documentation: H&P complete; Preop Testing complete; Consents complete   Proceeding: Proceed without further delay  Tobacco Use:  NO Active use of Tobacco/UNKNOWN Tobacco use status     Plan:   Anes. Type:  General      Induction:  Inhalational   Airway: Oral ETT   Access/Monitoring: PIV; 2nd PIV; A-Line; CVL/Port present   Maintenance: Balanced   Emergence: Recovery Site (PACU/ICU)   Logistics: ICU Admission     Postop Pain/Sedation Strategy:  Standard-Options: Opioids PRN     PONV Management:  Adult Risk Factors:, Non-Smoker, Postop Opioids  Prevention:  Ondansetron     CONSENT: Direct conversation   Plan and risks discussed with: Spouse   Blood Products: Consented (ALL Blood Products)           63M with s/p AVR 3 years ago, presented with AI / CHF and [facility]. Coded at the time of PA catheter placement in OR (pulm artery pressures eric to 80-90), crashed on pump. Aorta replaced with gel weave graft and aortic valve replaced. Unable to wean off pump with several attempts, including after placing balloon pump. Urine output trailed off near the end of the case; dialysis catheter placed. Placed on central ecmo transferred to [facility]. Currently intubated, sedated, on ECMO and IABP.  ASA 4.  Plan: GETA with existing lines / ETT.  Will place SANDRA intraop.    Dinorah Camacho MD  Staff Anesthesiologist  Pager [phone]                Dinorah Camacho MD

## 2018-11-15 NOTE — PROGRESS NOTES
CVTS PROGRESS NOTE  11/15/2018    CO-MORBIDITIES:   Severe Aortic stenosis and regurge.   ONOFRE  DM  HTN  HLD    ASSESSMENT: Johan Spence is a 63 year old male now s/p AVR and root [facility]. Was unable to be weaned from CPB so placed on VA ECMO and sent to [facility] for further cares. SANDRA shows adequate biventricular function upon ECMO turndown 11/15/18.       TODAY'S PROGRESS:   - Started nitric at 20 ppm this morning.  - Plan for SANDRA with turndown at some point today, possible washout as well pending timing  - IABP reposition given decreasing UOP and looks to be too shallow (though is unchanged from yesterday)  - Follow up UOP in PM, if improves after IABP reposition, will plan to pull HD catheter. However, given low UOP at this point HD may be needed so will keep HD catheter.  - Switch to Propofol from Versed provided pressures tolerate, start BIS monitoring and minimize sedation as much as able  - Goal even for the day  - Discontinue D5W as Na normalized    PLAN:  Neuro/pain/sedation:  #Post op pain.   - Monitor neurological status. Notify the MD for any acute changes in exam.  - Fentanyl gtt for pain.   - Propofol gtt. BIS monitoring 40-60. Cisatracurium gtt if needed given open chest.  RASS goal -2.  - Oxy/dil PRN. Robaxin PRN.   - Lidoderm.     Pulmonary:   #MV postop  #ONOFRE    - Supplemental oxygen to keep saturation above 92 %.  - Incentive spirometer every 15- 30 minutes when awake.  - Mechanically ventilated, will maintain PEEP of 8 while resuscitation.   - ECMO support weaned down with possible decannulation in OR 11/15.     Cardiovascular:   #AVR [facility].    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   #HTN.    - Monitor hemodynamic status.   - Epi/norepi/vaso gtts. Currently just on vaso/epi.   - MAP > 65.  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 Limited information available in chart.    - IABP paused  today and patient tolerates well so likely could be pulled after decan.   - IABP advanced and resecured given placement into distal aorta.   - ECMO turned down to 1 L today and SANDRA shows good biventricular function.   - Chest open and will take down to OR for possible closure.   - Holding PTA Lisinopril and Metoprolol.     GI care:   - Miralax/senna-colace, suppository PRN    Fluids/ Electrolytes/ Nutrition:   - TKO  - NPO  - NJ feeds can advance to goal.     - BMP q6h.  - D5W gtt stopped given correction of Na. (140 from 150)  - No indication for parenteral nutrition.    Renal:    #Right groin HD line in place.  - Nephrology following.    - Will continue to monitor intake and output.  - [place]  - Goal even for today.   - CRRT given drop in UOP overnight per nephrology recs.     Endocrine:    - Insulin gtt.    ID/ Antibiotics:  - Periop antibiotics.  - No signs of infection.     Heme:     - Hemoglobin stable.   - CBC/coags q6h.  - Anticoagulation: Hep gtt.      Prophylaxis:    - Mechanical prophylaxis for DVT.   - Hep gtt for ECMO -180  - PPI    Lines/tubes/drains:  - R internal jugular MAC/swan  - ETT  - Left IABP  - R groin HD catheter.   - VA ECMO  - Chest tubes: 3 meds.    - [place]  - NJ feeding tube.   - AV TPW's.     Disposition:  - CV ICU.    Patient seen, findings and plan discussed with CVTS fellow.     Davide Escalante MD  CA-3/PGY-4    ====================================    SUBJECTIVE:   No events overnight. Pressor requirements stable however required 250 mL albumin for lower MAP's and UOP.     OBJECTIVE:   1. VITAL SIGNS:   Temp:  [97.9  F (36.6  C)-98.6  F (37  C)] 98.6  F (37  C)  Heart Rate:  [80-93] 87  Resp:  [18] 18  BP: (82)/(48) 82/48  MAP:  [50 mmHg-262 mmHg] 57 mmHg  Arterial Line BP: ()/() 92/36  FiO2 (%):  [40 %] 40 %  SpO2:  [94 %-100 %] 96 %  Ventilation Mode: CMV/AC  (Continuous Mandatory Ventilation/ Assist Control)  FiO2 (%): 40 %  Rate Set (breaths/minute): 18  breaths/min  Tidal Volume Set (mL): 450 mL  PEEP (cm H2O): 8 cmH2O  Oxygen Concentration (%): 40 %  Resp: 18    2. INTAKE/ OUTPUT:   I/O last 3 completed shifts:  In: 7198.09 [I.V.:4645.09; Other:100; NG/GT:340]  Out: 2930 [Urine:1855; Emesis/NG output:200; Blood:125; Chest Tube:750]    3. PHYSICAL EXAMINATION:   General: laying in bed on ECMO and ventilator.   Neuro: sedated and intubated.   Resp: Breathing non-labored on vent.   CV: Paced at 80.   Abdomen: Soft, Non-distended,  Incisions: c/d/i.   Extremities: warm and well perfused    4. INVESTIGATIONS:   Arterial Blood Gases     Recent Labs  Lab 11/15/18  1418 11/15/18  1301 11/15/18  1200 11/15/18  1009   PH 7.46* 7.51* 7.46* 7.45   PCO2 42 40 48* 50*   PO2 231* 227* 89 127*   HCO3 30* 33* 34* 35*     Complete Blood Count     Recent Labs  Lab 11/15/18  1418 11/15/18  1301 11/15/18  1009 11/15/18  0448 11/15/18  0003 11/14/18 2157 11/14/18  1612   WBC  --   --  14.7* 14.7*  --  14.6* 15.8*   HGB 8.1* 8.2* 8.7* 8.9*  --  9.1* 8.2*   PLT  --   --  106* 99* 99* 102* 131*     Basic Metabolic Panel    Recent Labs  Lab 11/15/18  1418 11/15/18  1301 11/15/18  1009 11/15/18  0604 11/15/18  0448  11/14/18  2157 11/14/18  1612    138 140 140 142  < > 143 147*   POTASSIUM 4.0 4.6 4.7  --  4.5  < > 4.4 4.1   CHLORIDE  --   --  100  --  100  --  102 104   CO2  --   --  34*  --  32  --  33* 34*   BUN  --   --  57*  --  54*  --  50* 47*   CR  --   --  3.44*  --  3.23*  --  2.99* 2.90*   * 188* 152*  --  169*  --  187* 199*   < > = values in this interval not displayed.  Liver Function Tests    Recent Labs  Lab 11/15/18  1009 11/15/18  0448 11/14/18  2157 11/14/18  1612   AST 1723* 1887* 2161* 2200*   * 764* 869* 884*   ALKPHOS 50 43 46 44   BILITOTAL 4.4* 4.1* 4.6* 3.7*   ALBUMIN 3.0* 2.8* 2.8* 2.8*   INR 1.72* 1.89* 1.75* 1.80*     Pancreatic Enzymes  No lab results found in last 7 days.  Coagulation Profile    Recent Labs  Lab 11/15/18  1009  11/15/18  0448 11/14/18  2157 11/14/18  1612   INR 1.72* 1.89* 1.75* 1.80*   PTT 57* 54* 50* 40*         5. RADIOLOGY:   Recent Results (from the past 24 hour(s))   XR Chest Port 1 View    Narrative    Exam: XR CHEST PORT 1 VW, 11/15/2018 4:29 AM    Indication: postop open chest    Comparison: 11/14/2018, 11/13/2018    Findings:   A single AP view the chest was obtained. The right IJ [place]  catheter tip projects of the proximal pulmonary artery. The enteric  tubes pass below the field-of-view. The endotracheal tube tip projects  over the mid trachea. Stable position of the ECMO cannulae. Prosthetic  aortic valve. The intra-aortic balloon pump tip projects over the  lower descending thoracic aorta. Surgical instruments project over the  mediastinum. Stable right apical chest tube. The cardiac silhouette  remains enlarged.      Impression    Impression:   1. Stable support devices. The intra-aortic balloon pump tip projects  over the lower descending thoracic aorta, consider advancement by  approximately 6 cm.  2. Stable enlargement of the cardiac silhouette with mild retrocardiac  atelectasis/consolidation.  3. Interval increase in pulmonary edema.    I have personally reviewed the examination and initial interpretation  and I agree with the findings.    JOVANNY ROBERT MD   XR Chest Port 1 View    Narrative    XR CHEST PORT 1   11/15/2018 9:19 AM    History:  IABP placement; .     Comparison: Chest radiograph earlier today, 11/14/2018, 11/13/2018    Findings:   Supine portable AP chest radiograph. Endotracheal tube with the tip  projects 2.2 cm above rudy. Right IJ [place] catheter with the tip  projects over right ventricle outflow tract. Unchanged intra-aortic  pump tip projects over 4.7 cm below the rudy, unchanged. Stable  positioning of gastric tube, feeding tube, right apical directed chest  tube, and ECMO cannulae. Unchanged 3 clamps projecting over  mediastinum.    Stable mild cardiomegaly.  Persistent low lung volumes. Pulmonary  vasculature is mildly indistinct. Unchanged bibasilar basilar hazy  opacities. Unchanged small right pneumothorax without significant  pleural effusion.      Impression    IMPRESSION:  1.  Unchanged intraaortic pump with the tip 4.7 cm below the rudy.  Consider advancement. Otherwise stable supportive lines and tubes.  2.  Cardiomegaly with possible pulmonary interstitial edema.  3.  Persistent low lung volumes. Unchanged bibasilar hazy opacities,  likely atelectasis.    I have personally reviewed the examination and initial interpretation  and I agree with the findings.    MATT SCHMITT DO   XR Chest Port 1 View    Narrative    Exam: XR CHEST PORT 1 VW, 11/15/2018 10:36 AM    Indication: Balloon pump advanced.  Confirm placement.;     Comparison: 11/15/2018    Findings:   Approximately 2.5 cm above the rudy. Right IJ [place] catheter  projecting over the right ventricular outflow tract. ECMO cannula in  similar position. Right chest tube in similar position. Both enteric  tubes projecting over the esophagus and stomach. 3 surgical clamps in  unchanged position over the mediastinum. Aortic valve prosthesis in  place. Aortic balloon pump now approximately level of the rudy.    Unchanged cardiomegaly. Persistent low lung volumes. Unchanged  bibasilar and bilateral perihilar hazy opacities and small right  pneumothorax.      Impression    Impression: Balloon pump now projects at the level of the rudy.  Otherwise no significant interval change.    I have personally reviewed the examination and initial interpretation  and I agree with the findings.    MATT SCHMITT DO       =========================================

## 2018-11-15 NOTE — PROGRESS NOTES
ECMO Shift Summary:    Patient remains on VA ECMO, all equipment is functioning and alarms are appropriately set. RPM's 3125 with flow range 4.12-4.28 L/min. Sweep gas is at 1 LPM and FiO2 60%. Circuit remains free of air, clot and fibrin. Cannulas are secure with some oozing at the site. Extremities are cool to touch but perfused. Suctioned ETT for a small amount of thick, red-tan secretions.    Significant Shift Events:  Pressors weaned down overnight (refer to MAR for details). 250 ml of 5% albumin, two units of platelets, and one unit of RBCs were given overnight due to chugging and/or hypotension. No increase in bleeding/oozing overnight. Nitric oxide at 20 ppm started at the end of the shift per physician order.    Vent settings:  Ventilation Mode: CMV/AC  (Continuous Mandatory Ventilation/ Assist Control)  FiO2 (%): 40 %  Rate Set (breaths/minute): 18 breaths/min  Tidal Volume Set (mL): 450 mL  PEEP (cm H2O): 8 cmH2O  Oxygen Concentration (%): 40 %  Resp: 18.    Heparin is running at 600 u/hr, ACT range 148-160.    Urine output varies from  ml/hr, blood loss was a small amount from oozing and chest tube output (varied 10-50 ml/hr). Product given included two units of platelets, one unit of RBCs, and 250 ml of 5% albumin.      Intake/Output Summary (Last 24 hours) at 11/15/18 0525  Last data filed at 11/15/18 0500   Gross per 24 hour   Intake         89973.83 ml   Output             4080 ml   Net          6063.83 ml       ECHO:  No results found for this or any previous visit.No results found for this or any previous visit.    CXR:  Recent Results (from the past 24 hour(s))   XR Feeding Tube Placement    Narrative    Feeding tube placement: 11/14/2018 1:35 PM    Comparison: Same-day chest x-ray    Indication: Nutritional needs    Fluoroscopy time: 0.78 minutes    Technique: After injection of Xylocaine gel into the right nostril, a  feeding tube was advanced under fluoroscopic guidance.    Findings: The  feeding tube was advanced with the tip in the proximal  jejunum. A small amount of barium was injected to demonstrate  placement within the small bowel. The feeding tube was then flushed  with normal saline. The feeding tube was secured a nasal bridle.      Impression    Impression: Uncomplicated feeding tube placement with tip in the  distal duodenum/proximal jejunum.    I, YUNG CORONADO MD, attest that I was present for all critical  portions of the procedure and was immediately available to provide  guidance and assistance during the remainder of the procedure.    I have personally reviewed the examination and initial interpretation  and I agree with the findings.    YUNG CORONADO MD   XR Chest Port 1 View    Narrative    1. Stable support devices. The intra-aortic balloon pump tip projects  over the lower descending thoracic aorta, consider advancement by  approximately 6 cm.  2. Stable enlargement of the cardiac silhouette with mild retrocardiac  atelectasis/consolidation.       Labs:    Recent Labs  Lab 11/15/18  0432 11/15/18  0146 11/15/18  0003 11/14/18  2157   PH 7.45 7.42 7.34* 7.55*   PCO2 50* 55* 65* 41   PO2 172* 140* 122* 163*   HCO3 35* 35* 35* 36*   O2PER 40.0 40.0 40.0 40.0       Lab Results   Component Value Date    HGB 8.9 (L) 11/15/2018    PHGB 30 (H) 11/15/2018    PLT 99 (L) 11/15/2018    FIBR 365 11/15/2018    INR 1.89 (H) 11/15/2018    PTT 54 (H) 11/15/2018    DD 2.8 (H) 11/15/2018    AXA 0.11 11/15/2018    ANTCH 60 (L) 11/14/2018         Plan is for a SANDRA with a turndown today. Patient may return to OR for a chest washout in the near future. Will continue VA ECMO at this time.      Steve Garcia, RRT  11/15/2018 6:50 AM

## 2018-11-15 NOTE — PROGRESS NOTES
ECMO Note:    Turn down performed. Dr. Neri, RN, ECMO specialist bedside.    TIME    HR       FLOW      MAP  1130     84        3.7            60  1133     89        3.0            60   1137     91        2.5            63  1139     90        1               60     Patient tolerated well.     Elsa Richard

## 2018-11-15 NOTE — PROGRESS NOTES
Cardiology Progress Note 11/15/2018  ===================================================  Assessment & Plan ; Hospital Day #2    Mr. Johan Spence is a 63 year old male with h/o AVR, DM, ONOFRE, obesity, HTN, who was [facility] with inability of wean off pump.  Had re-do AVR and ao repalacement at [facility] but during placing PA cath in OR he was coded and placed pump. AVR and ao graft replacement were successful but he was unable to come off pump. He was transferred here for further management.     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 He is still on 3 pressors but we have been successful titrating down them. He has good pulsatility. His baseline LV/RV function was normal per [facility] note. During pre-op preparation of AS surgery, severe AS and vasodilation caused hypotension and bradycardia. Cardiogenic shock is likely from those perioperative hemodynamic instability. So far he seems hemodynamically stable with central ECMO.     Recommendations  - agree with tapering down inotropes  - agree with turndown trial today   - we advanced IABP 6 cm, X ray pending    Discussed with Dr. Roberts.     Gideon Wilks MD  Cardiology Fellow [phone]      =====================================================  Physical Examination  GEN:  intubated and sedated  CV: no murmur  Central cannulation. Chest tubes placed.  LUNGS:  Clear to auscultation bilaterally   ABD:  Active bowel sounds, soft, non-tender/non-distended.  No rebound/guarding/rigidity.  EXT:  No edema or cyanosis.  Hands/feet warm to touch with good signs of peripheral perfusion.     =====================================================  Interval history:  No acute events overnight  Tapering down pressors. 1u RBC, 2u plt, 250 albumin given overnight.  NO started for RV support per primary team.    "  ==================================================  Objective:  BP (!) 82/48 (BP Location: Right arm)  Temp 98.6  F (37  C)  Resp 18  Ht 1.7 m (5' 6.93\")  Wt 93 kg (205 lb 0.4 oz)  SpO2 96%  BMI 32.18 kg/m2      Vitals:    11/13/18 2100 11/13/18 2200 11/14/18 0000 11/15/18 0400   Weight: 90 kg (198 lb 6.6 oz) 89.8 kg (197 lb 15.6 oz) 89.8 kg (197 lb 15.6 oz) 93 kg (205 lb 0.4 oz)       Wt Readings from Last 10 Encounters:   11/15/18 93 kg (205 lb 0.4 oz)     I/O last 3 completed shifts:  In: 79914.86 [I.V.:5319.86; NG/GT:330; IV Piggyback:2000]  Out: 4160 [Urine:2870; Emesis/NG output:300; Chest Tube:990]    Ventilator Settings: Ventilation Mode: CMV/AC  (Continuous Mandatory Ventilation/ Assist Control)  FiO2 (%): 40 %  Rate Set (breaths/minute): 18 breaths/min  Tidal Volume Set (mL): 450 mL  PEEP (cm H2O): 8 cmH2O  Oxygen Concentration (%): 40 %  Resp: 18      Medications   Current Facility-Administered Medications   Medication Dose Route Frequency     albumin human         amiodarone         escitalopram  10 mg Oral Daily     multivitamins with minerals  15 mL Per Feeding Tube Daily     pantoprazole (PROTONIX) IV  40 mg Intravenous Q24H     polyethylene glycol  17 g Oral Daily     senna-docusate  1-2 tablet Oral or Feeding Tube BID     Infusions:    cisatracurium (NIMBEX) infusion ADULT Stopped (11/15/18 0928)     IV fluid REPLACEMENT ONLY       IV fluid REPLACEMENT ONLY       EPINEPHrine IV infusion ADULT 0.06 mcg/kg/min (11/15/18 0900)     fentaNYL 100 mcg/hr (11/15/18 0900)     HEParin 600 Units/hr (11/15/18 1000)     insulin (regular) Stopped (11/15/18 0900)     midazolam 7 mg/hr (11/15/18 0900)     norepinephrine Stopped (11/15/18 0600)     propofol (DIPRIVAN) infusion       vasopressin (PITRESSIN) infusion ADULT (40 mL) 2 Units/hr (11/15/18 0900)     PRN Medications:  bisacodyl, IV fluid REPLACEMENT ONLY, IV fluid REPLACEMENT ONLY, glucose **OR** dextrose **OR** glucagon, fentaNYL, heparin, " naloxone, naloxone, propofol (DIPRIVAN) infusion **AND** propofol **AND** CK total **AND** Triglycerides  Current Facility-Administered Medications   Medication     albumin human 25 % injection     amiodarone (NEXTERONE) 150 MG/100ML bolus     bisacodyl (DULCOLAX) Suppository 10 mg     cisatracurium (NIMBEX) 200 mg in D5W 100 mL infusion     dextrose 10 % 1,000 mL infusion     dextrose 10 % 1,000 mL infusion     glucose gel 15-30 g    Or     dextrose 50 % injection 25-50 mL    Or     glucagon injection 1 mg     EPINEPHrine (ADRENALIN) 16 mg in sodium chloride 0.9 % 250 mL infusion     escitalopram (LEXAPRO) tablet 10 mg     fentaNYL (PF) (SUBLIMAZE) injection  mcg     fentaNYL (SUBLIMAZE) infusion     heparin 100 UNIT/ML injection 330-660 Units     heparin infusion 25,000 units in 0.45% NaCl 250 mL     insulin 1 unit/mL in saline (NovoLIN, HumuLIN Regular) drip - ADULT IV Infusion     midazolam (VERSED) 100 mg in sodium chloride 0.9% 100 mL pre-mix infusion     multivitamins with minerals (CERTAVITE/CEROVITE) liquid 15 mL     naloxone (NARCAN) injection 0.1-0.4 mg     naloxone (NARCAN) injection 0.1-0.4 mg     norepinephrine (LEVOPHED) 16 mg in D5W 250 mL infusion     pantoprazole (PROTONIX) 40 mg IV push injection     polyethylene glycol (MIRALAX/GLYCOLAX) Packet 17 g     propofol (DIPRIVAN) infusion    And     propofol (DIPRIVAN) injection 10 mg/mL vial     senna-docusate (SENOKOT-S;PERICOLACE) 8.6-50 MG per tablet 1-2 tablet     vasopressin (VASOSTRICT) 40 Units in D5W 40 mL infusion         Labs:  Data   CMP  Recent Labs  Lab 11/15/18  0604 11/15/18  0448 11/15/18  0146 11/15/18  0003 11/14/18  2157 11/14/18  1612 11/14/18  1210    142  --  145* 143 147* 150*   POTASSIUM  --  4.5 4.7  --  4.4 4.1 4.0   CHLORIDE  --  100  --   --  102 104 104   CO2  --  32  --   --  33* 34* 30   ANIONGAP  --  10  --   --  8 9 15*   GLC  --  169*  --   --  187* 199* 264*   BUN  --  54*  --   --  50* 47* 46*   CR  --   3.23*  --   --  2.99* 2.90* 2.78*   GFRESTIMATED  --  20*  --   --  21* 22* 23*   GFRESTBLACK  --  24*  --   --  26* 27* 28*   TERESA  --  8.0*  --   --  8.7 9.4 8.1*   MAG  --  2.2 2.1  --  2.2 2.2 2.3   PHOS  --  6.7*  --   --  6.4* 7.2* 4.9*   PROTTOTAL  --  5.0*  --   --  5.0* 5.1* 4.9*   ALBUMIN  --  2.8*  --   --  2.8* 2.8* 2.9*   BILITOTAL  --  4.1*  --   --  4.6* 3.7* 3.1*   ALKPHOS  --  43  --   --  46 44 40   AST  --  1887*  --   --  2161* 2200* 2166*   ALT  --  764*  --   --  869* 884* 821*       CBC  Recent Labs  Lab 11/15/18  0448 11/15/18  0003 11/14/18  2157 11/14/18  1612 11/14/18  1020   WBC 14.7*  --  14.6* 15.8* 15.2*   RBC 2.95*  --  2.96* 2.70* 2.70*   HGB 8.9*  --  9.1* 8.2* 8.1*   HCT 26.6*  --  26.3* 24.0* 24.1*   MCV 90  --  89 89 89   MCH 30.2  --  30.7 30.4 30.0   MCHC 33.5  --  34.6 34.2 33.6   RDW 16.4*  --  16.0* 16.5* 17.0*   PLT 99* 99* 102* 131* 93*     Arterial Blood Gas  Recent Labs  Lab 11/15/18  1009 11/15/18  0756 11/15/18  0604 11/15/18  0432   PH 7.45 7.45 7.47* 7.45   PCO2 50* 52* 49* 50*   PO2 127* 158* 146* 172*   HCO3 35* 36* 35* 35*   O2PER 40 40.0 40.0 40.0     Venous Blood Gas   Recent Labs  Lab 11/15/18  1009 11/15/18  0756 11/15/18  0604 11/15/18  0432  11/14/18  1613  11/14/18  0354  11/13/18  2100   PHV  --   --   --   --   --   --   --  7.35  --  7.63*   PCO2V  --   --   --   --   --   --   --  41  --  13*   PO2V  --   --   --   --   --   --   --  47  --  211*   HCO3V  --   --   --   --   --   --   --  23  --  13*   YOU  --   --   --  10.6  --  12.4  --   --   --   --    O2PER 40 40.0 40.0 40.0  < >  --   < > 50.0  50.0  < > 100  100   < > = values in this interval not displayed.       Microbiology:  Data   Most Recent 6 Bacteria Isolates From Any Culture (See EPIC Reports for Culture Details):  Recent Labs   Lab Test  11/15/18   0519  11/14/18   0544   CULT  No growth after 1 hour  No growth after 1 day     Culture Micro   Date Value Ref Range Status   11/15/2018  No growth after 1 hour  Preliminary   11/14/2018 No growth after 1 day  Preliminary        Imaging:  Data         Recent Results (from the past 48 hour(s))   XR Abdomen Port 1 View    Narrative    Exam: XR ABDOMEN PORT 1 VW, 11/13/2018 10:50 PM    Indication: OG placement    Comparison: None available    Findings:   A single portable supine AP view of the upper abdomen was obtained.  Chest tubes, cannulae, Davis-Jenn catheter, epicardial pacing wires,  and surgical instruments /clamps project over the lower chest. A right  femoral approach catheter tip projects over the distal IVC. The  enteric tube tip projects over the stomach with sidehole at or just  beyond the gastroesophageal junction. Nonobstructive bowel gas  pattern.      Impression    Impression:   The enteric tube tip projects of the stomach with the sidehole at or  just beyond the gastroesophageal junction. Consider slight  advancement.     I have personally reviewed the examination and initial interpretation  and I agree with the findings.    JOVANNY ROBERT MD   XR Chest Port 1 View    Narrative     Examination:  XR CHEST PORT 1 VW     Date:  11/13/2018 9:42 PM      Clinical Information: check ETT and line placement;      Additional Information: none    Comparison: none    Findings:     Multiple Ene type clamps are projecting over the mid chest. The ET  tube tip is approximately 4 cm above the rudy.    There is a right IJ Davis-Jenn catheter and the tip is in main right  pulmonary artery. There is a right-sided chest tube with complete  expansion of the right lung. There is a catheter projected over the  right heart border, exact position is uncertain.      Impression    Impression:    1. ET tube in good position.  2. Operative instruments projecting over the midline of the chest..    JOVANNY ROBERT MD   XR Chest Port 1 View    Narrative    Exam: XR CHEST PORT 1 VW, 11/14/2018 3:26 AM    Indication: Central ECMO    Comparison:  11/13/2018    Findings:   A single portable AP view of the chest was obtained. The endotracheal  tube tip projects over the mid trachea. The right IJ Etna-Jenn  catheter tip projects over the proximal main pulmonary artery,  slightly retracted since the previous exam. The enteric tube tip and  sidehole project over the stomach. Stable right apical chest tube.  Unchanged central ECMO cannulae. Prosthetic aortic valve. The cardiac  silhouette remains enlarged. Low lung volumes. No pneumothorax or  pleural effusion. Mild left basilar opacities.      Impression    Impression:   1. Slight retraction of the Etna-Jenn catheter with the tip projecting  over the proximal main pulmonary artery. Otherwise stable support  devices.  2. Minimal left basilar opacities, likely atelectasis.    I have personally reviewed the examination and initial interpretation  and I agree with the findings.    JOVANNY ROBERT MD   XR Feeding Tube Placement    Narrative    Feeding tube placement: 11/14/2018 1:35 PM    Comparison: Same-day chest x-ray    Indication: Nutritional needs    Fluoroscopy time: 0.78 minutes    Technique: After injection of Xylocaine gel into the right nostril, a  feeding tube was advanced under fluoroscopic guidance.    Findings: The feeding tube was advanced with the tip in the proximal  jejunum. A small amount of barium was injected to demonstrate  placement within the small bowel. The feeding tube was then flushed  with normal saline. The feeding tube was secured a nasal bridle.      Impression    Impression: Uncomplicated feeding tube placement with tip in the  distal duodenum/proximal jejunum.    I, YUNG CORONADO MD, attest that I was present for all critical  portions of the procedure and was immediately available to provide  guidance and assistance during the remainder of the procedure.    I have personally reviewed the examination and initial interpretation  and I agree with the findings.    YUNG CORONADO MD    XR Chest Port 1 View    Narrative    Exam: XR CHEST PORT 1 VW, 11/15/2018 4:29 AM    Indication: postop open chest    Comparison: 11/14/2018, 11/13/2018    Findings:   A single AP view the chest was obtained. The right IJ Castell-Jenn  catheter tip projects of the proximal pulmonary artery. The enteric  tubes pass below the field-of-view. The endotracheal tube tip projects  over the mid trachea. Stable position of the ECMO cannulae. Prosthetic  aortic valve. The intra-aortic balloon pump tip projects over the  lower descending thoracic aorta. Surgical instruments project over the  mediastinum. Stable right apical chest tube. The cardiac silhouette  remains enlarged.      Impression    Impression:   1. Stable support devices. The intra-aortic balloon pump tip projects  over the lower descending thoracic aorta, consider advancement by  approximately 6 cm.  2. Stable enlargement of the cardiac silhouette with mild retrocardiac  atelectasis/consolidation.  3. Interval increase in pulmonary edema.    I have personally reviewed the examination and initial interpretation  and I agree with the findings.    JOVANNY ROBERT MD   XR Chest Port 1 View    Narrative    XR CHEST PORT 1   11/15/2018 9:19 AM    History:  IABP placement; .     Comparison: Chest radiograph earlier today, 11/14/2018, 11/13/2018    Findings:   Supine portable AP chest radiograph. Endotracheal tube with the tip  projects 2.2 cm above rudy. Right IJ Castell-Jenn catheter with the tip  projects over right ventricle outflow tract. Unchanged intra-aortic  pump tip projects over 4.7 cm below the rudy, unchanged. Stable  positioning of gastric tube, feeding tube, right apical directed chest  tube, and ECMO cannulae. Unchanged 3 clamps projecting over  mediastinum.    Stable mild cardiomegaly. Persistent low lung volumes. Pulmonary  vasculature is mildly indistinct. Unchanged bibasilar basilar hazy  opacities. Unchanged small right pneumothorax without  significant  pleural effusion.      Impression    IMPRESSION:  1.  Unchanged intraaortic pump with the tip 4.7 cm below the rudy.  Consider advancement. Otherwise stable supportive lines and tubes.  2.  Cardiomegaly with possible pulmonary interstitial edema.  3.  Persistent low lung volumes. Unchanged bibasilar hazy opacities,  likely atelectasis.    I have personally reviewed the examination and initial interpretation  and I agree with the findings.    MATT SCHMITT, DO          LINES/TUBES:  Peripheral IV 11/13/18 Right Upper forearm (Active)   Site Assessment [facility] 11/15/2018  8:00 AM   Line Status Infusing 11/15/2018  8:00 AM   Phlebitis Scale 0-->no symptoms 11/15/2018  8:00 AM   Infiltration Scale 0 11/15/2018  8:00 AM   Extravasation? No 11/15/2018  8:00 AM   IV Site Rotation Due Date 11/17/18 11/15/2018  8:00 AM   Number of days:2       Arterial Line 11/13/18 Radial (Active)   Site Assessment [facility] 11/15/2018  8:00 AM   Line Status Pulsatile blood flow 11/15/2018  8:00 AM   Art Line Waveform Appropriate;Square wave test performed 11/15/2018  8:00 AM   Art Line Interventions Connections checked and tightened;Flushed per protocol;Zeroed and calibrated;Leveled 11/15/2018  8:00 AM   Color/Movement/Sensation Capillary refill less than 3 sec 11/15/2018  8:00 AM   Dressing Type Transparent;Other (Comment) 11/15/2018  8:00 AM   Dressing Status Clean, dry, intact 11/15/2018  8:00 AM   Dressing Intervention Dressing changed/new dressing 11/14/2018 12:00 AM   Dressing Change Due 11/19/18 11/15/2018  8:00 AM   Number of days:2       CVC Double Lumen 11/13/18 (Active)   Site Assessment [facility] 11/15/2018  8:00 AM   Extravasation? No 11/15/2018  8:00 AM   Dressing Intervention Chlorhexidine sponge;Transparent 11/15/2018  8:00 AM   Dressing Change Due 11/19/18 11/15/2018  8:00 AM   CVC Comment TRINA 11/15/2018  8:00 AM   CVC Lumen Assessment White;Brown 11/13/2018 10:00 PM   Number of days:2       Arterial Sheath  (Active)    Specific Qualities Sutured 11/15/2018  8:00 AM   Site Assessment UTV 11/15/2018  8:00 AM   Dressing Type Transparent 11/15/2018  8:00 AM   Arterial Sheath Comment Arterial ECMO 11/15/2018  8:00 AM   Number of days:2       Arterial Sheath  (Active)   Specific Qualities Sutured 11/15/2018  8:00 AM   Site Assessment WDL 11/15/2018  8:00 AM   Dressing Type Biopatch;Transparent 11/15/2018  8:00 AM   Dressing Intervention Dressing changed/new dressing 11/14/2018  4:00 AM   Arterial Sheath Comment IABP 11/15/2018  8:00 AM   Number of days:2       Venous Sheath (Active)   Specific Qualities Sutured 11/15/2018  8:00 AM   Site Assessment UTV 11/15/2018  8:00 AM   Dressing Type Transparent 11/15/2018  8:00 AM   Venous Sheath Comment Venous ECMO 11/15/2018  8:00 AM   Number of days:2       Pulmonary Artery Catheter - Single Lumen 11/13/18 1200 Right internal jugular vein (Active)   Dressing dressing dry and intact 11/15/2018  8:00 AM   Securement secured with sutures 11/15/2018  8:00 AM   PA Catheter Markings (cm) 48 11/15/2018  8:00 AM   Phlebitis 0-->no symptoms 11/15/2018  8:00 AM   Infiltration 0-->no symptoms 11/15/2018  8:00 AM   Waveform flattened 11/15/2018  8:00 AM   Pressure Catheter Interventions line leveled/zeroed;chest x-ray to confirm placement;system flushed 11/15/2018  8:00 AM   Daily Review Of Necessity completed 11/15/2018  8:00 AM   Number of days:2

## 2018-11-15 NOTE — PLAN OF CARE
Problem: Patient Care Overview  Goal: Plan of Care/Patient Progress Review  Outcome: Improving  Patient remains on ECMO, IABP, Ventilator, and Vasopressor support. Main goals today were fluid resuscitation, weaning drips, and placing a feeding tube. Patient given total of 2L LR, 2 Units RBC's, 1 units FFP, 1 unit Platelets. Patient also increased on D5W drip at 175mL/hr per nephrology. Patient lactate went from 18.0 to 3.7 at last check. Vasopressors weaned down, see flowsheets. Feeding tube placed by IR successfully. See flowsheets for further details. Patient and wife, daughter, and sister updated over the phone, wife will be in tomorrow morning. Plan tomorrow is to do a turndown with SANDRA, possible washout tomorrow or Friday. See flowsheets for further details.

## 2018-11-15 NOTE — PLAN OF CARE
Problem: Extracorporeal Life Support/Membrane Oxygenation (Adult)  Goal: Signs and Symptoms of Listed Potential Problems Will be Absent, Minimized or Managed (Extracorporeal Life Support/Membrane Oxygenation)  Signs and symptoms of listed potential problems will be absent, minimized or managed by discharge/transition of care (reference Extracorporeal Life Support/Membrane Oxygenation (Adult) CPG).   Outcome: No Change  Pt sedated, moves all extremities spontaneously & with stimulation, nothing to command. Yamileth started at 20 ppm 0640, Paced 80's, IABP 1:1 augmenting . On and off Levo, currently remains on epi & vaso. Titrating pressors for MAP goal >60.  ECMO circuit at 60%, sweep at 1, flows: 4.1-4.3. 250 albumin for chugging, 2 units PLT & 1 unit RBC's per ECMO order set. CT output  10-60/hr. TF advanced to 20 mL/hr per orders. UOP:  mL/hr. Daughter updated throughout shift. Plan for turndown in AM. Will continue to monitor and notify team with concerns.

## 2018-11-15 NOTE — PROGRESS NOTES
CV ICU PROGRESS NOTE  11/15/2018    CO-MORBIDITIES:   Severe Aortic stenosis and regurge.   ONOFRE  DM  HTN  HLD    ASSESSMENT: Johan Spence is a 63 year old male now s/p AV[facility] and [facility]. Was unable to be weaned from CPB so placed on VA ECMO and sent to [facility] for further cares. SANDRA shows adequate biventricular function upon ECMO turndown 11/15/18.       TODAY'S PROGRESS:   - Started nitric at 20 ppm this morning.  - Plan for SANDRA with turndown at some point today, possible washout as well pending timing  - IABP reposition given decreasing UOP and looks to be too shallow (though is unchanged from yesterday)  - Follow up UOP in PM, if improves after IABP reposition, will plan to pull HD catheter. However, given low UOP at this point HD may be needed so will keep HD catheter.  - Switch to Propofol from Versed provided pressures tolerate, start BIS monitoring and minimize sedation as much as able  - Goal even for the day  - Discontinue D5W as Na normalized    PLAN:  Neuro/pain/sedation:  #Post op pain.   - Monitor neurological status. Notify the MD for any acute changes in exam.  - Fentanyl gtt for pain.   - Propofol gtt. BIS monitoring 40-60. Cisatracurium gtt if needed given open chest.  RASS goal -2.  - Oxy/dil PRN. Robaxin PRN.   - Lidoderm.     Pulmonary:   #MV postop  #ONOFRE    - Supplemental oxygen to keep saturation above 92 %.  - Incentive spirometer every 15- 30 minutes when awake.  - Mechanically ventilated, will maintain PEEP of 8 while resuscitation.   - ECMO support weaned down with possible decannulation in OR 11/15.     Cardiovascular:   #AVR and [facility].    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   #HTN.    - Monitor hemodynamic status.   - Epi/norepi/vaso gtts. Currently just on vaso/epi.   - MAP > 65.  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 Limited information available in chart.    - IABP  paused today and patient tolerates well so likely could be pulled after decan.   - IABP advanced and resecured given placement into distal aorta.   - ECMO turned down to 1 L today and SANDRA shows good biventricular function.   - Chest open and will take down to OR for possible closure.   - Holding PTA Lisinopril and Metoprolol.     GI care:   - Miralax/senna-colace, suppository PRN    Fluids/ Electrolytes/ Nutrition:   - TKO  - NPO  - NJ feeds can advance to goal.     - BMP q6h.  - D5W gtt stopped given correction of Na. (140 from 150)  - No indication for parenteral nutrition.    Renal:    #Right groin HD line in place.  - Nephrology following.    - Will continue to monitor intake and output.  - Coronado  - Goal even for today.   - CRRT given drop in UOP overnight per nephrology recs.     Endocrine:    - Insulin gtt.    ID/ Antibiotics:  - Periop antibiotics.  - No signs of infection.     Heme:     - Hemoglobin stable.   - CBC/coags q6h.  - Anticoagulation: Hep gtt.      Prophylaxis:    - Mechanical prophylaxis for DVT.   - Hep gtt for ECMO -180  - PPI    Lines/tubes/drains:  - R internal jugular MAC/swan  - ETT  - Left IABP  - R groin HD catheter.   - VA ECMO  - Chest tubes: 3 meds.    - Coronado  - NJ feeding tube.   - AV TPW's.     Disposition:  - CV ICU.    Patient seen, findings and plan discussed with CV ICU staff, Dr. Ball.    Davide Escalante MD  CA-3/PGY-4    ====================================    SUBJECTIVE:   No events overnight. Pressor requirements stable however required 250 mL albumin for lower MAP's and UOP.     OBJECTIVE:   1. VITAL SIGNS:   Temp:  [97  F (36.1  C)-99.5  F (37.5  C)] 98.4  F (36.9  C)  Heart Rate:  [80-95] 80  Resp:  [18] 18  BP: (111)/(55) 111/55  MAP:  [50 mmHg-262 mmHg] 58 mmHg  Arterial Line BP: ()/() 97/32  FiO2 (%):  [40 %] 40 %  SpO2:  [93 %-100 %] 97 %  Ventilation Mode: CMV/AC  (Continuous Mandatory Ventilation/ Assist Control)  FiO2 (%): 40 %  Rate Set  (breaths/minute): 18 breaths/min  Tidal Volume Set (mL): 450 mL  PEEP (cm H2O): 8 cmH2O  Oxygen Concentration (%): 40 %  Resp: 18    2. INTAKE/ OUTPUT:   I/O last 3 completed shifts:  In: 73813.86 [I.V.:5319.86; NG/GT:330; IV Piggyback:2000]  Out: 4160 [Urine:2870; Emesis/NG output:300; Chest Tube:990]    3. PHYSICAL EXAMINATION:   General: laying in bed on ECMO and ventilator.   Neuro: sedated and intubated.   Resp: Breathing non-labored on vent.   CV: Paced at 80.   Abdomen: Soft, Non-distended,  Incisions: c/d/i.   Extremities: warm and well perfused    4. INVESTIGATIONS:   Arterial Blood Gases     Recent Labs  Lab 11/15/18  0604 11/15/18  0432 11/15/18  0146 11/15/18  0003   PH 7.47* 7.45 7.42 7.34*   PCO2 49* 50* 55* 65*   PO2 146* 172* 140* 122*   HCO3 35* 35* 35* 35*     Complete Blood Count     Recent Labs  Lab 11/15/18  0448 11/15/18  0003 11/14/18 2157 11/14/18  1612 11/14/18  1020   WBC 14.7*  --  14.6* 15.8* 15.2*   HGB 8.9*  --  9.1* 8.2* 8.1*   PLT 99* 99* 102* 131* 93*     Basic Metabolic Panel    Recent Labs  Lab 11/15/18  0604 11/15/18  0448 11/15/18  0146 11/15/18  0003 11/14/18 2157 11/14/18  1612 11/14/18  1210    142  --  145* 143 147* 150*   POTASSIUM  --  4.5 4.7  --  4.4 4.1 4.0   CHLORIDE  --  100  --   --  102 104 104   CO2  --  32  --   --  33* 34* 30   BUN  --  54*  --   --  50* 47* 46*   CR  --  3.23*  --   --  2.99* 2.90* 2.78*   GLC  --  169*  --   --  187* 199* 264*     Liver Function Tests    Recent Labs  Lab 11/15/18  0448 11/14/18  2157 11/14/18  1612 11/14/18  1210 11/14/18  1020   AST 1887* 2161* 2200* 2166*  --    * 869* 884* 821*  --    ALKPHOS 43 46 44 40  --    BILITOTAL 4.1* 4.6* 3.7* 3.1*  --    ALBUMIN 2.8* 2.8* 2.8* 2.9*  --    INR 1.89* 1.75* 1.80*  --  2.12*     Pancreatic Enzymes  No lab results found in last 7 days.  Coagulation Profile    Recent Labs  Lab 11/15/18  0448 11/14/18  2157 11/14/18  1612 11/14/18  1020   INR 1.89* 1.75* 1.80* 2.12*   PTT  54* 50* 40* 44*         5. RADIOLOGY:   Recent Results (from the past 24 hour(s))   XR Feeding Tube Placement    Narrative    Feeding tube placement: 11/14/2018 1:35 PM    Comparison: Same-day chest x-ray    Indication: Nutritional needs    Fluoroscopy time: 0.78 minutes    Technique: After injection of Xylocaine gel into the right nostril, a  feeding tube was advanced under fluoroscopic guidance.    Findings: The feeding tube was advanced with the tip in the proximal  jejunum. A small amount of barium was injected to demonstrate  placement within the small bowel. The feeding tube was then flushed  with normal saline. The feeding tube was secured a nasal bridle.      Impression    Impression: Uncomplicated feeding tube placement with tip in the  distal duodenum/proximal jejunum.    I, YUNG CORONADO MD, attest that I was present for all critical  portions of the procedure and was immediately available to provide  guidance and assistance during the remainder of the procedure.    I have personally reviewed the examination and initial interpretation  and I agree with the findings.    YUNG CORONADO MD   XR Chest Port 1 View    Narrative    Exam: XR CHEST PORT 1 VW, 11/15/2018 4:29 AM    Indication: postop open chest    Comparison: 11/14/2018, 11/13/2018    Findings:   A single AP view the chest was obtained. The right IJ Leigh-Jenn  catheter tip projects of the proximal pulmonary artery. The enteric  tubes pass below the field-of-view. The endotracheal tube tip projects  over the mid trachea. Stable position of the ECMO cannulae. Prosthetic  aortic valve. The intra-aortic balloon pump tip projects over the  lower descending thoracic aorta. Surgical instruments project over the  mediastinum. Stable right apical chest tube. The cardiac silhouette  remains enlarged.      Impression    Impression:   1. Stable support devices. The intra-aortic balloon pump tip projects  over the lower descending thoracic aorta, consider  advancement by  approximately 6 cm.  2. Stable enlargement of the cardiac silhouette with mild retrocardiac  atelectasis/consolidation.  3. Interval increase in pulmonary edema.    I have personally reviewed the examination and initial interpretation  and I agree with the findings.    JOVANNY ROBERT MD       =========================================

## 2018-11-15 NOTE — PROGRESS NOTES
"SPIRITUAL HEALTH SERVICES  SPIRITUAL ASSESSMENT Progress Note (Palliative Focus)  [facility] ([place]) 4E    REFERRAL SOURCE: Palliative care spiritual assessment.    Visit with patient Johan Spence's wife Candice in family kemale. Candice said that she and daughter Rosa are Johan's primary supports. She described him as a man who is \"jolly\", warm and welcoming towards everyone. He loves riding motorcycles, hunting and fishing, and is a [place] . Per Candice, she and Johan have Sikh roots, but their jose is not especially important as a source of meaning.     Candice is preparing for \"months of recovery\" while also hopeful that Johan will be able to recover and return home. They are from [place], and Candice finds [place] overwhelming at times. She and Rosa will take turns being present to support Johan.    Plan: I will follow for spiritual/emotional support.    Chantal Chiu  Palliative   Pager [phone]  [facility] Inpatient Team Consult pager [phone] (M-F 8-4:30)  After-hours Answering Service [phone]    "

## 2018-11-15 NOTE — ANESTHESIA PROCEDURE NOTES
SANDRA Probe Insertion Note:    Inserted by:  CHRISTIANNE LOPEZ (Responsible Anesthesiologist)  Probe Number: [id number]  Probe Status PRE Insertion: Probe damaged  Probe type:  Adult 3D    Bite block used:   Yes  Insertion Technique: Easy, no oropharyngeal manipulation  Insertion complications: None obvious    Billing Report:A SANDRA report is NOT being generated.    Probe Status POST Removal: Probe damaged    Comments: Small scratch on crystal surface, but probe functioned appropriately during exam

## 2018-11-15 NOTE — PROGRESS NOTES
"  Nephrology Progress Note  11/15/2018         Assessment & Recommendations:   Johan Spence is a 63 year old year old male with cardiogenic shock now on pressors and ECMO, AYE.       Cardiogenic shock on ECMO/IABP/ + 3 pressors. Add dobutamine     AYE: baseline Cr 1.1, inc to 2.3likely ATN from cardiogenic shock on multiple pressors started on ECMO. Nonoliguric, UOP is 100 to 300/hr, might be in diuretics phase of ATN.  -- no indication for CRRT today but due to worsening kidney function might need CRRT initiation tonight.   -- cont supportive care for ATN diuretics phase cont IVF replacement   -- On ECMO and Pressors keep BP MAP >65  -- will discuss with family the need for RRT but no indication today.   -- will monitor now already have dialysis access.     BP and volume: on cardiogenic shock on VA ECMO now, pressors EPI/Norepi/Vaso  -- keep MAP >65, monitor UOP will consider dialysis if anuric.     Normal potassium  Hypernatremia: resolved now ok to stop D5W, please maintain Na level around 142.   Lactic acidosis resolved  metabolic alkalosis avoid extra bicarb, and aggressive NG suction,   Hypermagnesemia   Hyperphosphatemia: phos 7 will monitor now.     Recommendations were communicated to primary team via direct     Seen and discussed with Dr. Kirill Morrison MD   [phone]    Interval History :   Nursing and provider notes from last 24 hours reviewed.  In the last 24 hours Johan Spence no major events still making urine but its slow down overnight and Cr start to trend up , K still normal , still on pressors balloon pump and ECMO.   Review of Systems:   I reviewed the following systems:  Intubated   Physical Exam:   I/O last 3 completed shifts:  In: 7198.09 [I.V.:4645.09; Other:100; NG/GT:340]  Out: 3105 [Urine:1855; Emesis/NG output:200; Blood:300; Chest Tube:750]   BP (!) 82/48 (BP Location: Right arm)  Temp 98.6  F (37  C)  Resp 18  Ht 1.7 m (5' 6.93\")  Wt 93 kg (205 lb 0.4 oz)  " SpO2 96%  BMI 32.18 kg/m2     GENERAL APPEARANCE: intubated   EYES:  no scleral icterus, pupils equal  HENT: mouth without ulcers or lesions  PULM: lungs coarse to auscultation bilaterally, equal air movement, no clubbing  CV: regular rhythm, normal rate, no rub     -JVD not elevated      -edema 1+  GI: soft, notender, nodistended, bowel sounds are-ve  INTEGUMENT: no cyanosis, no rash  NEURO:  Intubated    Access ECMO, femoral line     Labs:   All labs reviewed by me  Electrolytes/Renal -   Recent Labs   Lab Test  11/15/18   1418  11/15/18   1301  11/15/18   1009   11/15/18   0448  11/15/18   0146   11/14/18 2157   NA  140  138  140   < >  142   --    < >  143   POTASSIUM  4.0  4.6  4.7   --   4.5  4.7   --   4.4   CHLORIDE   --    --   100   --   100   --    --   102   CO2   --    --   34*   --   32   --    --   33*   BUN   --    --   57*   --   54*   --    --   50*   CR   --    --   3.44*   --   3.23*   --    --   2.99*   GLC  194*  188*  152*   --   169*   --    --   187*   TERESA   --    --   8.4*   --   8.0*   --    --   8.7   MAG   --    --   2.4*   --   2.2  2.1   --   2.2   PHOS   --    --   7.1*   --   6.7*   --    --   6.4*    < > = values in this interval not displayed.       CBC -   Recent Labs   Lab Test  11/15/18   1418  11/15/18   1301  11/15/18   1009  11/15/18   0448  11/15/18   0003  11/14/18 2157   WBC   --    --   14.7*  14.7*   --   14.6*   HGB  8.1*  8.2*  8.7*  8.9*   --   9.1*   PLT   --    --   106*  99*  99*  102*       LFTs -   Recent Labs   Lab Test  11/15/18   1009  11/15/18   0448  11/14/18 2157   ALKPHOS  50  43  46   BILITOTAL  4.4*  4.1*  4.6*   ALT  762*  764*  869*   AST  1723*  1887*  2161*   PROTTOTAL  5.2*  5.0*  5.0*   ALBUMIN  3.0*  2.8*  2.8*       Iron Panel - No lab results found.      Imaging:  All imaging studies reviewed by me.     Current Medications:    escitalopram  10 mg Oral Daily     heparin in saline (CELL SAVER) formula   PERFUSION Once     multivitamins with  minerals  15 mL Per Feeding Tube Daily     pantoprazole (PROTONIX) IV  40 mg Intravenous Q24H     polyethylene glycol  17 g Oral Daily     senna-docusate  1-2 tablet Oral or Feeding Tube BID       cisatracurium (NIMBEX) infusion ADULT Stopped (11/15/18 0998)     IV fluid REPLACEMENT ONLY       IV fluid REPLACEMENT ONLY       EPINEPHrine IV infusion ADULT 0.1 mcg/kg/min (11/15/18 1408)     fentaNYL 100 mcg/hr (11/15/18 1256)     HEParin 700 Units/hr (11/15/18 1200)     insulin (regular) 5 Units/hr (11/15/18 1427)     midazolam       norepinephrine Stopped (11/15/18 1459)     propofol (DIPRIVAN) infusion       vasopressin (PITRESSIN) infusion ADULT (40 mL) 1.5 Units/hr (11/15/18 1503)     Manfred Morrison MD

## 2018-11-15 NOTE — ANESTHESIA POSTPROCEDURE EVALUATION
Anesthesia POST Procedure Evaluation    Patient: Johan Spence   MRN:     [id number] Gender:   male   Age:    63 year old :      1955        Preoperative Diagnosis: Open chest   Procedure(s):  Chest Washout, Revision of ECMO Cannula, Temporary Closure of Chest   Postop Comments: No value filed.       Anesthesia Type:  General    Reportable Event: NO     PAIN:        Airway/Resp.: Uneventful perioperative course   Sign Out Status: Airway Device presnt     Airway Device: ETT                 Reason: Planned (pre-op)     CV: Uneventful perioperative course   Sign Out status: CV Support within EXPECTED Parameters     Disposition:   Sign Out in:  ICU  Disposition:  ICU  Recovery Course: Uneventful  Follow-Up: Not required     Comments/Narrative:  Patient intubated and sedated; unable to assess pain, PONV, mental status.             Last Anesthesia Record Vitals:  CRNA VITALS  11/15/2018 1445 - 11/15/2018 1534      11/15/2018             Resp Rate (set): 10          Transported to ICU on full monitors and controlled ventilation.  Vital signs at time of transfer:  HR 80 paced  /61 on ECMO with IABP  SpO2 95%    Supported hemodynamically with 0.1 mcg/kg/min epi and 1.5 unit(s)/h vasopressin.  Sedated with fentanyl and midazolam.  Insulin running at 5 u/h.      Electronically Signed By: Dinorah Camacho MD, November 15, 2018, 3:34 PM

## 2018-11-15 NOTE — ANESTHESIA CARE TRANSFER NOTE
Patient: Johan Spence    Procedure(s):  Chest Washout, Revision of ECMO Cannula, Temporary Closure of Chest    Diagnosis: Open chest  Diagnosis Additional Information: No value filed.    Anesthesia Type:   No value filed.     Note:  Airway :ETT and Ventilator  Patient transferred to:ICU  Comments: Anesthesia Care Transfer Note    Patient: Johan Spence    Transferred to: 4E    Patient vital signs: baseline on gtts    Airway: ETT insitu, resumed preop vent settings    Continuous monitoring to ICU. Placed on Vent, gtts verified with RN. IABP 1:1, NO 20ppm. Report given and care transferred to RN.     Caren Orellana CRNA   11/15/2018  ICU Handoff: Call for PAUSE to initiate/utilize ICU HANDOFF, Identified Patient, Identified Responsible Provider, Reviewed the Pertinent Medical History, Discussed Surgical Course, Reviewed Intra-OP Anesthesia Management and Issues during Anesthesia, Set Expectations for Post Procedure Period and Allowed Opportunity for Questions and Acknowledgement of Understanding      Vitals: (Last set prior to Anesthesia Care Transfer)    CRNA VITALS  11/15/2018 1448 - 11/15/2018 1518      11/15/2018             Resp Rate (set): 10    EKG: V Paced                Electronically Signed By: JESUS Curiel CRNA  November 15, 2018  3:18 PM

## 2018-11-15 NOTE — PROGRESS NOTES
ECMO Shift Summary:    Patient remains on VA ECMO, all equipment is functioning and alarms are appropriately set. RPM's 3150 with flow range 4.0-4.26 L/min. Sweep gas is at 1.5 LPM and FiO2 60%. Circuit remains free of air, clot and fibrin. Cannulas are secure with a copious amt of bleeding from site at the beginning of my shift, and has improved throughout the day to a small amt of bleeding. Extremities are warm to the touch. Suctioned ETT for a scant amt of blood-tinged secretions.    Significant Shift Events: Bleeding improved throughout the day, and vasopressors were weaned down.     Vent settings:  Ventilation Mode: CMV/AC  (Continuous Mandatory Ventilation/ Assist Control)  FiO2 (%): 40 %  Rate Set (breaths/minute): 18 breaths/min  Tidal Volume Set (mL): 450 mL  PEEP (cm H2O): 8 cmH2O  Oxygen Concentration (%): 40 %  Resp: 18.    Heparin is running at 400 u/hr, ACT goal range 160-180s.    Blood loss was moderate overall, and product given included: two units of PRBC's, one unit of plts, and one unit of FFP. Two liters of LR given on my shift.      Intake/Output Summary (Last 24 hours) at 11/14/18 1832  Last data filed at 11/14/18 1815   Gross per 24 hour   Intake          9925.88 ml   Output             6915 ml   Net          3010.88 ml       ECHO:  No results found for this or any previous visit.No results found for this or any previous visit.    CXR:  Recent Results (from the past 24 hour(s))   XR Abdomen Port 1 View    Narrative    Exam: XR ABDOMEN PORT 1 , 11/13/2018 10:50 PM    Indication: OG placement    Comparison: None available    Findings:   A single portable supine AP view of the upper abdomen was obtained.  Chest tubes, cannulae, Mannington-Jenn catheter, epicardial pacing wires,  and surgical instruments /clamps project over the lower chest. A right  femoral approach catheter tip projects over the distal IVC. The  enteric tube tip projects over the stomach with sidehole at or just  beyond the  gastroesophageal junction. Nonobstructive bowel gas  pattern.      Impression    Impression:   The enteric tube tip projects of the stomach with the sidehole at or  just beyond the gastroesophageal junction. Consider slight  advancement.     I have personally reviewed the examination and initial interpretation  and I agree with the findings.    JOVANNY ROBERT MD   XR Chest Port 1 View    Narrative     Examination:  XR CHEST PORT 1 VW     Date:  11/13/2018 9:42 PM      Clinical Information: check ETT and line placement;      Additional Information: none    Comparison: none    Findings:     Multiple Ene type clamps are projecting over the mid chest. The ET  tube tip is approximately 4 cm above the rudy.    There is a right IJ Summit-Jenn catheter and the tip is in main right  pulmonary artery. There is a right-sided chest tube with complete  expansion of the right lung. There is a catheter projected over the  right heart border, exact position is uncertain.      Impression    Impression:    1. ET tube in good position.  2. Operative instruments projecting over the midline of the chest..    JOVANNY ROBERT MD   XR Chest Port 1 View    Narrative    Exam: XR CHEST PORT 1 VW, 11/14/2018 3:26 AM    Indication: Central ECMO    Comparison: 11/13/2018    Findings:   A single portable AP view of the chest was obtained. The endotracheal  tube tip projects over the mid trachea. The right IJ Summit-Jenn  catheter tip projects over the proximal main pulmonary artery,  slightly retracted since the previous exam. The enteric tube tip and  sidehole project over the stomach. Stable right apical chest tube.  Unchanged central ECMO cannulae. Prosthetic aortic valve. The cardiac  silhouette remains enlarged. Low lung volumes. No pneumothorax or  pleural effusion. Mild left basilar opacities.      Impression    Impression:   1. Slight retraction of the Summit-Jenn catheter with the tip projecting  over the proximal main pulmonary artery.  Otherwise stable support  devices.  2. Minimal left basilar opacities, likely atelectasis.    I have personally reviewed the examination and initial interpretation  and I agree with the findings.    JOVANNY ROBERT MD   XR Feeding Tube Placement    Narrative    Feeding tube placement: 11/14/2018 1:35 PM    Comparison: Same-day chest x-ray    Indication: Nutritional needs    Fluoroscopy time: 0.78 minutes    Technique: After injection of Xylocaine gel into the right nostril, a  feeding tube was advanced under fluoroscopic guidance.    Findings: The feeding tube was advanced with the tip in the proximal  jejunum. A small amount of barium was injected to demonstrate  placement within the small bowel. The feeding tube was then flushed  with normal saline. The feeding tube was secured a nasal bridle.      Impression    Impression: Uncomplicated feeding tube placement with tip in the  distal duodenum/proximal jejunum.    I, YUNG CORONADO MD, attest that I was present for all critical  portions of the procedure and was immediately available to provide  guidance and assistance during the remainder of the procedure.    I have personally reviewed the examination and initial interpretation  and I agree with the findings.    YUNG CORONADO MD       Labs:    Recent Labs  Lab 11/14/18  1814 11/14/18  1612 11/14/18  1410 11/14/18  1210   PH 7.48* 7.45 7.39 7.45   PCO2 49* 52* 60* 46*   PO2 138* 146* 143* 140*   HCO3 36* 36* 36* 32*   O2PER 40 40.0 40 40       Lab Results   Component Value Date    HGB 8.2 (L) 11/14/2018    PHGB 80 (H) 11/14/2018     (L) 11/14/2018    FIBR 286 11/14/2018    INR 1.80 (H) 11/14/2018    PTT 40 (H) 11/14/2018    DD 3.3 (H) 11/14/2018    AXA <0.10 11/14/2018    ANTCH 60 (L) 11/14/2018         Plan is to continue on VA ECMO, with plans for a SANDRA with ECMO turndown tomorrow.      Jo Honeycutt, RRT  11/14/2018 6:32 PM

## 2018-11-15 NOTE — BRIEF OP NOTE
[facility], [place]    Brief Operative Note    Pre-operative diagnosis: Open chest  Post-operative diagnosis Open chest  Procedure: Procedure(s):  Chest Washout, Revision of ECMO Cannula, Temporary Closure of Chest  Surgeon: Surgeon(s) and Role:     * Lebron Neri MD - Primary     * Funmilayo Garnica PA-C - Assisting  Anesthesia: General   Estimated blood loss: 125 cc  Drains: 2 mediastinals   Specimens: * No specimens in log *  Findings:   See official op note  Complications: See official op note.  Implants: None.    Funmilayo Weiss PA-C  Cardiothoracic Surgery  Pager [phone]  November 15, 2018

## 2018-11-16 NOTE — PROGRESS NOTES
CV ICU PROGRESS NOTE  11/16/2018    CO-MORBIDITIES:   Severe Aortic stenosis and regurge.   ONOFRE  DM  HTN  HLD    ASSESSMENT: Johan Spence is a 63 year old male now s/p AVR and root [facility]. Was unable to be weaned from CPB so placed on [facility] ECMO and sent to [facility] for further cares. SANDRA shows adequate biventricular function upon ECMO turndown 11/15/18.       TODAY'S PROGRESS:   - Transition from versed to propofol  - Lasix 80 mg  - Follow UOP and pressor requirements for fluid goals.   - Washout in next day or two.  - Wean NO per RT.        PLAN:  Neuro/pain/sedation:  #Post op pain.   - Monitor neurological status. Notify the MD for any acute changes in exam.  - Fentanyl gtt for pain.   - Propofol gtt. BIS monitoring 40-60.  RASS goal -3 to -4.  - Oxy/dil PRN. Robaxin PRN.   - Lidoderm.     Pulmonary:   #MV postop  #ONOFRE    - Supplemental oxygen to keep saturation above 92 %.  - Incentive spirometer every 15-30 minutes when awake.  - Mechanically ventilated, will maintain PEEP of 8 while resuscitation.     Cardiovascular:   #AVR and root [facility].    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   #HTN.    - Monitor hemodynamic status.   - Epi/norepi/vaso gtts. Currently just on vaso/epi.   - MAP >65.   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 Limited information available in chart.    - ECMO turndown in OR 11/15 unsuccessful.   - Chest remains open and will repeat washout and closure with ECMO turndown again in next few days. Patient became hypotensive with RV strain on 11/15 attempt.   - Weaning Nitric Oxide per RT protocol.    - Holding PTA Lisinopril and Metoprolol.     GI care:   - Miralax/senna-colace, suppository PRN    Fluids/ Electrolytes/ Nutrition:   - TKO  - NPO    - NJ feeds can advance to goal.     - BMP q6h.  - D5W gtt stopped given correction of Na. (140 from 150)  - No indication for parenteral  nutrition.    Renal:    #Right groin HD line in place.  - Nephrology following.    - Will continue to monitor intake and output.  - [place]  - Goal even to negative for today.    - Per nephrology recs, will give 80mg lasix to see if UOP can pick back up but if this does not resolve and electrolytes start to rise will resume CRRT via groin line.     Endocrine:    - Insulin gtt.    ID/ Antibiotics:  - Periop antibiotics.  - No signs of infection.     Heme:     - Hemoglobin stable.   - CBC/coags q6h.  - Anticoagulation: Hep gtt.      Prophylaxis:    - Mechanical prophylaxis for DVT.   - Hep gtt for ECMO -180.   - PPI    Lines/tubes/drains:  - R internal jugular MAC/swan  - ETT  - Left IABP  - R groin HD catheter.   - VA ECMO  - Chest tubes: 3 meds.    - [place]  - NJ feeding tube.   - AV TPW's.     Disposition:  - CV ICU.    Patient seen, findings and plan discussed with CV ICU staff, Dr. Ball.    Davide Escalante MD  CA-3/PGY-4    ====================================    SUBJECTIVE:   No events overnight. Give 500 mL albumin given some ecmo chugging last night. Pressor requirement has been stable.     OBJECTIVE:   1. VITAL SIGNS:   Temp:  [95.4  F (35.2  C)-99.3  F (37.4  C)] 99.3  F (37.4  C)  Heart Rate:  [] 80  Resp:  [18] 18  BP: (82)/(48) 82/48  MAP:  [53 mmHg-83 mmHg] 67 mmHg  Arterial Line BP: ()/(31-61) 101/45  FiO2 (%):  [40 %] 40 %  SpO2:  [94 %-100 %] 100 %  Ventilation Mode: CMV/AC  (Continuous Mandatory Ventilation/ Assist Control)  FiO2 (%): 40 %  Rate Set (breaths/minute): 18 breaths/min  Tidal Volume Set (mL): 450 mL  PEEP (cm H2O): 8 cmH2O  Oxygen Concentration (%): 40 %  Resp: 18    2. INTAKE/ OUTPUT:   I/O last 3 completed shifts:  In: 6615.77 [I.V.:3002.77; Other:100; NG/GT:340]  Out: 3150 [Urine:1670; Emesis/NG output:100; Blood:300; Chest Tube:1080]    3. PHYSICAL EXAMINATION:   General: laying in bed on ECMO and ventilator.   Neuro: sedated and intubated.   Resp: Breathing non-labored  on vent.   CV: Paced at 80.   Abdomen: Soft, Non-distended,  Incisions: c/d/i.   Extremities: warm and well perfused    4. INVESTIGATIONS:   Arterial Blood Gases     Recent Labs  Lab 11/16/18 0358 11/16/18  0208 11/16/18  0010 11/15/18  2159   PH 7.43 7.44 7.44 7.40   PCO2 50* 49* 48* 52*   PO2 176* 146* 170* 137*   HCO3 33* 33* 33* 33*     Complete Blood Count     Recent Labs  Lab 11/16/18  0358 11/15/18  2159 11/15/18  1530 11/15/18  1418  11/15/18  1009   WBC 9.4 9.4 10.9  --   --  14.7*   HGB 8.5* 8.0* 9.2* 8.1*  < > 8.7*   * 100* 75*  --   --  106*   < > = values in this interval not displayed.  Basic Metabolic Panel    Recent Labs  Lab 11/16/18  0358 11/15/18  2159 11/15/18  1530 11/15/18  1418  11/15/18  1009    141 141 140  < > 140   POTASSIUM 4.8 5.0 4.2 4.0  < > 4.7   CHLORIDE 102 102 101  --   --  100   CO2 32 30 28  --   --  34*   BUN 70* 65* 59*  --   --  57*   CR 3.25* 3.21* 3.27*  --   --  3.44*   * 150* 182* 194*  < > 152*   < > = values in this interval not displayed.  Liver Function Tests    Recent Labs  Lab 11/16/18  0358 11/15/18  2159 11/15/18  1530 11/15/18  1009   AST 1056* 1146* 1484* 1723*   * 594* 704* 762*   ALKPHOS 50 49 52 50   BILITOTAL 4.9* 4.3* 4.3* 4.4*   ALBUMIN 3.0* 3.2* 3.0* 3.0*   INR 1.77* 1.55* 1.71* 1.72*     Pancreatic Enzymes  No lab results found in last 7 days.  Coagulation Profile    Recent Labs  Lab 11/16/18  0358 11/15/18  2159 11/15/18  1530 11/15/18  1009 11/15/18  0448   INR 1.77* 1.55* 1.71* 1.72* 1.89*   PTT  --  50* 38* 57* 54*         5. RADIOLOGY:   Recent Results (from the past 24 hour(s))   XR Chest Port 1 View    Narrative    XR CHEST PORT 1 VW  11/15/2018 9:19 AM    History:  IABP placement; .     Comparison: Chest radiograph earlier today, 11/14/2018, 11/13/2018    Findings:   Supine portable AP chest radiograph. Endotracheal tube with the tip  projects 2.2 cm above rudy. Right IJ [place]-Jenn catheter with the tip  projects over  right ventricle outflow tract. Unchanged intra-aortic  pump tip projects over 4.7 cm below the rudy, unchanged. Stable  positioning of gastric tube, feeding tube, right apical directed chest  tube, and ECMO cannulae. Unchanged 3 clamps projecting over  mediastinum.    Stable mild cardiomegaly. Persistent low lung volumes. Pulmonary  vasculature is mildly indistinct. Unchanged bibasilar basilar hazy  opacities. Unchanged small right pneumothorax without significant  pleural effusion.      Impression    IMPRESSION:  1.  Unchanged intraaortic pump with the tip 4.7 cm below the rudy.  Consider advancement. Otherwise stable supportive lines and tubes.  2.  Cardiomegaly with possible pulmonary interstitial edema.  3.  Persistent low lung volumes. Unchanged bibasilar hazy opacities,  likely atelectasis.    I have personally reviewed the examination and initial interpretation  and I agree with the findings.    MATT SCHMITT DO   XR Chest Port 1 View    Narrative    Exam: XR CHEST PORT 1 VW, 11/15/2018 10:36 AM    Indication: Balloon pump advanced.  Confirm placement.;     Comparison: 11/15/2018    Findings:   Approximately 2.5 cm above the rudy. Right IJ [place]-Jenn catheter  projecting over the right ventricular outflow tract. ECMO cannula in  similar position. Right chest tube in similar position. Both enteric  tubes projecting over the esophagus and stomach. 3 surgical clamps in  unchanged position over the mediastinum. Aortic valve prosthesis in  place. Aortic balloon pump now approximately level of the rudy.    Unchanged cardiomegaly. Persistent low lung volumes. Unchanged  bibasilar and bilateral perihilar hazy opacities and small right  pneumothorax.      Impression    Impression: Balloon pump now projects at the level of the rudy.  Otherwise no significant interval change.    I have personally reviewed the examination and initial interpretation  and I agree with the findings.    MATT SCHMITT, DO   XR  Chest Port 1 View    Narrative    EXAM: XR CHEST PORT 1 VW  11/15/2018 5:06 PM     HISTORY:  s/p chest washout;     COMPARISON: Chest radiograph dated 11/15/2018    FINDINGS: A single AP view of the chest is obtained. Endotracheal tube  tip projects over the midthoracic trachea. Right IJ [place]-Jenn catheter  tip projects over the proximal right pulmonary artery. Aortic valve  prosthesis. Intra-aortic balloon pump marker projects over the aortic  bulb. Gastric tube tip and sidehole project over the stomach. Feeding  tube tip is off the field-of-view. Right chest tube is repositioned  and directed over the right lower lobe. Mediastinal drains. Surgical  clamps are removed. ECMO cannulae project over the right cardiac  silhouette and upper abdomen. Surgical clips are removed.    Cardiac silhouette is unchanged. Persistent low lung volumes.  Increased basilar and stable perihilar opacities. Previously  visualized right pneumothorax is not appreciated on the current study.  Small bilateral pleural effusions. The upper abdomen is unremarkable.      Impression    IMPRESSION:   1. [place]-Jenn catheter tip projects over the proximal right pulmonary  artery.  2. Repositioning of the right chest tube. Previously visualized right  pneumothorax is not well seen on the current study.  3. Small bilateral pleural effusions with increased bibasilar and  stable perihilar atelectasis.    I have personally reviewed the examination and initial interpretation  and I agree with the findings.    JOVANNY ROBERT MD   XR Abdomen Port 1 View    Narrative    Exam: XR ABDOMEN PORT 1 VW, 11/15/2018 5:09 PM    Indication: confirm feeding tube placement;     Comparison: 11/13/2018    Findings:   Supine frontal view of the abdomen. Gastric tube tip and sidehole  project over the stomach. Weighted feeding tube tip projects near the  ligament of Treitz. Right chest tube, ECMO cannula, and epicardial  pacer wires are partially visualized. Right femoral  vein central  venous catheter tip projects over L4. Intra-aortic balloon pump  inferior marker projects over the left side of the L1 vertebral body.  Surgical clips project over the lower pelvis. Cannula projecting over  the left femur and hip with tip projecting over the left sacrum.    There is relative paucity of bowel gas projecting over the mid abdomen  with nonobstructive bowel gas pattern of the colon. No definite  pneumatosis or portal venous gas. No definite free air on this supine  study.      Impression    Impression:   1. Feeding tube tip projects near the ligament of Treitz.  2. Additional support devices appear grossly unchanged.    I have personally reviewed the examination and initial interpretation  and I agree with the findings.    JOVANNY ROBERT MD   XR Chest Port 1 View    Narrative    1. Stable support devices.  2. Unchanged cardiomegaly, low lung volumes, and bibasilar  atelectasis/consolidation.       =========================================

## 2018-11-16 NOTE — PROGRESS NOTES
CRRT INITIATION NOTE    Consent for CRRT Completed:  YES  Patient s Vascular Access: ECMO Circuit    DATA:  Procedure:  CVVHDF  Start Time:  1611  Machine#:  7   Filter:  M150  Blood Flow:  200  ML/min  Pre-Replacement Solution:  Phoxillum BK 4/2.5  Post-Replacement Solution:  Phoxillum BK 4/2.5  Dialysate Solution:  PrismaSol BGK 2/3.5  Pre-Replacement Solution Rate:  1125 mL/hr  Post-Replacement Solution Rate:  200 mL/hr  Dialysate Flow Rate:  1125 mL/hr   Patient Removal Rate:  0 mL/hr  Anticoagulation Type and Rate:  N /A    ASSESSMENT:  How Patient Tolerated Initiation:   Vital Signs:  HR 80 Paced. BP 88/43, RR 18, SpO2 97%  Initial Pressures:  Access:  -26  Filter:  177  Return:  140  TMP:  59  Change in Filter Pressure:  16      INTERVENTIONS:  Intake = Output    PLAN:  CRRT as ordered, progress as tolerated, update MD as needed, and continue to monitor. Call CRRT RN with questions as needed.

## 2018-11-16 NOTE — PROGRESS NOTES
CVTS PROGRESS NOTE  11/16/2018    CO-MORBIDITIES:   Severe Aortic stenosis and regurge.   ONOFRE  DM  HTN  HLD    ASSESSMENT: Johan Spence is a 63 year old male now s/p AVR and root [facility]. Was unable to be weaned from CPB so placed on VA ECMO and sent to [facility] for further cares. SANDRA shows adequate biventricular function upon ECMO turndown 11/15/18.       TODAY'S PROGRESS:   - Transition from versed to propofol  - Lasix 80 mg  - Follow UOP and pressor requirements for fluid goals.   - Washout in next day or two.  - Wean NO per RT.        PLAN:  Neuro/pain/sedation:  #Post op pain.   - Monitor neurological status. Notify the MD for any acute changes in exam.  - Fentanyl gtt for pain.   - Propofol gtt. BIS monitoring 40-60. RASS goal -3 to -4.  - Oxy/dil PRN. Robaxin PRN.   - Lidoderm.     Pulmonary:   #MV postop  #ONOFRE    - Supplemental oxygen to keep saturation above 92 %.  - Incentive spirometer every 15-30 minutes when awake.  - Mechanically ventilated, will maintain PEEP of 8 while resuscitation.     Cardiovascular:   #AVR and root [facility].    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   #HTN.    - Monitor hemodynamic status.   - Epi/norepi/vaso gtts. Currently just on vaso/epi.   - MAP >65.   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 Limited information available in chart.    - ECMO turndown in OR 11/15 unsuccessful.   - Chest remains open and will repeat washout and closure with ECMO turndown again in next few days. Patient became hypotensive with RV strain on 11/15 attempt.   - Weaning Nitric Oxide per RT protocol.    - Holding PTA Lisinopril and Metoprolol.     GI care:   - Miralax/senna-colace, suppository PRN    Fluids/ Electrolytes/ Nutrition:   - TKO  - NPO    - NJ feeds can advance to goal.     - BMP q6h.  - D5W gtt stopped given correction of Na. (140 from 150)  - No indication for parenteral  nutrition.    Renal:    #Right groin HD line in place.  - Nephrology following.    - Will continue to monitor intake and output.  - [place]  - Goal even to negative for today.    - Per nephrology recs, will give 80mg lasix to see if UOP can pick back up but if this does not resolve and electrolytes start to rise will resume CRRT via groin line.     Endocrine:    - Insulin gtt.    ID/ Antibiotics:  - Periop antibiotics.  - No signs of infection.     Heme:     - Hemoglobin stable.   - CBC/coags q6h.  - Anticoagulation: Hep gtt.      Prophylaxis:    - Mechanical prophylaxis for DVT.   - Hep gtt for ECMO -180.   - PPI    Lines/tubes/drains:  - R internal jugular MAC/swan  - ETT  - Left IABP  - R groin HD catheter.   - VA ECMO  - Chest tubes: 3 meds.    - [place]  - NJ feeding tube.   - AV TPW's.     Disposition:  - CV ICU.    Patient seen, findings and plan discussed with CVTS fellow.     Davide Escalante MD  CA-3/PGY-4    ====================================    SUBJECTIVE:   No events overnight. Give 500 mL albumin given some ecmo chugging last night. Pressor requirement has been stable.     OBJECTIVE:   1. VITAL SIGNS:   Temp:  [95.4  F (35.2  C)-99.3  F (37.4  C)] 98.6  F (37  C)  Heart Rate:  [] 80  Resp:  [18] 18  MAP:  [55 mmHg-83 mmHg] 68 mmHg  Arterial Line BP: ()/(35-61) 91/46  FiO2 (%):  [40 %] 40 %  SpO2:  [93 %-100 %] 97 %  Ventilation Mode: CMV/AC  (Continuous Mandatory Ventilation/ Assist Control)  FiO2 (%): 40 %  Rate Set (breaths/minute): 18 breaths/min  Tidal Volume Set (mL): 450 mL  PEEP (cm H2O): 8 cmH2O  Oxygen Concentration (%): 40 %  Resp: 18    2. INTAKE/ OUTPUT:   I/O last 3 completed shifts:  In: 4686.82 [I.V.:1647.82; Other:100; NG/GT:340]  Out: 3185 [Urine:1795; Blood:300; Chest Tube:1090]    3. PHYSICAL EXAMINATION:   General: laying in bed on ECMO and ventilator.   Neuro: sedated and intubated.   Resp: Breathing non-labored on vent.   CV: Paced at 80.   Abdomen: Soft,  Non-distended,  Incisions: c/d/i.   Extremities: warm and well perfused    4. INVESTIGATIONS:   Arterial Blood Gases     Recent Labs  Lab 11/16/18  1143 11/16/18  0958 11/16/18  0814 11/16/18  0626   PH 7.43 7.44 7.42 7.41   PCO2 45 46* 50* 52*   PO2 149* 170* 157* 152*   HCO3 30* 31* 33* 33*     Complete Blood Count     Recent Labs  Lab 11/16/18  0958 11/16/18  0358 11/15/18  2159 11/15/18  1530   WBC 10.8 9.4 9.4 10.9   HGB 8.4* 8.5* 8.0* 9.2*   PLT 86* 101* 100* 75*     Basic Metabolic Panel    Recent Labs  Lab 11/16/18  0958 11/16/18  0358 11/15/18  2159 11/15/18  1530    142 141 141   POTASSIUM 5.6* 4.8 5.0 4.2   CHLORIDE 101 102 102 101   CO2 29 32 30 28   BUN 78* 70* 65* 59*   CR 3.36* 3.25* 3.21* 3.27*   * 156* 150* 182*     Liver Function Tests    Recent Labs  Lab 11/16/18  0958 11/16/18  0358 11/15/18  2159 11/15/18  1530   * 1056* 1146* 1484*   * 544* 594* 704*   ALKPHOS 56 50 49 52   BILITOTAL 5.3* 4.9* 4.3* 4.3*   ALBUMIN 3.1* 3.0* 3.2* 3.0*   INR 1.35* 1.77* 1.55* 1.71*     Pancreatic Enzymes  No lab results found in last 7 days.  Coagulation Profile    Recent Labs  Lab 11/16/18  0958 11/16/18  0358 11/15/18  2159 11/15/18  1530 11/15/18  1009   INR 1.35* 1.77* 1.55* 1.71* 1.72*   PTT 71*  --  50* 38* 57*         5. RADIOLOGY:   Recent Results (from the past 24 hour(s))   XR Chest Port 1 View    Narrative    EXAM: XR CHEST PORT 1 VW  11/15/2018 5:06 PM     HISTORY:  s/p chest washout;     COMPARISON: Chest radiograph dated 11/15/2018    FINDINGS: A single AP view of the chest is obtained. Endotracheal tube  tip projects over the midthoracic trachea. Right IJ [place]-Jenn catheter  tip projects over the proximal right pulmonary artery. Aortic valve  prosthesis. Intra-aortic balloon pump marker projects over the aortic  bulb. Gastric tube tip and sidehole project over the stomach. Feeding  tube tip is off the field-of-view. Right chest tube is repositioned  and directed over the  right lower lobe. Mediastinal drains. Surgical  clamps are removed. ECMO cannulae project over the right cardiac  silhouette and upper abdomen. Surgical clips are removed.    Cardiac silhouette is unchanged. Persistent low lung volumes.  Increased basilar and stable perihilar opacities. Previously  visualized right pneumothorax is not appreciated on the current study.  Small bilateral pleural effusions. The upper abdomen is unremarkable.      Impression    IMPRESSION:   1. [place]-Jenn catheter tip projects over the proximal right pulmonary  artery.  2. Repositioning of the right chest tube. Previously visualized right  pneumothorax is not well seen on the current study.  3. Small bilateral pleural effusions with increased bibasilar and  stable perihilar atelectasis.    I have personally reviewed the examination and initial interpretation  and I agree with the findings.    JOVANNY ROBERT MD   XR Abdomen Port 1 View    Narrative    Exam: XR ABDOMEN PORT 1 VW, 11/15/2018 5:09 PM    Indication: confirm feeding tube placement;     Comparison: 11/13/2018    Findings:   Supine frontal view of the abdomen. Gastric tube tip and sidehole  project over the stomach. Weighted feeding tube tip projects near the  ligament of Treitz. Right chest tube, ECMO cannula, and epicardial  pacer wires are partially visualized. Right femoral vein central  venous catheter tip projects over L4. Intra-aortic balloon pump  inferior marker projects over the left side of the L1 vertebral body.  Surgical clips project over the lower pelvis. Cannula projecting over  the left femur and hip with tip projecting over the left sacrum.    There is relative paucity of bowel gas projecting over the mid abdomen  with nonobstructive bowel gas pattern of the colon. No definite  pneumatosis or portal venous gas. No definite free air on this supine  study.      Impression    Impression:   1. Feeding tube tip projects near the ligament of Treitz.  2. Additional  support devices appear grossly unchanged.    I have personally reviewed the examination and initial interpretation  and I agree with the findings.    JOVANNY ROBERT MD   XR Chest Port 1 View    Narrative    Exam: XR CHEST PORT 1 VW, 11/16/2018 4:13 AM    Indication: Postop open chest.    Comparison: 11/15/2018, 11/14/2018, 11/13/2018    Findings:   A single AP view the chest was obtained. The endotracheal tube tip  projects over the mid trachea. The right IJ [place]-Jenn catheter tip  projects over the main pulmonary artery. The enteric tubes pass below  the field-of-view. Epicardial pacing wires and prosthetic aortic valve  in stable position. Grossly stable position of the ECMO cannulae and  right basilar chest tube. The intra-aortic balloon pump tip projects  over the aortic arch. Stable enlargement of the cardiac silhouette.  Marked mitral annular calcifications. Low lung volumes. No  pneumothorax. No significant pleural effusion. Mild bibasilar  opacities are unchanged.      Impression    Impression:   1. Stable support devices.  2. Unchanged cardiomegaly, low lung volumes, and bibasilar  atelectasis/consolidation (left greater than right).    I have personally reviewed the examination and initial interpretation  and I agree with the findings.    JOVANNY ROBERT MD       =========================================

## 2018-11-16 NOTE — CONSULTS
"[facility], [place]    Palliative Care Consultation Note    Patient: Johan Spence  Date of Admission:  11/13/2018    Requesting provider/team: CVTS  Reason for consult: Goals of care  Decisional support  Patient and family support    Recommendations: Patient seen and examined.  Interviewed patient's wife Candice in the waiting room.  Updated primary team and nursing .  He is intubated and sedated and unable to participate in interview.  Goals of care: At this time window endorses that Johan would want full measures.  No limited treatment concerns.  Plans for all advanced interventions at this time.  -We will continue to support patient and family during ICU stay.  -Encourage self-care in the part of patient's family.  They will likely return to [place] this weekend and come back on Monday.  -She verbalized the critical nature of his illness and the sophistication of his required life support at this time.    These recommendations have been discussed with patient and patient family.    Thank you for the opportunity to participate in the care of this patient and family. Our team will follow. Please feel free to contact the on-call Palliative provider with any urgent needs.     Shahid Hagen NP [facility]  Pager: [phone]  [facility] Inpatient Team Consult pager [phone] (M-F 8-4:30)  After-hours Answering Service [phone]   Palliative Clinic: [phone]       Assessment: Questions answered by family.     Social: Living situation: Lives with spouse of 32 years in [place]       Support system: Family and close knit friends. Citizen of [place] Cody \"Michele\" Johan has designated his dog as an emotional support animal.       Functional status: Was independent       Occupation: Worked for [employer] until detention        Hobbies: [employer].  Coping: relies on family  Spiritual/Episcopalian:    Spiritual background: Not part of his coping      Prognostic Information: Candice" seems overwhelmed with the sophistication of life support needed at this time.  Remains hopeful that he will make a full recovery.  Recognizes that process will likely take months.    History of Present Illness   Sources of History:electronic health record and patient's family,  63-year-old male with a past medical history of hypertension, hyperlipidemia, obstructive sleep apnea, prostate cancer, depression, tobacco and alcohol abuse, and aortic stenosis status post [facility] [place]  Transferred to [facility] after unsuccessful attempts at weaning bypass.  Balloon pump was initiated.  [facility] was initiated.  Patient was transferred with open chest.  Palliative care asked to see for support and goals of care conversation in the family with the setting of ECMO placement.      ROS:  A comprehensive ROS has been negative other than stated in the HPI and below: Pt intubated and sedated. Unable to participate in ROS     Past Medical History:   No past medical history on file.       Past Surgical History:   No past surgical history on file.          Family History:   No family history on file.  Family history reviewed       Allergies:   Allergies   Allergen Reactions     Simvastatin             Medications:   I have reviewed this patient's medication profile and medications given in the past 24 hours.           Physical Exam:   Vital Signs: Temp: 99  F (37.2  C) Temp src: Bladder    Heart Rate: 80 Resp: 18 SpO2: 100 % O2 Device: Mechanical Ventilator    Weight: 205 lbs .44 oz    Physical Exam:  GEN: In ICU bed- orally intubated and sedated. On IABP and ECMO support  HEENT: NC/AT. Orally intubated. MM mildly dry.  CV: S1 S2 with distant tones. Open chest wound. ECMO. Chest tubes placed.  LUNGS:  Mechanical breath sounds. Clear to auscultation bilaterally   ABD:  Active bowel sounds, soft, non-distended.    EXT:  Generalized 1+ edema, no cyanosis.  Hands/feet  warm to touch.  SKIN:  Dry to touch, no rash noted in the visualized areas. Open chest wound.  NEURO:  No new focal deficits appreciated.         Data reviewed:      ROUTINE LABS (Last four results)  BMP  Recent Labs  Lab 11/16/18  0358 11/15/18  2159 11/15/18  1530 11/15/18  1418  11/15/18  1009    141 141 140  < > 140   POTASSIUM 4.8 5.0 4.2 4.0  < > 4.7   CHLORIDE 102 102 101  --   --  100   TERESA 8.4* 8.6 8.8  --   --  8.4*   CO2 32 30 28  --   --  34*   BUN 70* 65* 59*  --   --  57*   CR 3.25* 3.21* 3.27*  --   --  3.44*   * 150* 182* 194*  < > 152*   < > = values in this interval not displayed.  CBC  Recent Labs  Lab 11/16/18  0358 11/15/18  2159 11/15/18  1530 11/15/18  1418  11/15/18  1009   WBC 9.4 9.4 10.9  --   --  14.7*   RBC 2.74* 2.61* 2.97*  --   --  2.85*   HGB 8.5* 8.0* 9.2* 8.1*  < > 8.7*   HCT 26.0* 24.2* 27.4*  --   --  25.8*   MCV 95 93 92  --   --  91   MCH 31.0 30.7 31.0  --   --  30.5   MCHC 32.7 33.1 33.6  --   --  33.7   RDW 16.2* 16.2* 16.6*  --   --  16.6*   * 100* 75*  --   --  106*   < > = values in this interval not displayed.  INR  Recent Labs  Lab 11/16/18  0358 11/15/18  2159 11/15/18  1530 11/15/18  1009   INR 1.77* 1.55* 1.71* 1.72*           Shahid Hagen NP  Pager: [phone]  [facility] Inpatient Team Consult pager [phone] (M-F 8-4:30)  After-hours Answering Service [phone]  Palliative Clinic: [phone]     Total time spent was 40 minutes,  >50% of time was spent counseling and/or coordination of care.  30 min of that were spent face-to-face.

## 2018-11-16 NOTE — PROGRESS NOTES
ECMO Attending Progress Note  11/15/2018    Johan Spence is a 63 year old male who was started on ECMO due to severe cardiac failure after redo AVR.     ECMO DAY: 2    Interval events: Decreasing pressor requirements, improving lactate.    The patients vital signs today are as follows:    Temp:  [95.4  F (35.2  C)-98.6  F (37  C)] 98.4  F (36.9  C)  Heart Rate:  [] 86  Resp:  [18] 18  BP: (82)/(48) 82/48  MAP:  [50 mmHg-262 mmHg] 65 mmHg  Arterial Line BP: ()/() 91/48  FiO2 (%):  [40 %] 40 %  SpO2:  [94 %-100 %] 97 %    Intake/Output Summary (Last 24 hours) at 11/15/18 2010  Last data filed at 11/15/18 1900   Gross per 24 hour   Intake          6625.81 ml   Output             3145 ml   Net          3480.81 ml    Ventilation Mode: CMV/AC  (Continuous Mandatory Ventilation/ Assist Control)  FiO2 (%): 40 %  Rate Set (breaths/minute): 18 breaths/min  Tidal Volume Set (mL): 450 mL  PEEP (cm H2O): 8 cmH2O  Oxygen Concentration (%): 40 %  Resp: 18   Recent Labs  Lab 11/15/18  1812 11/15/18  1634 11/15/18  1530 11/15/18  1418   PH 7.42 7.49* 7.43 7.46*   PCO2 46* 37 45 42   PO2 155* 247* 262* 231*   HCO3 30* 28 30* 30*   O2PER 40 40 40  40 91.0      Recent Labs  Lab 11/15/18  1530 11/15/18  1418 11/15/18  1301 11/15/18  1009 11/15/18  0448 11/14/18  2157   WBC 10.9  --   --  14.7* 14.7* 14.6*   HGB 9.2* 8.1* 8.2* 8.7* 8.9* 9.1*     Creatinine   Date Value Ref Range Status   11/15/2018 3.27 (H) 0.66 - 1.25 mg/dL Final   11/15/2018 3.44 (H) 0.66 - 1.25 mg/dL Final   11/15/2018 3.23 (H) 0.66 - 1.25 mg/dL Final   11/14/2018 2.99 (H) 0.66 - 1.25 mg/dL Final     Physical Exam:   Cannula unchanged.  Minimal oozing.  Minimal air movement  Extremities edematous    ECMO Issues including assessments and plan on DOS 11/15/2018:    Neuro: Sedated for mechanical ventilation and ECMO.    CV: Hemodynamically stable on epi and vaso   Pulm: Severe respiratory failure requiring ECMO and mechanical ventilation. Flows  unchanged at 3L   Keep vent settings at rest settings: PC 25, PEEP10, FiO2 60%.  FEN/Renal: Creatinine  Lab Results   Component Value Date    CR 3.27 11/15/2018     Heme: Hemoglobin 9.2 .  Goals: if O2 sat >85% Hgb may drift to 7-8.  If O2 Sat <85% keep Hgb 10-12.  ID:      All pertinent labs, imaging studies, physical exam and medications have been reviewed by me.     This patient requires continued ICU monitoring and cares.  I have discussed patient care and treatment plan with the ICU team and the patient's family.     ECMO [id number]    Lebron Neri MD  Cardiothoracic Surgery  [phone]

## 2018-11-16 NOTE — PROGRESS NOTES
ECMO Shift Summary:    [facility] on [facility], all equipment is functioning and alarms are appropriately set. RPM's 3135 with flow range 4.01-4.13 L/min. Sweep gas is at 1 LPM and FiO2 70%. Circuit remains free of air.  No visible clot or fibrin. Cannulas are secure with no bleeding from site. Extremities are cool to the touch.     Significant Shift Events:  n/a    Vent settings:  VCV 18/450/+8/40%.  Yamileth weaned to 10 ppm and will continue to wean per protocol.  BS diminished, coarse.  Suctioned for small creamy tan secretions.    Heparin is running at 1200 u/hr, ACT range 152-164.    Urine output is moises and averages to 75 mls/hr. Pt did get 80 mg of Lasix this shift with good response.  Blood loss was ~50 ml/hr from the CTs. Product given included 1 unit of PRBCs and 1 unit of Platelets to maintain ordered parameters.      Intake/Output Summary (Last 24 hours) at 11/16/18 1411  Last data filed at 11/16/18 1400   Gross per 24 hour   Intake          4889.12 ml   Output             3715 ml   Net          1174.12 ml       ECHO:  No results found for this or any previous visit.No results found for this or any previous visit.    CXR:  Recent Results (from the past 24 hour(s))   XR Chest Port 1 View    Narrative    EXAM: XR CHEST PORT 1 VW  11/15/2018 5:06 PM     HISTORY:  s/p chest washout;     COMPARISON: Chest radiograph dated 11/15/2018    FINDINGS: A single AP view of the chest is obtained. Endotracheal tube  tip projects over the midthoracic trachea. Right IJ [place]-Jenn catheter  tip projects over the proximal right pulmonary artery. Aortic valve  prosthesis. Intra-aortic balloon pump marker projects over the aortic  bulb. Gastric tube tip and sidehole project over the stomach. Feeding  tube tip is off the field-of-view. Right chest tube is repositioned  and directed over the right lower lobe. Mediastinal drains. Surgical  clamps are removed. ECMO cannulae project over the right cardiac  silhouette and upper  abdomen. Surgical clips are removed.    Cardiac silhouette is unchanged. Persistent low lung volumes.  Increased basilar and stable perihilar opacities. Previously  visualized right pneumothorax is not appreciated on the current study.  Small bilateral pleural effusions. The upper abdomen is unremarkable.      Impression    IMPRESSION:   1. [place]-Jenn catheter tip projects over the proximal right pulmonary  artery.  2. Repositioning of the right chest tube. Previously visualized right  pneumothorax is not well seen on the current study.  3. Small bilateral pleural effusions with increased bibasilar and  stable perihilar atelectasis.    I have personally reviewed the examination and initial interpretation  and I agree with the findings.    JOVANNY ROBERT MD   XR Abdomen Port 1 View    Narrative    Exam: XR ABDOMEN PORT 1 VW, 11/15/2018 5:09 PM    Indication: confirm feeding tube placement;     Comparison: 11/13/2018    Findings:   Supine frontal view of the abdomen. Gastric tube tip and sidehole  project over the stomach. Weighted feeding tube tip projects near the  ligament of Treitz. Right chest tube, ECMO cannula, and epicardial  pacer wires are partially visualized. Right femoral vein central  venous catheter tip projects over L4. Intra-aortic balloon pump  inferior marker projects over the left side of the L1 vertebral body.  Surgical clips project over the lower pelvis. Cannula projecting over  the left femur and hip with tip projecting over the left sacrum.    There is relative paucity of bowel gas projecting over the mid abdomen  with nonobstructive bowel gas pattern of the colon. No definite  pneumatosis or portal venous gas. No definite free air on this supine  study.      Impression    Impression:   1. Feeding tube tip projects near the ligament of Treitz.  2. Additional support devices appear grossly unchanged.    I have personally reviewed the examination and initial interpretation  and I agree with the  findings.    JOVANNY ROBERT MD   XR Chest Port 1 View    Narrative    Exam: XR CHEST PORT 1 VW, 11/16/2018 4:13 AM    Indication: Postop open chest.    Comparison: 11/15/2018, 11/14/2018, 11/13/2018    Findings:   A single AP view the chest was obtained. The endotracheal tube tip  projects over the mid trachea. The right IJ [place]-Jenn catheter tip  projects over the main pulmonary artery. The enteric tubes pass below  the field-of-view. Epicardial pacing wires and prosthetic aortic valve  in stable position. Grossly stable position of the ECMO cannulae and  right basilar chest tube. The intra-aortic balloon pump tip projects  over the aortic arch. Stable enlargement of the cardiac silhouette.  Marked mitral annular calcifications. Low lung volumes. No  pneumothorax. No significant pleural effusion. Mild bibasilar  opacities are unchanged.      Impression    Impression:   1. Stable support devices.  2. Unchanged cardiomegaly, low lung volumes, and bibasilar  atelectasis/consolidation (left greater than right).    I have personally reviewed the examination and initial interpretation  and I agree with the findings.    JOVANNY ROBERT MD       Labs:    Recent Labs  Lab 11/16/18  1321 11/16/18  1143 11/16/18  0958 11/16/18  0814   PH 7.43 7.43 7.44 7.42   PCO2 46* 45 46* 50*   PO2 121* 149* 170* 157*   HCO3 30* 30* 31* 33*   O2PER 40 40.0 40.0 40       Lab Results   Component Value Date    HGB 8.4 (L) 11/16/2018    PHGB <30 11/16/2018    PLT 86 (L) 11/16/2018    FIBR 511 (H) 11/16/2018    INR 1.35 (H) 11/16/2018    PTT 71 (H) 11/16/2018    DD 1.6 (H) 11/16/2018    AXA 0.16 11/16/2018    ANTCH 44 (L) 11/16/2018         Plan is to continue VA ECMO support & initiate CRRT through the ECMO circuit..      Dinorah Wagner, RRT  11/16/2018 2:11 PM

## 2018-11-16 NOTE — OP NOTE
OPERATIVE DATE: 11/15/2018    PRE-OPERATIVE DIAGNOSIS:  1) S/P redo sternotomy and aortic valve replacement  2) [facility] ECMO for post-operative cardiac collapse  3) Open chest  4) Hypertension  5) Hyperlipidemia  6) Obesity  7) Obstructive sleep apnea  8) Diabetes  9) Former smoker  10) ETOH abuse    POST-OPERATIVE DIAGNOSIS:  1) S/P redo sternotomy and aortic valve replacement  2) [facility] ECMO for post-operative cardiac collapse  3) Open chest  4) Hypertension  5) Hyperlipidemia  6) Obesity  7) Obstructive sleep apnea  8) Diabetes  9) Former smoker  10) ETOH abuse    PROCEDURE:  1) Chest washout  2) Revision of ECMO cannulas  3) Temporary chest closure    SURGEON: Lebron Neri MD    ASSISTANT: Funmilayo Barker PA-C    ANESTHESIA: GETA    ESTIMATED BLOOD LOSS: 250cc    INDICATIONS:  Mr. SHENA GREEN is a 63 year old male admitted with open chest on [facility] ECMO after redo sternotomy and AVR.  The patient had turndown study today showing good RV and LV function, but requiring high doses of inotropes and vasopressors.  I recommended to the family takeback and washout to ensure there was not a component of cardiac tamponade.   Risks and benefits of the operation were explained to the patient and their family including, but not limited to, bleeding, infection, stroke and even death.  They understood these risks and agreed to proceed electively.    OPERATIVE REPORT:  The patient was transferred to the operating room and positioned supine on the OR table.  General anesthesia was initiated by the anesthesia team.  Endotracheal intubation and IV access was already in place. The patients neck, chest, abdomen and bilateral lower extremities were clipped, prepped and draped in sterile fashion.  A pre-procedure time-out was performed confirming the correct patient, correct site and correct procedure.    The previous chest dressing was removed.  Three kerlex gauze sponges were retrieved.  There was a moderate amount of  pericardial blood and clot.  The patient's hemodynamics did not change significantly with evacuation of the pericardium.      A dobutamine drip was started, and the patient was weaned from 3L ECMO flow to 1L of flow.  His systemic blood pressure dropped from MAPs of 70s to low 50s.  We immediately resumed ECMO flow of 3L and hemodynamics improved.    I decided to tunnel the ECMO lines.  The lines were tunnelled out the right upper quadrant.  ECMO flow was interrupted briefly for both lines.  Flows and hemodynamics were adequate after resuming ECMO.  Next the chest was packed with kerlex gauze.  An Esmarch dressing was applied.  An ioban was used to seal the dressing.    The patient was stable on moderate doses of inotropes and vasopressors.  Repeat labs were stable.      The patient was then transferred from the operating bed to an ICU bed and transferred to the ICU in critical, but stable, condition.    All needle, sponge and instrument counts were correct at the end of the case.    Lebron Neri  Cardiothoracic Surgery  Pager: [phone]

## 2018-11-16 NOTE — PROGRESS NOTES
Cardiology Progress Note 11/16/2018  ===================================================  Assessment & Plan ; Hospital Day #3    Mr. Johan Spence is a 63 year old male with h/o AVR, DM, ONOFRE, obesity, HTN, who [facility] with [facility] of wean off pump.  Had re-do AVR and ao repalacement at [facility] but during placing PA cath in OR he was coded and placed pump. AVR and ao graft replacement were successful but he was unable to come off pump. He was transferred here for further management.     Yesterday turn down study at bedside - down to 1L/min with good LV/RV fx and BP.  RV mildly dilated and mildly reduced RV fx. LVEF 20-30% on 3.5L, 40% with 2.5L.  Went to OR. 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 He is still on 3 pressors but we have been successful titrating down them. He has good pulsatility. His [facility]. During pre-op preparation of AS surgery, severe AS and vasodilation caused hypotension and bradycardia. Cardiogenic shock is likely from those perioperative hemodynamic instability. So far he seems hemodynamically stable with central ECMO.     Recommendations  - agree with tapering down inotropes  - agree with continue ECMO for now    Discussed with Sylvia Salguero.     Gideon Wilks MD  Cardiology Fellow [phone]      Pt's condition and care plan discussed with fellow but patient not seen personally by me today.    Sylvia Peraza MD  Section Head - Advanced Heart Failure, Transplantation and Mechanical Circulatory Support  Co-Director - [facility]  Associate Professor of Medicine, [facility]      =====================================================  Physical  "Examination  GEN:  intubated and sedated  CV: no murmur, open chest  Central cannulation. Chest tubes in place.  LUNGS:  Clear to auscultation bilaterally   ABD:  Active bowel sounds, soft, non-tender/non-distended.  No rebound/guarding/rigidity.  EXT:  No edema or cyanosis.  Hands/feet warm to touch with good signs of peripheral perfusion.     =====================================================  Interval history:  No acute events overnight  Yesterday turn down study at bedside - down to 1L/min with good LV/RV fx and BP.  RV mildly dilated and mildly reduced RV fx. LVEF 20-30% on 3.5L, 40% with 2.5L.  Went to OR. tried to decannulate but BP dropped with ECMO flow of 1L       ==================================================  Objective:  BP (!) 82/48 (BP Location: Right arm)  Temp 98.6  F (37  C)  Resp 18  Ht 1.7 m (5' 6.93\")  Wt 93 kg (205 lb 0.4 oz)  SpO2 95%  BMI 32.18 kg/m2      Vitals:    11/13/18 2100 11/13/18 2200 11/14/18 0000 11/15/18 0400   Weight: 90 kg (198 lb 6.6 oz) 89.8 kg (197 lb 15.6 oz) 89.8 kg (197 lb 15.6 oz) 93 kg (205 lb 0.4 oz)       Wt Readings from Last 10 Encounters:   11/15/18 93 kg (205 lb 0.4 oz)     I/O last 3 completed shifts:  In: 4632.52 [I.V.:1271.52; NG/GT:300]  Out: 3370 [Urine:1995; Chest Tube:1375]    Ventilator Settings: Ventilation Mode: CMV/AC  (Continuous Mandatory Ventilation/ Assist Control)  FiO2 (%): 40 %  Rate Set (breaths/minute): 18 breaths/min  Tidal Volume Set (mL): 450 mL  PEEP (cm H2O): 8 cmH2O  Oxygen Concentration (%): 40 %  Resp: 18      Medications   Current Facility-Administered Medications   Medication Dose Route Frequency     escitalopram  10 mg Oral Daily     multivitamins with minerals  15 mL Per Feeding Tube Daily     pantoprazole (PROTONIX) IV  40 mg Intravenous Q24H     polyethylene glycol  17 g Oral Daily     senna-docusate  1-2 tablet Oral or Feeding Tube BID     Infusions:    IV fluid REPLACEMENT ONLY       IV fluid REPLACEMENT ONLY       " CRRT replacement solution 12.5 mL/kg/hr (11/16/18 1543)     EPINEPHrine IV infusion ADULT 0.1 mcg/kg/min (11/16/18 1500)     fentaNYL 100 mcg/hr (11/16/18 1500)     HEParin 1,200 Units/hr (11/16/18 1500)     insulin (regular) 6 Units/hr (11/16/18 1500)     midazolam Stopped (11/16/18 0835)     - MEDICATION INSTRUCTIONS -       norepinephrine Stopped (11/15/18 5449)     CRRT replacement solution 2.222 mL/kg/hr (11/16/18 1542)     CRRT replacement solution 12.5 mL/kg/hr (11/16/18 1542)     propofol (DIPRIVAN) infusion 20 mcg/kg/min (11/16/18 1500)     vasopressin (PITRESSIN) infusion ADULT (40 mL) 2 Units/hr (11/16/18 1500)     PRN Medications:  bisacodyl, calcium chloride, calcium chloride, calcium chloride in 0.9% NaCl intermittent infusion, calcium chloride in 0.9% NaCl intermittent infusion, IV fluid REPLACEMENT ONLY, IV fluid REPLACEMENT ONLY, glucose **OR** dextrose **OR** glucagon, fentaNYL, heparin, magnesium sulfate, meperidine, naloxone, naloxone, - MEDICATION INSTRUCTIONS -, potassium chloride, propofol (DIPRIVAN) infusion **AND** propofol **AND** CK total **AND** Triglycerides, sodium phosphate (NaPHOS) CRRT Replacement  Current Facility-Administered Medications   Medication     bisacodyl (DULCOLAX) Suppository 10 mg     calcium chloride 1 g in sodium chloride 0.9 % 100 mL intermittent infusion     calcium chloride 2 g in sodium chloride 0.9 % 100 mL intermittent infusion     calcium chloride 3 g in sodium chloride 0.9 % 200 mL intermittent infusion     calcium chloride 4 g in sodium chloride 0.9 % 200 mL intermittent infusion     dextrose 10 % 1,000 mL infusion     dextrose 10 % 1,000 mL infusion     glucose gel 15-30 g    Or     dextrose 50 % injection 25-50 mL    Or     glucagon injection 1 mg     dialysate for CVVHD & CVVHDF (PrismaSol BGK 2/3.5)     EPINEPHrine (ADRENALIN) 16 mg in sodium chloride 0.9 % 250 mL infusion     escitalopram (LEXAPRO) tablet 10 mg     fentaNYL (PF) (SUBLIMAZE) injection   mcg     fentaNYL (SUBLIMAZE) infusion     heparin 100 UNIT/ML injection 330-660 Units     heparin infusion 25,000 units in 0.45% NaCl 250 mL     insulin 1 unit/mL in saline (NovoLIN, HumuLIN Regular) drip - ADULT IV Infusion     magnesium sulfate 2 g in water intermittent infusion     meperidine (DEMEROL) injection 25 mg     midazolam (VERSED) 100 mg in sodium chloride 0.9% 100 mL pre-mix infusion     multivitamins with minerals (CERTAVITE/CEROVITE) liquid 15 mL     naloxone (NARCAN) injection 0.1-0.4 mg     naloxone (NARCAN) injection 0.1-0.4 mg     No heparin required     norepinephrine (LEVOPHED) 16 mg in D5W 250 mL infusion     pantoprazole (PROTONIX) 40 mg IV push injection     polyethylene glycol (MIRALAX/GLYCOLAX) Packet 17 g     POST-filter replacement solution for CVVHD & CVVHDF (Phoxillum BK4/2.5)     potassium chloride 20 mEq in 50 mL intermittent infusion     PRE-filter replacement solution for CVVHD & CVVHDF (Phoxillum BK4/2.5)     propofol (DIPRIVAN) infusion    And     propofol (DIPRIVAN) injection 10 mg/mL vial     senna-docusate (SENOKOT-S;PERICOLACE) 8.6-50 MG per tablet 1-2 tablet     sodium phosphate 20 mmol in D5W 100 mL intermittent infusion     vasopressin (VASOSTRICT) 40 Units in D5W 40 mL infusion         Labs:  Data   CMP    Recent Labs  Lab 11/16/18  1321 11/16/18  0958 11/16/18  0358 11/15/18  2159 11/15/18  1530    140 142 141 141   POTASSIUM 5.3 5.6* 4.8 5.0 4.2   CHLORIDE 100 101 102 102 101   CO2 30 29 32 30 28   ANIONGAP 9 10 8 10 12   * 197* 156* 150* 182*   BUN 83* 78* 70* 65* 59*   CR 3.40* 3.36* 3.25* 3.21* 3.27*   GFRESTIMATED 18* 19* 19* 20* 19*   GFRESTBLACK 22* 23* 23* 24* 23*   TERESA 8.3* 8.1* 8.4* 8.6 8.8   MAG  --  2.3 2.3 2.2 2.1   PHOS  --  6.5* 5.6* 6.2* 7.3*   PROTTOTAL  --  5.6* 5.5* 5.6* 5.4*   ALBUMIN  --  3.1* 3.0* 3.2* 3.0*   BILITOTAL  --  5.3* 4.9* 4.3* 4.3*   ALKPHOS  --  56 50 49 52   AST  --  927* 1056* 1146* 1484*   ALT  --  491* 544* 594*  704*       CBC    Recent Labs  Lab 11/16/18  0958 11/16/18  0358 11/15/18  2159 11/15/18  1530   WBC 10.8 9.4 9.4 10.9   RBC 2.71* 2.74* 2.61* 2.97*   HGB 8.4* 8.5* 8.0* 9.2*   HCT 25.7* 26.0* 24.2* 27.4*   MCV 95 95 93 92   MCH 31.0 31.0 30.7 31.0   MCHC 32.7 32.7 33.1 33.6   RDW 15.9* 16.2* 16.2* 16.6*   PLT 86* 101* 100* 75*     Arterial Blood Gas    Recent Labs  Lab 11/16/18  1321 11/16/18  1143 11/16/18  0958 11/16/18  0814   PH 7.43 7.43 7.44 7.42   PCO2 46* 45 46* 50*   PO2 121* 149* 170* 157*   HCO3 30* 30* 31* 33*   O2PER 40 40.0 40.0 40     Venous Blood Gas   Recent Labs  Lab 11/16/18  1321 11/16/18  1143 11/16/18  0958 11/16/18  0814  11/16/18  0359  11/15/18  1634 11/15/18  1530  11/15/18  0432  11/14/18  0354  11/13/18  2100   PHV  --   --   --   --   --   --   --   --  7.40  --   --   --  7.35  --  7.63*   PCO2V  --   --   --   --   --   --   --   --  48  --   --   --  41  --  13*   PO2V  --   --   --   --   --   --   --   --  43  --   --   --  47  --  211*   HCO3V  --   --   --   --   --   --   --   --  30*  --   --   --  23  --  13*   YOU  --   --   --   --   --  8.6  --  4.1 4.3  --  10.6  < >  --   --   --    O2PER 40 40.0 40.0 40  < >  --   < > 40 40  40  < > 40.0  < > 50.0  50.0  < > 100  100   < > = values in this interval not displayed.       Microbiology:  Data   Most Recent 6 Bacteria Isolates From Any Culture (See EPIC Reports for Culture Details):  Recent Labs   Lab Test  11/16/18   0416  11/15/18   0519  11/14/18   0544   CULT  No growth after 9 hours  No growth after 1 day  No growth after 2 days     Culture Micro   Date Value Ref Range Status   11/16/2018 No growth after 9 hours  Preliminary   11/15/2018 No growth after 1 day  Preliminary   11/14/2018 No growth after 2 days  Preliminary        Imaging:  Data         Recent Results (from the past 48 hour(s))   XR Abdomen Port 1 View    Narrative    Exam: XR ABDOMEN PORT 1 VW, 11/13/2018 10:50 PM    Indication: OG  placement    Comparison: None available    Findings:   A single portable supine AP view of the upper abdomen was obtained.  Chest tubes, cannulae, Belt-Jenn catheter, epicardial pacing wires,  and surgical instruments /clamps project over the lower chest. A right  femoral approach catheter tip projects over the distal IVC. The  enteric tube tip projects over the stomach with sidehole at or just  beyond the gastroesophageal junction. Nonobstructive bowel gas  pattern.      Impression    Impression:   The enteric tube tip projects of the stomach with the sidehole at or  just beyond the gastroesophageal junction. Consider slight  advancement.     I have personally reviewed the examination and initial interpretation  and I agree with the findings.    JOVANNY ROBERT MD   XR Chest Port 1 View    Narrative     Examination:  XR CHEST PORT 1 VW     Date:  11/13/2018 9:42 PM      Clinical Information: check ETT and line placement;      Additional Information: none    Comparison: none    Findings:     Multiple Ene type clamps are projecting over the mid chest. The ET  tube tip is approximately 4 cm above the rudy.    There is a right IJ Belt-Jenn catheter and the tip is in main right  pulmonary artery. There is a right-sided chest tube with complete  expansion of the right lung. There is a catheter projected over the  right heart border, exact position is uncertain.      Impression    Impression:    1. ET tube in good position.  2. Operative instruments projecting over the midline of the chest..    JOVANNY ROBERT MD   XR Chest Port 1 View    Narrative    Exam: XR CHEST PORT 1 VW, 11/14/2018 3:26 AM    Indication: Central ECMO    Comparison: 11/13/2018    Findings:   A single portable AP view of the chest was obtained. The endotracheal  tube tip projects over the mid trachea. The right IJ Belt-Ejnn  catheter tip projects over the proximal main pulmonary artery,  slightly retracted since the previous exam. The enteric tube  tip and  sidehole project over the stomach. Stable right apical chest tube.  Unchanged central ECMO cannulae. Prosthetic aortic valve. The cardiac  silhouette remains enlarged. Low lung volumes. No pneumothorax or  pleural effusion. Mild left basilar opacities.      Impression    Impression:   1. Slight retraction of the [place]-Jenn catheter with the tip projecting  over the proximal main pulmonary artery. Otherwise stable support  devices.  2. Minimal left basilar opacities, likely atelectasis.    I have personally reviewed the examination and initial interpretation  and I agree with the findings.    JOVANNY ROBERT MD   XR Feeding Tube Placement    Narrative    Feeding tube placement: 11/14/2018 1:35 PM    Comparison: Same-day chest x-ray    Indication: Nutritional needs    Fluoroscopy time: 0.78 minutes    Technique: After injection of Xylocaine gel into the right nostril, a  feeding tube was advanced under fluoroscopic guidance.    Findings: The feeding tube was advanced with the tip in the proximal  jejunum. A small amount of barium was injected to demonstrate  placement within the small bowel. The feeding tube was then flushed  with normal saline. The feeding tube was secured a nasal bridle.      Impression    Impression: Uncomplicated feeding tube placement with tip in the  distal duodenum/proximal jejunum.    I, YUNG CORONADO MD, attest that I was present for all critical  portions of the procedure and was immediately available to provide  guidance and assistance during the remainder of the procedure.    I have personally reviewed the examination and initial interpretation  and I agree with the findings.    YUNG CORONADO MD   XR Chest Port 1 View    Narrative    Exam: XR CHEST PORT 1 VW, 11/15/2018 4:29 AM    Indication: postop open chest    Comparison: 11/14/2018, 11/13/2018    Findings:   A single AP view the chest was obtained. The right IJ [place]-Jenn  catheter tip projects of the proximal pulmonary  artery. The enteric  tubes pass below the field-of-view. The endotracheal tube tip projects  over the mid trachea. Stable position of the ECMO cannulae. Prosthetic  aortic valve. The intra-aortic balloon pump tip projects over the  lower descending thoracic aorta. Surgical instruments project over the  mediastinum. Stable right apical chest tube. The cardiac silhouette  remains enlarged.      Impression    Impression:   1. Stable support devices. The intra-aortic balloon pump tip projects  over the lower descending thoracic aorta, consider advancement by  approximately 6 cm.  2. Stable enlargement of the cardiac silhouette with mild retrocardiac  atelectasis/consolidation.  3. Interval increase in pulmonary edema.    I have personally reviewed the examination and initial interpretation  and I agree with the findings.    JOVANNY ROBERT MD   XR Chest Port 1 View    Narrative    XR CHEST PORT 1 VW  11/15/2018 9:19 AM    History:  IABP placement; .     Comparison: Chest radiograph earlier today, 11/14/2018, 11/13/2018    Findings:   Supine portable AP chest radiograph. Endotracheal tube with the tip  projects 2.2 cm above rudy. Right IJ [place]-Jenn catheter with the tip  projects over right ventricle outflow tract. Unchanged intra-aortic  pump tip projects over 4.7 cm below the rudy, unchanged. Stable  positioning of gastric tube, feeding tube, right apical directed chest  tube, and ECMO cannulae. Unchanged 3 clamps projecting over  mediastinum.    Stable mild cardiomegaly. Persistent low lung volumes. Pulmonary  vasculature is mildly indistinct. Unchanged bibasilar basilar hazy  opacities. Unchanged small right pneumothorax without significant  pleural effusion.      Impression    IMPRESSION:  1.  Unchanged intraaortic pump with the tip 4.7 cm below the rudy.  Consider advancement. Otherwise stable supportive lines and tubes.  2.  Cardiomegaly with possible pulmonary interstitial edema.  3.  Persistent low lung  volumes. Unchanged bibasilar hazy opacities,  likely atelectasis.    I have personally reviewed the examination and initial interpretation  and I agree with the findings.    MATT SCHMITT, DO          LINES/TUBES:  Arterial Line 11/13/18 Radial (Active)   Site Assessment [employer] 11/16/2018 12:00 PM   Line Status Pulsatile blood flow 11/16/2018 12:00 PM   Art Line Waveform Appropriate 11/16/2018 12:00 PM   Art Line Interventions Leveled;Connections checked and tightened 11/16/2018 12:00 PM   Color/Movement/Sensation Capillary refill less than 3 sec 11/16/2018 12:00 PM   Dressing Type Transparent 11/16/2018 12:00 PM   Dressing Status Clean, dry, intact 11/16/2018 12:00 PM   Dressing Intervention Dressing changed/new dressing 11/14/2018 12:00 AM   Dressing Change Due 11/18/18 11/16/2018 12:00 PM   Number of days:3       CVC Double Lumen 11/13/18 (Active)   Site Assessment [employer] 11/16/2018 12:00 PM   Extravasation? No 11/16/2018 12:00 PM   Dressing Intervention Chlorhexidine sponge;Transparent 11/16/2018  8:00 AM   Dressing Change Due 11/18/18 11/16/2018  8:00 AM   CVC Comment [place] 11/16/2018 12:00 PM   CVC Lumen Assessment Becky;[place] 11/13/2018 10:00 PM   Number of days:3       Arterial Sheath  (Active)   Specific Qualities Sutured 11/16/2018 12:00 PM   Site Assessment UTV 11/16/2018 12:00 PM   Dressing Type Transparent 11/16/2018 12:00 PM   Dressing Intervention Dressing changed/new dressing 11/15/2018  4:00 PM   Arterial Sheath Comment ECMO 11/16/2018 12:00 PM   Number of days:3       Arterial Sheath  (Active)   Specific Qualities Sutured 11/16/2018 12:00 PM   Site Assessment WDL 11/16/2018 12:00 PM   Dressing Type Biopatch;Transparent 11/16/2018 12:00 PM   Dressing Intervention Dressing changed/new dressing 11/15/2018 12:00 PM   Arterial Sheath Comment IABP 11/16/2018 12:00 PM   Number of days:3       Venous Sheath (Active)   Specific Qualities Sutured 11/16/2018 12:00 PM   Site Assessment UTV 11/16/2018 12:00 PM    Dressing Type Transparent 11/16/2018 12:00 PM   Dressing Intervention Dressing changed/new dressing 11/15/2018  4:00 PM   Venous Sheath Comment ECMO 11/16/2018 12:00 PM   Number of days:3       Pulmonary Artery Catheter - Single Lumen 11/13/18 1200 Right internal jugular vein (Active)   Dressing dressing dry and intact 11/16/2018 12:00 PM   Securement secured with sutures 11/16/2018 12:00 PM   PA Catheter Markings (cm) 49 11/16/2018 12:00 PM   Phlebitis 0-->no symptoms 11/16/2018 12:00 PM   Infiltration 0-->no symptoms 11/16/2018 12:00 PM   Waveform normal 11/16/2018 12:00 PM   Pressure Catheter Interventions line leveled/zeroed;system flushed 11/16/2018 12:00 PM   Daily Review Of Necessity completed 11/16/2018 12:00 PM   Number of days:3

## 2018-11-16 NOTE — PLAN OF CARE
Problem: Extracorporeal Life Support/Membrane Oxygenation (Adult)  Goal: Signs and Symptoms of Listed Potential Problems Will be Absent, Minimized or Managed (Extracorporeal Life Support/Membrane Oxygenation)  Signs and symptoms of listed potential problems will be absent, minimized or managed by discharge/transition of care (reference Extracorporeal Life Support/Membrane Oxygenation (Adult) CPG).   Outcome: No Change  Care hours [id number]    Cardiac: Open chest. [facility] ECMO. Flows increased to 4 LPM. 1 unit RBC and 1 Unit FFP given per ECMO protocol. IABP 1:1 EKG. Augmented DBP 80's-90's. Drifting blood pressures earlier in shift required an increase in vasopressors. Unable to titrate down for rest of shift. Tips of fingers and toes dusky. All extremities cool. Episode of shivering lasting 2 hours overnight.     Neuro: Deeply sedated. Pupils equal and sluggish.     Resp: Ventilated. Coughs with stimulation. Mild thin, yellow drainage from nose.     GI: Abdomen rounded with hyperactive bowel sounds. TF increased per order. Insulin drip infusing, 's-150's.    : [place] patent. UOP 30-60 mL/hr. Decreased last hour to 15 mL/hr. Will continue to closely monitoring.

## 2018-11-16 NOTE — PLAN OF CARE
Problem: Patient Care Overview  Goal: Plan of Care/Patient Progress Review  Outcome: Improving  Patient remains on ECMO, IABP, Ventilator, Nitric and Vasopressor support. IABP advanced 6cm this morning by Cards 2 fellow, appears in correct position per Cards 2 attending. Patient had SANDRA with turndown this morning, tolerated well. Taken down to OR for washout, during washout patient ECMO cannulas tunneled into chest for better seal on open chest wound. During this time did clamp out with ECMO, patient did not tolerate at all per OR staff, became hypotensive and needed increased pressors. Patient returned from OR at 1600. Propofol restarted and then stopped, due to marked drop in MAP correlating with restart. Vasopressors weaned down. Consulted with CV[facility] about sedation plan, will attempt to manage with Versed fentanyl at this time as patient tolerates much better. Given 1 unit of Platelets and 500mL of Albumin upon return from OR. See flowsheets for further details. Patient wife and daughter updated over phone.

## 2018-11-16 NOTE — PROGRESS NOTES
Nephrology Progress Note  11/16/2018       Mr Spence is a 63 yom w/hx of HTN, HLP, ONOFRE, DM2, ETOH and AVR 2015 who had repeat AVR on 11/13 with difficulty weaning from CPB, transitioned to ECMO and transferred to [facility].  Nephrology consulted for management of hemodynamic AYE, started CRRT on 11/16.    Interval History :   Mr Spence's renal fx is stable with Cr of 3.3 over past 24h with several checks.  K jumped to 5.6 late this morning and also had elevated phos, CK was checked and was 11k so will start CRRT to prevent myoglobin injury to kidneys, also can use to match intake as he was net positive yesterday despite 1.7L of UOP and large chest tube output.  Unclear source of CK for now but will clear, using mix of 2k/4k baths to essentially provide a 3k Rx, matching I=O, CVP and PA pressures up.  .      Assessment & Recommendations:   AYE-Baseline Cr reported at 1.1, now stable at 3.3 over past 24h.  AYE is due to hemodynamic injury with cardiogenic shock.  Had planned on holding off on intervention today but K was elevated with late morning BNP check, CK add-on revealed some rhabdo with CK at 11k, given this we are starting CRRT to try to clear myoglobin and reduce renal exposure and further injury.  Will match I=O but unlikely UF will be major contributor as he has other sources of output.  Consent in chart for RRT.    -Has both ECMO and R femoral access. Running through ECMO for now, can pull line if ECMO course is prolonged but will revisit.    -Starting CRRT to manage K and clear myoglobin with elevated CK, mix of 2k/4k to essentially give a 3k Rx.  I=O for volume goal.      Volume status-Overloaded with bilateral elevated filling pressures but still on 2 pressors.  Still with reasonable UOP, starting CRRT mainly to clear myoglobin and reduce renal exposure, but will also use to match intake and prevent further volume overload.      Electrolytes/pH-K up to 5.6 despite stable (but low) renal fx, starting  "CRRT for clearance of K secondary to rhabdo.    Ca/phos/pth-Phos up a bit at 5.6, likely with CK up, will clear some with CRRT, Ca and Mg WNL.     Rhabdomyolysis-Unclear source, CK up at ~11k along with K and phos, starting CRRT to clear myoglobin.     Anemia-Hgb 8.4, replacement per team with ECMO.    Nutrition-Impact TF     Seen and discussed with Dr William    Recommendations were communicated to primary team via verbal communication.    Davide Damon  Clinical Nurse Specialist  [phone]    Review of Systems:   I reviewed the following systems:  ROS not done due to vent/sedation.      Physical Exam:   I/O last 3 completed shifts:  In: 4632.52 [I.V.:1271.52; NG/GT:300]  Out: 3370 [Urine:1995; Chest Tube:1375]   BP (!) 82/48 (BP Location: Right arm)  Temp (P) 98.8  F (37.1  C) (Bladder)  Resp (P) 18  Ht 1.7 m (5' 6.93\")  Wt 93 kg (205 lb 0.4 oz)  SpO2 95%  BMI 32.18 kg/m2     GENERAL APPEARANCE: Intubated, sedated, ECMO and IABP, critically ill.   EYES:  No scleral icterus, pupils equal  HENT: mouth without ulcers or lesions  PULM: lungs rhonchi to auscultation, equal air movement, no cyanosis or clubbing  CV: regular rhythm, normal rate, no rub     -edema +2 LE  GI: soft, nontender, non-distended, bowel sounds are +  MS: no evidence of inflammation in joints, no muscle tenderness  NEURO: Intubated and sedated.      Labs:   All labs reviewed by me  Electrolytes/Renal -   Recent Labs   Lab Test  11/16/18   1321  11/16/18   0958  11/16/18   0358  11/15/18   2159   NA  139  140  142  141   POTASSIUM  5.3  5.6*  4.8  5.0   CHLORIDE  100  101  102  102   CO2  30  29  32  30   BUN  83*  78*  70*  65*   CR  3.40*  3.36*  3.25*  3.21*   GLC  284*  197*  156*  150*   TERESA  8.3*  8.1*  8.4*  8.6   MAG   --   2.3  2.3  2.2   PHOS   --   6.5*  5.6*  6.2*       CBC -   Recent Labs   Lab Test  11/16/18   0958  11/16/18   0358  11/15/18   2159   WBC  10.8  9.4  9.4   HGB  8.4*  8.5*  8.0*   PLT  86*  101*  100*       LFTs " -   Recent Labs   Lab Test  11/16/18   0958  11/16/18   0358  11/15/18   2159   ALKPHOS  56  50  49   BILITOTAL  5.3*  4.9*  4.3*   ALT  491*  544*  594*   AST  927*  1056*  1146*   PROTTOTAL  5.6*  5.5*  5.6*   ALBUMIN  3.1*  3.0*  3.2*       Iron Panel - No lab results found.        Current Medications:    escitalopram  10 mg Oral Daily     multivitamins with minerals  15 mL Per Feeding Tube Daily     pantoprazole (PROTONIX) IV  40 mg Intravenous Q24H     polyethylene glycol  17 g Oral Daily     senna-docusate  1-2 tablet Oral or Feeding Tube BID       IV fluid REPLACEMENT ONLY       IV fluid REPLACEMENT ONLY       CRRT replacement solution 12.5 mL/kg/hr (11/16/18 1543)     EPINEPHrine IV infusion ADULT 0.1 mcg/kg/min (11/16/18 1500)     fentaNYL 100 mcg/hr (11/16/18 1500)     HEParin 1,200 Units/hr (11/16/18 1500)     insulin (regular) 6 Units/hr (11/16/18 1500)     midazolam Stopped (11/16/18 0835)     - MEDICATION INSTRUCTIONS -       norepinephrine Stopped (11/15/18 1459)     CRRT replacement solution 2.222 mL/kg/hr (11/16/18 1542)     CRRT replacement solution 12.5 mL/kg/hr (11/16/18 1542)     propofol (DIPRIVAN) infusion 20 mcg/kg/min (11/16/18 1500)     vasopressin (PITRESSIN) infusion ADULT (40 mL) 2 Units/hr (11/16/18 1500)

## 2018-11-16 NOTE — PROGRESS NOTES
ECMO Shift Summary:    Patient remains on [facility] ECMO, all equipment is functioning and alarms are appropriately set. RPM's 3135 with flow range 4.02-4.13 L/min. Sweep gas is at 1 LPM and FiO2 70%. Circuit remains free of air, clot and fibrin. Cannulas are secure with no bleeding from site. Extremities are cool to touch. Suctioned ETT for a small amount of thick, red-streaked secretions.    Significant Shift Events: Patient became hypotensive during the shift. Increased ECMO flows, gave one unit of RBCs and one unit of FFP, and vasopressin was increased.    Vent settings:  Ventilation Mode: CMV/AC  (Continuous Mandatory Ventilation/ Assist Control)  FiO2 (%): 40 %  Rate Set (breaths/minute): 18 breaths/min  Tidal Volume Set (mL): 450 mL  PEEP (cm H2O): 8 cmH2O  Oxygen Concentration (%): 40 %  Resp: 18.    Heparin is running at 1000 u/hr, ACT range 143-152.    Urine output is about 40 ml/hr, no blood was lost. Product given included one unit of RBCs and one unit of FFP.      Intake/Output Summary (Last 24 hours) at 11/16/18 0642  Last data filed at 11/16/18 0600   Gross per 24 hour   Intake          4609.02 ml   Output             3150 ml   Net          1459.02 ml       ECHO:  No results found for this or any previous visit.No results found for this or any previous visit.    CXR:  Recent Results (from the past 24 hour(s))   XR Chest Port 1 View    Narrative    XR CHEST PORT 1 VW  11/15/2018 9:19 AM    History:  IABP placement; .     Comparison: Chest radiograph earlier today, 11/14/2018, 11/13/2018    Findings:   Supine portable AP chest radiograph. Endotracheal tube with the tip  projects 2.2 cm above rudy. Right IJ Duarte-Jenn catheter with the tip  projects over right ventricle outflow tract. Unchanged intra-aortic  pump tip projects over 4.7 cm below the rudy, unchanged. Stable  positioning of gastric tube, feeding tube, right apical directed chest  tube, and ECMO cannulae. Unchanged 3 clamps projecting  over  mediastinum.    Stable mild cardiomegaly. Persistent low lung volumes. Pulmonary  vasculature is mildly indistinct. Unchanged bibasilar basilar hazy  opacities. Unchanged small right pneumothorax without significant  pleural effusion.      Impression    IMPRESSION:  1.  Unchanged intraaortic pump with the tip 4.7 cm below the rudy.  Consider advancement. Otherwise stable supportive lines and tubes.  2.  Cardiomegaly with possible pulmonary interstitial edema.  3.  Persistent low lung volumes. Unchanged bibasilar hazy opacities,  likely atelectasis.    I have personally reviewed the examination and initial interpretation  and I agree with the findings.    MATT SCHMITT, DO   XR Chest Port 1 View    Narrative    Exam: XR CHEST PORT 1 VW, 11/15/2018 10:36 AM    Indication: Balloon pump advanced.  Confirm placement.;     Comparison: 11/15/2018    Findings:   Approximately 2.5 cm above the rudy. Right IJ Galva-Jenn catheter  projecting over the right ventricular outflow tract. ECMO cannula in  similar position. Right chest tube in similar position. Both enteric  tubes projecting over the esophagus and stomach. 3 surgical clamps in  unchanged position over the mediastinum. Aortic valve prosthesis in  place. Aortic balloon pump now approximately level of the urdy.    Unchanged cardiomegaly. Persistent low lung volumes. Unchanged  bibasilar and bilateral perihilar hazy opacities and small right  pneumothorax.      Impression    Impression: Balloon pump now projects at the level of the rudy.  Otherwise no significant interval change.    I have personally reviewed the examination and initial interpretation  and I agree with the findings.    MATT SCHMITT, DO   XR Chest Port 1 View    Narrative    EXAM: XR CHEST PORT 1 VW  11/15/2018 5:06 PM     HISTORY:  s/p chest washout;     COMPARISON: Chest radiograph dated 11/15/2018    FINDINGS: A single AP view of the chest is obtained. Endotracheal tube  tip projects  over the midthoracic trachea. Right IJ Washington-Jenn catheter  tip projects over the proximal right pulmonary artery. Aortic valve  prosthesis. Intra-aortic balloon pump marker projects over the aortic  bulb. Gastric tube tip and sidehole project over the stomach. Feeding  tube tip is off the field-of-view. Right chest tube is repositioned  and directed over the right lower lobe. Mediastinal drains. Surgical  clamps are removed. ECMO cannulae project over the right cardiac  silhouette and upper abdomen. Surgical clips are removed.    Cardiac silhouette is unchanged. Persistent low lung volumes.  Increased basilar and stable perihilar opacities. Previously  visualized right pneumothorax is not appreciated on the current study.  Small bilateral pleural effusions. The upper abdomen is unremarkable.      Impression    IMPRESSION:   1. Washington-Jenn catheter tip projects over the proximal right pulmonary  artery.  2. Repositioning of the right chest tube. Previously visualized right  pneumothorax is not well seen on the current study.  3. Small bilateral pleural effusions with increased bibasilar and  stable perihilar atelectasis.    I have personally reviewed the examination and initial interpretation  and I agree with the findings.    JOVANNY ROBERT MD   XR Abdomen Port 1 View    Narrative    Exam: XR ABDOMEN PORT 1 VW, 11/15/2018 5:09 PM    Indication: confirm feeding tube placement;     Comparison: 11/13/2018    Findings:   Supine frontal view of the abdomen. Gastric tube tip and sidehole  project over the stomach. Weighted feeding tube tip projects near the  ligament of Treitz. Right chest tube, ECMO cannula, and epicardial  pacer wires are partially visualized. Right femoral vein central  venous catheter tip projects over L4. Intra-aortic balloon pump  inferior marker projects over the left side of the L1 vertebral body.  Surgical clips project over the lower pelvis. Cannula projecting over  the left femur and hip with  tip projecting over the left sacrum.    There is relative paucity of bowel gas projecting over the mid abdomen  with nonobstructive bowel gas pattern of the colon. No definite  pneumatosis or portal venous gas. No definite free air on this supine  study.      Impression    Impression:   1. Feeding tube tip projects near the ligament of Treitz.  2. Additional support devices appear grossly unchanged.    I have personally reviewed the examination and initial interpretation  and I agree with the findings.    JOVANNY ROBERT MD   XR Chest Port 1 View    Narrative    1. Stable support devices.  2. Unchanged cardiomegaly, low lung volumes, and bibasilar  atelectasis/consolidation.       Labs:    Recent Labs  Lab 11/16/18  0626 11/16/18  0358 11/16/18  0208 11/16/18  0010   PH 7.41 7.43 7.44 7.44   PCO2 52* 50* 49* 48*   PO2 152* 176* 146* 170*   HCO3 33* 33* 33* 33*   O2PER 40 40 40 40       Lab Results   Component Value Date    HGB 8.5 (L) 11/16/2018    PHGB <30 11/16/2018     (L) 11/16/2018    FIBR 491 (H) 11/16/2018    INR 1.77 (H) 11/16/2018    PTT 50 (H) 11/15/2018    DD 1.2 (H) 11/16/2018    AXA <0.10 11/16/2018    ANTCH 43 (L) 11/15/2018         Plan is to continue VA ECMO at this time.      Steve Garcia, RRT  11/16/2018 6:42 AM

## 2018-11-17 NOTE — PROGRESS NOTES
Nephrology Progress Note  11/17/2018         Mr Spence is a 63 yom w/hx of HTN, HLP, ONOFRE, DM2, ETOH and AVR 2015 who had repeat AVR on 11/13 with difficulty weaning from CPB, transitioned to ECMO and transferred to [facility].  Nephrology consulted for management of hemodynamic AYE, started CRRT on 11/16.     Interval History :   Mr Spence started on CRRT yesterday for elevated CK and K, both now improved today but having issues with chest tubes and open chest.  Will plan no UF until these issues are sorted, will continue for management of electrolytes.  Likely to go to OR for washout today, will consider pulling UF once more stable.       Assessment & Recommendations:   AYE-Baseline Cr reported at 1.1, now stable at 3.3 over past 24h.  AYE is due to hemodynamic injury with cardiogenic shock.  Had planned on holding off on intervention today but K was elevated with late morning BNP check, CK add-on revealed some rhabdo with CK at 11k, given this we are starting CRRT to try to clear myoglobin and reduce renal exposure and further injury.  Will match I=O but unlikely UF will be major contributor as he has other sources of output.  Consent in chart for RRT.                          -Has both ECMO and R femoral access. Running through ECMO for now, can pull line if ECMO course is prolonged but will revisit.                          -CRRT to manage K and clear myoglobin with elevated CK, mix of 2k/4k to essentially give a 3k Rx.  No UF with tamponade physiology.       Volume status-Overloaded with bilateral elevated filling pressures but still on 2 pressors but with some signs of tamponade this am with open chest, likely to go to OR for washout, no UF until issues are sorted to not drop preload.       Electrolytes/pH-K 4.4, on CRRT for clearance of K secondary to rhabdo.     Ca/phos/pth-Phos up a bit at 4.6 with CK up, will clear some with CRRT, Ca and Mg WNL.      Rhabdomyolysis-Unclear source, CK up at ~11k initially  "along with K and phos, CK 7k this am with CRRT.      Anemia-Hgb 8.3, replacement per team with ECMO.     Nutrition-Impact TF      Seen and discussed with Dr William     Recommendations were communicated to primary team via verbal communication.       Davide Damon  Clinical Nurse Specialist  [phone]    Review of Systems:   I reviewed the following systems:  ROS not done due to vent/sedation.     Physical Exam:   I/O last 3 completed shifts:  In: 6257.75 [I.V.:1964.75; Other:261; NG/GT:320]  Out: 2835 [Urine:1870; Other:25; Chest Tube:940]   BP (!) 82/48 (BP Location: Right arm)  Temp 97.9  F (36.6  C)  Resp 18  Ht 1.7 m (5' 6.93\")  Wt 96 kg (211 lb 10.3 oz)  SpO2 96%  BMI 33.22 kg/m2     GENERAL APPEARANCE: Intubated, sedated, ECMO and IABP, critically ill.   EYES:  No scleral icterus, pupils equal  HENT: mouth without ulcers or lesions  PULM: lungs rhonchi to auscultation, equal air movement, no cyanosis or clubbing  CV: regular rhythm, normal rate, no rub     -edema +2 LE  GI: soft, nontender, non-distended, bowel sounds are +  MS: no evidence of inflammation in joints, no muscle tenderness  NEURO: Intubated and sedated.      Labs:   All labs reviewed by me  Electrolytes/Renal -   Recent Labs   Lab Test  11/17/18 0344 11/16/18 2158 11/16/18   1555   NA  141  142  140   POTASSIUM  4.4  4.4  5.0   CHLORIDE  105  104  101   CO2  28  29  31   BUN  60*  74*  92*   CR  2.09*  2.62*  3.38*   GLC  167*  109*  267*   TERESA  8.1*  8.6  8.5   MAG  2.4*  2.3  2.5*   PHOS  4.6*  3.2  6.0*       CBC -   Recent Labs   Lab Test  11/17/18   0344 11/16/18 2158 11/16/18   1555   WBC  9.7  8.6  9.3   HGB  8.3*  8.3*  8.3*   PLT  113*  86*  98*       LFTs -   Recent Labs   Lab Test  11/17/18 0344  11/16/18   2158  11/16/18   1555   ALKPHOS  58  54  55   BILITOTAL  5.4*  5.5*  5.0*   ALT  337*  396*  459*   AST  585*  649*  771*   PROTTOTAL  5.6*  5.7*  5.4*   ALBUMIN  3.0*  3.3*  2.9*       Iron Panel - No lab " results found.        Current Medications:    escitalopram  10 mg Oral Daily     multivitamins with minerals  15 mL Per Feeding Tube Daily     pantoprazole (PROTONIX) IV  40 mg Intravenous Q24H     polyethylene glycol  17 g Oral Daily     senna-docusate  1-2 tablet Oral or Feeding Tube BID       IV fluid REPLACEMENT ONLY       IV fluid REPLACEMENT ONLY       CRRT replacement solution 12.5 mL/kg/hr (11/17/18 0514)     EPINEPHrine IV infusion ADULT 0.1 mcg/kg/min (11/17/18 0700)     fentaNYL 100 mcg/hr (11/17/18 0700)     HEParin 1,200 Units/hr (11/17/18 0700)     insulin (regular) 8 Units/hr (11/17/18 0751)     midazolam Stopped (11/16/18 0835)     - MEDICATION INSTRUCTIONS -       norepinephrine 0.06 mcg/kg/min (11/17/18 0700)     CRRT replacement solution 2.222 mL/kg/hr (11/16/18 1542)     CRRT replacement solution 12.5 mL/kg/hr (11/17/18 0514)     propofol (DIPRIVAN) infusion 40 mcg/kg/min (11/17/18 0700)     vasopressin (PITRESSIN) infusion ADULT (40 mL) 3 Units/hr (11/17/18 0700)

## 2018-11-17 NOTE — PROGRESS NOTES
CV ICU PROGRESS NOTE  11/17/2018    CO-MORBIDITIES:   Severe Aortic stenosis and regurge.   ONOFRE  DM  HTN  HLD    ASSESSMENT: Johan Spence is a 63 year old male now s/p [facility]. Was unable to be weaned from CPB so placed on VA ECMO and sent to [facility] for further cares. SANDRA shows adequate biventricular function upon ECMO turndown 11/15/18.       TODAY'S PROGRESS:   - Possible to OR tomorrow.   - Needing washout and CT replacement   - Checking thyroid labs.   - Cortisol   - 12 lead EKG  - Needing additional TPW's.        PLAN:  Neuro/pain/sedation:  #Post op pain.   - Monitor neurological status. Notify the MD for any acute changes in exam.  - Fentanyl gtt for pain.   - Propofol gtt. BIS monitoring 40-60.  RASS goal -3 to -4.  - Oxy/dil PRN. Robaxin PRN.   - Lidoderm.     Pulmonary:   #MV postop  #ONOFRE    - Supplemental oxygen to keep saturation above 92 %.  - Incentive spirometer every 15-30 minutes when awake.  - Mechanically ventilated, will maintain PEEP of 8 while resuscitation.     Cardiovascular:   #AV[facility] and [facility].    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   #HTN.   #Complete HB.    #Vasoplegia  - Monitor hemodynamic status.   - Epi/norepi/vaso gtts.  - MAP >65.   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 Limited information available in chart.    - ECMO turndown in OR 11/15 unsuccessful.   - Chest remains open and will repeat washout and closure with ECMO turndown again in next few days. Patient became hypotensive with RV strain on 11/15 attempt.   - Nitric weaned off.     - Holding PTA Lisinopril and Metoprolol.   - 12 Lead EKG shows complete HB.   - Washed out at Bedside 11/17 with removal of clots. Pressor requirements improved.  - Coronary angiogram given elevated troponin.      GI care:   - Miralax/senna-colace, suppository PRN    Fluids/ Electrolytes/ Nutrition:   - TKO  - NPO    - NJ  feeds can advance to goal.     - BMP q6h.  - D5W gtt stopped given correction of Na. (140 from 150)  - No indication for parenteral nutrition.    Renal:    #Right groin HD line in place.  - Nephrology following.    - Will continue to monitor intake and output.  - [place]  - Goal even to negative for today.    - CRRT through ECMO circuit.     Endocrine:    - Insulin gtt.    ID/ Antibiotics:  - Periop antibiotics.  - No signs of infection.     Heme:     - Hemoglobin stable.   - CBC/coags q6h.  - Anticoagulation: Hep gtt.      Prophylaxis:    - Mechanical prophylaxis for DVT.   - Hep gtt for ECMO -180.   - PPI    Lines/tubes/drains:  - R internal jugular MAC/swan  - ETT  - Left IABP  - R groin HD catheter.   - VA ECMO  - Chest tubes: 3 meds.    - [place]  - NJ feeding tube.   - AV TPW's.     Disposition:  - CV ICU.    Patient seen, findings and plan discussed with CV ICU staff, Dr. Ball.    Davide Escalante MD  CA-3/PGY-4    ====================================    SUBJECTIVE:   No events overnight. Give 500 mL albumin given some ecmo chugging last night. Pressor requirement has been stable. Bulging from chest dressing with tamponade physiology so will washout today.     OBJECTIVE:   1. VITAL SIGNS:   Temp:  [97.7  F (36.5  C)-99.1  F (37.3  C)] 97.7  F (36.5  C)  Heart Rate:  [80] 80  Resp:  [18] 18  MAP:  [55 mmHg-88 mmHg] 70 mmHg  Arterial Line BP: ()/(37-58) 108/46  FiO2 (%):  [40 %] 40 %  SpO2:  [93 %-100 %] 97 %  Ventilation Mode: CMV/AC  (Continuous Mandatory Ventilation/ Assist Control)  FiO2 (%): 40 %  Rate Set (breaths/minute): 18 breaths/min  Tidal Volume Set (mL): 450 mL  PEEP (cm H2O): 8 cmH2O  Oxygen Concentration (%): 40 %  Resp: 18    2. INTAKE/ OUTPUT:   I/O last 3 completed shifts:  In: 6257.75 [I.V.:1964.75; Other:261; NG/GT:320]  Out: 2835 [Urine:1870; Other:25; Chest Tube:940]    3. PHYSICAL EXAMINATION:   General: laying in bed on ECMO and ventilator.   Neuro: sedated and intubated.   Resp:  Breathing non-labored on vent.   CV: Paced at 80.   Abdomen: Soft, Non-distended,  Incisions: c/d/i.   Extremities: warm and well perfused    4. INVESTIGATIONS:   Arterial Blood Gases     Recent Labs  Lab 11/17/18  0749 11/17/18  0543 11/17/18 0344 11/17/18  0158   PH 7.36 7.38 7.42 7.44   PCO2 52* 51* 47* 45   PO2 123* 128* 188* 191*   HCO3 30* 30* 31* 31*     Complete Blood Count     Recent Labs  Lab 11/17/18 0344 11/16/18 2158 11/16/18  1555 11/16/18  0958   WBC 9.7 8.6 9.3 10.8   HGB 8.3* 8.3* 8.3* 8.4*   * 86* 98* 86*     Basic Metabolic Panel    Recent Labs  Lab 11/17/18 0344 11/16/18 2158 11/16/18  1555 11/16/18  1321    142 140 139   POTASSIUM 4.4 4.4 5.0 5.3   CHLORIDE 105 104 101 100   CO2 28 29 31 30   BUN 60* 74* 92* 83*   CR 2.09* 2.62* 3.38* 3.40*   * 109* 267* 284*     Liver Function Tests    Recent Labs  Lab 11/17/18 0344 11/16/18 2158 11/16/18  1555 11/16/18  0958   * 649* 771* 927*   * 396* 459* 491*   ALKPHOS 58 54 55 56   BILITOTAL 5.4* 5.5* 5.0* 5.3*   ALBUMIN 3.0* 3.3* 2.9* 3.1*   INR 1.32* 1.31* 1.33* 1.35*     Pancreatic Enzymes  No lab results found in last 7 days.  Coagulation Profile    Recent Labs  Lab 11/17/18 0344 11/16/18 2158 11/16/18  1555 11/16/18  0958   INR 1.32* 1.31* 1.33* 1.35*   PTT 73* 74* 79* 71*         5. RADIOLOGY:   Recent Results (from the past 24 hour(s))   XR Chest Port 1 View    Narrative    1. Stable support devices.  2. Stable enlargement of the cardiac silhouette, layering pleural  effusions, and bibasilar opacities.       =========================================

## 2018-11-17 NOTE — PROGRESS NOTES
CRRT RESTART NOTE    Reason for Restart:  Stopped due to max fluid loss after effluent bag was clamped.  Kept off due to cardiac cath lab, but kept getting bumped.  Decided to restart.  Error Code:  Max fluid loss achieved.  Again did not start due to frozen screen (syringe screen).  Was about to restart again, and the cardiac cath lab team came to the bedside to get the patient.     Patient s Vascular Access: ECMO Catheter/Circuit                  Placement Confirmed: ECMO    DATA:  Procedure:  CVVHDF  Start Time: unknown-will hook up when patient comes back from cath lab.  Machine#:  7  Filter:  M150  Blood Flow:   200mL/min  Pre-Replacement Solution:  Phox BK 4/2.5  Post-Replacement Solution:  Phox BK 4/2.5  Dialysate Solution:  PrismaSol BGK 2/3.5  Pre-Replacement Solution Rate:  1100 mL/hr  Post-Replacement Solution Rate:  200 mL/hr  Dialysate Flow Rate: 1100 mL/hr  Patient Removal Rate: 0 mL/hr  Anticoagulation Type and Rate:  N/A         INTERVENTIONS:      PLAN:

## 2018-11-17 NOTE — PROGRESS NOTES
Cardiology - Heart failure service    Mr. Johan Spence is a 63 year old male with h/o AVR, DM, ONOFRE, obesity, HTN, who [facility]an off pump. Had re-do AVR and ao repalacement at [facility] but during placing PA cath in OR he was coded and placed pump. AVR and ao graft replacement were successful but he was unable to come off pump. He was transferred here for further management.      Interval events:  Had to readd levophed yesterday. Open chest. CT tubes appear clotted off, with positional changes overnight patient's BP dropped. Pressor requirement increasing.     Vital signs: Afebrile, HR 80, /42; MAP 63, oxygenating 99%  Tele: No significant events noted.   Hemodynamics:  CVP 11, PA 24/15/20  IABP via LFA, 1:1, triggered by EKG, augmented diastolic 86, pump mean 63, L radial and distal pulse in tact   ECMO settings:  Patient remains on VA ECM. RPM's 3135 with flow range 4.01-4.13 L/min. Sweep gas is at 1 LPM and FiO2 70%.   Vent Rate 18, , PEEP 8, 40% oxygen, CMV/AC.   Epicardial pacemaker: DDD, 80, capturing.     I/O last 3 completed shifts:  In: 4782.7 [I.V.:1520.7; Other:221; NG/GT:310]  Out: 2817 [Urine:1770; Other:7; Chest Tube:1040]  CT yesterday 1 L, 140 since midnight.    Hemodynamic drips: Epi 0.1, levophed 0.05, vaso 3.   Selected medications: lexapro, lasix 80 mg at 10:30 am yesterday, pantoprazole 40 mg daily,   Sedation: fentanyl 100, heparin, insulin 10, propofol 40.     Physical examination:  GEN:  intubated and sedated  CV: no murmur, open chest  Central cannulation. Chest tubes in place.  LUNGS:  Clear to auscultation bilaterally   ABD:  Active bowel sounds, soft, non-tender/non-distended.  No rebound/guarding/rigidity.  EXT:  No edema or cyanosis.  Hands/feet warm to touch with good signs of peripheral perfusion.      Labs were reviewed.  Lab Results   Component Value Date    WBC 9.7 11/17/2018     Lab Results   Component Value Date     RBC 2.71 11/17/2018     Lab Results   Component Value Date    HGB 8.3 11/17/2018     Lab Results   Component Value Date    HCT 25.4 11/17/2018     No components found for: MCT  Lab Results   Component Value Date    MCV 94 11/17/2018     Lab Results   Component Value Date    MCH 30.6 11/17/2018     Lab Results   Component Value Date    MCHC 32.7 11/17/2018     Lab Results   Component Value Date    RDW 16.7 11/17/2018     Lab Results   Component Value Date     11/17/2018       Last Comprehensive Metabolic Panel:  Sodium   Date Value Ref Range Status   11/17/2018 141 133 - 144 mmol/L Final     Potassium   Date Value Ref Range Status   11/17/2018 4.4 3.4 - 5.3 mmol/L Final     Chloride   Date Value Ref Range Status   11/17/2018 105 94 - 109 mmol/L Final     Carbon Dioxide   Date Value Ref Range Status   11/17/2018 28 20 - 32 mmol/L Final     Anion Gap   Date Value Ref Range Status   11/17/2018 8 3 - 14 mmol/L Final     Glucose   Date Value Ref Range Status   11/17/2018 167 (H) 70 - 99 mg/dL Final     Urea Nitrogen   Date Value Ref Range Status   11/17/2018 60 (H) 7 - 30 mg/dL Final     Creatinine   Date Value Ref Range Status   11/17/2018 2.09 (H) 0.66 - 1.25 mg/dL Final     GFR Estimate   Date Value Ref Range Status   11/17/2018 32 (L) >60 mL/min/1.7m2 Final     Comment:     Non  GFR Calc     Calcium   Date Value Ref Range Status   11/17/2018 8.1 (L) 8.5 - 10.1 mg/dL Final     Cr yesterday 2.6.  LA 1.2.  to 337,  to 585, CK 7641 to 7079  Hgb 8.3.   INR 1.32.     Last Arterial Blood Gas:  pH Arterial   Date Value Ref Range Status   11/17/2018 7.38 7.35 - 7.45 pH Final     pCO2 Arterial   Date Value Ref Range Status   11/17/2018 51 (H) 35 - 45 mm Hg Final     pO2 Arterial   Date Value Ref Range Status   11/17/2018 128 (H) 80 - 105 mm Hg Final     Bicarbonate Arterial   Date Value Ref Range Status   11/17/2018 30 (H) 21 - 28 mmol/L Final     Base Excess Art   Date Value Ref Range  Status   11/17/2018 4.2 mmol/L Final     Comment:     Abnormal Result, Ref range: -9.0 to 1.8       Radiology     1. Stable support devices.  2. Stable enlargement of the cardiac silhouette, layering pleural  effusions, and bibasilar opacities.    Cardiology No changes.          IMPRESSION & PLAN  Mr. Johan Spence is a 63 year old male with h/o AVR, DM, ONOFRE, obesity, HTN, who [facility] with [facility] of [facility]an off pump. Had re-do AVR and ao repalacement at [facility] but during placing PA cath in OR he was coded and placed pump. AVR and ao graft replacement were successful but he was unable to come off pump. He was transferred here for further management.     Changes today  -ecg done with pacemaker on hold, complete HB is present. Ventricular escape.   -Dr. Morse did evacuation of cardiac tamponade and bedside chest dressing change. The previous chest dressing was removed. There was a large amount of blood and clot in the pericardium.  All the chest tubes were clotted.  -vasodilatory workup - TSH normal, cortisol pending, BC negative.     Cardiovascular:   #AVR [facility].    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   #HTN.    - Monitor hemodynamic status.   - Epi/norepi/vaso gtts. Currently just on vaso/epi.   - MAP >65.   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 Limited information available in chart.    - ECMO turndown in OR 11/15 unsuccessful.   - Chest remains open and will repeat washout and closure with ECMO turndown again in next few days. Patient became hypotensive with RV strain on 11/15 attempt.   - Weaning Nitric Oxide per RT protocol.    - Holding PTA Lisinopril and Metoprolol.   - We have very limited information on his baseline LV/RV function. He is still on 3 pressors, has evidence of pulsatility. Pressor requirement increasing likely because of vasoplegia as well as early tamponade physiology. Chest drained  today via Dr. Morse, hemodynamics improved.  - His baseline LV/RV function was normal [facility]. During pre-op preparation of AS surgery, severe AS and vasodilation caused hypotension and bradycardia.    Neuro/pain/sedation:  #Post op pain.   - Fentanyl gtt for pain.   - Propofol gtt. BIS monitoring 40-60. RASS goal -3 to -4.  - Oxy/dil PRN. Robaxin PRN.     Pulmonary:   #MV postop  #ONOFRE    - Supplemental oxygen to keep saturation above 92 %.  - Mechanically ventilated, will maintain PEEP of 8 while resuscitation.      Renal:    #Right groin HD line in place.  - Nephrology following.    - Will continue to monitor intake and output.  - CRT ongoing.      Lines/tubes/drains:  - R internal jugular MAC/swan  - ETT  - Left IABP  - R groin HD catheter.   - VA ECMO  - Chest tubes: 3 meds.    - Coronado  - NJ feeding tube.   - AV TPW's.      Disposition:  - CV ICU.    I discussed the patient with Dr. Peraza.    Thank you for allowing me to care for this patient. This has been discussed with the attending physician.    Winter King MD  Cardiology Fellow, PGY-5      I have reviewed today's vital signs, notes, medications, labs and imaging. I have also seen and examined the patient and agree with the findings and plan as outlined above.    Sylvia Peraza MD  Section Head - Advanced Heart Failure, Transplantation and Mechanical Circulatory Support  Co-Director - [facility]  Associate Professor of Medicine, [facility]

## 2018-11-17 NOTE — PLAN OF CARE
Problem: Patient Care Overview  Goal: Plan of Care/Patient Progress Review  Outcome: No Change  Assessment:   Cardiac: 100% AV TPM, asystole underlying. Epinephrine, Levophed, Vasopressin titrated to keep MAP > 60. IABP 1:1 via Pacer. A/V ECMO, Heparin gtt infusing.  Resp: CMV 40%/450/18/8.       Neuro: Propofol increased due to tremors/shivering. Fentanyl gtt infusing.   : Coronado patent, UO 50-75 cc/hr. Ongoing CRRT, Flow alarms during shift. Unable to pull any fluid due to hemodynamic instability.   GI: Tube feedings via NJ @ 50 cc/hr. Rectal tube in place. OG to LIS.  Skin: ECMO cannulas bleeding, requiring dressing reinforcement q 1-2 hours, Dr. Eli and Dr. Chung aware. Mediastinal CT x 3 -20 cc, no air leak present. Open chest, air/fluid under Ioban.   Endocrine: Insulin gtt infusing.    Interventions:   2 unit of RBCs and 1 unit of Platelets given.    Plan:  Possible OR washout. Will continue to monitor/assess and update MD as needed.

## 2018-11-17 NOTE — PROGRESS NOTES
ECMO Shift Summary:    Patient remains on VA ECMO, all equipment is functioning and alarms are appropriately set. RPM's 3150 with flow range 4.02 L/min. Sweep gas is at 1 LPM and FiO2 70%. Circuit remains free of air, clot and fibrin. Cannulas are secure with no bleeding from site. Extremities are warm to the touch. Suctioned ETT for small amount of secretions.    Significant Shift Events:    Vent settings:  Ventilation Mode: CMV/AC  (Continuous Mandatory Ventilation/ Assist Control)  FiO2 (%): 40 %  Rate Set (breaths/minute): 18 breaths/min  Tidal Volume Set (mL): 450 mL  PEEP (cm H2O): 8 cmH2O  Oxygen Concentration (%): 40 %  Resp: 18.    Heparin is running at 1200 u/kg/hr, ACT range 160.    Urine output is adequate, blood loss was moderate from cannulation site. Products given included 2 units of PRBC, 1 unit of platelets.      Intake/Output Summary (Last 24 hours) at 11/17/18 0729  Last data filed at 11/17/18 0700   Gross per 24 hour   Intake          6301.53 ml   Output             2790 ml   Net          3511.53 ml       ECHO:  No results found for this or any previous visit.No results found for this or any previous visit.    CXR:  Recent Results (from the past 24 hour(s))   XR Chest Port 1 View    Narrative    1. Stable support devices.  2. Stable enlargement of the cardiac silhouette, layering pleural  effusions, and bibasilar opacities.       Labs:    Recent Labs  Lab 11/17/18  0543 11/17/18  0344 11/17/18  0158 11/16/18  2353   PH 7.38 7.42 7.44 7.51*   PCO2 51* 47* 45 39   PO2 128* 188* 191* 169*   HCO3 30* 31* 31* 31*   O2PER 40 40 40 40       Lab Results   Component Value Date    HGB 8.3 (L) 11/17/2018    PHGB 40 (H) 11/17/2018     (L) 11/17/2018    FIBR 521 (H) 11/17/2018    INR 1.32 (H) 11/17/2018    PTT 73 (H) 11/17/2018    DD 0.9 (H) 11/17/2018    AXA 0.18 11/17/2018    ANTCH 44 (L) 11/16/2018         Plan for chest washout today.      Yo Hernández, RRT  11/17/2018 7:29 AM

## 2018-11-17 NOTE — PROGRESS NOTES
CVTS PROGRESS NOTE  11/17/2018    CO-MORBIDITIES:   Severe Aortic stenosis and regurge.   ONOFRE  DM  HTN  HLD    ASSESSMENT: Johan Spence is a 63 year old male now s/p AVR and [facility]. Was unable to be weaned from CPB so placed on VA ECMO and sent to [facility] for further cares. SANDRA shows adequate biventricular function upon ECMO turndown 11/15/18.       TODAY'S PROGRESS:   - Possible to OR tomorrow.   - Needing washout and CT replacement   - Checking thyroid labs.   - Cortisol   - 12 lead EKG  - Needing additional TPW's.        PLAN:  Neuro/pain/sedation:  #Post op pain.   - Monitor neurological status. Notify the MD for any acute changes in exam.  - Fentanyl gtt for pain.   - Propofol gtt. BIS monitoring 40-60.  RASS goal -3 to -4.  - Oxy/dil PRN. Robaxin PRN.   - Lidoderm.     Pulmonary:   #MV postop  #ONOFRE    - Supplemental oxygen to keep saturation above 92 %.  - Incentive spirometer every 15-30 minutes when awake.  - Mechanically ventilated, will maintain PEEP of 8 while resuscitation.     Cardiovascular:   #AVR and [facility].    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   #HTN.   #Complete HB.    #Vasoplegia  - Monitor hemodynamic status.   - Epi/norepi/vaso gtts.  - MAP >65.   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 Limited information available in chart.    - ECMO turndown in OR 11/15 unsuccessful.   - Chest remains open and will repeat washout and closure with ECMO turndown again in next few days. Patient became hypotensive with RV strain on 11/15 attempt.   - Nitric weaned off.     - Holding PTA Lisinopril and Metoprolol.   - 12 Lead EKG shows complete HB.   - Washed out at Bedside 11/17 with removal of clots. Pressor requirements improved.  - Coronary angiogram given elevated troponin.      GI care:   - Miralax/senna-colace, suppository PRN    Fluids/ Electrolytes/ Nutrition:   - TKO  - NPO    - NJ  feeds can advance to goal.     - BMP q6h.  - D5W gtt stopped given correction of Na. (140 from 150)  - No indication for parenteral nutrition.    Renal:    #Right groin HD line in place.  - Nephrology following.    - Will continue to monitor intake and output.  - Coronado  - Goal even to negative for today.    - CRRT through ECMO circuit.     Endocrine:    - Insulin gtt.    ID/ Antibiotics:  - Periop antibiotics.  - No signs of infection.     Heme:     - Hemoglobin stable.   - CBC/coags q6h.  - Anticoagulation: Hep gtt.      Prophylaxis:    - Mechanical prophylaxis for DVT.   - Hep gtt for ECMO -180.   - PPI    Lines/tubes/drains:  - R internal jugular MAC/swan  - ETT  - Left IABP  - R groin HD catheter.   - VA ECMO  - Chest tubes: 3 meds.    - Coronado  - NJ feeding tube.   - AV TPW's.     Disposition:  - CV ICU.    Patient seen, findings and plan discussed with CVTS fellow.     Davide Escalante MD  CA-3/PGY-4    ====================================    SUBJECTIVE:   No events overnight. Give 500 mL albumin given some ecmo chugging last night. Pressor requirement has been stable. Bulging from chest dressing with tamponade physiology so will washout today.     OBJECTIVE:   1. VITAL SIGNS:   Temp:  [97.7  F (36.5  C)-99.3  F (37.4  C)] 99.3  F (37.4  C)  Heart Rate:  [79-80] 80  Resp:  [18] 18  MAP:  [53 mmHg-89 mmHg] 64 mmHg  Arterial Line BP: ()/(32-58) 95/43  FiO2 (%):  [40 %] 40 %  SpO2:  [94 %-100 %] 97 %  Ventilation Mode: CMV/AC  (Continuous Mandatory Ventilation/ Assist Control)  FiO2 (%): 40 %  Rate Set (breaths/minute): 18 breaths/min  Tidal Volume Set (mL): 450 mL  PEEP (cm H2O): 8 cmH2O  Oxygen Concentration (%): 40 %  Resp: 18    2. INTAKE/ OUTPUT:   I/O last 3 completed shifts:  In: 5836.58 [I.V.:1892.58; Other:264; NG/GT:260]  Out: 2181 [Urine:1365; Other:26; Chest Tube:790]    3. PHYSICAL EXAMINATION:   General: laying in bed on ECMO and ventilator.   Neuro: sedated and intubated.   Resp: Breathing  non-labored on vent.   CV: Paced at 80.   Abdomen: Soft, Non-distended,  Incisions: c/d/i.   Extremities: warm and well perfused    4. INVESTIGATIONS:   Arterial Blood Gases     Recent Labs  Lab 11/17/18  1551 11/17/18  1415 11/17/18  1231 11/17/18  0954   PH 7.46* 7.49* 7.50* 7.45   PCO2 40 39 39 44   PO2 100 148* 108* 116*   HCO3 29* 30* 30* 30*     Complete Blood Count     Recent Labs  Lab 11/17/18  1552 11/17/18  0954 11/17/18  0344 11/16/18  2158   WBC 10.6 9.6 9.7 8.6   HGB 8.7* 8.8* 8.3* 8.3*   PLT 69* 88* 113* 86*     Basic Metabolic Panel    Recent Labs  Lab 11/17/18  1552 11/17/18  0954 11/17/18  0344 11/16/18  2158    140 141 142   POTASSIUM 5.1 4.7 4.4 4.4   CHLORIDE 105 104 105 104   CO2 27 29 28 29   BUN 62* 55* 60* 74*   CR 1.78* 1.75* 2.09* 2.62*   * 120* 167* 109*     Liver Function Tests    Recent Labs  Lab 11/17/18  1552 11/17/18  0954 11/17/18  0344 11/16/18  2158   * 559* 585* 649*   * 318* 337* 396*   ALKPHOS 61 60 58 54   BILITOTAL 6.0* 5.8* 5.4* 5.5*   ALBUMIN 2.7* 2.8* 3.0* 3.3*   INR 1.29* 1.27* 1.32* 1.31*     Pancreatic Enzymes  No lab results found in last 7 days.  Coagulation Profile    Recent Labs  Lab 11/17/18  1552 11/17/18  0954 11/17/18  0344 11/16/18  2158   INR 1.29* 1.27* 1.32* 1.31*   PTT 69* 73* 73* 74*         5. RADIOLOGY:   Recent Results (from the past 24 hour(s))   XR Chest Port 1 View    Narrative    Exam: XR CHEST PORT 1 VW, 11/17/2018 4:08 AM    Indication: postop open chest;     Comparison: 11/16/2018, 11/15/2018, 11/13/2018.    Findings:   A single AP view of the chest was obtained. The right IJ [place]  catheter tip projects over the proximal main pulmonary artery. The  intra-aortic balloon pump tip projects over the aortic arch. The  enteric tubes pass below the field-of-view. Stable chest tubes and  cannulae. Unchanged enlargement of the cardiac silhouette and low lung  volumes. No pneumothorax. Layering pleural effusions and  mild  bibasilar opacities.      Impression    Impression:   1. Stable support devices.  2. Stable enlargement of the cardiac silhouette, layering pleural  effusions, and bibasilar opacities.    I have personally reviewed the examination and initial interpretation  and I agree with the findings.    YUNG CORONADO MD   XR Chest Port 1 View    Narrative    Exam: XR CHEST PORT 1 VW, 11/17/2018 3:25 PM    Indication: chest tube placement;     Comparison: 11/17/18, 11/16/18    Findings:   Single AP view of the chest, supine. Endotracheal tube tip projects  over the mid trachea, 5.8 cm above the rudy. Enteric feeding tube  and gastric tube tip extends below the field-of-view. Right IJ  [place]-Jenn catheter tip projects over the pulmonary outflow tract.  Stable position of ECMO cannulae and aortic valvuloplasty. Stable  position of IABP, approximately 2.1 cm above the rudy. New left  basilar chest tube. No definite pneumothorax. Stable position of right  basilar chest tube. Unchanged enlargement of the cardiac silhouette  and low lung volumes. Layering pleural effusions and mild bibasilar  opacities. Epicardial pacer wires. Mediastinal surgical clips.      Impression    Impression:   1. New left basilar chest tube. No pneumothorax.  2. Otherwise stable support devices.  3. Persistent bibasilar atelectasis in small bilateral pleural  effusions.  4. Cardiomegaly.      I have personally reviewed the examination and initial interpretation  and I agree with the findings.    PATIENCE BLUNT MD       =========================================

## 2018-11-17 NOTE — OP NOTE
OPERATIVE DATE: 11/17/2018    PRE-OPERATIVE DIAGNOSIS:  1) S/P redo sternotomy and aortic valve replacement  2) VA ECMO for post-operative cardiac collapse  3) Open chest  4) Hypertension  5) Hyperlipidemia  6) Obesity  7) Obstructive sleep apnea  8) Diabetes  9) Former smoker  10) ETOH abuse     POST-OPERATIVE DIAGNOSIS:  1) S/P redo sternotomy and aortic valve replacement  2) [facility] ECMO for post-operative cardiac collapse  3) Open chest  4) Hypertension  5) Hyperlipidemia  6) Obesity  7) Obstructive sleep apnea  8) Diabetes  9) Former smoker  10) ETOH abuse      PROCEDURE:  1) Bedside chest dressing change  2) Evacuation of cardiac tamponade    SURGEON: Lebron Neri MD    ANESTHESIA: GETA    ESTIMATED BLOOD LOSS: 800cc    INDICATIONS:  Mr. SHENA GREEN is a 63 year old male admitted with post-cardiotomy shock after AVR.  The patient had increasing pressors and no chest tube output the last 12 hours .  The patient's family was attempted to be contacted, but no one answered the phone.  I decided to perform bedside dressing change as there was no operating room availability.    OPERATIVE REPORT:  The patient was prepped and draped in sterile fashion.  Pre-procedure timeout was performed.  The patient was maintained with propofol for sedation.  Endotracheal intubation and IV access was already in place.     The previous chest dressing was removed.  There was a large amount of blood and clot in the pericardium.  All the chest tubes were clotted.  I evacuated the blood and hemodynamics improved.    I placed a left pleural chest tube for pleural effusion.  One kerlex was used to pack the mediastinum.  An ioban dressing was applied.    The patient was stable in the ICU post procedure.    All needle, sponge and instrument counts were correct at the end of the case.    Lebron Neri  Cardiothoracic Surgery  Pager: [phone]

## 2018-11-17 NOTE — PROGRESS NOTES
CRRT STATUS NOTE    DATA:  Time:  9:45 AM  Pressures WNL  Filter Status:  WDL    Problems Reported/Alarms Noted:  Had difficulties today due to getting to the max fluid loss before clamped lines were discovered x2 ( CRRT RN error).  Frozen screen on the syringe screen-called Gambrio-had to restart.  Then patient went to cardiac cath lab.  It is primed and ready for his return.    Supplies Present:Yes    ASSESSMENT:  Patient Net Fluid Balance: +2247  Vital Signs: Pt requires pressors:  Epinephrine: 0.05 mcg/kg/min  Levophed: 0.02 mcg/kg/min  Vasopressin: 2 units/hr    Labs:   BUN: 60, 55, 62  Creatinine: 2.09, 1.75, 1.78  GFR: 32, 40, 39  K: 4.4, 4.7, 5.1  M.4,   Phos: 4.6  T Bili: 5.4, 5.8, 6.0  ALT: 337, 318, 275  AST: 585, 559, 474  CK Total: 7079, 6855, 5899  AB.46/40/100/29  Hgb: 8.3, 8.8, 8.7  Plt: 113, 88, 69  INR: 1.32, 1.27, 1.29  PTT: 73, 69  Fibrinogen: 521, 533, 501  D-dimer: 1.0    Goals of Therapy:  No pull presently, UF only, due to tampenade. Restart CRRT when patient returns.    INTERVENTIONS:   Round with bedside RN and Renal CNS    PLAN:  Support the bedside RN.

## 2018-11-17 NOTE — PROGRESS NOTES
ECMO Attending Progress Note  11/17/2018    Johan Spence is a 63 year old male who was started on ECMO due to severe cardiac failure after redo AVR.     ECMO DAY: 4    Interval events:   - Chest dressing change for cardiac tamponade    The patients vital signs today are as follows:    Temp:  [97.7  F (36.5  C)-98.8  F (37.1  C)] 98.2  F (36.8  C)  Heart Rate:  [80] 80  Resp:  [18] 18  MAP:  [53 mmHg-89 mmHg] 66 mmHg  Arterial Line BP: ()/(32-58) 99/44  FiO2 (%):  [40 %] 40 %  SpO2:  [94 %-100 %] 96 %    Intake/Output Summary (Last 24 hours) at 11/17/18 1416  Last data filed at 11/17/18 1400   Gross per 24 hour   Intake          5706.38 ml   Output             2006 ml   Net          3700.38 ml    Ventilation Mode: CMV/AC  (Continuous Mandatory Ventilation/ Assist Control)  FiO2 (%): 40 %  Rate Set (breaths/minute): 18 breaths/min  Tidal Volume Set (mL): 450 mL  PEEP (cm H2O): 8 cmH2O  Oxygen Concentration (%): 40 %  Resp: 18   Recent Labs  Lab 11/17/18  1231 11/17/18  0954 11/17/18  0749 11/17/18  0543   PH 7.50* 7.45 7.36 7.38   PCO2 39 44 52* 51*   PO2 108* 116* 123* 128*   HCO3 30* 30* 30* 30*   O2PER 40 40 40 40      Recent Labs  Lab 11/17/18  0954 11/17/18  0344 11/16/18  2158 11/16/18  1555   WBC 9.6 9.7 8.6 9.3   HGB 8.8* 8.3* 8.3* 8.3*     Creatinine   Date Value Ref Range Status   11/17/2018 1.75 (H) 0.66 - 1.25 mg/dL Final   11/17/2018 2.09 (H) 0.66 - 1.25 mg/dL Final   11/16/2018 2.62 (H) 0.66 - 1.25 mg/dL Final   11/16/2018 3.38 (H) 0.66 - 1.25 mg/dL Final     Physical Exam:   Cannula unchanged.  Minimal oozing.  Minimal air movement  Extremities edematous    ECMO Issues including assessments and plan on DOS 11/17/2018:    Neuro: Sedated for mechanical ventilation and ECMO.    CV: Hemodynamically stable   Pulm: Severe respiratory failure requiring ECMO and mechanical ventilation. Flows unchanged at 4L   Keep vent settings at rest settings: PC 25, PEEP10, FiO2 60%.  FEN/Renal: Creatinine  Lab  Results   Component Value Date    CR 1.75 11/17/2018     Heme: Hemoglobin 8.8 .  Goals: if O2 sat >85% Hgb may drift to 7-8.  If O2 Sat <85% keep Hgb 10-12.  ID:      All pertinent labs, imaging studies, physical exam and medications have been reviewed by me.     This patient requires continued ICU monitoring and cares.  I have discussed patient care and treatment plan with the ICU team and the patient's family.     ECMO [id number]    Lebron Neri MD  Cardiothoracic Surgery  [phone]

## 2018-11-17 NOTE — PLAN OF CARE
Problem: Patient Care Overview  Goal: Plan of Care/Patient Progress Review  Outcome: No Change  Neuro: Pt sedated/intubated. Propofol gtt 20mcg/kg.min. Fentanyl gtt 100mcg/h. Pt moves all 4 extremities, not to command. No sedation holiday d/t open chest.  Resp: CMV, 40%, 450, 18, 8. Lungs coarse, diminished, Mod tan secretions, red streaked.   Cardiac:Open chest, dressing more convex than beginning of shift. MD's notified. 3 CT to sxn, CT output: 30-100mL/h. ECMO, flow: 4, Sweep: 1, 70%. 2 RBC, 1 platelet given, 750mL albumin. CaCl replacement x 1. IABP in left femoral, 1:1, 100%. Epi gtt 0.1mcg/kg/min, levo gtt 0.04mcg/kg/min, Vaso gtt 3u/h. Trending trops. Pulses dopplered.   : Coronado in place and patent. UOP adequate, 80mg lasix given x 1. CRRT started for CK total > 10,000.   GI: Bowel sounds active, passing gas. TF at goal, 50mLh. No BM this shift.   Skin: Dressing c/d/i, Sacrum red, blanchable, preventative mepilex in place. Feet robert, dusky.

## 2018-11-17 NOTE — PROGRESS NOTES
"We were preparing for the patient to go to Cath Lab for a procedure. While packing up the patient I prepared a place on the bed to place the Cardiohelp.  The unit was then unplug.  All cords and lines were cleared for the transfer.  As I picked up the Cardiohelp and moved it to the bed, the unit started to alarm.  I looked at it and it said \"Backflow Prevention.\" The Flows were reading 0.00 lpm.  As I quickly placed the Cardiohelp back onto the cart, I quickly looked for a diagnosis to the problem.  I immediately saw that the red oxygenator itself had become disconnected from the Cardiohelp.  I clamped the circuit.  Another ECMO tech and I worked quickly to reestablish flows by connecting the oxygenator to the crank on the unit.  The clamps were removed and flows had been restored.  The MD's were at the bedside.  The MAPs were > 100.  The patient tolerated the event and remained hemodynamically stable.  Dr. Neri, with CVTS service was present and felt the patient looked good enough to move from the crank back  to the Cardiohelp.  The transfer was done successfully and the patient remained stable t/o the process.    We resumed preparing for the transfer and the patient was brought down without incident.      Will continue to follow pt POC.  "

## 2018-11-17 NOTE — PROGRESS NOTES
"ECMO Shift Summary:    Patient remains on VA ECMO, all equipment is functioning and alarms are appropriately set. 3300RPM's  with flow range 4.0 L/min. Sweep gas is at 2 LPM and FiO2 80%. Circuit remains free of air and clot, fibrin present post oxygenator at connector. Cannulas are secure with no bleeding from site. Chest tubes oozing t/o the shift.  Extremities are pale and warm. Suctioned ETT for moderate yellow/white secretions.    Significant Shift Events:     We were preparing for the patient to go to Cath Lab for a procedure. While packing up the patient I prepared a place on the bed to place the Cardiohelp.  The unit was then unplug.  All cords and lines were cleared for the transfer.  As I picked up the Cardiohelp and moved it to the bed, the unit started to alarm.  I looked at it and it said \"Backflow Prevention.\" The Flows were reading 0.00 lpm.  As I quickly placed the Cardiohelp back onto the cart, I quickly looked for a diagnosis to the problem.  I immediately saw that the red oxygenator itself had become disconnected from the Cardiohelp.  I clamped the circuit.  Another ECMO tech and I worked quickly to reestablish flows by connecting the oxygenator to the crank on the unit.  The clamps were removed and flows had been restored.  The MD's were at the bedside.  The MAPs were > 100.  The patient tolerated the event and remained hemodynamically stable.  Dr. Neri, with CVTS service was present and felt the patient looked good enough to move from the crank back  to the Cardiohelp.  The transfer was done successfully and the patient remained stable t/o the process.     We resumed preparing for the transfer and the patient was brought down without incident.       Will continue to follow pt POC.             Vent settings:  Ventilation Mode: CMV/AC  (Continuous Mandatory Ventilation/ Assist Control)  FiO2 (%): 40 %  Rate Set (breaths/minute): 18 breaths/min  Tidal Volume Set (mL): 450 mL  PEEP (cm H2O): 8 " cmH2O  Oxygen Concentration (%): 40 %  Resp: 18.    Heparin is running at 1200 u/hr, ACT range 160-180.    Urine output is minimal. Pt on [place],  BP soft, unable to pull any fluid today. Blood loss was small. Product given included 1 PRBC and 250 ml Albumin.      Intake/Output Summary (Last 24 hours) at 11/17/18 1718  Last data filed at 11/17/18 1600   Gross per 24 hour   Intake          5404.11 ml   Output             2016 ml   Net          3388.11 ml       ECHO:  No results found for this or any previous visit.No results found for this or any previous visit.    CXR:  Recent Results (from the past 24 hour(s))   XR Chest Port 1 View    Narrative    Exam: XR CHEST PORT 1 , 11/17/2018 4:08 AM    Indication: postop open chest;     Comparison: 11/16/2018, 11/15/2018, 11/13/2018.    Findings:   A single AP view of the chest was obtained. The right IJ Waterford-Jenn  catheter tip projects over the proximal main pulmonary artery. The  intra-aortic balloon pump tip projects over the aortic arch. The  enteric tubes pass below the field-of-view. Stable chest tubes and  cannulae. Unchanged enlargement of the cardiac silhouette and low lung  volumes. No pneumothorax. Layering pleural effusions and mild  bibasilar opacities.      Impression    Impression:   1. Stable support devices.  2. Stable enlargement of the cardiac silhouette, layering pleural  effusions, and bibasilar opacities.    I have personally reviewed the examination and initial interpretation  and I agree with the findings.    YUNG CORONADO MD   XR Chest Port 1 View    Narrative    Exam: XR CHEST PORT 1 VW, 11/17/2018 3:25 PM    Indication: chest tube placement;     Comparison: 11/17/18, 11/16/18    Findings:   Single AP view of the chest, supine. Endotracheal tube tip projects  over the mid trachea, 5.8 cm above the rudy. Enteric feeding tube  and gastric tube tip extends below the field-of-view. Right IJ  Waterford-Jenn catheter tip projects over the pulmonary  outflow tract.  Stable position of ECMO cannulae and aortic valvuloplasty. Stable  position of IABP, approximately 2.1 cm above the rudy. New left  basilar chest tube. No definite pneumothorax. Stable position of right  basilar chest tube. Unchanged enlargement of the cardiac silhouette  and low lung volumes. Layering pleural effusions and mild bibasilar  opacities. Epicardial pacer wires. Mediastinal surgical clips.      Impression    Impression:   1. New left basilar chest tube. No pneumothorax.  2. Otherwise stable support devices.  3. Persistent bibasilar atelectasis in small bilateral pleural  effusions.  4. Cardiomegaly.      I have personally reviewed the examination and initial interpretation  and I agree with the findings.    PATIENCE BLUNT MD       Labs:    Recent Labs  Lab 11/17/18  1551 11/17/18  1415 11/17/18  1231 11/17/18  0954   PH 7.46* 7.49* 7.50* 7.45   PCO2 40 39 39 44   PO2 100 148* 108* 116*   HCO3 29* 30* 30* 30*   O2PER 40.0 40 40 40       Lab Results   Component Value Date    HGB 8.7 (L) 11/17/2018    PHGB 40 (H) 11/17/2018    PLT 69 (L) 11/17/2018    FIBR 501 (H) 11/17/2018    INR 1.29 (H) 11/17/2018    PTT 69 (H) 11/17/2018    DD 1.0 (H) 11/17/2018    AXA 0.18 11/17/2018    ANTCH 44 (L) 11/17/2018         Plan is to take the patient to the cath lab for an Angiogram.  Pt remains critically stable.  IABP 1:1/Pacer trigger/100%  Will continue to follow pt POC.      Porsche Velasquez, RRT  11/17/2018 5:18 PM              2

## 2018-11-18 NOTE — OP NOTE
OPERATIVE DATE: 11/18/2018    PRE-OPERATIVE DIAGNOSIS:  1) S/P redo sternotomy and aortic valve replacement  2) VA ECMO for post-operative cardiac collapse  3) Open chest  4) Hypertension  5) Hyperlipidemia  6) Obesity  7) Obstructive sleep apnea  8) Diabetes  9) Former smoker  10) ETOH abuse      POST-OPERATIVE DIAGNOSIS:  1) S/P redo sternotomy and aortic valve replacement  2) VA ECMO for post-operative cardiac collapse  3) Open chest  4) Hypertension  5) Hyperlipidemia  6) Obesity  7) Obstructive sleep apnea  8) Diabetes  9) Former smoker  10) ETOH abuse    PROCEDURE:  1) Chest washout  2) Conversion of VA ECMO to RA-PA RVAD  3) Temporary chest closure    SURGEON: Lebron Neri MD    ASSISTANT: Jose Shearer MD    ANESTHESIA: GETA    ESTIMATED BLOOD LOSS: 250cc    INDICATIONS:  Mr. SHENA GREEN is a 63 year old male admitted with post-cardiotomy shock after redo AVR.  The patient had bedside chest washout yesterday.  Again overnight there was excessively high chest tube output.  I recommended to the family take back and chest washout.  Risks and benefits of the operation were explained to the patient and their family including, but not limited to, bleeding, infection, stroke and even death.  They understood these risks and agreed to proceed electively.    OPERATIVE REPORT:  The patient was transferred to the operating room and positioned supine on the OR table.  General anesthesia was initiated by the anesthesia team.  Endotracheal intubation and IV access was already in place. The patients chest, abdomen and bilateral lower extremities were clipped, prepped and draped in sterile fashion.  A pre-procedure time-out was performed confirming the correct patient, correct site and correct procedure.    The previous dressing was removed.  There was a large amount of blood and clot in the pericardium.  There was active oozy bleeding from the aortic anastomosis.  This was repaired with a pledgeted stitch.       The patient was weaned off VA ECMO.  He required excessively high doses of inotropes and vasopressors.  I placed an 18F EOPA cannula in the pulmonary artery via pledgeted 3-0 ethibond stitch.  The ECMO circuit was then configured RA to PA.      The patient was stable on moderate doses of inotropes and vasopressors.  Repeat labs were stable.      The aortic cannula was removed.      I decided to pack the chest open.  The chest was packed with 1 kerlex.  An esmarch dressing was sutured in place.  An ioban dressing was applied.    The patient was then transferred from the operating bed to an ICU bed and transferred to the ICU in critical, but stable, condition.    All needle, sponge and instrument counts were correct at the end of the case.    Lebron Neri  Cardiothoracic Surgery  Pager: [phone]

## 2018-11-18 NOTE — PROCEDURES
FINAL CARDIAC CATH REPORT:     PROCEDURES PERFORMED:   Coronary Angiography  Aortography    PHYSICIANS:  Attending Physician: Ruddy Cano MD  Interventional Cardiology Fellow: None  Cardiology Fellow: None    INDICATION:  Johan Spence is a 63 year old male, POD #4 from porcine AVR, failed to come off ECMO, transferred to [facility] for further management.  The patient remains on central ECMO and was noted to have troponin 120 this morning.  A coronary angiogram pre op was reportedly normal.  The patient was referred for repeat coronary angiography to ensure the coronary arteries were patent and free of embolism.  The patient had ECMO in RFA, IABP in LFA, and radial line in LRA.     DESCRIPTION:  1. Consent obtained with discussion of risks.  All questions were answered.  2. Sterile prep and procedure.  3. Location with Sheaths:   RT Radial Arterial  5 Fr Slender 10 cm [short]  4. Access: Local anesthetic with lidocaine.  A micropuncture 21 guage needle with ultrasound guidance was used to establish vascular access using a modified Seldinger technique.  5. Diagnostic Catheters:   5 Fr  Richie  6 Fr  JR4 and MP1  6. Guiding Catheters:  6 Fr  JL 4 GC  6. Estimated blood loss: < 25 ml    MEDICATIONS:  The procedure was performed under conscious sedation for 95 minutes from 16:55 to 18:30.  The patient was continued on IV Propafol infusion.  Heart rate, BP, respiration, oxygen saturation and patient responses were monitored throughout the procedure with the assistance of the RN under my supervision.  >> IA NTG were administered to prophylax against radial artery spasm.     Procedures:    CORONARY ANGIOGRAM:   1. Both coronary arteries arise from their respective cusps.  2. Dominance: Right  3.  I was unable to directly engage the ostium of the LMCA.  The LM has 10% ostial narrowing but is otherwise without angiographic evidence of disease. There did not appear to be any compression of the ostium of the coronary artery.   There was no obvious compression of the ostium of the LMCA.  4. LAD: Type 3 [LAD supplies the entire apex].  The LAD gives rise to septal perforators, D1.  The pLAD has a 0% stenosis, mLAD has a 0% stenosis, dLAD has a 0% stenosis, D1 has a 0% stenosis.    5. LCX gives rise to OM1 and OM2 vessels.  The pLCx has a 0% stenosis, mLCx has a 0% stenosis, dLCx has a 0% stenosis, OM1 has a 0% stenosis and OM2 has a 0% stenosis.    6. RCA gives rise to PL branches and supplies PDA.  I was unable to directly engage the ostium of the coronary artery which appears to emerge just above the sewing ring.  The pRCA has 10% stenosis, mRCA has a 0% stenosis, dRCA has a 0% stenosis, RPDA has a 0% stenosis and RPLA has a 0% stenosis.      AORTOGRAPHY:  Aortography was performed in Turkish position.  The coronary arteries were injected while the patient's ECMO was reduced to 2 L/min    Sheath Removal:  The RT Radial sheath was manually removed in the cardiac catheterization laboratory.    Contrast: Isovue, 217 ml     Fluoroscopy Time: 19.0 min    COMPLICATIONS:  1. None    SUMMARY:   1. No obvious impingement of coronary artery flow (LADARIUS-3 Flow)  2. Competent aortic tissue valve, positioned superiorly, with suture ring immediately inferior to coronary arteries    PLAN:   >> Bedrest per protocol.  >> Continued medical management and lifestyle modification for cardiovascular risk factor optimization.       The attending interventional cardiologist was present and supervised all critical aspects the procedure.    Findings discussed with Jayant Harper, and Ene.    See CVIS report for final draft.    Ruddy Cano MD   Cardiology Staff

## 2018-11-18 NOTE — ANESTHESIA CARE TRANSFER NOTE
Patient: Johan Spence    Procedure(s):  INSERT EXTRACORPORAL MEMBRANE OXYGENATOR    Diagnosis: ECMO switch  Diagnosis Additional Information: No value filed.    Anesthesia Type:   No value filed.     Note:  Airway :ETT  Patient transferred to:ICU  ICU Handoff: Call for PAUSE to initiate/utilize ICU HANDOFF, Identified Patient, Identified Responsible Provider, Reviewed the Pertinent Medical History, Discussed Surgical Course, Reviewed Intra-OP Anesthesia Management and Issues during Anesthesia, Set Expectations for Post Procedure Period and Allowed Opportunity for Questions and Acknowledgement of Understanding      Vitals: (Last set prior to Anesthesia Care Transfer)    CRNA VITALS  11/18/2018 1601 - 11/18/2018 1649      11/18/2018             Resp Rate (observed): 10    Resp Rate (set): 10                Electronically Signed By: JESUS Smith CRNA  November 18, 2018  4:49 PM

## 2018-11-18 NOTE — ANESTHESIA POSTPROCEDURE EVALUATION
Anesthesia POST Procedure Evaluation    Patient: Johan Spence   MRN:     [id number] Gender:   male   Age:    63 year old :      1955        Preoperative Diagnosis: ECMO switch   Procedure(s):  INSERT EXTRACORPORAL MEMBRANE OXYGENATOR   Postop Comments: No value filed.       Anesthesia Type:  General    Reportable Event: NO     PAIN:        Airway/Resp.:   Sign Out Status: Airway Device presnt     Airway Device: ETT                 Reason: Planned (pre-op)     CV:   Sign Out status: CV Support within EXPECTED Parameters     Disposition:   Sign Out in:  ICU  Disposition:  ICU  Recovery Course: Recovery in ICU     Comments/Narrative:  Patient intubated and sedated; unable to assess pain, PONV, mental status.               Last Anesthesia Record Vitals:  CRNA VITALS  2018 1601 - 2018 1653      2018             ART BP: (!)  100/33    Ht Rate: 89    SpO2: 99 %    EKG: AV Paced          Transported to ICU on full monitors and controlled ventilation and VA ECMO.  Vital signs at time of transfer:  HR 90 paced  /33  SpO2 98%    Supported hemodynamically with 0.1 mcg/kg/min epi and 0.12 mcg/kg/min norepi and vasopressin 3 unit(s)/h.  Sedated with propofol 30 mcg/kg/min.  Insulin running at 2 u/h.    Full report to ICU team.    Electronically Signed By: Dinorah Camacho MD, 2018, 4:53 PM

## 2018-11-18 NOTE — PLAN OF CARE
Problem: Patient Care Overview  Goal: Plan of Care/Patient Progress Review  Outcome: No Change  Chest dressing change in room at 1230.  Pt down to cath lab at 1615. Angio completed. See procedure notes for results. Pt back to unit at shift change. No interventions done. TR band in place. 14mL air.     Neuro: Pt sedated/intubated. Propofol gtt 30mcg/kg.min. Fentanyl gtt 100mcg/h. Withdraws from pain, nothing to command. No sedation holiday d/t open chest.  Resp: CMV, 40%, 450, 18, 8. Lungs coarse, diminished, small thick yellow secretions secretions.   Cardiac:AV paced w/ temp pacer, idioventricular rhythm underlying w/ HR 20's. Open chest, pressors needs increasing in AM, surgery changed dressing in room,left pleural CT added. Pressors titrating down. Cortisol level drawn, stress dose given. 4 CT to sxn. ECMO, flow: 4, Sweep: 2, 70%. Circuit went down during transport to cath lab. See EMCO tech note for details. 1 RBC given, 250mL albumin. IABP in left femoral, 1:1, 100%. CVP: 17-18, PA: 28/20. Epi gtt 0.06mcg/kg/min, levo gtt 0.02mcg/kg/min, Vaso gtt 2u/h. Pulses dopplered.   : Coronado in place and patent. UOP: 30-50ml/h. CK trending down. CRRT went down at 1030. Pt left off for cath lab. Pt bumped from schedule and circuit restarted. Circuit went down for second time. Pt then went to cath lab. Will restart on return to unit.    GI: Bowel sounds active, passing gas. TF at goal, 50mLh. Rectal tube in place.   Skin: Dressing c/d/i, Sacrum red, blanchable, preventative mepilex in place. Dressing c/d/i.

## 2018-11-18 NOTE — PROGRESS NOTES
CRRT RESTART NOTE    Reason for Restart:  Pt returned from Cath lab.  Error Code:  None    Patient s Vascular Access: ECMO                   Placement Confirmed:  YES  Manufacture:  -  Model:  -  Length/E[place] Size:  -  Flush Volume:  -    DATA:  Procedure:  CVVHDF  Start Time:  2000  Machine#:  7  Filter:  M150  Blood Flow:   200 mL/min  Pre-Replacement Solution:  Phoxillum 4/2.5  Post-Replacement Solution:  Phoxillum 4/2.5  Dialysate Solution:  PrismaSol 2/3.5  Pre-Replacement Solution Rate:  1100mL/hr  Post-Replacement Solution Rate:  200mL/hr  Dialysate Flow Rate:  1100mL/hr  Patient Removal Rate:  0 mL/hr  Anticoagulation Type and Rate:  0    ASSESSMENT:  How Patient Tolerated Restart:  Well  Vital Signs:  HR 80, RR 18, /48, MAP 68  Initial Pressures:  Access:  61  Filter:  164  Return:  117  TMP:  44  Change in Filter Pressure:  41    INTERVENTIONS:  Restarted per MD orders. Lines attached to ECMO circuit and blood warmer not on.     PLAN:  Continue to monitor circuit daily and change set q72 hours or PRN for clotting/clogging. Please call CRRT RN with any questions/problems.

## 2018-11-18 NOTE — ANESTHESIA CARE TRANSFER NOTE
Patient: Johan Green    Procedure(s):  COMBINED IRRIGATION AND DEBRIDEMENT CHEST WASHOUT, conversion of VA echmo to Right atrium to pulmonary artery, Right ventricle assistive device, temporary chest closure    Diagnosis: Open Chest Post Cardiac Shock  Diagnosis Additional Information: No value filed.    Anesthesia Type:   No value filed.     Note:  Airway :ETT  Patient transferred to:ICU  Comments: JOHAN GREEN was transferred to CVICU, breathing from ETT on ventilator.   Oxygen saturation 100%  BP 97/39  HR 89  RR 16  Pain: sedated with open chest    Phil Garcia MD CA3  November 18, 2018 12:45 PMICU Handoff: Call for PAUSE to initiate/utilize ICU HANDOFF, Identified Patient, Identified Responsible Provider, Reviewed the Pertinent Medical History, Discussed Surgical Course, Reviewed Intra-OP Anesthesia Management and Issues during Anesthesia, Set Expectations for Post Procedure Period and Allowed Opportunity for Questions and Acknowledgement of Understanding      Vitals: (Last set prior to Anesthesia Care Transfer)    CRNA VITALS  11/18/2018 1148 - 11/18/2018 1245      11/18/2018             EKG: AV Paced                Electronically Signed By: Phil Garcia MD  November 18, 2018  12:45 PM

## 2018-11-18 NOTE — PROGRESS NOTES
Clinical Nutrition Services- Brief Note    Paged by provider requesting additional protein to be added to regimen given swelling.  Pt on CRRT which increases protein needs, appropriate for additional packet of prosource BID for additional 80 kcal/22 g protein to increase provisions to 1880 kcal (26 kcal/kg/day) and 135 g protein (1.8 g/kg/day).    If CRRT is discontinued rec to discontinue additional protein packets pending BUN/Cr values.    RD to follow per protocol.    Jessica Gaston RD, , LD.  Weekend Pager: [phone]

## 2018-11-18 NOTE — ANESTHESIA PROCEDURE NOTES
Perioperative SANDRA Report  Anesthesia Information  SANDRA probe placed and report generated by:  TED FLOR  In the presence of a teaching physician :  CHRISTIANNE LOPEZ    I personally reviewed the images and the fellow/resident interpretation.  I agree with the fellow/resident interpretation as amended by myself. CHRISTIANNE LOPEZ  Images for this study have been archived.     Echocardiogram Comments: Echo performed on ECMO, during ECMO wean and after establishment of RA-PA ECMO.  This report summarizes findings following establishment of RA-PA ECMO.  1.  RV enlarged with mildly reduced function.  2. LV hypertrophy present.  Off ECMO, RV septum bowing to left.  Following establishment of RA-PA ECMO, normal septal motion.   The remainder of the LV is hypokinetic.  3.  Mild MR with restricted posterior leaflet.  4.  Mild-moderate TR when off ECMO, mild on RA-PA ECMO.  5.  Bioprosthetic AV poorly visualized, but appears to have normal function.  No apparent AI.  Unable to measure AV gradient.  6.  Moderate atherosclerosis seen in distal arch.  Unable to assess descending aorta due to presence of IABP.    Preanesthesia Checklist:  Patient identified, IV assessed, risks and benefits discussed, monitors and equipment assessed, procedure being performed at surgeon's request and anesthesia consent obtained.  General Procedure Information  Modalities:  2D, CW Doppler, PW Doppler and Color flow mapping  Diagnostic indications for SANDRA:   Cardiomyopathy, other                          .    Echocardiographic and Doppler Measurements  Right Ventricle:  Cavity size dilated.   Global function mildly impaired.     Left Ventricle:  Cavity size normal.   Hypertrophy present.   Global Function moderately impaired.       Ventricular Regional Function:  1- Basal Anteroseptal:  normal  2- Basal Anterior:  hypokinetic  3- Basal Anterolateral:  hypokinetic  4- Basal Inferolateral:  hypokinetic  5- Basal Inferior:  hypokinetic  6-  Basal Inferoseptal:  normal  7- Mid Anteroseptal:  normal  8- Mid Anterior:  hypokinetic  9- Mid Anterolateral:  hypokinetic  10- Mid Inferolateral:  hypokinetic  11- Mid Inferior:  hypokinetic  12- Mid Inferoseptal:  normal  13- Apical Anterior:  hypokinetic  14- Apical Lateral:  hypokinetic  15- Apical Inferior:  hypokinetic  16- Apical Septal:  normal  17- Stark:  hypokinetic    Valves  Aortic Valve: Annulus bioprosthetic.  Stenosis not present.  Leaflets normal.  Leaflet motions normal.    Mitral Valve: Annulus calcified.  Mean Gradient: 3 mmHg.  Leaflet motions restricted.  Specific leaflet segments with abnormal motions:  P2  Tricuspid Valve: Annulus normal.  Regurgitation +2.  Leaflets normal.    Pulmonic Valve: Annulus normal.      Aorta: Ascending Aorta: Size normal.    Aortic Arch: Size normal.   Plaque thickness less than 3 mm.     Descending Aorta: Size normal.         Right Atrium:  Size normal.    Left Atrium: Size dilated.           Post Intervention Findings  . .   Regional wall motion:   Surgeon(s) notified of all postintervention findings:  Yes  .  .  .   .  .  .  .  .  .  .        Echocardiogram Comments  Echo performed on ECMO, during ECMO wean and after establishment of RA-PA ECMO.  This report summarizes findings following establishment of RA-PA ECMO.  1.  RV enlarged with mildly reduced function.  2. LV hypertrophy present.  Off ECMO, RV septum bowing to left.  Following establishment of RA-PA ECMO, normal septal motion.   The remainder of the LV is hypokinetic.  3.  Mild MR with restricted posterior leaflet.  4.  Mild-moderate TR when off ECMO, mild on RA-PA ECMO.  5.  Bioprosthetic AV poorly visualized, but appears to have normal function.  No apparent AI.  Unable to measure AV gradient.  6.  Moderate atherosclerosis seen in distal arch.  Unable to assess descending aorta due to presence of IABP.

## 2018-11-18 NOTE — OP NOTE
OPERATIVE DATE: 11/18/2018    PRE-OPERATIVE DIAGNOSIS:  1) S/P redo sternotomy and aortic valve replacement  2) VA ECMO for post-operative cardiac collapse  3) Open chest  4) Hypertension  5) Hyperlipidemia  6) Obesity  7) Obstructive sleep apnea  8) Diabetes  9) Former smoker  10) ETOH abuse      POST-OPERATIVE DIAGNOSIS:  1) S/P redo sternotomy and aortic valve replacement  2) VA ECMO for post-operative cardiac collapse  3) Open chest  4) Hypertension  5) Hyperlipidemia  6) Obesity  7) Obstructive sleep apnea  8) Diabetes  9) Former smoker  10) ETOH abuse     PROCEDURE:  1) Chest washout  2) Conversion of VA ECMO to RA-PA RVAD  3) Temporary chest closure    SURGEON: Lebron Neri MD    ASSISTANT: Jose Shearer MD    ANESTHESIA: GETA    ESTIMATED BLOOD LOSS: 250cc    INDICATIONS:  Mr. SHENA GREEN is a 63 year old male admitted with post-cardiotomy shock after redo AVR.  The patient had increasing pressor requirements after conversion to RA-PA RVAD.  I recommended to the family take back and conversion to central VA ECMO.  Risks and benefits of the operation were explained to the patient and their family including, but not limited to, bleeding, infection, stroke and even death.  They understood these risks and agreed to proceed electively.    OPERATIVE REPORT:  The patient was transferred to the operating room and positioned supine on the OR table.  General anesthesia was initiated by the anesthesia team.  Endotracheal intubation and IV access was already in place. The patients chest, abdomen and bilateral lower extremities were clipped, prepped and draped in sterile fashion.  A pre-procedure time-out was performed confirming the correct patient, correct site and correct procedure.    The previous dressing was removed.      An 18F EOPA cannula was placed in the ascending aorta via pledgeted 3-0 ethibond pursestring.  The patient was converted from RA-PA RVAD to VA ECMO by connecting the cannula to the  circuit.  The PA cannula placed earlier was removed.  This was oversewn with 4-0 prolene.      The patient was stable on moderate doses of inotropes and vasopressors.  Repeat labs were stable.  I decided to pack the chest open.  The chest was packed with 1 kerlex.  An esmarch dressing was sutured in place.  An ioban dressing was applied.    The patient was then transferred from the operating bed to an ICU bed and transferred to the ICU in critical, but stable, condition.    All needle, sponge and instrument counts were correct at the end of the case.    Lebron Neri  Cardiothoracic Surgery  Pager: [phone]

## 2018-11-18 NOTE — PROGRESS NOTES
Brief Nephrology progress note      Pt in OR all AM, I was not able to do physical assessment but reviewed labs and events from past day.  Going for washout today for bleeding after issues with tamponade yesterday.  Will plan on continuing CRRT once back from OR, mainly for K management as he did rise to 5.1 with some downtime yesterday.  Will pull I=O as able depending on hemodynamics post surgery.      Davide Damon  Clinical Nurse Specialist  [phone]

## 2018-11-18 NOTE — PROGRESS NOTES
CVTS PROGRESS NOTE  11/18/2018    CO-MORBIDITIES:   Severe Aortic stenosis and regurge.   ONOFRE  DM  HTN  HLD    ASSESSMENT: Johan Spence is a 63 year old male now s/p AVR and root [facility]. Was unable to be weaned from CPB so placed on VA ECMO and sent to [facility] for further cares. SANDRA shows adequate biventricular function upon ECMO turndown however did require increased pressor requirements. Brought to OR that day and failed turndown. Chest washout at bedside on 11/7 shows clot tamponade. Cleaned out and pressor requirements improved. Brought to Cath lab and coronaries clean. 11/18 had increased bleeding from chest tubes. Went to OR where cannulation was switched to RA PA RVAD without oxygenator. PA pressures continued to rise into the PA systolics into the 75 range. Brought back down to cannulate central VAECMO again.         TODAY'S PROGRESS:   - To OR for washout and turndown.  - Pull HD line.            PLAN:  Neuro/pain/sedation:  #Post op pain.   - Monitor neurological status. Notify the MD for any acute changes in exam.  - Fentanyl gtt for pain.   - Propofol gtt. BIS monitoring 40-60.  RASS goal -3 to -4.  - Oxy/dil PRN. Robaxin PRN.   - Lidoderm.     Pulmonary:   #MV postop  #ONOFRE    - Supplemental oxygen to keep saturation above 92 %.  - Incentive spirometer every 15-30 minutes when awake.  - Mechanically ventilated, will maintain PEEP of 8 while resuscitation.     Cardiovascular:   #AVR and root [facility].    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   #HTN.   #Complete HB.    #Vasoplegia  - Monitor hemodynamic status.   - Epi/norepi/vaso gtts.  - MAP >65.   - ECMO turndown in OR 11/15 unsuccessful.   - Chest remains open with multiple turndown attempts.   - Nitric at 40ppm after OR 11/18.     - Holding PTA Lisinopril and Metoprolol.   - 12 Lead EKG shows complete HB.   - Washed out at Bedside 11/17. Washed out in OR 11/18. RA to  PA RVAD without oxygenator. PAP increased into the mid 70's and rising so brought back to OR for central cannulation RA to Aorta.      GI care:   - Miralax/senna-colace, suppository PRN    Fluids/ Electrolytes/ Nutrition:   - TKO  - NPO    - NJ feeds to advance once returned from OR.      - BMP q6h.  - No indication for parenteral nutrition.    Renal:    #Right groin HD line. Removed 11/18.  - Nephrology following.    - Will continue to monitor intake and output.  - [place]  - Goal even to negative for today.    - CRRT through ECMO circuit.     Endocrine:    - Insulin gtt.    ID/ Antibiotics:  - Periop antibiotics.  - No signs of infection.     Heme:     - Hemoglobin stable.   - CBC/coags q6h.  - Anticoagulation: Hep gtt.      Prophylaxis:    - Mechanical prophylaxis for DVT.   - Hep gtt for ECMO -180.   - PPI    Lines/tubes/drains:  - R internal jugular MAC/swan  - ETT  - Left IABP   - VA ECMO  - Chest tubes: 3 meds.    - Coronado  - NJ feeding tube.   - AV TPW's.     Disposition:  - CV ICU.    Patient seen, findings and plan discussed with CVTS fellow.     Davide Escalante MD  CA-3/PGY-4    ====================================    SUBJECTIVE:   CT output overnight increasing. Increased pressor requirements so plan to take to OR today for washout.       OBJECTIVE:   1. VITAL SIGNS:   Temp:  [95.5  F (35.3  C)-99.3  F (37.4  C)] 97.6  F (36.4  C)  Heart Rate:  [76-89] 89  Resp:  [18] 18  MAP:  [54 mmHg-89 mmHg] 70 mmHg  Arterial Line BP: ()/(33-58) 103/40  FiO2 (%):  [40 %] 40 %  SpO2:  [95 %-100 %] 100 %  Ventilation Mode: CMV/AC  (Continuous Mandatory Ventilation/ Assist Control)  FiO2 (%): 40 %  Rate Set (breaths/minute): 18 breaths/min  Tidal Volume Set (mL): 450 mL  PEEP (cm H2O): 8 cmH2O  Oxygen Concentration (%): 60 %  Resp: 18    2. INTAKE/ OUTPUT:   I/O last 3 completed shifts:  In: 9397.53 [I.V.:4264.53; Other:178; NG/GT:90]  Out: 4061 [Urine:1478; Other:18; Chest Tube:2565]    3. PHYSICAL EXAMINATION:    General: laying in bed on ECMO and ventilator.   Neuro: sedated and intubated.   Resp: Breathing non-labored on vent.   CV: Paced at 80.   Abdomen: Soft, Non-distended.   Incisions: c/d/i.   Extremities: warm and well perfused    4. INVESTIGATIONS:   Arterial Blood Gases     Recent Labs  Lab 11/18/18  1357 11/18/18  1250 11/18/18  0735 11/18/18  0548   PH 7.34* 7.37 7.46* 7.44   PCO2 47* 46* 42 43   PO2 87 145* 230* 199*   HCO3 25 26 30* 29*     Complete Blood Count     Recent Labs  Lab 11/18/18  1250 11/18/18  0340 11/17/18  2344 11/17/18 2015   WBC 14.2* 11.0 11.6* 9.1   HGB 10.4* 8.2* 7.8* 7.9*   PLT 92* 85* 94* 59*     Basic Metabolic Panel    Recent Labs  Lab 11/18/18  1250 11/18/18  0340 11/17/18  2344 11/17/18 2015    141 141 140   POTASSIUM 4.6 4.6 4.8 4.5   CHLORIDE 108 108 106 107   CO2 28 27 28 29   BUN 47* 51* 53* 61*   CR 1.43* 1.41* 1.53* 1.83*   * 125* 142* 145*     Liver Function Tests    Recent Labs  Lab 11/18/18  1250 11/18/18  0340 11/17/18  2344 11/17/18  1552   * 372* 398* 474*   * 207* 225* 275*   ALKPHOS 70 66 66 61   BILITOTAL 5.7* 5.7* 5.7* 6.0*   ALBUMIN 2.3* 2.6* 2.6* 2.7*   INR 1.25* 1.18* 1.27* 1.29*     Pancreatic Enzymes  No lab results found in last 7 days.  Coagulation Profile    Recent Labs  Lab 11/18/18  1250 11/18/18  0340 11/17/18  2344 11/17/18  1552   INR 1.25* 1.18* 1.27* 1.29*   PTT 39* 34 89* 69*         5. RADIOLOGY:   Recent Results (from the past 24 hour(s))   XR Chest Port 1 View    Narrative    Exam: XR CHEST PORT 1 VW, 11/18/2018 3:48 AM    Indication: Postop open chest    Comparison: 11/17/2018, 11/16/2018, 11/15/2018.    Findings:   A single AP view the chest was obtained. The endotracheal tube tip  projects over the mid trachea. The right IJ [place]-Jenn catheter tip  projects over the proximal main pulmonary artery. The intra-aortic  balloon pump tip projects of the aortic arch. The enteric tubes pass  below the field-of-view. Stable  prosthetic aortic valve, ECMO  cannulae, and bilateral chest tubes. Unchanged enlargement of the  cardiac silhouette. Low lung volumes. No pneumothorax. Linear lucency  projecting over the mediastinum may represent pneumomediastinum in the  setting of open chest. Unchanged bibasilar opacities.      Impression    Impression:   1. Stable support devices.  2. Probable pneumomediastinum in the setting of open chest.  3. Low lung volumes and bibasilar opacities are unchanged, likely  atelectasis.    I have personally reviewed the examination and initial interpretation  and I agree with the findings.    YUNG CORONADO MD   XR Chest Port 1 View    Narrative    Exam: Chest x-ray, 1 view, 11/18/2018.    COMPARISON: Same day, earlier.    HISTORY: Combined irrigation and debridement chest washout.    FINDINGS/    Impression    impression: Intraoperative AP view of the chest was obtained. The  inferior mid chest is within the field-of-view. Stable support devices  including right IJ [place]-Jenn catheter, intra-aortic balloon pump,  enteric tubes, prosthetic aortic valve, ECMO cannula and chest tubes.  Unchanged enlarged cardiomediastinal silhouette.    KAILA MINA MD       =========================================

## 2018-11-18 NOTE — PROGRESS NOTES
ECMO Shift Summary:    Patient remains on VA ECMO, all equipment is functioning and alarms are appropriately set. RPM's 3400 with flow range 4.5 L/min. Sweep gas is at 1.5 LPM and FiO2 80%. Circuit remains free of air, clot and fibrin. Cannulas are secure with moderate bleeding from cannulation site and chest tube output of approximately 150 ml per hour. Extremities are warm to the touch. Suctioned ETT for small amount of secretions.    Significant Shift Events:    Vent settings:  Ventilation Mode: CMV/AC  (Continuous Mandatory Ventilation/ Assist Control)  FiO2 (%): 40 %  Rate Set (breaths/minute): 18 breaths/min  Tidal Volume Set (mL): 450 mL  PEEP (cm H2O): 8 cmH2O  Oxygen Concentration (%): 40 %  Resp: 18.    Heparin is running at 1200 u/kg/hr, ACT range 160.    Urine output is minimal on CRRT, blood loss was approximately 150 ml per hour from chest tubes. Products given included 3 units of platelets, 3 units of PRBC, and 2 units of FFP.      Intake/Output Summary (Last 24 hours) at 11/18/18 0557  Last data filed at 11/18/18 0515   Gross per 24 hour   Intake          5637.98 ml   Output             3858 ml   Net          1779.98 ml       ECHO:  No results found for this or any previous visit.No results found for this or any previous visit.    CXR:  Recent Results (from the past 24 hour(s))   XR Chest Port 1 View    Narrative    Exam: XR CHEST PORT 1 VW, 11/17/2018 3:25 PM    Indication: chest tube placement;     Comparison: 11/17/18, 11/16/18    Findings:   Single AP view of the chest, supine. Endotracheal tube tip projects  over the mid trachea, 5.8 cm above the rudy. Enteric feeding tube  and gastric tube tip extends below the field-of-view. Right IJ  [place]-Jenn catheter tip projects over the pulmonary outflow tract.  Stable position of ECMO cannulae and aortic valvuloplasty. Stable  position of IABP, approximately 2.1 cm above the rudy. New left  basilar chest tube. No definite pneumothorax. Stable  position of right  basilar chest tube. Unchanged enlargement of the cardiac silhouette  and low lung volumes. Layering pleural effusions and mild bibasilar  opacities. Epicardial pacer wires. Mediastinal surgical clips.      Impression    Impression:   1. New left basilar chest tube. No pneumothorax.  2. Otherwise stable support devices.  3. Persistent bibasilar atelectasis in small bilateral pleural  effusions.  4. Cardiomegaly.      I have personally reviewed the examination and initial interpretation  and I agree with the findings.    PATIENCE BLUNT MD   XR Chest Port 1 View    Narrative    1. Stable support devices.  2. Probable pneumomediastinum in the setting of open chest.  3. Low lung volumes and bibasilar opacities are unchanged, likely  atelectasis.       Labs:    Recent Labs  Lab 11/18/18  0340 11/18/18  0153 11/17/18  2344 11/17/18  2159   PH 7.45 7.45 7.44 7.40   PCO2 43 41 44 44   PO2 216* 276* 84 110*   HCO3 30* 29* 30* 28   O2PER 40.0 40.0 40.0 40.0       Lab Results   Component Value Date    HGB 8.2 (L) 11/18/2018    PHGB 50 (H) 11/18/2018    PLT 85 (L) 11/18/2018    FIBR 472 (H) 11/18/2018    INR 1.18 (H) 11/18/2018    PTT 34 11/18/2018    DD 1.2 (H) 11/18/2018    AXA <0.10 11/18/2018    ANTCH 44 (L) 11/17/2018         Plan for chest washout today.      Yo Hernández, RRT  11/18/2018 5:57 AM

## 2018-11-18 NOTE — PLAN OF CARE
Problem: Patient Care Overview  Goal: Plan of Care/Patient Progress Review  Outcome: No Change  Assessment:   Cardiac: 100% AV Pace via TPM, asystole underlying. Epinephrine, Levophed, and Vasopressin titrated to keep MAP > 60. IABP 1:1 via EKG. A/V ECMO. Heparin gtt infusing, per Dr. Higgins held from midnight to 0400 due to CT output 150-200 ml/hr.    Resp: CMV 40%/450/18/8.  Neuro: Fentanyl and Propofol providing adequate sedation.  :  Coronado patent, UO  cc/hr. Ongoing CRRT, minimal flow alarms during shift. Unable to pull any fluid due to hemodynamic instability.   GI: Tube feedings held at 0600. Rectal tube in place. OG  to LIS.  Skin: Open chest. Mediastinal CT x 3, Pleural CT x 1 -20 ml. Dr. Higgins notified at 2100 and 0000 Mediastinal -200 ml/hr.   Endocrine: Insulin gtt infusing.     Interventions: 3 unit of RBCs, 4 unit of Platelets, and 2 unit of FFP given.    Plan: OR at 0800 today. Will continue to monitor/assess and update MD as needed.

## 2018-11-18 NOTE — ANESTHESIA PROCEDURE NOTES
SANDRA Probe Insertion Note:    Inserted by:  CHRISTIANNE LOPEZ (Responsible Anesthesiologist)                        TED FLOR (in the presence of a teaching physician )  Probe Number: [id number]  Probe Status PRE Insertion: NO obvious damage  Probe type:  Adult 2D    Bite block used:   Yes  Insertion Technique: Jaw Lift  Insertion complications: None obvious    Billing Report:SANDRA report by Anesthesiologist (See Separate Report note)

## 2018-11-18 NOTE — PROGRESS NOTES
CV ICU PROGRESS NOTE  11/18/2018    CO-MORBIDITIES:   Severe Aortic stenosis and regurge.   ONOFRE  DM  HTN  HLD    ASSESSMENT: Johan Spence is a 63 year old male now s/p AVR and root [facility]. Was unable to be weaned from CPB so placed on VA ECMO and sent to [facility] for further cares. SANDRA shows adequate biventricular function upon ECMO turndown however did require increased pressor requirements. Brought to OR that day and failed turndown. Chest washout at bedside on 11/7 shows clot tamponade. Cleaned out and pressor requirements improved. Brought to Cath lab and coronaries clean. 11/18 had increased bleeding from chest tubes. Went to OR where cannulation was switched to RA PA RVAD without oxygenator. PA pressures continued to rise into the PA systolics into the 75 range. Brought back down to cannulate central VAECMO again.         TODAY'S PROGRESS:   - To OR for washout and turndown.  - Pull HD line.            PLAN:  Neuro/pain/sedation:  #Post op pain.   - Monitor neurological status. Notify the MD for any acute changes in exam.  - Fentanyl gtt for pain.   - Propofol gtt. BIS monitoring 40-60.  RASS goal -3 to -4.  - Oxy/dil PRN. Robaxin PRN.   - Lidoderm.     Pulmonary:   #MV postop  #ONOFRE    - Supplemental oxygen to keep saturation above 92 %.  - Incentive spirometer every 15-30 minutes when awake.  - Mechanically ventilated, will maintain PEEP of 8 while resuscitation.     Cardiovascular:   #AVR and root [facility].    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   #HTN.   #Complete HB.    #Vasoplegia  - Monitor hemodynamic status.   - Epi/norepi/vaso gtts.  - MAP >65.   - ECMO turndown in OR 11/15 unsuccessful.   - Chest remains open with multiple turndown attempts.   - Nitric at 40ppm after OR 11/18.     - Holding PTA Lisinopril and Metoprolol.   - 12 Lead EKG shows complete HB.   - Washed out at Bedside 11/17. Washed out in OR 11/18. RA to  PA RVAD without oxygenator. PAP increased into the mid 70's and rising so brought back to OR for central cannulation RA to Aorta.      GI care:   - Miralax/senna-colace, suppository PRN    Fluids/ Electrolytes/ Nutrition:   - TKO  - NPO    - NJ feeds to advance once returned from OR.      - BMP q6h.  - No indication for parenteral nutrition.    Renal:    #Right groin HD line. Removed 11/18.  - Nephrology following.    - Will continue to monitor intake and output.  - [place]  - Goal even to negative for today.    - CRRT through ECMO circuit.     Endocrine:    - Insulin gtt.    ID/ Antibiotics:  - Periop antibiotics.  - No signs of infection.     Heme:     - Hemoglobin stable.   - CBC/coags q6h.  - Anticoagulation: Hep gtt.      Prophylaxis:    - Mechanical prophylaxis for DVT.   - Hep gtt for ECMO -180.   - PPI    Lines/tubes/drains:  - R internal jugular MAC/swan  - ETT  - Left IABP   - VA ECMO  - Chest tubes: 3 meds.    - [place]  - NJ feeding tube.   - AV TPW's.     Disposition:  - CV ICU.    Patient seen, findings and plan discussed with CV ICU staff, Dr. Ball.    Davide Escalante MD  CA-3/PGY-4    ====================================    SUBJECTIVE:   CT output overnight increasing. Increased pressor requirements so plan to take to OR today for washout.       OBJECTIVE:   1. VITAL SIGNS:   Temp:  [95.5  F (35.3  C)-99.7  F (37.6  C)] 97.9  F (36.6  C)  Heart Rate:  [76-80] 78  Resp:  [18] 18  MAP:  [38 mmHg-145 mmHg] 76 mmHg  Arterial Line BP: ()/() 112/49  FiO2 (%):  [40 %] 40 %  SpO2:  [94 %-100 %] 100 %  Ventilation Mode: CMV/AC  (Continuous Mandatory Ventilation/ Assist Control)  FiO2 (%): 40 %  Rate Set (breaths/minute): 18 breaths/min  Tidal Volume Set (mL): 450 mL  PEEP (cm H2O): 8 cmH2O  Oxygen Concentration (%): 40 %  Resp: 18    2. INTAKE/ OUTPUT:   I/O last 3 completed shifts:  In: 5889.48 [I.V.:1743.48; Other:181; NG/GT:150]  Out: 3888 [Urine:1625; Other:8; Chest Tube:2255]    3. PHYSICAL  EXAMINATION:   General: laying in bed on ECMO and ventilator.   Neuro: sedated and intubated.   Resp: Breathing non-labored on vent.   CV: Paced at 80.   Abdomen: Soft, Non-distended.   Incisions: c/d/i.   Extremities: warm and well perfused    4. INVESTIGATIONS:   Arterial Blood Gases     Recent Labs  Lab 11/18/18  0548 11/18/18  0340 11/18/18  0153 11/17/18  2344   PH 7.44 7.45 7.45 7.44   PCO2 43 43 41 44   PO2 199* 216* 276* 84   HCO3 29* 30* 29* 30*     Complete Blood Count     Recent Labs  Lab 11/18/18  0340 11/17/18  2344 11/17/18 2015 11/17/18  1552   WBC 11.0 11.6* 9.1 10.6   HGB 8.2* 7.8* 7.9* 8.7*   PLT 85* 94* 59* 69*     Basic Metabolic Panel    Recent Labs  Lab 11/18/18  0340 11/17/18  2344 11/17/18 2015 11/17/18  1552    141 140 140   POTASSIUM 4.6 4.8 4.5 5.1   CHLORIDE 108 106 107 105   CO2 27 28 29 27   BUN 51* 53* 61* 62*   CR 1.41* 1.53* 1.83* 1.78*   * 142* 145* 216*     Liver Function Tests    Recent Labs  Lab 11/18/18  0340 11/17/18  2344 11/17/18  1552 11/17/18  0954   * 398* 474* 559*   * 225* 275* 318*   ALKPHOS 66 66 61 60   BILITOTAL 5.7* 5.7* 6.0* 5.8*   ALBUMIN 2.6* 2.6* 2.7* 2.8*   INR 1.18* 1.27* 1.29* 1.27*     Pancreatic Enzymes  No lab results found in last 7 days.  Coagulation Profile    Recent Labs  Lab 11/18/18  0340 11/17/18  2344 11/17/18  1552 11/17/18  0954   INR 1.18* 1.27* 1.29* 1.27*   PTT 34 89* 69* 73*         5. RADIOLOGY:   Recent Results (from the past 24 hour(s))   XR Chest Port 1 View    Narrative    Exam: XR CHEST PORT 1 VW, 11/17/2018 3:25 PM    Indication: chest tube placement;     Comparison: 11/17/18, 11/16/18    Findings:   Single AP view of the chest, supine. Endotracheal tube tip projects  over the mid trachea, 5.8 cm above the rudy. Enteric feeding tube  and gastric tube tip extends below the field-of-view. Right IJ  [place] catheter tip projects over the pulmonary outflow tract.  Stable position of ECMO cannulae and aortic  valvuloplasty. Stable  position of IABP, approximately 2.1 cm above the rudy. New left  basilar chest tube. No definite pneumothorax. Stable position of right  basilar chest tube. Unchanged enlargement of the cardiac silhouette  and low lung volumes. Layering pleural effusions and mild bibasilar  opacities. Epicardial pacer wires. Mediastinal surgical clips.      Impression    Impression:   1. New left basilar chest tube. No pneumothorax.  2. Otherwise stable support devices.  3. Persistent bibasilar atelectasis in small bilateral pleural  effusions.  4. Cardiomegaly.      I have personally reviewed the examination and initial interpretation  and I agree with the findings.    PATIENCE BLUNT MD   XR Chest Port 1 View    Narrative    1. Stable support devices.  2. Probable pneumomediastinum in the setting of open chest.  3. Low lung volumes and bibasilar opacities are unchanged, likely  atelectasis.       =========================================

## 2018-11-18 NOTE — PROGRESS NOTES
Cardiology - Heart failure service    Interval events: Bedside chest dressing change yesterday, evacuation of pericardial fluid, placement of left pleural chest tube for effusion. Overnight got 4 units platelets, 3 units of blood, 2 units of FFP. Chest tubes have been putting out, yesterday 1.4 L, since midnight 900 mL. Pressor requirement initially came down yesterday, has now come back up to levels from yesterday. Chest tube draining blood and clots. Angiogram yesterday with no focal stenosis. Yesterday ECG with pacemaker held demonstrated complete HB. Went to OR today again with Dr. Morse, the patient was weaned off VA ECMO.  He required excessively high doses of inotropes and vasopressors.  ECMO circuit was configured RA to PA. Chest was packed, sent upstairs on moderate doses of inotropes and vasopressors. Patient then further decompensated, and was sent back to the OR for RA-PA RVAD to VA ECMO by connecting the cannula to the circuit.  The PA cannula placed earlier was removed.     The patient was stable on moderate doses of inotropes and vasopressors.  Repeat labs were stable.       The aortic cannula was removed.      Vital signs: 98.1 afebrile, HR 80, /47; MAP 75, oxygenating 100%  Tele: No events.   Hemodynamics:  CVP 12.   IABP via LFA, 1:1, triggered by EKG, augmented diastolic 108, pump mean 80, L radial and distal pulse in tact   ECMO settings:  Patient remains on VA ECM. RPM's 3150 with flow range 4.01-4.13 L/min. Sweep gas is at 1 LPM, SVO2 73%.   Vent Rate 18, , PEEP 8, 40% oxygen, CMV/AC.   Epicardial pacemaker: DDD, 80, capturing.     I/O last 3 completed shifts:  In: 5674.02 [I.V.:1811.02; Other:43; NG/GT:160]  Out: 3170 [Urine:1710; Other:26; Chest Tube:1434], 900 CT since midnight, 400 urine.     Hemodynamic drips: Epi 0.08, levophed 0.05, vaso 3.   Selected medications: lexapro, pantoprazole 40 mg daily,   Sedation: fentanyl 100, heparin, insulin 8, propofol 30.      Physical  examination:  GEN:  intubated and sedated  CV: no murmur, open chest  Central cannulation. Chest tubes in place.  LUNGS:  Clear to auscultation bilaterally   ABD:  Active bowel sounds, soft, non-tender/non-distended.  No rebound/guarding/rigidity.  EXT:  No edema or cyanosis.  Hands/feet warm to touch with good signs of peripheral perfusion.      Labs were reviewed.  Lab Results   Component Value Date    WBC 11.0 11/18/2018     Lab Results   Component Value Date    RBC 2.74 11/18/2018     Lab Results   Component Value Date    HGB 8.2 11/18/2018     Lab Results   Component Value Date    HCT 24.8 11/18/2018     No components found for: MCT  Lab Results   Component Value Date    MCV 91 11/18/2018     Lab Results   Component Value Date    MCH 29.9 11/18/2018     Lab Results   Component Value Date    MCHC 33.1 11/18/2018     Lab Results   Component Value Date    RDW 16.8 11/18/2018     Lab Results   Component Value Date    PLT 85 11/18/2018     Last Comprehensive Metabolic Panel:  Sodium   Date Value Ref Range Status   11/18/2018 141 133 - 144 mmol/L Final     Potassium   Date Value Ref Range Status   11/18/2018 4.6 3.4 - 5.3 mmol/L Final     Chloride   Date Value Ref Range Status   11/18/2018 108 94 - 109 mmol/L Final     Carbon Dioxide   Date Value Ref Range Status   11/18/2018 27 20 - 32 mmol/L Final     Anion Gap   Date Value Ref Range Status   11/18/2018 7 3 - 14 mmol/L Final     Glucose   Date Value Ref Range Status   11/18/2018 125 (H) 70 - 99 mg/dL Final     Urea Nitrogen   Date Value Ref Range Status   11/18/2018 51 (H) 7 - 30 mg/dL Final     Creatinine   Date Value Ref Range Status   11/18/2018 1.41 (H) 0.66 - 1.25 mg/dL Final     GFR Estimate   Date Value Ref Range Status   11/18/2018 51 (L) >60 mL/min/1.7m2 Final     Comment:     Non  GFR Calc     Calcium   Date Value Ref Range Status   11/18/2018 7.8 (L) 8.5 - 10.1 mg/dL Final     CK 4681 (5255), LA 1.6, ionized calcium 4.7,  (225),   (398). T4 0.86, TSH 0.92, cortisol 39.2 (high).     Radiology 1. Stable support devices.  2. Probable pneumomediastinum in the setting of open chest.  3. Low lung volumes and bibasilar opacities are unchanged, likely  atelectasis.     Cardiology None         IMPRESSION & PLAN  Mr. Johan Spence is a 63 year old male with h/o AVR, DM, ONOFRE, obesity, HTN, who [facility] with inability of wean off pump. Had re-do AVR and ao repalacement at [facility] but during placing PA cath in OR he was coded and placed pump. AVR and ao graft replacement were successful but he was unable to come off pump. He was transferred here for further management.      Changes today  -OR today for wash out, see above for OR events. ECMO circuit was configured RA to PA. Chest was packed, sent upstairs on moderate doses of inotropes and vasopressors.   -wean pressors as tolerated.      Cardiovascular:   #AVR and root [facility]que [facility] 11/[facility].    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   #HTN.    - Monitor hemodynamic status.   - Epi/norepi/vaso gtts.   - MAP >65.   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 Limited information available in chart.   - ECMO turndown in OR 11/15 unsuccessful. On turndown today in OR 11/18, was able to be weaned off ECMO; however, was requiring max multiple pressors. Now back on VA-ECMO.   - Went to OR 11/18 again with Dr. Morse, the patient was weaned off VA ECMO.  He required excessively high doses of inotropes and vasopressors.  ECMO circuit was configured RA to PA. Chest was packed, sent upstairs on moderate doses of inotropes and vasopressors. Chest was packed open. Patient then further decompensated, and was sent back to the OR for RA-PA RVAD to VA ECMO by connecting the cannula to the circuit.  The PA cannula placed earlier was removed.  - changed IABP to 1:2 to increase some afterload (patient is extremely vasoplegic).   - Added back  stress dose steroids.  - Holding PTA Lisinopril and Metoprolol.   - We have very limited information on his baseline LV/RV function. He is still on 3 pressors, has evidence of pulsatility. Pressor requirement increasing likely because of vasoplegia as well as early tamponade physiology. Chest drained today via Dr. Morse, hemodynamics improved.  - His baseline LV/RV function was normal per [facility] note. During pre-op preparation of AS surgery, severe AS and vasodilation caused hypotension and bradycardia.       Disposition:  - CV ICU.     I discussed the patient with Dr. Peraza.     Thank you for allowing me to care for this patient. This has been discussed with the attending physician.     Winter King MD  Cardiology Fellow, PGY-5      Late entry - pt seen and examined 11/18/18  I have reviewed today's vital signs, notes, medications, labs and imaging. I have also seen and examined the patient and agree with the findings and plan as outlined above.    Sylvia Peraza MD  Section Head - Advanced Heart Failure, Transplantation and Mechanical Circulatory Support  Co-Director - [facility]  Associate Professor of Medicine, [facility]

## 2018-11-18 NOTE — ANESTHESIA POSTPROCEDURE EVALUATION
Anesthesia POST Procedure Evaluation    Patient: Johan Spence   MRN:     [id number] Gender:   male   Age:    63 year old :      1955        Preoperative Diagnosis: Open Chest Post Cardiac Shock   Procedure(s):  COMBINED IRRIGATION AND DEBRIDEMENT CHEST WASHOUT, conversion of VA echmo to Right atrium to pulmonary artery, Right ventricle assistive device, temporary chest closure   Postop Comments: No value filed.       Anesthesia Type:  General    Reportable Event: NO     PAIN:        Airway/Resp.:   Sign Out Status: Airway Device presnt     Airway Device: ETT                 Reason: Planned (pre-op)     CV:   Sign Out status: CV Support within EXPECTED Parameters     Disposition:   Sign Out in:  ICU  Disposition:  ICU  Recovery Course: Recovery in ICU     Comments/Narrative:  Patient intubated and sedated; unable to assess pain, PONV, mental status.             Last Anesthesia Record Vitals:  CRNA VITALS  2018 1148 - 2018 1247      2018             EKG: AV Paced        Transported to ICU on full monitors and controlled ventilation.  On R heart ECMO without oxygenator support.  Vital signs at time of transfer:  HR 80 paced  BP 92/40  SpO2 99%    Supported hemodynamically with 0.1 mcg/kg/min epi, 0.1 mcg/kg/min norepi and 2 unit(s)/h vasopressin.  Sedated with propofol 30 mcg/kg/min and fentanyl 100 mcg/h.  Insulin running at 2 u/h.    Full report given to ICU team.        Electronically Signed By: Dinorah Camacho MD, 2018, 12:47 PM

## 2018-11-18 NOTE — ANESTHESIA PREPROCEDURE EVALUATION
Anesthesia Pre-Procedure Evaluation    Patient: Johan Spence   MRN:     [id number] Gender:   male   Age:    63 year old :      1955        Preoperative Diagnosis: Open Chest Post Cardiac Shock   Procedure(s):  COMBINED IRRIGATION AND DEBRIDEMENT CHEST WASHOUT, CHEST CLOSURE  REMOVE EXTRACORPORAL MEMBRANE OXYGENATOR ADULT     No past medical history on file.   Past Surgical History:   Procedure Laterality Date     IRRIGATION AND DEBRIDEMENT CHEST WASHOUT, COMBINED N/A 11/15/2018    Procedure: Chest Washout, Revision of ECMO Cannula, Temporary Closure of Chest;  Surgeon: Lebron Neri MD;  Location: [facility] OR          Anesthesia Evaluation     . Pt has had prior anesthetic. Type: General    No history of anesthetic complications          ROS/MED HX    ENT/Pulmonary:     (+)sleep apnea, tobacco use, Past use , . .    Neurologic: Comment: On Fentanyl and Propofol gtt      Cardiovascular: Comment: s/p AVR and [facility]. Was unable to be weaned from CPB so [facility] and [facility] for further cares. SANDRA shows adequate biventricular function upon ECMO turndown 11/15/18    -on [facility]: ECMO, flow: 4, Sweep: 2, 70%  -on IABP 1:1 via left femoral    (+) Dyslipidemia, hypertension----. : . . . :. . Previous cardiac testing Echodate:11/15/2018results:ECHO on 11/15   Baseline, 3.5 L and Epi -  Left ventricular size is normal. Severely (EF 20-30%) reduced left ventricular  function is present.  No pericardial effusion is present.  Mild right ventricular dilation is present. Global right ventricular function  is mildly to moderately reduced.  A bioprosthetic aortic valve is present. Doppler interrogation of the aortic  valve is normal. Leaflets are not well seen. Aortic root is thickened, likely  normal post-op changes.date: results:ECG reviewed date:2018 results:Complete heart block with ventricular rhythm in low 20's, non-conducting P-waves noted date:  "results:          METS/Exercise Tolerance:     Hematologic:     (+) Anemia, History of Transfusion no previous transfusion reaction -      Musculoskeletal:  - neg musculoskeletal ROS       GI/Hepatic:  - neg GI/hepatic ROS       Renal/Genitourinary: Comment: On CRRT    (+) chronic renal disease, type: ARF, Pt requires dialysis,       Endo: Comment: On Insulin gtt    (+) type II DM .      Psychiatric:  - neg psychiatric ROS       Infectious Disease:  - neg infectious disease ROS       Malignancy:      - no malignancy   Other:                         PHYSICAL EXAM:   Mental Status/Neuro:    Airway: Facies: Feasible  Mallampati: Not Assessed  Mouth/Opening: Not Assessed  TM distance: Not Assessed  Neck ROM: Not Assessed  Device in place: ETT   Respiratory:   Resp. Support: SIMV      CV:    Comments:                    Lab Results   Component Value Date    WBC 14.2 (H) 11/18/2018    HGB 10.4 (L) 11/18/2018    HCT 31.2 (L) 11/18/2018    PLT 92 (L) 11/18/2018    CRP 65.0 (H) 11/14/2018     11/18/2018    POTASSIUM 4.6 11/18/2018    CHLORIDE 108 11/18/2018    CO2 28 11/18/2018    BUN 47 (H) 11/18/2018    CR 1.43 (H) 11/18/2018     (H) 11/18/2018    TERESA 9.1 11/18/2018    PHOS 5.5 (H) 11/18/2018    MAG 2.1 11/18/2018    ALBUMIN 2.3 (L) 11/18/2018    PROTTOTAL 4.9 (L) 11/18/2018     (H) 11/18/2018     (H) 11/18/2018    ALKPHOS 70 11/18/2018    BILITOTAL 5.7 (H) 11/18/2018    PTT 39 (H) 11/18/2018    INR 1.25 (H) 11/18/2018    FIBR 443 (H) 11/18/2018    TSH 0.92 11/17/2018    T4 0.86 11/17/2018       Preop Vitals  BP Readings from Last 3 Encounters:   11/15/18 (!) 82/48    Pulse Readings from Last 3 Encounters:   No data found for Pulse      Resp Readings from Last 3 Encounters:   11/18/18 18    SpO2 Readings from Last 3 Encounters:   11/18/18 100%      Temp Readings from Last 1 Encounters:   11/18/18 36.4  C (97.6  F) (Oral)    Ht Readings from Last 1 Encounters:   11/13/18 1.7 m (5' 6.93\")      Wt " "Readings from Last 1 Encounters:   11/18/18 96.5 kg (212 lb 11.9 oz)    Estimated body mass index is 33.39 kg/(m^2) as calculated from the following:    Height as of this encounter: 1.7 m (5' 6.93\").    Weight as of this encounter: 96.5 kg (212 lb 11.9 oz).     LDA:  Peripheral IV 11/17/18 Right Upper arm (Active)   Site Assessment [facility] 11/18/2018  8:00 AM   Line Status Saline locked 11/18/2018  8:00 AM   Phlebitis Scale 0-->no symptoms 11/18/2018  8:00 AM   Infiltration Scale 0 11/18/2018  8:00 AM   Extravasation? No 11/18/2018  8:00 AM   Number of days:1       Arterial Line 11/13/18 Radial (Active)   Site Assessment [facility] 11/18/2018  8:00 AM   Line Status Pulsatile blood flow 11/18/2018  8:00 AM   Art Line Waveform Appropriate 11/18/2018  8:00 AM   Art Line Interventions Flushed per protocol 11/18/2018  8:00 AM   Color/Movement/Sensation Capillary refill greater than 3 sec 11/18/2018  8:00 AM   Dressing Type Transparent 11/18/2018  8:00 AM   Dressing Status Clean, dry, intact 11/18/2018  8:00 AM   Dressing Intervention Dressing changed/new dressing 11/18/2018  2:00 AM   Dressing Change Due 11/25/18 11/18/2018  8:00 AM   Number of days:5       CVC Double Lumen 11/13/18 (Active)   Site Assessment [facility] 11/18/2018  8:00 AM   Extravasation? No 11/18/2018  8:00 AM   Dressing Intervention Chlorhexidine sponge;Transparent 11/18/2018  8:00 AM   Dressing Change Due 11/25/18 11/18/2018  8:00 AM   CVC Comment [place] 11/18/2018  8:00 AM   CVC Lumen Assessment [place] 11/13/2018 10:00 PM   Number of days:5       Arterial Sheath  (Active)   Specific Qualities Sutured 11/18/2018  8:00 AM   Site Assessment [facility] 11/18/2018  8:00 AM   Dressing Type Gauze 11/18/2018  8:00 AM   Dressing Intervention Dressing changed/new dressing 11/17/2018  8:00 PM   Arterial Sheath Comment ECMO 11/18/2018  8:00 AM   Number of days:5       Arterial Sheath  (Active)   Specific Qualities Sutured 11/18/2018  8:00 AM   Site Assessment [facility] 11/18/2018  8:00 AM "   Dressing Type Biopatch;Transparent 11/18/2018  8:00 AM   Dressing Intervention Dressing changed/new dressing 11/15/2018 12:00 PM   Arterial Sheath Comment IABP 11/18/2018  8:00 AM   Number of days:5       Venous Sheath (Active)   Specific Qualities Sutured 11/18/2018  8:00 AM   Site Assessment [facility] 11/18/2018  8:00 AM   Dressing Type Gauze 11/18/2018  8:00 AM   Dressing Intervention Dressing changed/new dressing 11/17/2018  8:00 PM   Venous Sheath Comment ECMO 11/18/2018  8:00 AM   Number of days:5       Right Radial Interventional Procedure Access (Active)   Site Assessment [facility] 11/18/2018  4:00 AM   Hemostasis management Unchanged 11/17/2018 11:45 PM   Radial device volume remaining 0 mL 11/17/2018 11:45 PM   CMS Right Arm [facility] 11/17/2018 11:45 PM   Radial Pulse - Right Arm Weak 11/17/2018 11:45 PM   Number of days:1       Pulmonary Artery Catheter - Single Lumen 11/13/18 1200 Right internal jugular vein (Active)   Dressing dressing dry and intact 11/18/2018  8:00 AM   Securement secured with sutures 11/18/2018  8:00 AM   PA Catheter Markings (cm) 49 11/18/2018  8:00 AM   Phlebitis 0-->no symptoms 11/18/2018  8:00 AM   Infiltration 0-->no symptoms 11/18/2018  8:00 AM   Waveform dampened 11/18/2018  8:00 AM   Pressure Catheter Interventions system flushed;line leveled/zeroed 11/18/2018  8:00 AM   Daily Review Of Necessity completed 11/18/2018  8:00 AM   Number of days:5       Airway - Adult/Peds 8 endotracheal tube (Active)   Site Appearance Clean and dry 11/18/2018  2:00 PM   Secured By Commercial tube matt 11/18/2018  2:00 PM   Secured at (cm) to lip 26 cm 11/17/2018  8:00 AM   Cuff Pressure - Type minimal occluding volume 11/17/2018  8:00 AM   Tube Care/Reposition repositioned tube right side of mouth 11/18/2018  6:00 AM   Bite Block Secure and Patent 11/18/2018  2:00 PM   Safety Measures manual resuscitator/PEEP valve in room 11/18/2018  2:00 PM   Number of days:5       Chest Tube 1 Mediastinal (Active)   Site  Assessment [facility] 11/18/2018  2:00 PM   Suction -20 cm H2O 11/18/2018  2:00 PM   Chest Tube Airleak No 11/18/2018  2:00 PM   Drainage Description Serosanguinous 11/18/2018  2:00 PM   Dressing Status Normal: Clean, Dry & Intact 11/18/2018  2:00 PM   Dressing Change Due 11/19/18 11/18/2018  2:00 PM   Dressing Intervention Gauze 11/18/2018  2:00 PM   Patency Intervention Tip/Tilt 11/18/2018  2:00 PM   Chest Tube Clamps at Bedside present 11/18/2018  2:00 PM   Container Amount 360 11/18/2018  2:45 PM   Output (ml) 0 ml 11/18/2018  2:45 PM   Number of days:5       Chest Tube 2 Mediastinal (Active)   Site Assessment [facility] 11/18/2018  2:00 PM   Suction -20 cm H2O 11/18/2018  2:00 PM   Chest Tube Airleak No 11/18/2018  2:00 PM   Drainage Description Sanguinous 11/18/2018  2:00 PM   Dressing Status Normal: Clean, Dry & Intact 11/18/2018  2:00 PM   Dressing Change Due 11/19/18 11/18/2018  2:00 PM   Dressing Intervention Gauze 11/18/2018  2:00 PM   Patency Intervention Tip/Tilt 11/18/2018  2:00 PM   Chest Tube Clamps at Bedside present 11/18/2018  2:00 PM   Number of days:5       Chest Tube 3 Right Mediastinal (Active)   Site Assessment [facility] 11/18/2018  2:00 PM   Suction -20 cm H2O 11/18/2018  2:00 PM   Chest Tube Airleak No 11/18/2018  2:00 PM   Drainage Description Sanguinous 11/18/2018  2:00 PM   Dressing Status Normal: Clean, Dry & Intact 11/18/2018  2:00 PM   Dressing Change Due 11/19/18 11/18/2018  2:00 PM   Dressing Intervention Gauze 11/18/2018  2:00 PM   Patency Intervention Tip/Tilt 11/18/2018  2:00 PM   Chest Tube Clamps at Bedside present 11/18/2018  2:00 PM   Container Amount 180 11/18/2018  3:00 PM   Output (ml) 40 ml 11/18/2018  3:00 PM   Number of days:5       Chest Tube Left (Active)   Site Assessment [facility] 11/18/2018  2:00 PM   Suction -20 cm H2O 11/18/2018  2:00 PM   Chest Tube Airleak No 11/18/2018  2:00 PM   Drainage Description Sanguinous 11/18/2018  2:00 PM   Dressing Status Normal: Clean, Dry & Intact  11/18/2018  2:00 PM   Dressing Change Due 11/19/18 11/18/2018  2:00 PM   Dressing Intervention Gauze 11/18/2018  2:00 PM   Patency Intervention Tip/Tilt 11/18/2018  2:00 PM   Chest Tube Clamps at Bedside present 11/18/2018  2:00 PM   Container Amount 420 11/18/2018  3:00 PM   Output (ml) 0 ml 11/18/2018  3:00 PM   Number of days:1       NG/OG Tube Orogastric (Active)   Site Description [facility] 11/18/2018  2:00 PM   Status Suction-low intermittent 11/18/2018  2:00 PM   Drainage Appearance [place] 11/18/2018  2:00 PM   Placement Check [place] unchanged 11/18/2018  2:00 PM   [place] (cm marking) at nare/mouth 65 cm 11/18/2018  2:00 PM   Intake (ml) 30 ml 11/14/2018 12:00 PM   Flush/Free Water (mL) 10 mL 11/17/2018  4:00 AM   Residual (mL) 0 mL 11/14/2018 12:00 PM   Output (ml) 0 ml 11/18/2018  2:00 PM   Number of days:5       NG/OG Tube Nasogastric Right nostril (Active)   Site Description [facility] 11/18/2018  2:00 PM   Status Clamped 11/18/2018  2:00 PM   Placement Check [place] unchanged 11/18/2018  2:00 PM   [place] (cm marking) at nare/mouth 92 cm 11/18/2018  2:00 PM   Intake (ml) 30 ml 11/16/2018  8:00 PM   Flush/Free Water (mL) 30 mL 11/18/2018  4:00 AM   Number of days:4       Rectal Tube With balloon (Active)   Stool Amount Small 11/18/2018  8:00 AM   Number of days:1       Urethral Catheter (Active)   Tube Description Positional 11/16/2018 12:00 PM   Catheter Care Done 11/18/2018  2:00 PM   Collection Container Standard 11/18/2018  2:00 PM   Securement Method Securing device (Describe) 11/18/2018  2:00 PM   Rationale for Continued Use Strict 1-2 Hour I&O 11/18/2018  2:00 PM   Urine Output 0 mL 11/18/2018  3:00 PM   Number of days:5            Assessment:   ASA SCORE: 4 emergent     NPO Status: > 6 hours since completed Solid Foods   Documentation: Deferred   Proceeding: Proceed without further delay     Plan:   Anes. Type:  General   Pre-Induction: None     Fluid/Blood-Preparation: Blood in Room; PRBC; FFP; Hot Line;  Albumin; PLT; Cell Saver     Drips/Meds-Preparation: Heparin; Norepi; Milrinone; Epinephrine; NO   Induction:  IV (Standard)   Airway: Oral ETT   Access/Monitoring: PIV; A-Line; CVL/Port present     Advanced Monitoring: NIRS (cerebral)   Maintenance: Balanced   Emergence: Post-Procedural Sedation; Postop SECURED AIRWAY likely   Logistics: ICU Admission     Postop Pain/Sedation Strategy:  Standard-Options: Opioids PRN  Advanced Options: Propofol (cont.)     PONV Management:  Adult Risk Factors:, Postop Opioids     CONSENT: Direct conversation   Plan and risks discussed with: Spouse   Blood Products: Consented (ALL Blood Products)           63[facility] inability to wean from CPB following redo AVR.  Weaned from full [facility] ECMO support to right heart bypass early today, now with increasing vasopressor and inotropic needs.    To OR for return to full [facility] ECMO support.  ASA 4E.  Plan: GA via existing ETT, all lines in situ.  Intraoperative blood products available.    Dinorah Camacho MD  Staff Anesthesiologist  Pager [phone]                    Dinorah Camacho MD

## 2018-11-18 NOTE — ANESTHESIA PREPROCEDURE EVALUATION
Anesthesia Pre-Procedure Evaluation    Patient: Johan Spence   MRN:     [id number] Gender:   male   Age:    63 year old :      1955        Preoperative Diagnosis: Open Chest Post Cardiac Shock   Procedure(s):  COMBINED IRRIGATION AND DEBRIDEMENT CHEST WASHOUT, CHEST CLOSURE  REMOVE EXTRACORPORAL MEMBRANE OXYGENATOR ADULT     No past medical history on file.   No past surgical history on file.       Anesthesia Evaluation     . Pt has had prior anesthetic. Type: General    No history of anesthetic complications          ROS/MED HX    ENT/Pulmonary:     (+)sleep apnea, tobacco use, Past use , . .    Neurologic: Comment: On Fentanyl and Propofol gtt      Cardiovascular: Comment: s/p AVR and [facility]. Was unable to be weaned from CPB so placed on [facility] ECMO and sent to [facility] for further cares. SANDRA shows adequate biventricular function upon ECMO turndown 11/15/18    -on [facility] ECMO: ECMO, flow: 4, Sweep: 2, 70%  -on IABP 1:1 via left femoral    (+) Dyslipidemia, hypertension----. : . . . :. . Previous cardiac testing Echodate:11/15/2018results:ECHO on 11/15   Baseline, 3.5 L and Epi -  Left ventricular size is normal. Severely (EF 20-30%) reduced left ventricular  function is present.  No pericardial effusion is present.  Mild right ventricular dilation is present. Global right ventricular function  is mildly to moderately reduced.  A bioprosthetic aortic valve is present. Doppler interrogation of the aortic  valve is normal. Leaflets are not well seen. Aortic root is thickened, likely  normal post-op changes.date: results:ECG reviewed date:2018 results:Complete heart block with ventricular rhythm in low 20's, non-conducting P-waves noted date: results:          METS/Exercise Tolerance:     Hematologic:     (+) Anemia, History of Transfusion no previous transfusion reaction -      Musculoskeletal:  - neg musculoskeletal ROS       GI/Hepatic:  - neg GI/hepatic ROS      "  Renal/Genitourinary: Comment: On CRRT    (+) chronic renal disease, type: ARF, Pt requires dialysis,       Endo: Comment: On Insulin gtt    (+) type II DM .      Psychiatric:  - neg psychiatric ROS       Infectious Disease:  - neg infectious disease ROS       Malignancy:      - no malignancy   Other:                         PHYSICAL EXAM:   Mental Status/Neuro:    Airway: Facies: Feasible  Mallampati: Not Assessed  Mouth/Opening: Not Assessed  TM distance: Not Assessed  Neck ROM: Not Assessed  Device in place: ETT   Respiratory:   Resp. Support: SIMV      CV:    Comments:                    Lab Results   Component Value Date    WBC 11.6 (H) 11/17/2018    HGB 7.8 (L) 11/17/2018    HCT 23.8 (L) 11/17/2018    PLT 94 (L) 11/17/2018    CRP 65.0 (H) 11/14/2018     11/17/2018    POTASSIUM 4.8 11/17/2018    CHLORIDE 106 11/17/2018    CO2 28 11/17/2018    BUN 53 (H) 11/17/2018    CR 1.53 (H) 11/17/2018     (H) 11/17/2018    TERESA 8.1 (L) 11/17/2018    PHOS 4.7 (H) 11/17/2018    MAG 2.2 11/17/2018    ALBUMIN 2.6 (L) 11/17/2018    PROTTOTAL 5.1 (L) 11/17/2018     (H) 11/17/2018     (H) 11/17/2018    ALKPHOS 66 11/17/2018    BILITOTAL 5.7 (H) 11/17/2018    PTT 89 (H) 11/17/2018    INR 1.27 (H) 11/17/2018    FIBR 477 (H) 11/17/2018    TSH 0.92 11/17/2018    T4 0.86 11/17/2018       Preop Vitals  BP Readings from Last 3 Encounters:   11/15/18 (!) 82/48    Pulse Readings from Last 3 Encounters:   No data found for Pulse      Resp Readings from Last 3 Encounters:   11/18/18 18    SpO2 Readings from Last 3 Encounters:   11/18/18 100%      Temp Readings from Last 1 Encounters:   11/18/18 37  C (98.6  F)    Ht Readings from Last 1 Encounters:   11/13/18 1.7 m (5' 6.93\")      Wt Readings from Last 1 Encounters:   11/17/18 96 kg (211 lb 10.3 oz)    Estimated body mass index is 33.22 kg/(m^2) as calculated from the following:    Height as of this encounter: 1.7 m (5' 6.93\").    Weight as of this encounter: 96 " kg (211 lb 10.3 oz).     LDA:  Peripheral IV 11/17/18 Right Upper arm (Active)   Site Assessment [facility] 11/18/2018 12:00 AM   Line Status Infusing 11/18/2018 12:00 AM   Phlebitis Scale 0-->no symptoms 11/18/2018 12:00 AM   Infiltration Scale 0 11/18/2018 12:00 AM   Extravasation? No 11/18/2018 12:00 AM   Number of days:1       Arterial Line 11/13/18 Radial (Active)   Site Assessment [facility] 11/18/2018 12:00 AM   Line Status Pulsatile blood flow 11/18/2018 12:00 AM   Art Line Waveform Appropriate 11/18/2018 12:00 AM   Art Line Interventions Flushed per protocol 11/18/2018 12:00 AM   Color/Movement/Sensation Capillary refill greater than 3 sec 11/18/2018 12:00 AM   Dressing Type Transparent 11/18/2018 12:00 AM   Dressing Status Clean, dry, intact 11/18/2018 12:00 AM   Dressing Intervention Dressing changed/new dressing 11/14/2018 12:00 AM   Dressing Change Due 11/18/18 11/18/2018 12:00 AM   Number of days:5       CVC Double Lumen 11/13/18 (Active)   Site Assessment [facility] 11/18/2018 12:00 AM   Extravasation? No 11/18/2018 12:00 AM   Dressing Intervention Chlorhexidine sponge;Transparent 11/16/2018  8:00 AM   Dressing Change Due 11/18/18 11/18/2018 12:00 AM   CVC Comment [place] 11/18/2018 12:00 AM   CVC Lumen Assessment White;Brown 11/13/2018 10:00 PM   Number of days:5       Arterial Sheath  (Active)   Specific Qualities Sutured 11/18/2018 12:00 AM   Site Assessment [facility] 11/18/2018 12:00 AM   Dressing Type Gauze 11/18/2018 12:00 AM   Dressing Intervention Dressing reinforced 11/17/2018 12:00 PM   Arterial Sheath Comment ECMO 11/18/2018 12:00 AM   Number of days:5       Arterial Sheath  (Active)   Specific Qualities Sutured 11/18/2018 12:00 AM   Site Assessment [facility] 11/18/2018 12:00 AM   Dressing Type Biopatch;Transparent 11/18/2018 12:00 AM   Dressing Intervention Dressing changed/new dressing 11/15/2018 12:00 PM   Arterial Sheath Comment IABP 11/18/2018 12:00 AM   Number of days:5       Venous Sheath (Active)   Specific Qualities  Sutured 11/18/2018 12:00 AM   Site Assessment WDL 11/18/2018 12:00 AM   Dressing Type Gauze 11/18/2018 12:00 AM   Dressing Intervention Dressing reinforced 11/17/2018  4:00 PM   Venous Sheath Comment ECMO 11/18/2018 12:00 AM   Number of days:5       Pulmonary Artery Catheter - Single Lumen 11/13/18 1200 Right internal jugular vein (Active)   Dressing dressing dry and intact 11/18/2018 12:00 AM   Securement secured with sutures 11/18/2018 12:00 AM   PA Catheter Markings (cm) 49 11/18/2018 12:00 AM   Phlebitis 0-->no symptoms 11/18/2018 12:00 AM   Infiltration 0-->no symptoms 11/18/2018 12:00 AM   Waveform dampened 11/17/2018  8:00 AM   Pressure Catheter Interventions line leveled/zeroed;system flushed 11/18/2018 12:00 AM   Daily Review Of Necessity completed 11/18/2018 12:00 AM   Number of days:5       Airway - Adult/Peds 8 endotracheal tube (Active)   Site Appearance Clean and dry 11/18/2018 12:20 AM   Secured By Commercial tube matt 11/18/2018 12:20 AM   Secured at (cm) to lip 26 cm 11/17/2018  8:00 AM   Cuff Pressure - Type minimal occluding volume 11/17/2018  8:00 AM   Tube Care/Reposition repositioned tube left side of mouth 11/18/2018 12:00 AM   Bite Block Secure and Patent 11/18/2018 12:00 AM   Safety Measures manual resuscitator/PEEP valve in room 11/18/2018 12:00 AM   Number of days:5       Chest Tube 1 Mediastinal (Active)   Site Assessment UTV 11/18/2018 12:00 AM   Suction -20 cm H2O 11/18/2018 12:00 AM   Chest Tube Airleak No 11/18/2018 12:00 AM   Drainage Description Sanguinous 11/18/2018 12:00 AM   Dressing Status Normal: Clean, Dry & Intact 11/18/2018 12:00 AM   Dressing Change Due 11/18/18 11/18/2018 12:00 AM   Dressing Intervention Gauze 11/17/2018  8:00 PM   Patency Intervention Tip/Tilt 11/18/2018 12:00 AM   Chest Tube Clamps at Bedside present 11/18/2018 12:00 AM   Container Amount 195 11/18/2018 12:00 AM   Output (ml) 0 ml 11/18/2018  2:00 AM   Number of days:5       Chest Tube 2 Mediastinal  (Active)   Site Assessment [facility] 11/18/2018 12:00 AM   Suction -20 cm H2O 11/18/2018 12:00 AM   Chest Tube Airleak No 11/18/2018 12:00 AM   Drainage Description Sanguinous 11/18/2018 12:00 AM   Dressing Status Normal: Clean, Dry & Intact 11/18/2018 12:00 AM   Dressing Change Due 11/18/18 11/18/2018 12:00 AM   Dressing Intervention Gauze 11/17/2018  8:00 PM   Patency Intervention Tip/Tilt 11/18/2018 12:00 AM   Chest Tube Clamps at Bedside present 11/18/2018 12:00 AM   Number of days:5       Chest Tube 3 Right Mediastinal (Active)   Site Assessment [facility] 11/18/2018 12:00 AM   Suction -20 cm H2O 11/18/2018 12:00 AM   Chest Tube Airleak No 11/18/2018 12:00 AM   Drainage Description Sanguinous 11/18/2018 12:00 AM   Dressing Status Normal: Clean, Dry & Intact 11/18/2018 12:00 AM   Dressing Change Due 11/18/18 11/18/2018 12:00 AM   Dressing Intervention Gauze 11/17/2018  8:00 PM   Patency Intervention Tip/Tilt 11/18/2018 12:00 AM   Chest Tube Clamps at Bedside present 11/18/2018 12:00 AM   Container Amount 1290 11/18/2018  2:00 AM   Output (ml) 180 ml 11/18/2018  2:00 AM   Number of days:5       Chest Tube Left (Active)   Site Assessment [facility] 11/18/2018 12:00 AM   Suction -20 cm H2O 11/18/2018 12:00 AM   Chest Tube Airleak No 11/18/2018 12:00 AM   Drainage Description Sanguinous 11/18/2018 12:00 AM   Dressing Status Normal: Clean, Dry & Intact 11/18/2018 12:00 AM   Dressing Change Due 11/18/18 11/18/2018 12:00 AM   Dressing Intervention Gauze 11/17/2018  8:00 PM   Patency Intervention Tip/Tilt 11/18/2018 12:00 AM   Chest Tube Clamps at Bedside present 11/18/2018 12:00 AM   Container Amount 195 11/18/2018  2:00 AM   Output (ml) 5 ml 11/18/2018  2:00 AM   Number of days:1       NG/OG Tube Orogastric (Active)   Site Description WDL 11/18/2018 12:00 AM   Status Suction-low intermittent 11/18/2018 12:00 AM   Drainage Appearance Brown 11/18/2018 12:00 AM   Placement Check [place] unchanged 11/18/2018 12:00 AM   [place] (cm marking)  at nare/mouth 65 cm 11/18/2018 12:00 AM   Intake (ml) 30 ml 11/14/2018 12:00 PM   Flush/Free Water (mL) 10 mL 11/17/2018  4:00 AM   Residual (mL) 0 mL 11/14/2018 12:00 PM   Output (ml) 0 ml 11/17/2018  8:00 PM   Number of days:5       NG/OG Tube Nasogastric Right nostril (Active)   Site Description WD 11/18/2018 12:00 AM   Status Enteral Feedings 11/18/2018 12:00 AM   Placement Check [place] unchanged 11/18/2018 12:00 AM   [place] (cm marking) at nare/mouth 92 cm 11/18/2018 12:00 AM   Intake (ml) 30 ml 11/16/2018  8:00 PM   Flush/Free Water (mL) 30 mL 11/18/2018 12:00 AM   Number of days:4       Rectal Tube With balloon (Active)   Stool Amount Small 11/17/2018  8:00 PM   Number of days:1       Urethral Catheter (Active)   Tube Description Positional 11/16/2018 12:00 PM   Catheter Care Done 11/17/2018  8:00 PM   Collection Container Standard 11/18/2018 12:00 AM   Securement Method Securing device (Describe) 11/18/2018 12:00 AM   Rationale for Continued Use Strict 1-2 Hour I&O 11/18/2018 12:00 AM   Urine Output 60 mL 11/18/2018  2:00 AM   Number of days:5       Hemodialysis Vascular Access Femoral vein catheter Right Femoral vein (Active)   Site Assessment WDL 11/18/2018 12:00 AM   Dressing Intervention Dressing changed 11/14/2018  4:00 AM   Dressing Status Clean, dry, intact 11/18/2018 12:00 AM   Number of days:5            Assessment:   ASA SCORE: 4    NPO Status: > 6 hours since completed Solid Foods   Documentation: H&P complete; Preop Testing complete; Consents complete   Proceeding: Proceed without further delay  Tobacco Use:  Active user of Tobacco     Plan:   Anes. Type:  General   Pre-Induction: None     Fluid/Blood-Preparation: Blood in Room; PRBC; FFP; Hot Line; Albumin; PLT; Cell Saver     Drips/Meds-Preparation: Heparin; Norepi; Milrinone; Epinephrine; NO; Vasopressin   Induction:  IV (Standard)   Airway: Oral ETT   Access/Monitoring: PIV; 2nd PIV; A-Line     Advanced Monitoring: NIRS (cerebral); SANDRA  Adult            ADULT SANDRA Checklist:             Absolute Contra-Indications: NONE   Maintenance: Balanced   Emergence: Post-Procedural Sedation; Postop SECURED AIRWAY likely   Logistics: ICU Admission     Postop Pain/Sedation Strategy:  Standard-Options: Opioids PRN  Advanced Options: Propofol (cont.); Opioid (cont.)     PONV Management:  Adult Risk Factors:, Postop Opioids  Prevention: Ondansetron     CONSENT: Direct conversation   Plan and risks discussed with: Spouse   Blood Products: Consented (ALL Blood Products)           63[facility] inability to wean from CPB following redo AVR.  Currently on ECMO, IABP, nitric oxide, 3 pressors and CRRT with open chest.  To OR for washout, possible ECMO decannulation, possible chest closure.  ASA 4.  Plan: GA via existing ETT, all lines in situ.  Intraoperative SANDRA and blood products available.    Dinorah Camacho MD  Staff Anesthesiologist  Pager [phone]                Jean Paul Latham MD

## 2018-11-18 NOTE — PROGRESS NOTES
CRRT STATUS NOTE    DATA:  Time:  1715  Pressures WNL:  -currently off - in OR  Filter Status: BAR  Problems Reported/Alarms Noted:      Supplies Present:  YES    ASSESSMENT:  Patient Net Fluid Balance:  +2500 yesterday                                                +5051 Today                                               +[phone]  Since Admission   Vital Signs: 90/35, 80s  On epi, vaso, and levophed  Labs:  K 4.6  Hgb 10.4  Plt 92  Goals of Therapy:  No UF    INTERVENTIONS:   Patient went to OR today for washout x 2 today. Unable to remove fluid     PLAN:  Continue with current plan of care. Contact resource with questions at [phone].

## 2018-11-18 NOTE — PROGRESS NOTES
Patient returned from OR after washout on V-V ECMO with the sweep gas off (RVAD) and with NO set at 40 PPM

## 2018-11-18 NOTE — PROGRESS NOTES
CRRT STATUS NOTE    DATA:  Time:  6:09 AM  Pressures WNL:  YES  Filter Status:  WDL    Problems Reported/Alarms Noted:  None    Supplies Present:  YES    ASSESSMENT:  Patient Net Fluid Balance:  Net + 769 ml @ 0600  Vital Signs:  Hr 80, RR 18, /47, MAP 75  Labs:  Plt 85, Hgb 8.2, K 4.6, Mg 2.3, Phos 4.3, iCa 4.7  Goals of Therapy:  I = O    INTERVENTIONS:   Floor mat and hair ties in use.    PLAN:  Continue to monitor circuit daily and change set q72 hours and PRN for clotting/clogging. Please call CRRT RN with any questions/problems. Plan for OR this morning.

## 2018-11-19 NOTE — PLAN OF CARE
Problem: Patient Care Overview  Goal: Plan of Care/Patient Progress Review  1. Will be hemodynamically stable.  2. Oxygenation will be met.  3. Family will be well informed of all procedures.  Outcome: Improving  Problem: Goal Outcome Summary  Goal: Goal Outcome Summary  Outcome:  Sift Duration: 4458-1311      NEURO: Heavily sedated with fentanyl and propofol. Will decrease sedation when chest is closed. NINFA.  CARDIAC: Continues to be 100% paced with temporary pacing wires. MAP >60 on neosyneprhrine, vasopressin, and norepinephrine. VA ECMO supporting heart. No acute issues over the day. Lactate stable. Heparin started due to high filter pressures. IABP back to 1:1 and 100% augmentation, tolerating.  PULM: No oxygenation issues.   GI/: 2 loose stools today. 20-30 ml/hour of urine. CRRT trying to keep even for the day if tolerates.  SKIN: Intact except drains and incisions. See flow sheets for details  MOBILITY: Tolerates wedge turns from left to right.  SPECIAL EVENTS: Consent signed for levotronix RVAD and LVAD placement tomorrow and possible close chest.

## 2018-11-19 NOTE — PROCEDURES
Arterial Line Procedure Note    DATE OF OPERATION: 11/19/2018    PREOPERATIVE DIAGNOSIS: Severe aortic stenosis, ECMO    POSTOPERATIVE DIAGNOSIS: same    OPERATION PERFORMED: radial line placement    SURGEON: Dr Clarence Mejía, supervising    ANESTHESIA: Sedation    EBL: 2 mL    COMPLICATIONS: none    INDICATIONS FOR PROCEDURE:   The patient is a 63 year old male with sever aortic stenosis s/p AVR and root replacement on ECMO, who requires an arterial line for invasive BP monitoring and frequent ABG monitoring.    DESCRIPTION OF OPERATIVE PROCEDURE:   Pre-procedure consent for the procedure was obtained.  The patient was prepped and draped in the usual sterile manner. The right radial artery was palpated and visualized with ultrasound and cannulated. Pulsatile, arterial blood was visualized and the artery was then threaded with arterial quick cath. Catheter was sutured into place. A good wave-form was obtained. The patient tolerated the procedure well without any immediate complications. The area was cleaned and sterile dressing was applied. Dr Lima is the supervising surgeon and was available for assistance.    DISPOSITION: stable, OK to use arterial line    Libby Love MD  General Surgery, PGY-3  Pg [phone]

## 2018-11-19 NOTE — PLAN OF CARE
Problem: Patient Care Overview  Goal: Plan of Care/Patient Progress Review  Outcome: No Change  Assessment:   Cardiac: 100% AV paced via TPM, CVP 12, PA 26/18, SVR 500s. Epinephrine, Levophed, Vasopressin, and Phenylephrine  titrated to keep MAP > 60. IABP 1:1 via EKG. A/V ECMO, Heparin gtt on hold per Dr. Neri.  Resp: CMV 40%/450/18/8.  Neuro: Fentanyl and Propofol providing adequate sedation.  : [place] patent, UO 25-35 cc/hr. Ongoing CRRT, no alarms during shift. Unable to pull any fluid due to hemodynamic instability.   GI: Tube feedings on hold.  OG to LIS.  Skin: Mediastinal CT x2 output decreased significantly after protamine given, Pleural CT x 2 to -20 cc. Open chest.   Endocrine: Insulin gtt infusing.    Interventions:  See previous note for events and interventions done during shift. 3 unit of RBCs and 1 unit of Platelets given. 1000 cc of albumen given.    Plan: Will continue to monitor/assess and update MD as needed.

## 2018-11-19 NOTE — PROGRESS NOTES
CRRT RESTART NOTE    Reason for Restart: Patient went to OR early this am for washout and then went back again this afternoon for VA ECMO.   Error Code:  na    Patient s Vascular Access: Catheter      Via ECMO line          Placement Confirmed:  YES  Manufacture:  NA  Model:  NA  Length/Turkmen Size:  NA  Flush Volume:  NA    DATA:  Procedure:  CVVHDF  Start Time:  1735  Machine#:  7  Filter:  M150  Blood Flow:   200 mL/min  Pre-Replacement Solution:  Phoxillum 4/2.5  Post-Replacement Solution:  Phoxillum 4/2.5  Dialysate Solution:  PrismaSol 2/3.5  Pre-Replacement Solution Rate:  1100 mL/hr  Post-Replacement Solution Rate:  200 mL/hr  Dialysate Flow Rate:  1100 mL/hr  Patient Removal Rate:  0 mL/hr  Anticoagulation Type and Rate:  NA (however heparin through ecmo line)    ASSESSMENT:  How Patient Tolerated Restart:  well  Vital Signs:  94/36, 89,94%  Initial Pressures:  Access:  88  Filter:  142 - after a few minutes 214  Return:  102  TMP:  68  Change in Filter Pressure:  14    INTERVENTIONS:  Hair ties applied to each bag.  Machine placed on anti fatigue mat.    PLAN:  Continue with current plan of care  Contact resource with questions at [phone]

## 2018-11-19 NOTE — PHARMACY-VANCOMYCIN DOSING SERVICE
Pharmacy Vancomycin Initial Note  Date of Service 2018  Patient's  1955  63 year old, male    Indication: open chest prophylaxis    Current estimated CrCl = CRRT    Creatinine for last 3 days  2018:  3:55 PM Creatinine 3.38 mg/dL;  9:58 PM Creatinine 2.62 mg/dL  2018:  3:44 AM Creatinine 2.09 mg/dL;  9:54 AM Creatinine 1.75 mg/dL;  3:52 PM Creatinine 1.78 mg/dL;  8:15 PM Creatinine 1.83 mg/dL; 11:44 PM Creatinine 1.53 mg/dL  2018:  3:40 AM Creatinine 1.41 mg/dL; 12:50 PM Creatinine 1.43 mg/dL;  5:30 PM Creatinine 1.75 mg/dL;  9:04 PM Creatinine 1.59 mg/dL  2018:  3:48 AM Creatinine 1.46 mg/dL;  5:51 AM Creatinine 1.42 mg/dL; 10:10 AM Creatinine 1.40 mg/dL    Recent Vancomycin Level(s) for last 3 days  No results found for requested labs within last 72 hours.      Vancomycin IV Administrations (past 72 hours)                   vancomycin (VANCOCIN) 1,750 mg in sodium chloride 0.9 % 250 mL intermittent infusion (mg) 1,750 mg New Bag 18 1247                Nephrotoxins and other renal medications (Future)    Start     Dose/Rate Route Frequency Ordered Stop    18 1200  vancomycin (VANCOCIN) 1,750 mg in sodium chloride 0.9 % 250 mL intermittent infusion      1,750 mg  over 90 Minutes Intravenous EVERY 24 HOURS 18 1149      18 1145  piperacillin-tazobactam (ZOSYN) 3.375 g vial to attach to  mL bag      3.375 g  over 30 Minutes Intravenous EVERY 6 HOURS 18 1133      18 0030  phenylephrine (MATT-SYNEPHRINE) 200 mg in sodium chloride 0.9 % 250 mL infusion      0.5-6 mcg/kg/min × 90 kg (Dosing Weight)  3.4-40.5 mL/hr  Intravenous CONTINUOUS 18 0018      18 2130  norepinephrine (LEVOPHED) 16 mg in D5W 250 mL infusion      0.03-0.4 mcg/kg/min × 90 kg (Dosing Weight)  2.5-33.8 mL/hr  Intravenous CONTINUOUS 18  vasopressin (VASOSTRICT) 40 Units in D5W 40 mL infusion      0.5-4 Units/hr  0.5-4 mL/hr   Intravenous CONTINUOUS 11/13/18 2115            Contrast Orders - past 72 hours (72h ago through future)    Start     Dose/Rate Route Frequency Ordered Stop    11/17/18 1845  iopamidol (ISOVUE-370) solution 217 mL      217 mL INTRA-ARTERIAL ONCE 11/17/18 1835 11/17/18 1845                Plan:  1.  Start vancomycin  1750 mg IV q24h.   2.  Goal Trough Level: 15-20 mg/L   3.  Pharmacy will check trough levels as appropriate in 3-5 Days.    4. Serum creatinine levels will be ordered daily for the first week of therapy and at least twice weekly for subsequent weeks.    5. Griswold method utilized to dose vancomycin therapy: CRRT    Airam Boucher, PharmD  Pager [phone]

## 2018-11-19 NOTE — PROGRESS NOTES
CV ICU PROGRESS NOTE  11/19/2018    CO-MORBIDITIES:   Severe Aortic stenosis and regurge.   ONOFRE  DM  HTN  HLD    ASSESSMENT: Johna Spence is a 63 year old male now s/p AVR and root [facility]. Was unable to be weaned from CPB so placed on VA ECMO and sent to [facility] for further cares. SANDRA shows adequate biventricular function upon ECMO turndown however did require increased pressor requirements. Brought to OR that day and failed turndown. Chest washout at bedside on 11/7 shows clot tamponade. Cleaned out and pressor requirements improved. Brought to Cath lab and coronaries clean. 11/18 had increased bleeding from chest tubes. Went to OR where cannulation was switched to RA PA RVAD without oxygenator. PA pressures continued to rise into the PA systolics into the 75 range. Brought back down to cannulate central VA ECMO again. Since, the patient has persistently required vasopressors x4, attempting to titrate off epi first.       TODAY'S PROGRESS:   - Discontinue epi gtt as pt tolerates  - Start prophylactic abx for open chest (Vanc/Zosyn)  - resume anti-coagulation per   - start trickle feeds when off epinephrine   - replace arterial line on right radial, proximal to prior cath site    PLAN:  Neuro/pain/sedation:  #Post op pain.   - Monitor neurological status. Notify the MD for any acute changes in exam.  - Fentanyl gtt for pain.   - Propofol gtt. BIS monitoring 40-60.  RASS goal -3 to -4.  - Oxy/dil PRN. Robaxin PRN.   - Lidoderm.     Pulmonary:   #MV postop  #ONOFRE    - Supplemental oxygen to keep saturation above 92 %.  - Mechanically ventilated, will maintain PEEP of 8 during resuscitation.     Cardiovascular:   #AVR and root [facility].    #VA ECMO after inability to separate from bypass.   #HTN.   #Complete HB.    #Vasoplegia  - Monitor hemodynamic status.   - Epi/norepi/vaso/phenyl gtts.  - MAP >65.   - ECMO turndown in OR 11/15  unsuccessful.   - Chest remains open with multiple turndown attempts.   - Holding PTA Lisinopril and Metoprolol.   - 12 Lead EKG shows complete HB, cards aware  - Washed out at Bedside 11/17. Washed out in OR 11/18. RA to PA RVAD without oxygenator. PAP increased into the mid 70's and rising so brought back to OR for central cannulation RA to Aorta.  Plan for return to OR 11/20 for temporary BiVAD per Dr. Neri.    GI care:   - Miralax/senna-colace, suppository PRN    Fluids/ Electrolytes/ Nutrition:   - TKO  - NPO    - NJ trophic feeds while weening off pressors     - BMP q6h.  - No indication for parenteral nutrition.    Renal:    #Right groin HD line. Removed 11/18.  - Nephrology following.    - Will continue to monitor intake and output.  - Coron[place]  - Goal even to negative for today.    - CRRT through ECMO circuit.     Endocrine:    - Insulin gtt.    ID/ Antibiotics:  - Starting Vanc/Zosyn   - No signs of infection but vasoplegic.     Heme:     - Hemoglobin stable.   - CBC/coags q6h.  - Anticoagulation: Heparin being resumed this PM.      Prophylaxis:    - Mechanical prophylaxis for DVT.   - Hep gtt for ECMO -160.   - PPI    Lines/tubes/drains:  - R internal jugular MAC/swan  - ETT  - Left IABP   - VA ECMO  - Chest tubes: 3 meds.    - Coronado  - NJ feeding tube.   - AV TPW's.   - Newly placed right radial A-line    Disposition:  - CV ICU.    Patient seen, findings and plan discussed with attending physician, Dr. Lima.       ====================================    SUBJECTIVE:   Labile vasopressor requirements. Received protamine for persistent OP from wound vac. Heparin held accordingly.      OBJECTIVE:   1. VITAL SIGNS:   Temp:  [97.2  F (36.2  C)-97.9  F (36.6  C)] 97.5  F (36.4  C)  Heart Rate:  [87-90] 89  Resp:  [18] (P) 18  MAP:  [52 mmHg-145 mmHg] 65 mmHg  Arterial Line BP: ()/(33-65) 88/45  FiO2 (%):  [40 %-80 %] (P) 40 %  SpO2:  [93 %-100 %] 99 %  Ventilation Mode: CMV/AC  (Continuous  Mandatory Ventilation/ Assist Control)  FiO2 (%): 40 %  Rate Set (breaths/minute): 18 breaths/min  Tidal Volume Set (mL): 450 mL  PEEP (cm H2O): 8 cmH2O  Oxygen Concentration (%): 40 %  Resp: 18    2. INTAKE/ OUTPUT:   I/O last 3 completed shifts:  In: 8390.82 [I.V.:4427.82; Other:78]  Out: 2923 [Urine:532; Other:86; Chest Tube:2305]    3. PHYSICAL EXAMINATION:   General: lying in bed on ECMO and ventilator.   Neuro: sedated and intubated.   Resp: Breathing non-labored on vent.   CV: Paced at 80.   Abdomen: Soft, Non-distended.   Incisions: c/d/i.   Extremities: warm and well perfused    4. INVESTIGATIONS:   Arterial Blood Gases     Recent Labs  Lab 11/19/18  0822 11/19/18  0551 11/19/18  0348 11/19/18  0152   PH 7.40 7.48* 7.49* 7.42   PCO2 43 36 35 39   PO2 134* 154* 235* 161*   HCO3 27 27 27 26     Complete Blood Count     Recent Labs  Lab 11/19/18  0348 11/18/18  2104 11/18/18  1730 11/18/18  1607  11/18/18  1250   WBC 15.2* 13.7* 15.4*  --   --  14.2*   HGB 9.2* 7.3* 8.8* 9.0*  < > 10.4*   * 94* 112*  --   --  92*   < > = values in this interval not displayed.  Basic Metabolic Panel    Recent Labs  Lab 11/19/18  0551 11/19/18 0348 11/18/18  2104 11/18/18  1730    139 142 142   POTASSIUM 5.2 5.3 5.1 4.9   CHLORIDE 107 107 107 107   CO2 23 26 22 22   BUN 44* 45* 48* 54*   CR 1.42* 1.46* 1.59* 1.75*   * 122* 147* 179*     Liver Function Tests    Recent Labs  Lab 11/19/18  0551 11/19/18  0348 11/18/18  2104 11/18/18  1730 11/18/18  1250   * Canceled, Test credited 276* 276* 326*   ALT  --  104* 113* 133* 170*   ALKPHOS  --  63 56 63 70   BILITOTAL  --  6.7* 5.0* 5.4* 5.7*   ALBUMIN  --  2.9* 2.5* 2.6* 2.3*   INR  --  1.30* 1.42* 1.51* 1.25*     Pancreatic Enzymes  No lab results found in last 7 days.  Coagulation Profile    Recent Labs  Lab 11/19/18  0348 11/18/18  2104 11/18/18  1730 11/18/18  1250   INR 1.30* 1.42* 1.51* 1.25*   PTT 33 46* 213* 39*         5. RADIOLOGY:   Recent  Results (from the past 24 hour(s))   XR Chest Port 1 View    Narrative    Exam: Chest x-ray, 1 view, 11/18/2018.    COMPARISON: Same day, earlier.    HISTORY: Combined irrigation and debridement chest washout.    FINDINGS/    Impression    impression: Intraoperative AP view of the chest was obtained. The  inferior mid chest is within the field-of-view. Stable support devices  including right IJ [place]-Jenn catheter, intra-aortic balloon pump,  enteric tubes, prosthetic aortic valve, ECMO cannula and chest tubes.  Unchanged enlarged cardiomediastinal silhouette.    KAILA MINA MD   XR Chest Port 1 View    Narrative    EXAM: XR CHEST PORT 1 VW  11/18/2018 5:53 PM     HISTORY:  post op;     COMPARISON: Chest radiograph dated 11/18/2018 at 11:50 AM and 2:02 AM    FINDINGS: A single AP view of the chest is obtained. Transesophageal  ultrasound probe has been removed. Endotracheal tube tip projects over  the midthoracic trachea. Right IJ [place]-Jenn catheter tip projects over  the right main pulmonary artery.Enteric feeding tube tips are off the  field-of-view. Bibasilar chest tubes and mediastinal drains are stable  in position. ECMO cannulae appear unchanged. Aortic valve prosthesis  and postoperative changes of aortic root repair. Intra-aortic balloon  pump marker projects near the aortic bulb, unchanged.  Pneumomediastinum again seen.    Trachea is midline. Cardiomediastinal silhouette is unchanged. No  appreciable pneumothorax. Right pleural effusion, increased compared  to the 2:02 AM comparison study. Increased streaky right perihilar and  basilar opacities.       Impression    IMPRESSION:   1. Right [place]-Jenn catheter tip projects over the right pulmonary  artery. Additional support devices appear stable.  2. Increased right pleural effusion and patchy basilar and perihilar   opacities    I have personally reviewed the examination and initial interpretation  and I agree with the findings.    PATIENCE BLNUT MD    XR Abdomen Port 1 View    Narrative    Exam: XR ABDOMEN PORT 1 VW, 11/18/2018 5:55 PM    Indication: OG, NJ pacement post op;     Comparison: 11/15/2018    Findings:   Supine frontal view of the abdomen. Gastric tube tip and sidehole  project over the stomach. Weighted feeding tube tip projects over the  ligament of [place]. Intra-aortic balloon pump superior marker projects  near the aortic bulb, unchanged. Stable positioning of ECMO cannulae,  mediastinal drains, and bilateral chest tubes compared to prior chest  radiograph. Right IJ [place]-Jenn catheter tip projects over the right  pulmonary artery. Aortic valve prosthesis and postoperative changes of  aortic root repair. Epicardial pacer wires.    Relative paucity of bowel gas projecting over the abdomen. No  pneumatosis or portal venous gas. No definite free air on this supine  study. IABP inferior metallic marker projects at the level of L2.      Impression    Impression:   1. Appropriate positioning of the gastric and feeding tubes.  Additional support devices appear stable.  2. Relative paucity of bowel gas projecting over the abdomen. No  pneumatosis or portal venous gas.    I have personally reviewed the examination and initial interpretation  and I agree with the findings.    PATIENCE BLUNT MD   XR Chest Port 1 View    Narrative    EXAM: XR CHEST PORT 1 VW  11/19/2018 1:25 AM      HISTORY: postop open chest;     COMPARISON: 11/18/2018    FINDINGS: Single AP view of the chest. Venoarterial ECMO cannulae in  stable position. Aortic valve prostheses. Bibasilar chest tubes in  stable position. Epicardial pacer wires. Low lung volumes. Likely  interval removal of the mediastinal drains. Endotracheal and gastric  tubes following expected course. Decreased right pleural effusion.  Improved right basilar opacities. Persistent left basilar opacities.  No pneumothorax. Adequately positioned intra-aortic balloon pump,  endotracheal tube, right IJ approach pulmonary  artery catheter.  Calcified granuloma right lower lung.      Impression    IMPRESSION:   1. Adequately positioned support devices, with possible interval  removal of mediastinal drains.  2. No pneumothorax. Chest remains open.  3. Improved aeration of the right lung, with decreased right pleural  effusion.  4. Persistent residual bibasilar opacities, likely atelectasis/ edema/  aspiration.    I have personally reviewed the examination and initial interpretation  and I agree with the findings.    EMILIE RUSSO MD       =========================================

## 2018-11-19 NOTE — PLAN OF CARE
Problem: Patient Care Overview  Goal: Plan of Care/Patient Progress Review  Outcome: Declining  0830 Pt down to OR for washout.   1300 Pt back to unit, cannulation revised to RA and PA with RVAD, oxygenator off. PA [id number]'s. Increasing pressor needs after coming up from OR. Epi: 2.5mcg/kg/min, Levo: 2.5mcg/kg/min, Vaso: 4u/h. 750ml albumin given, 1 unit platelets given. Dr. Butler updated.   1500 Pt back to OR to go back on VA ECMO.   1700 Pt back to unit on VA ECMO, flows 4.5, 85%. Lactate 5.1. Pressors titrated up slightly, IABP changed to 1:2, 50% augmentation. Bleeding at cannula site. ACT: 210, heparin paused until ACT back in range. CRRT restarted, orders I=o. Open area on scrotal area noted in between first and second OR. Interdry in place. Pt unstable throughout the day, unable to turn, no posterior skin inspection done. See flowsheets for assessment and intervention.

## 2018-11-19 NOTE — PROGRESS NOTES
Cardiology - Heart failure service    Interval events: Back on VA ECMO. Overnight, was started on phenylephrine, which stabilized pressor requirements. IABP was changed 1:2, augmenting 30%. 4 units of albumin, 3 units PRBCs, 1 unit platelets given overnight. Also got 50 mg of protamine. Off heparin gtt.     Temp:  [97.2  F (36.2  C)-98.1  F (36.7  C)] 97.5  F (36.4  C)  Heart Rate:  [78-90] 89  Resp:  [18] 18  MAP:  [52 mmHg-145 mmHg] 63 mmHg  Arterial Line BP: ()/(33-65) 85/45  FiO2 (%):  [40 %-80 %] 40 %  SpO2:  [93 %-100 %] 100 %  Tele: No events.   Hemodynamics:  CVP 12, PA 26/17, PAWP -, CI 3.3, SVO2 70,   IABP via LFA, 1:2, triggered by EKG, augmented 30%, diastolic 104, pump mean 79, L radial and distal pulse in tact   ECMO settings:  Patient remains on VA ECMO. RPM's 3150 with flow range 4.3 L/min. Sweep gas is at 1 LPM, SVO2 70%.   Vent Rate 18, , PEEP 8, 40% oxygen, CMV/AC.   Epicardial pacemaker: DDD, 80, capturing.     I/O last 3 completed shifts:  In: 9768.04 [I.V.:4327.04; Other:251; NG/GT:60]  Out: 3646 [Urine:740; Other:50; Chest Tube:2856], 200 urine since MN, 400 CT.     Hemodynamic drips: Epi 0.04 (was as high as 0.16), levophed 0.05 (was up to 0.15), vaso 2 (was 4), phenylephrine 2.   Selected medications: lexapro, pantoprazole 40 mg daily, hydrocortisone 50 mg q6. Protamine 50 mg once.   Sedation: fentanyl 100, heparin, insulin 1, propofol 30.     Physical examination:  Vitals:    11/14/18 0000 11/15/18 0400 11/17/18 0300 11/18/18 0500   Weight: 89.8 kg (197 lb 15.6 oz) 93 kg (205 lb 0.4 oz) 96 kg (211 lb 10.3 oz) 96.5 kg (212 lb 11.9 oz)    11/19/18 0500   Weight: 99 kg (218 lb 4.1 oz)     CV: no murmur, open chest  Central cannulation. Chest tubes in place.  LUNGS:  Clear to auscultation bilaterally   ABD:  Active bowel sounds, soft, non-tender/non-distended.  No rebound/guarding/rigidity.  EXT:  No edema or cyanosis.  Hands/feet warm to touch with good signs of peripheral  perfusion.      Labs were reviewed.  BMP  Recent Labs  Lab 11/19/18 0348 11/18/18  2104 11/18/18  1730 11/18/18  1250    142 142 142   POTASSIUM 5.3 5.1 4.9 4.6   CHLORIDE 107 107 107 108   TERESA 8.1* 7.8* 8.5 9.1   CO2 26 22 22 28   BUN 45* 48* 54* 47*   CR 1.46* 1.59* 1.75* 1.43*   * 147* 179* 155*     LFTs  Recent Labs  Lab 11/19/18 0348 11/18/18 2104 11/18/18 1730 11/18/18  1250   ALKPHOS 63 56 63 70   AST Canceled, Test credited 276* 276* 326*   * 113* 133* 170*   BILITOTAL 6.7* 5.0* 5.4* 5.7*   PROTTOTAL 5.0* 4.6* 5.0* 4.9*   ALBUMIN 2.9* 2.5* 2.6* 2.3*      CBC  Recent Labs  Lab 11/19/18 0348 11/18/18 2104 11/18/18 1730 11/18/18  1250   WBC 15.2* 13.7* 15.4* 14.2*   RBC 3.03* 2.38* 2.94* 3.46*   HGB 9.2* 7.3* 8.8* 10.4*   HCT 27.0* 21.4* 26.6* 31.2*   MCV 89 90 91 90   MCH 30.4 30.7 29.9 30.1   MCHC 34.1 34.1 33.1 33.3   RDW 16.4* 16.2* 16.1* 15.7*   * 94* 112* 92*     INR  Recent Labs  Lab 11/19/18 0348 11/18/18 2104 11/18/18  1730 11/18/18  1250   INR 1.30* 1.42* 1.51* 1.25*       Radiology 1. Right [place]-Jenn catheter tip projects over the right pulmonary  artery. Additional support devices appear stable.  2. Mildly increased right pleural effusion and patchy basilar and perihilar   opacities    Cardiology None.    IMPRESSION & PLAN  Mr. Johan Spence is a 63 year old male with h/o AVR, DM, ONOFRE, obesity, HTN, who [facility] with [facility] of wean off pump. Had re-do AVR and ao repalacement at [facility] but during placing PA cath in OR he was coded and placed pump. AVR and ao graft replacement were successful but he was unable to come off pump. He was transferred here for further management.       Changes today  -wean pressors as tolerated. Epi first, than levo. Leave phenylephrine since pt is vasoplegic.   -will defer to surgery about anti-coagulation and antibiotics.   -touch base with Dr. Smiley regarding IABP      Cardiovascular:   #AVR and  root [facility].    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   #HTN.    - Monitor hemodynamic status.   - Epi/norepi/vaso gtts.   - MAP >65.   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 Limited information available in chart.   - ECMO turndown in OR 11/15 unsuccessful. On turndown in OR 11/18, was able to be weaned off ECMO; however, was requiring max multiple pressors. Now back on VA-ECMO.   - Went to OR 11/18 again with Dr. Morse, the patient was weaned off VA ECMO.  He required excessively high doses of inotropes and vasopressors.  ECMO circuit was configured RA to PA. Chest was packed, sent upstairs on moderate doses of inotropes and vasopressors. Chest was packed open. Patient then further decompensated, and was sent back to the OR for RA-PA RVAD to VA ECMO by connecting the cannula to the circuit.  The PA cannula placed earlier was removed.  - Added back stress dose steroids.  - Holding PTA Lisinopril and Metoprolol.   - We have very limited information on his baseline LV/RV function. He is still on 3 pressors, has evidence of pulsatility. Pressor requirement increasing likely because of vasoplegia as well as early tamponade physiology. Chest drained today via Dr. Morse, hemodynamics improved.  - His baseline LV/RV function was normal per [facility] note. During pre-op preparation of AS surgery, severe AS and vasodilation caused hypotension and bradycardia.      Disposition:  - CV ICU.      I discussed the patient with Dr. Peraza.      Thank you for allowing me to care for this patient. This has been discussed with the attending physician.      Winter King MD  Cardiology Fellow, PGY-5        Late entry - pt seen and examined 11/19/18  I have reviewed today's vital signs, notes, medications, labs and imaging. I have also seen and examined the patient and agree with the findings and plan as outlined above.    Sylvia Peraza MD  Section Head - Advanced Heart  Failure, Transplantation and Mechanical Circulatory Support  Co-Director - [facility]  Associate Professor of Medicine, [facility]

## 2018-11-19 NOTE — PROGRESS NOTES
CRRT STATUS NOTE    DATA:  Time:  5:52 AM  Pressures WNL:  Yes   Filter Status:  WDL    Problems Reported/Alarms Noted:  None    Supplies Present:  YES    ASSESSMENT:  Patient Net Fluid Balance:  11/18 +6122, +432 for 11/19 due to blood component and colloid need.    Vital Signs:  MAPs 50-80's, Paced 89. Slightly decreased pressor needs. Sweep 2 Flows 4's  Labs:  K 5.1 and 5.3 (second slightly hemolyzed). Recheck at 0600. Bili 4.6, ABG becoming more alkalotic.   Goals of Therapy:  I=O     INTERVENTIONS:   I=O. High Blood and colloid needs overnight to maintain MAPs. Mat and hair ties in use    PLAN:  Continue per CRRT Recs. Monitor potassium levels to assess K bath needs (next draw 0600).

## 2018-11-19 NOTE — PROGRESS NOTES
Nephrology Progress Note  11/19/2018       Mr Spence is a 63 yom w/hx of HTN, HLP, ONOFRE, DM2, ETOH and AVR 2015 who had repeat AVR on 11/13 with difficulty weaning from CPB, transitioned to ECMO and transferred to [facility].  Nephrology consulted for management of hemodynamic AYE, started CRRT on 11/16.      Interval History :   Mr Spence started on CRRT yesterday for elevated CK and K, both now improved today but having issues with chest tubes and open chest.  Will plan no UF until these issues are sorted, will continue for management of electrolytes.  Likely to go to OR for washout today, will consider pulling UF once more stable.        Assessment & Recommendations:   AYE-Baseline Cr reported at 1.1, now stable at 3.3 over past 24h.  AYE is due to hemodynamic injury with cardiogenic shock.  Had planned on holding off on intervention today but K was elevated with late morning BNP check, CK add-on revealed some rhabdo with CK at 11k, given this we are starting CRRT to try to clear myoglobin and reduce renal exposure and further injury.  Will match I=O but unlikely UF will be major contributor as he has other sources of output.  Consent in chart for RRT.                          -Running via ECMO circuit.                         -CRRT to manage K and clear myoglobin with elevated CK, all 2k baths.  I=O as able with 3-4 pressors.       Volume status-Overloaded with bilateral elevated filling pressures but on 3-4 pressors with tenuous hemodynamics, trying to match intake as able but was net positive 6L yesterday with massive intake.        Electrolytes/pH-K 5.2, on CRRT for clearance of K secondary to rhabdo.  Changed to all 2k solutions with K of 5.2 this am, likely related to long down-time yesterday but with frequent OR trips we will err on low side for K and replace if needed.        Ca/phos/pth-Phos up a bit at 5.8 with CK up, will clear some with CRRT, Ca and Mg WNL.       Rhabdomyolysis-Unclear source, CK up at  "~11k initially along with K and phos, CK 4k this am with CRRT.       Anemia-Hgb 8.8, replacement per team with ECMO.      Nutrition-Impact TF      Seen and discussed with Dr Sheikh      Recommendations were communicated to primary team via verbal communication.         Davide Damon  Clinical Nurse Specialist  [phone]    Review of Systems:   I reviewed the following systems:  ROS not done due to vent/sedation.       Physical Exam:   I/O last 3 completed shifts:  In: 4820.68 [I.V.:2227.68; Other:78; NG/GT:30]  Out: 2661 [Urine:599; Emesis/NG output:100; Other:122; Chest Tube:1840]   BP (!) 82/48 (BP Location: Right arm)  Temp 98.1  F (36.7  C)  Resp 18  Ht 1.7 m (5' 6.93\")  Wt 99 kg (218 lb 4.1 oz)  SpO2 99%  BMI 34.26 kg/m2     GENERAL APPEARANCE: Intubated, sedated, ECMO and IABP, critically ill.   EYES:  No scleral icterus, pupils equal  HENT: mouth without ulcers or lesions  PULM: lungs rhonchi to auscultation, equal air movement, no cyanosis or clubbing  CV: regular rhythm, normal rate, no rub     -edema +2 LE  GI: soft, nontender, non-distended, bowel sounds are +  MS: no evidence of inflammation in joints, no muscle tenderness  NEURO: Intubated and sedated.      Labs:   All labs reviewed by me  Electrolytes/Renal -   Recent Labs   Lab Test  11/19/18   1010  11/19/18   0551  11/19/18   0348  11/18/18   2104   NA  141  141  139  142   POTASSIUM  5.3  5.2  5.3  5.1   CHLORIDE  107  107  107  107   CO2  26  23  26  22   BUN  47*  44*  45*  48*   CR  1.40*  1.42*  1.46*  1.59*   GLC  151*  133*  122*  147*   TERESA  7.8*  8.1*  8.1*  7.8*   MAG  2.3   --   2.4*  2.1   PHOS  5.8*   --   5.2*  6.4*       CBC -   Recent Labs   Lab Test  11/19/18   1010  11/19/18   0348 11/18/18   2104   WBC  15.4*  15.2*  13.7*   HGB  8.8*  9.2*  7.3*   PLT  88*  115*  94*       LFTs -   Recent Labs   Lab Test  11/19/18   1010  11/19/18   0551  11/19/18 0348 11/18/18 2104   ALKPHOS  66   --   63  56   BILITOTAL  7.0*   -- "   6.7*  5.0*   ALT  118*   --   104*  113*   AST  Unsatisfactory specimen - hemolyzed  343*  Canceled, Test credited  276*   PROTTOTAL  4.9*   --   5.0*  4.6*   ALBUMIN  2.7*   --   2.9*  2.5*       Iron Panel - No lab results found.        Current Medications:    [START ON 11/20/2018] B and C vitamin Complex with folic acid  5 mL Per Feeding Tube Daily     escitalopram  10 mg Oral Daily     hydrocortisone sodium succinate PF  50 mg Intravenous Q6H     pantoprazole (PROTONIX) IV  40 mg Intravenous Q24H     piperacillin-tazobactam  3.375 g Intravenous Q6H     polyethylene glycol  17 g Oral Daily     protein modular  1 packet Per Feeding Tube BID     senna-docusate  1-2 tablet Oral or Feeding Tube BID     vancomycin (VANCOCIN) IV  1,750 mg Intravenous Q24H       IV fluid REPLACEMENT ONLY       IV fluid REPLACEMENT ONLY       CRRT replacement solution 12.5 mL/kg/hr (11/19/18 1046)     EPINEPHrine IV infusion ADULT Stopped (11/19/18 1030)     fentaNYL 100 mcg/hr (11/19/18 1400)     HEParin 600 Units/hr (11/19/18 1500)     insulin (regular) 2 Units/hr (11/19/18 1400)     - MEDICATION INSTRUCTIONS -       norepinephrine 0.04 mcg/kg/min (11/19/18 1430)     phenylephrine IV infusion ADULT 2 mcg/kg/min (11/19/18 1400)     CRRT replacement solution 200 mL/hr at 11/19/18 1045     CRRT replacement solution 12.5 mL/kg/hr (11/19/18 1036)     propofol (DIPRIVAN) infusion 30 mcg/kg/min (11/19/18 1400)     vasopressin (PITRESSIN) infusion ADULT (40 mL) 2 Units/hr (11/19/18 1400)

## 2018-11-19 NOTE — PROVIDER NOTIFICATION
1703 Dr. Neri updated on patient status. Vitals, IABP setting, labs, pressor requirements, and CT output reviewed. So far patient has gotten 1 L of albumen, 1 unit of RBC, and started on    Plan to give 3 units of  RBCs, 1 unit of platelets , and 50 mg of protamine, Plan to keep heparin gtt off overnight regardless of ACT.

## 2018-11-19 NOTE — PROGRESS NOTES
CLINICAL NUTRITION SERVICES - brief note. See 11/14 note for full assessment.    -FEN/GI: TF on hold 2/2 pressor requirements.   -Renal: on CRRT    Interventions  Collaboration with other providers - Discussed plan in rounds, will restart trophic feeds today and reassess TF advancement pending pressor wean  Multivitamin/mineral supplement therapy - changed certavite to nephronex multivitamin     RD will continue to follow.    Kristie Pool, FRANNIE, LD, CNSC  CVICU Dietitian  Pager: [phone]

## 2018-11-19 NOTE — PROGRESS NOTES
ECMO Shift Summary:    Patient remains on VA ECMO, all equipment is functioning and alarms are appropriately set. RPM's 3600 with flow range 4.6 L/min. Sweep gas is at 1.5 LPM and FiO2 70%. Circuit remains free of air, clot and fibrin. Cannulas are secure with some bleeding from site. Extremities are warm to the touch. Suctioned ETT for small amount of secretions.    Significant Shift Events:    Vent settings:  Ventilation Mode: CMV/AC  (Continuous Mandatory Ventilation/ Assist Control)  FiO2 (%): 40 %  Rate Set (breaths/minute): 18 breaths/min  Tidal Volume Set (mL): 450 mL  PEEP (cm H2O): 8 cmH2O  Oxygen Concentration (%): 60 %  Resp: 18.    Heparin is currently off per MD due to bleeding, ACT range 123.    Urine output is minimal on CRRT, moderate blood loss from cannulation site and chest tubes. Product given included 3 units of PRBC, 1 liter of albumin, 1 unit of platelets.      Intake/Output Summary (Last 24 hours) at 11/19/18 0721  Last data filed at 11/19/18 0700   Gross per 24 hour   Intake          8382.03 ml   Output             2884 ml   Net          5498.03 ml       ECHO:  No results found for this or any previous visit.No results found for this or any previous visit.    CXR:  Recent Results (from the past 24 hour(s))   XR Chest Port 1 View    Narrative    Exam: Chest x-ray, 1 view, 11/18/2018.    COMPARISON: Same day, earlier.    HISTORY: Combined irrigation and debridement chest washout.    FINDINGS/    Impression    impression: Intraoperative AP view of the chest was obtained. The  inferior mid chest is within the field-of-view. Stable support devices  including right IJ Chicken-Jenn catheter, intra-aortic balloon pump,  enteric tubes, prosthetic aortic valve, ECMO cannula and chest tubes.  Unchanged enlarged cardiomediastinal silhouette.    KAILA MINA MD   XR Chest Port 1 View    Narrative    EXAM: XR CHEST PORT 1 VW  11/18/2018 5:53 PM     HISTORY:  post op;     COMPARISON: Chest radiograph  dated 11/18/2018 at 11:50 AM and 2:02 AM    FINDINGS: A single AP view of the chest is obtained. Transesophageal  ultrasound probe has been removed. Endotracheal tube tip projects over  the midthoracic trachea. Right IJ Forreston-Jenn catheter tip projects over  the right main pulmonary artery.Enteric feeding tube tips are off the  field-of-view. Bibasilar chest tubes and mediastinal drains are stable  in position. ECMO cannulae appear unchanged. Aortic valve prosthesis  and postoperative changes of aortic root repair. Intra-aortic balloon  pump marker projects near the aortic bulb, unchanged.  Pneumomediastinum again seen.    Trachea is midline. Cardiomediastinal silhouette is unchanged. No  appreciable pneumothorax. Right pleural effusion, increased compared  to the 2:02 AM comparison study. Increased streaky right perihilar and  basilar opacities.       Impression    IMPRESSION:   1. Right Forreston-Jenn catheter tip projects over the right pulmonary  artery. Additional support devices appear stable.  2. Increased right pleural effusion and patchy basilar and perihilar   opacities    I have personally reviewed the examination and initial interpretation  and I agree with the findings.    PATIENCE BLUNT MD   XR Abdomen Port 1 View    Narrative    Exam: XR ABDOMEN PORT 1 VW, 11/18/2018 5:55 PM    Indication: OG, NJ pacement post op;     Comparison: 11/15/2018    Findings:   Supine frontal view of the abdomen. Gastric tube tip and sidehole  project over the stomach. Weighted feeding tube tip projects over the  ligament of Treitz. Intra-aortic balloon pump superior marker projects  near the aortic bulb, unchanged. Stable positioning of ECMO cannulae,  mediastinal drains, and bilateral chest tubes compared to prior chest  radiograph. Right IJ Forreston-Jenn catheter tip projects over the right  pulmonary artery. Aortic valve prosthesis and postoperative changes of  aortic root repair. Epicardial pacer wires.    Relative paucity of  bowel gas projecting over the abdomen. No  pneumatosis or portal venous gas. No definite free air on this supine  study. IABP inferior metallic marker projects at the level of L2.      Impression    Impression:   1. Appropriate positioning of the gastric and feeding tubes.  Additional support devices appear stable.  2. Relative paucity of bowel gas projecting over the abdomen. No  pneumatosis or portal venous gas.    I have personally reviewed the examination and initial interpretation  and I agree with the findings.    PATIENCE BLUNT MD   XR Chest Port 1 View    Narrative    EXAM: XR CHEST PORT 1 VW  11/19/2018 1:25 AM      HISTORY: postop open chest;     COMPARISON: 11/18/2018    FINDINGS: Single AP view of the chest. Venoarterial ECMO cannulae in  stable position. Aortic valve prostheses. Bibasilar chest tubes in  stable position. Epicardial pacer wires. Low lung volumes. Likely  interval removal of the mediastinal drains. Endotracheal and gastric  tubes following expected course. Decreased right pleural effusion.  Improved right basilar opacities. Persistent left basilar opacities.  No pneumothorax. Adequately positioned intra-aortic balloon pump,  endotracheal tube, right IJ approach pulmonary artery catheter.  Calcified granuloma right lower lung.      Impression    IMPRESSION:   1. Adequately positioned support devices, with possible interval  removal of mediastinal drains.  2. No pneumothorax. Chest remains open.  3. Improved aeration of the right lung, with decreased right pleural  effusion.  4. Persistent residual bibasilar opacities, likely atelectasis/ edema/  aspiration.    I have personally reviewed the examination and initial interpretation  and I agree with the findings.    EMILIE RUSSO MD       Labs:    Recent Labs  Lab 11/19/18  0551 11/19/18  0348 11/19/18  0152 11/18/18  2347   PH 7.48* 7.49* 7.42 7.41   PCO2 36 35 39 38   PO2 154* 235* 161* 175*   HCO3 27 27 26 24   O2PER 40.0 40.0 60.0  60.0  80.0       Lab Results   Component Value Date    HGB 9.2 (L) 11/19/2018    PHGB 120 (H) 11/19/2018     (L) 11/19/2018    FIBR 391 11/19/2018    INR 1.30 (H) 11/19/2018    PTT 33 11/19/2018    DD 2.3 (H) 11/19/2018    AXA <0.10 11/19/2018    ANTCH 44 (L) 11/18/2018         Plan is to continue managing patient on ECMO.      Yo Hernández, RRT  11/19/2018 7:21 AM

## 2018-11-19 NOTE — PROVIDER NOTIFICATION
[id number] Dr. Neri updated on patient status. Vitals, IABP setting, labs, pressor requirements, and CT output reviewed. Patient has gotten 1 L of albumen, 1 unit of RBC, and started on phenylephrine gtt per Dr. Peraza recommendations.   Plan to give 3 units of  RBCs, 1 unit of platelets , and 50 mg of protamine, Plan to keep heparin gtt off overnight regardless of ACT.

## 2018-11-20 NOTE — PROGRESS NOTES
CRRT STATUS NOTE    DATA:  Time:  6:51 AM  Pressures WNL:  YES  Filter Status:  WDL    Problems Reported/Alarms Noted:  Few flow alarms.     Supplies Present:  YES    ASSESSMENT:  Patient Net Fluid Balance:  Net -13 ml @ 0500  Vital Signs:  HR 89, /43, MAP 72  Labs:  K 4.7, Mg 2.4, iCa 4.9, Hgb 8.1, Plt 94  Goals of Therapy:  I = O as BP allows.    INTERVENTIONS:   Hair ties in use. Mat in room but not needed at this moment.     PLAN:  Continue to monitor circuit daily and change set q72 hours or PRN for clotting/clogging. Please call CRRT RN with any questions/problems.

## 2018-11-20 NOTE — ANESTHESIA PREPROCEDURE EVALUATION
Anesthesia Pre-Procedure Evaluation    Patient: Johan Spence   MRN:     [id number] Gender:   male   Age:    63 year old :      1955        Preoperative Diagnosis: Open Chest Post Cardiac Shock   Procedure(s): Placement of Temporary Biventricular Assist Device (BIVAD) And Chest Closure  (Bilateral Heart)       No past medical history on file.   Past Surgical History:   Procedure Laterality Date     INSERT EXTRACORPORAL MEMBRANE OXYGENATOR N/A 2018    Procedure: INSERT EXTRACORPORAL MEMBRANE OXYGENATOR;  Surgeon: Lebron Neri MD;  Location: [facility] OR     IRRIGATION AND DEBRIDEMENT CHEST WASHOUT, COMBINED N/A 11/15/2018    Procedure: Chest Washout, Revision of ECMO Cannula, Temporary Closure of Chest;  Surgeon: Lebron Neri MD;  Location: [facility] OR     IRRIGATION AND DEBRIDEMENT CHEST WASHOUT, COMBINED N/A 2018    Procedure: COMBINED IRRIGATION AND DEBRIDEMENT CHEST WASHOUT, conversion of VA echmo to Right atrium to pulmonary artery, Right ventricle assistive device, temporary chest closure;  Surgeon: Lebron Neri MD;  Location: [facility] OR          Anesthesia Evaluation     . Pt has had prior anesthetic. Type: General    No history of anesthetic complications          ROS/MED HX    ENT/Pulmonary:     (+)sleep apnea, tobacco use, Past use , . .    Neurologic: Comment: On Fentanyl and Propofol gtt      Cardiovascular: Comment: s/p AVR and root [facility]. Was unable to be weaned from CPB so placed on [facility] ECMO and sent to [facility] for [facility] cares.  - bedside chest drainage for tamponade physiology.  had increased bleeding from chest tubes. Went to OR where cannulation was switched to RA PA RVAD without oxygenator. PA pressures continued to rise into the PA systolics into the 75 range. Brought back down to cannulate central VA ECMO again.    (+) Dyslipidemia, hypertension----. : . . . :. . Previous cardiac testing  Echodate:11/15/2018results:ECHO on 11/15   Baseline, 3.5 L and Epi -  Left ventricular size is normal. Severely (EF 20-30%) reduced left ventricular  function is present.  No pericardial effusion is present.  Mild right ventricular dilation is present. Global right ventricular function  is mildly to moderately reduced.  A bioprosthetic aortic valve is present. Doppler interrogation of the aortic  valve is normal. Leaflets are not well seen. Aortic root is thickened, likely  normal post-op changes.date: results:ECG reviewed date:11/17/2018 results:Complete heart block with ventricular rhythm in low 20's, non-conducting P-waves noted date: results:          METS/Exercise Tolerance:     Hematologic:     (+) Anemia, History of Transfusion no previous transfusion reaction -      Musculoskeletal:  - neg musculoskeletal ROS       GI/Hepatic:  - neg GI/hepatic ROS       Renal/Genitourinary: Comment: On CRRT    (+) chronic renal disease, type: ARF, Pt requires dialysis,       Endo: Comment: On Insulin gtt    (+) type II DM .      Psychiatric:  - neg psychiatric ROS       Infectious Disease:  - neg infectious disease ROS       Malignancy:      - no malignancy   Other:                         PHYSICAL EXAM:   Mental Status/Neuro:    Airway: Facies: Feasible  Mallampati: Not Assessed  Mouth/Opening: Not Assessed  TM distance: Not Assessed  Neck ROM: Not Assessed  Device in place: ETT   Respiratory:   Resp. Support: SIMV      CV:    Comments:                    Lab Results   Component Value Date    WBC 21.4 (H) 11/20/2018    HGB 8.5 (L) 11/20/2018    HCT 26.2 (L) 11/20/2018     (L) 11/20/2018    .0 (H) 11/19/2018     11/20/2018    POTASSIUM 4.0 11/20/2018    CHLORIDE 106 11/20/2018    CO2 26 11/20/2018    BUN 50 (H) 11/20/2018    CR 1.42 (H) 11/20/2018     (H) 11/20/2018    TERESA 8.4 (L) 11/20/2018    PHOS 3.8 11/20/2018    MAG 2.3 11/20/2018    ALBUMIN 2.4 (L) 11/20/2018    PROTTOTAL 5.0 (L) 11/20/2018  "    (H) 11/20/2018     (H) 11/20/2018    ALKPHOS 87 11/20/2018    BILITOTAL 9.4 (H) 11/20/2018    PTT 43 (H) 11/20/2018    INR 1.25 (H) 11/20/2018    FIBR 400 11/20/2018    TSH 0.92 11/17/2018    T4 0.86 11/17/2018       Preop Vitals  BP Readings from Last 3 Encounters:   No data found for BP    Pulse Readings from Last 3 Encounters:   No data found for Pulse      Resp Readings from Last 3 Encounters:   11/20/18 18    SpO2 Readings from Last 3 Encounters:   11/20/18 100%      Temp Readings from Last 1 Encounters:   11/20/18 36.7  C (98.1  F)    Ht Readings from Last 1 Encounters:   11/13/18 1.7 m (5' 6.93\")      Wt Readings from Last 1 Encounters:   11/20/18 98.1 kg (216 lb 4.3 oz)    Estimated body mass index is 33.94 kg/(m^2) as calculated from the following:    Height as of 11/13/18: 1.7 m (5' 6.93\").    Weight as of 11/20/18: 98.1 kg (216 lb 4.3 oz).     LDA:  Peripheral IV 11/17/18 Right Upper arm (Active)   Site Assessment WDL 11/20/2018  4:00 PM   Line Status Saline locked 11/20/2018  4:00 PM   Phlebitis Scale 0-->no symptoms 11/20/2018  4:00 PM   Infiltration Scale 0 11/20/2018  4:00 PM   Extravasation? No 11/20/2018  4:00 PM   Number of days:3       Arterial Line 11/19/18 Radial (Active)   Site Assessment [facility] 11/20/2018  4:00 PM   Line Status Pulsatile blood flow 11/20/2018  4:00 PM   Art Line Waveform Appropriate 11/20/2018  4:00 PM   Art Line Interventions Leveled;Connections checked and tightened 11/20/2018  4:00 PM   Color/Movement/Sensation Capillary refill less than 3 sec 11/20/2018  4:00 PM   Dressing Type Transparent 11/20/2018  4:00 PM   Dressing Status Clean, dry, intact 11/20/2018  4:00 PM   Dressing Intervention Other (Comment) 11/20/2018  4:00 PM   Dressing Change Due 11/25/18 11/20/2018  4:00 PM   Number of days:1       CVC Double Lumen 11/13/18 (Active)   Site Assessment WDL 11/20/2018  4:00 PM   Extravasation? No 11/20/2018  4:00 PM   Dressing Intervention Chlorhexidine " sponge;Transparent 11/18/2018  8:00 AM   Dressing Change Due 11/25/18 11/20/2018  4:00 PM   CVC Comment [place] 11/20/2018  4:00 PM   CVC Lumen Assessment White;Brown 11/13/2018 10:00 PM   Number of days:7       Arterial Sheath  (Active)   Specific Qualities Sutured 11/20/2018  4:00 PM   Site Assessment [facility] 11/20/2018  4:00 PM   Dressing Type Transparent;Biopatch 11/20/2018  4:00 PM   Dressing Intervention Dressing changed/new dressing 11/20/2018  4:00 PM   Arterial Sheath Comment IABP 11/20/2018  4:00 PM   Number of days:7       Right Groin Interventional Procedure Access (Active)   Site Assessment [facility] 11/20/2018  4:00 PM   Hemostasis management Manual pressure applied 11/19/2018  8:00 AM   Femoral Bruit not present 11/19/2018  8:00 AM   CMS Right Extremity [facility] 11/20/2018  4:00 AM   Dorsalis Pulse - Right Leg Present with doppler 11/19/2018  6:00 PM   Posterior Tibial Pulse - Right Leg Present with doppler 11/19/2018  6:00 PM   Number of days:3       Right Radial Interventional Procedure Access (Active)   Site Assessment [facility] 11/20/2018  4:00 PM   Hemostasis management Unchanged 11/19/2018  6:00 PM   Radial device volume remaining 0 mL 11/17/2018 11:45 PM   CMS Right Arm [facility] 11/20/2018  4:00 AM   Radial Pulse - Right Arm Present with doppler 11/19/2018  6:00 PM   Number of days:3       Pulmonary Artery Catheter - Single Lumen 11/13/18 1200 Right internal jugular vein (Active)   Dressing dressing dry and intact 11/20/2018  4:00 PM   Securement secured with sutures 11/20/2018  4:00 PM   PA Catheter Markings (cm) 56 11/20/2018  4:00 PM   Phlebitis 0-->no symptoms 11/20/2018  4:00 PM   Infiltration 0-->no symptoms 11/20/2018  4:00 PM   Waveform dampened 11/20/2018  4:00 PM   Pressure Catheter Interventions line leveled/zeroed;other (see comments) 11/20/2018  4:00 PM   Daily Review Of Necessity completed 11/20/2018  8:00 AM   Number of days:7       Airway - Adult/Peds 8 endotracheal tube (Active)   Site Appearance Clean and  dry 11/20/2018  4:00 PM   Secured By Commercial tube matt 11/20/2018  4:00 PM   Secured at (cm) to lip 24 cm 11/20/2018  4:00 PM   Cuff Pressure - Type minimal occluding volume 11/20/2018  4:00 PM   Tube Care/Reposition repositioned tube right side of mouth 11/20/2018  4:00 PM   Bite Block Secure and Patent 11/20/2018  4:00 PM   Safety Measures manual resuscitator/PEEP valve in room 11/20/2018  4:00 PM   Number of days:7       Chest Tube 1 Mediastinal (Active)   Site Assessment [facility] 11/20/2018  4:00 PM   Suction -20 cm H2O 11/20/2018  4:00 PM   Chest Tube Airleak No 11/20/2018  4:00 PM   Drainage Description Serosanguinous 11/20/2018  4:00 PM   Dressing Status Dressing Changed 11/20/2018  4:00 PM   Dressing Change Due 11/21/18 11/20/2018  4:00 PM   Dressing Intervention New dressing 11/20/2018  4:00 PM   Patency Intervention Tip/Tilt 11/20/2018  4:00 PM   Chest Tube Clamps at Bedside present 11/20/2018  4:00 PM   Container Amount 1730 11/20/2018  4:00 PM   Output (ml) 20 ml 11/20/2018  4:00 PM   Number of days:7       Chest Tube 2 Mediastinal (Active)   Site Assessment [facility] 11/20/2018  4:00 PM   Suction -20 cm H2O 11/20/2018  4:00 PM   Chest Tube Airleak No 11/20/2018  4:00 PM   Drainage Description Serosanguinous 11/20/2018  4:00 PM   Dressing Status Dressing Changed 11/20/2018  4:00 PM   Dressing Change Due 11/21/18 11/20/2018  4:00 PM   Dressing Intervention New dressing 11/20/2018  4:00 PM   Patency Intervention Tip/Tilt 11/20/2018  4:00 PM   Chest Tube Clamps at Bedside present 11/20/2018  4:00 PM   Number of days:7       Chest Tube 3 Right Mediastinal (Active)   Site Assessment [facility] 11/20/2018  4:00 PM   Suction -20 cm H2O 11/20/2018  4:00 PM   Chest Tube Airleak No 11/20/2018  4:00 PM   Drainage Description Serosanguinous 11/20/2018  4:00 PM   Dressing Status Dressing Changed 11/20/2018  4:00 PM   Dressing Change Due 11/21/18 11/20/2018  4:00 PM   Dressing Intervention New dressing 11/20/2018  4:00 PM    Patency Intervention Tip/Tilt 11/20/2018  4:00 PM   Chest Tube Clamps at Bedside present 11/20/2018  4:00 PM   Container Amount 1070 11/20/2018  4:00 PM   Output (ml) 0 ml 11/20/2018  4:00 PM   Number of days:7       Chest Tube 4 Left (Active)   Site Assessment [facility] 11/20/2018  4:00 PM   Suction -20 cm H2O 11/20/2018  4:00 PM   Chest Tube Airleak No 11/20/2018  4:00 PM   Drainage Description Serosanguinous 11/20/2018  4:00 PM   Dressing Status Dressing Changed 11/20/2018  4:00 PM   Dressing Change Due 11/21/18 11/20/2018  4:00 PM   Dressing Intervention New dressing 11/20/2018  4:00 PM   Patency Intervention Tip/Tilt 11/20/2018  4:00 PM   Chest Tube Clamps at Bedside present 11/20/2018  4:00 PM   Container Amount 850 11/20/2018  4:00 PM   Output (ml) 10 ml 11/20/2018  4:00 PM   Number of days:3       NG/OG Tube Orogastric (Active)   Site Description [facility] 11/20/2018  4:00 PM   Status Suction-low intermittent 11/20/2018  4:00 PM   Drainage Appearance Clear 11/19/2018  4:00 PM   Placement Check [place] unchanged 11/20/2018  4:00 PM   [place] (cm marking) at nare/mouth 60 cm 11/20/2018  4:00 PM   Intake (ml) 30 ml 11/14/2018 12:00 PM   Flush/Free Water (mL) 10 mL 11/17/2018  4:00 AM   Residual (mL) 0 mL 11/14/2018 12:00 PM   Output (ml) 0 ml 11/20/2018  4:00 AM   Number of days:7       NG/OG Tube Nasogastric Right nostril (Active)   Site Description [facility] 11/20/2018  4:00 PM   Status Enteral Feedings 11/20/2018  4:00 PM   Placement Check [place] unchanged 11/20/2018  4:00 PM   [place] (cm marking) at nare/mouth 92 cm 11/20/2018  4:00 PM   Intake (ml) 30 ml 11/20/2018  8:00 AM   Flush/Free Water (mL) 30 mL 11/20/2018  4:00 PM   Number of days:6       Urethral Catheter (Active)   Tube Description Positional 11/19/2018  4:00 PM   Catheter Care Done 11/20/2018  4:00 PM   Collection Container Standard 11/20/2018  4:00 PM   Securement Method Securing device (Describe) 11/20/2018  4:00 PM   Rationale for Continued Use Strict  1-2 Hour I&O 11/20/2018  4:00 PM   Urine Output 25 mL 11/20/2018  4:00 PM   Number of days:7            Assessment:   ASA SCORE: 4    NPO Status: > 6 hours since completed [employer]   Documentation: Deferred   Proceeding: Proceed without further delay  Tobacco Use:  Active user of Tobacco     Plan:   [place]. Type:  General   Pre-Induction: None     Fluid/Blood-Preparation: Blood in Room; PRBC; FFP; Hot Line; Albumin; PLT; Cell Saver     Drips/Meds-Preparation: Heparin; Norepi; Epinephrine; Vasopressin; Phenylephrine; Insulin   Induction:  IV (Standard)   Airway: Oral ETT   Access/Monitoring: PIV; A-Line; CVL/Port present     Advanced Monitoring: NIRS (cerebral)   Maintenance: Balanced   Emergence: Post-Procedural Sedation; Postop SECURED AIRWAY likely   Logistics: ICU Admission     Postop Pain/Sedation Strategy:  Standard-Options: Opioids PRN  Advanced Options: Propofol (cont.)     PONV Management:  Adult Risk Factors:, Postop Opioids     CONSENT: Direct conversation   Plan and risks discussed with: Power of    Blood Products: Consented (ALL Blood Products)                               Dieter Hall MD

## 2018-11-20 NOTE — PROGRESS NOTES
ECMO Shift Summary:    Patient remains on VA ECMO, all equipment is functioning and alarms are appropriately set. RPM's 3600 with flow range 4.42 L/min. Sweep gas is at 1 LPM and FiO2 65%. Circuit remains free of air, small bits of clot and fibrin are visible. Cannulas are secure with no bleeding from site. Extremities are warm. Suctioned ETT for scant amount of secretions.    Significant Shift Events:    Vent settings:  Ventilation Mode: CMV/AC  (Continuous Mandatory Ventilation/ Assist Control)  FiO2 (%): 40 %  Rate Set (breaths/minute): 18 breaths/min  Tidal Volume Set (mL): 450 mL  PEEP (cm H2O): 8 cmH2O  Oxygen Concentration (%): 40 %  Resp: 18.    Heparin is running at 600 u/hr, ACT range 135.    Urine output is as charted, blood loss was none. Product given included none.      Intake/Output Summary (Last 24 hours) at 11/20/18 1756  Last data filed at 11/20/18 1700   Gross per 24 hour   Intake          3211.36 ml   Output             4099 ml   Net          -887.64 ml       ECHO:  No results found for this or any previous visit.No results found for this or any previous visit.    CXR:  Recent Results (from the past 24 hour(s))   XR Chest Port 1 View    Narrative    EXAM: XR CHEST PORT 1 VW  11/20/2018 1:35 AM      HISTORY: postop open chest;     COMPARISON: 11/19/2018    FINDINGS: Critical repositioned endotracheal tube, right IJ approach  pulmonary artery catheter, partially visualized enteric and gastric  tubes, bibasilar chest tubes, aortic valvular prostheses, venous  arterial ECMO cannulae, intra-aortic balloon pump.    Right basilar calcified granuloma. Stable cardiac silhouette  enlargement and basilar opacities. Lower lung volumes.      Impression    IMPRESSION: Stable support devices. Stable cardiac silhouette  enlargement and basilar airspace opacities.    I have personally reviewed the examination and initial interpretation  and I agree with the findings.    EMILIE RUSSO MD       Labs:    Recent  Labs  Lab 11/20/18  1607 11/20/18  1155 11/20/18  0955 11/20/18  0750   PH 7.44 7.40 7.39 7.40   PCO2 41 44 45 44   PO2 137* 103 106* 135*   HCO3 27 27 27 27   O2PER 40  40 40.0 40 40.0       Lab Results   Component Value Date    HGB 8.5 (L) 11/20/2018    PHGB 90 (H) 11/20/2018     (L) 11/20/2018    FIBR 400 11/20/2018    INR 1.25 (H) 11/20/2018    PTT 43 (H) 11/20/2018    DD 2.3 (H) 11/20/2018    AXA <0.10 11/20/2018    ANTCH 53 (L) 11/20/2018         Plan is continue to provide VA ECMO support and travel to the Operating room tomorrow for CABG and BiVads.      Mira Ramesh, RRT  11/20/2018 5:56 PM

## 2018-11-20 NOTE — PROGRESS NOTES
ECMO Shift Summary:    Patient remains on [facility] ECMO, all equipment is functioning and alarms are appropriately set. RPM's 3600 with flow range 4.24-4.39 L/min. Sweep gas is at 1 LPM and FiO2 70%. Circuit remains free of visible air and clot.  Some fibrin is appreciated at connectors. Cannulas are secure with no bleeding from site. Extremities are warm to the touch.     Significant Shift Events:  FS Flohien initiated this shift.    Vent settings:  CMV 18/450/+8/40%. BS coarse, suctioned for creamy white secretions.      Heparin is running at 600 u/hr, ACT range 131-148.    Urine output is ~25 mls/hr and moises.  CTs put out ~20 mls/hr. Product given included 1 unit of PRBCs to maintain ordered parameters.      Intake/Output Summary (Last 24 hours) at 11/20/18 1340  Last data filed at 11/20/18 1330   Gross per 24 hour   Intake          3535.06 ml   Output             4253 ml   Net          -717.94 ml       ECHO:  No results found for this or any previous visit.No results found for this or any previous visit.    CXR:  Recent Results (from the past 24 hour(s))   XR Chest Port 1 View    Narrative    EXAM: XR CHEST PORT 1 VW  11/20/2018 1:35 AM      HISTORY: postop open chest;     COMPARISON: 11/19/2018    FINDINGS: Critical repositioned endotracheal tube, right IJ approach  pulmonary artery catheter, partially visualized enteric and gastric  tubes, bibasilar chest tubes, aortic valvular prostheses, venous  arterial ECMO cannulae, intra-aortic balloon pump.    Right basilar calcified granuloma. Stable cardiac silhouette  enlargement and basilar opacities. Lower lung volumes.      Impression    IMPRESSION: Stable support devices. Stable cardiac silhouette  enlargement and basilar airspace opacities.    I have personally reviewed the examination and initial interpretation  and I agree with the findings.    EMILIE RUSSO MD       Labs:    Recent Labs  Lab 11/20/18  1155 11/20/18  0955 11/20/18  0750 11/20/18  0558   PH 7.40  7.39 7.40 7.39   PCO2 44 45 44 46*   PO2 103 106* 135* 168*   HCO3 27 27 27 28   O2PER 40.0 40 40.0 40.0       Lab Results   Component Value Date    HGB 8.8 (L) 11/20/2018    PHGB 90 (H) 11/20/2018     (L) 11/20/2018    FIBR 404 11/20/2018    INR 1.21 (H) 11/20/2018    PTT 42 (H) 11/20/2018    DD 2.7 (H) 11/20/2018    AXA <0.10 11/20/2018    ANTCH 53 (L) 11/20/2018         Plan is to continue VA ECMO support. OR tomorrow morning for BiVAD.      Dinorah Wagner, RRT  11/20/2018 1:40 PM

## 2018-11-20 NOTE — PROGRESS NOTES
Care Coordinator Progress Note    Admission Date/Time:  11/13/2018  Attending MD:  Moises Saba MD    Data  Chart reviewed, discussed with interdisciplinary team.   Pt is [facility]/p [facility]. Unable to wean off pump at the end of the case in addition to developing AYE, cannulated centrally for [facility] ECMO.  Pt transferred to [facility] with [facility] ECMO for higher level of care.      Concerns with insurance coverage for discharge needs: None.  Current Living Situation: Patient lives with spouse.  Support System: Supportive and Involved  Transportation at Discharge: Family or friend will provide  Barriers to Discharge: chronically ill      Assessment  Pt is in ICU vented, sedated, with ECMO and CRRT.  Attempt to visit family for support.  No family was in the room at time of my visit.  RNCC will cont to follow plan of care.     Plan  Anticipated Discharge Date:  TBD.  Anticipated Discharge Plan:   TBD.  RNCC will cont to follow plan of care.      Rito Dye RN, PHN, BSN  4A and 4E/ ICU  Care Coordinator  Phone: [phone]  Pager: [phone]

## 2018-11-20 NOTE — PROGRESS NOTES
CRRT STATUS NOTE    DATA:  Time:  6:00 PM  Pressures WNL:  NO, Filter pressures 200  Filter Status:  WDL    Problems Reported/Alarms Noted:  None reported from bedside RN    Supplies Present:  YES    ASSESSMENT:  Patient Net Fluid Balance:  Net positive 669ml since midnight  Vital Signs:  HR 89, MAPs 60's to 70 on vaso, levo and phenylephrine, Sats 99% on 40% on vent  Labs:  K 5.1(All solutions changed to 2K), Cr 1.42, WBC 17.1, Hgb 8.1, INR 1.46  Goals of Therapy:  I=O as BP allows    INTERVENTIONS:   Checked in with bedside RN    PLAN:  Continue with treatment goals. Call CRRT RN at [phone] with questions and concerns.

## 2018-11-20 NOTE — PROGRESS NOTES
Nephrology Progress Note  11/20/2018       Mr Spence is a 63 yom w/hx of HTN, HLP, ONOFRE, DM2, ETOH and AVR 2015 who had repeat AVR on 11/13 with difficulty weaning from CPB, transitioned to ECMO and transferred to [facility].  Nephrology consulted for management of hemodynamic AYE, started CRRT on 11/16.      Interval History :   No major change in nephrology plan for Mr Spence, on 3 pressors but a bit improved, has other sources of loss including UOP of 630cc so UF was 1.3L to be net even, large insensible losses with ECMO and open chest.  Plan for 0-50cc/hr as BP's allow.        Assessment & Recommendations:   AYE-Baseline Cr reported at 1.1, now stable at 3.3 over past 24h.  AYE is due to hemodynamic injury with cardiogenic shock.  Had planned on holding off on intervention today but K was elevated with late morning BNP check, CK add-on revealed some rhabdo with CK at 11k, given this we are starting CRRT to try to clear myoglobin and reduce renal exposure and further injury.  Will match I=O but unlikely UF will be major contributor as he has other sources of output.  Consent in chart for RRT.                          -Running via ECMO circuit.                         -CRRT to manage K and clear myoglobin with elevated CK, all 2k baths.  I=O as able with 3 pressors.       Volume status-Overloaded with bilateral elevated filling pressures but on 3-4 pressors with tenuous hemodynamics, trying to pull 0-50cc/hr as able, was able to be net even yesterday, has large insensible losses.       Electrolytes/pH-K 4.6, continuing on 2k bath.  Would prefer to try to run low given frequent OR trips, can replace per protocol if low overnight.        Ca/phos/pth-Phos up a bit at 4.9 with CK up, will clear some with CRRT, Ca and Mg WNL.       Rhabdomyolysis-Unclear source, CK up at ~11k initially along with K and phos, CK 3k this am with CRRT.       Anemia-Hgb 8.8, stable, replacement per team with ECMO.      Nutrition-Impact  "TF      Seen and discussed with Dr Sheikh      Recommendations were communicated to primary team via verbal communication.       Davide Damon  Clinical Nurse Specialist  [phone]        Review of Systems:   I reviewed the following systems:  ROS not done due to vent/sedation.     Physical Exam:   I/O last 3 completed shifts:  In: 3312.06 [I.V.:2161.06; NG/GT:250]  Out: 4298 [Urine:667; Emesis/NG output:25; Other:2756; Chest Tube:850]   BP (!) 82/48 (BP Location: Right arm)  Temp 98.4  F (36.9  C)  Resp 18  Ht 1.7 m (5' 6.93\")  Wt 98.1 kg (216 lb 4.3 oz)  SpO2 98%  BMI 33.94 kg/m2     GENERAL APPEARANCE: Intubated, sedated, ECMO and IABP, critically ill.   EYES:  No scleral icterus, pupils equal  HENT: mouth without ulcers or lesions  PULM: lungs rhonchi to auscultation, equal air movement, no cyanosis or clubbing  CV: regular rhythm, normal rate, no rub     -edema +2 LE  GI: soft, nontender, non-distended, bowel sounds are +  MS: no evidence of inflammation in joints, no muscle tenderness  NEURO: Intubated and sedated.      Labs:   All labs reviewed by me  Electrolytes/Renal -   Recent Labs   Lab Test  11/20/18 0955 11/20/18 0345 11/19/18 2204   NA  139  138  138   POTASSIUM  4.6  4.7  4.8   CHLORIDE  105  104  105   CO2  26  27  26   BUN  52*  51*  48*   CR  1.42*  1.45*  1.49*   GLC  140*  142*  147*   TERESA  8.5  8.2*  8.2*   MAG  2.4*  2.4*  2.4*   PHOS  4.9*  5.1*  4.5       CBC -   Recent Labs   Lab Test  11/20/18 0955 11/20/18 0345 11/19/18 2204   WBC  23.2*  21.0*  20.1*   HGB  8.8*  8.1*  8.4*   PLT  123*  94*  107*       LFTs -   Recent Labs   Lab Test  11/20/18   0955  11/20/18   0345  11/19/18   2204   ALKPHOS  88  83  75   BILITOTAL  8.9*  8.5*  8.3*   ALT  129*  122*  124*   AST  423*  439*  442*   PROTTOTAL  5.2*  5.1*  5.1*   ALBUMIN  2.6*  2.5*  2.6*       Iron Panel - No lab results found.        Current Medications:    B and C vitamin Complex with folic acid  5 mL Per " Feeding Tube Daily     ceFAZolin  1 g Intravenous See Admin Instructions     escitalopram  10 mg Oral Daily     hydrocortisone sodium succinate PF  50 mg Intravenous Q6H     mupirocin  1 g Both Nostrils BID     pantoprazole (PROTONIX) IV  40 mg Intravenous Q24H     piperacillin-tazobactam  3.375 g Intravenous Q6H     polyethylene glycol  17 g Oral Daily     protein modular  1 packet Per Feeding Tube BID     senna-docusate  1-2 tablet Oral or Feeding Tube BID     vancomycin (VANCOCIN) IV  1,750 mg Intravenous Q24H       Reason beta blocker order not selected       IV fluid REPLACEMENT ONLY       IV fluid REPLACEMENT ONLY       CRRT replacement solution 12.5 mL/kg/hr (11/20/18 1508)     EPINEPHrine IV infusion ADULT Stopped (11/19/18 1030)     epoprostenol (VELETRI) 20 mcg/mL in sterile water inhalation solution 20 ng/kg/min (11/20/18 1335)     fentaNYL 100 mcg/hr (11/20/18 1500)     HEParin 600 Units/hr (11/20/18 1330)     insulin (regular) 2 Units/hr (11/20/18 1500)     - MEDICATION INSTRUCTIONS -       norepinephrine Stopped (11/20/18 1200)     phenylephrine IV infusion ADULT 1 mcg/kg/min (11/20/18 1500)     CRRT replacement solution 200 mL/hr at 11/20/18 1251     CRRT replacement solution 12.5 mL/kg/hr (11/20/18 1508)     propofol (DIPRIVAN) infusion 20 mcg/kg/min (11/20/18 1500)     vasopressin (PITRESSIN) infusion ADULT (40 mL) 2 Units/hr (11/20/18 1500)

## 2018-11-20 NOTE — PROGRESS NOTES
CRRT STATUS NOTE    DATA:  Time:  1808  Pressures WNL:  YES  Filter Status:  WDL    Problems Reported/Alarms Noted:  none    Supplies Present:  YES    ASSESSMENT:  Patient Net Fluid Balance:  -157 at 1800  Vital Signs:  HR 89, 100% paced.  MAPs 60-70 with phenyl at 1 mcg/kg/min and vaso at 2 U/hr.   Sats % on vent 40%/PEEP 8  Labs: K 4, Mg 2.3, Phos 3.8, iCa 5, LA 1.2, WBC 21.4, Hgb 8.5, Plt 107.  Goals of Therapy:  Net negative 0-50 ml/hr as BP allows.      INTERVENTIONS:   none    PLAN:  Continue with POC and goals of therapy.  Monitor circuit daily.  Pt scheduled for OR at 0730.  Set will go down then and restart on return to unit. Call [phone] with issues.

## 2018-11-20 NOTE — PROGRESS NOTES
[facility] Nurse Inpatient Adult Pressure Injury Prevention Assessment: ECMO  Follow up    Positioning Tolerance: Good  Date of [facility]: [facility]  Presence of Ischemia: No- however noted by bedside RN that over the weekend, patient on 3 pressors and had poor flow to right foot and left hand. All blood supply returned and skin pink and blanchable on assessment today.  Location of ischemia: n/a    Pressure Injury Prevention Interventions In Place:  Optifoam Dressing under ECMO Cannula, Z flow Positioner under head, TAPs Wedge Positioners in use, Heel off-loading boots, Pillows under calves for heel suspension and Mepilex Sacral Dressing   Current support surface: Standard  Low air loss mattress with pulsation        Pressure Injury Prevention Interventions Added:  None        Plan of Care for Positioning and Pressure Injury Prevention  Reposition patient every 1-2 hours using TAP Wedges  Position head on Z flow positioner, mold indentation at areas of pressure points.  Pad ECMO IJ cannula with Optifoam (#[id number]) along face and scalp between skin and cannula and under Coban head wraps    Pad ECMO groin and chest cannula under rigid connectors with Optifoam or Soft cloth  Heel off-loading Boots at all times  Sacral Mepilex for Prevention, change every 5 days and prn  Low Air loss mattress    Patient History:   According to medical record: Johan Spence is a 63 year old male now s/p [facility]. Unable to wean off pump at the end of the case in addition to developing AYE, cannulated centrally for V-A ecmo.    Current Diet / Nutrition:     None      Output:    I/O last 3 completed shifts:  In: 2777.05 [I.V.:2187.05; NG/GT:190]  Out: 3459 [Urine:633; Emesis/NG output:150; Other:1876; Chest Tube:800]  Containment: of urine/stool: Incontinence Protocol and Urinary Catheter    Risk Assessment:   Sensory Perception: 2-->very  limited  Moisture: 4-->rarely moist  Activity: 1-->bedfast  Mobility: 1-->completely immobile  Nutrition: 2-->probably inadequate  Friction and Shear: 2-->potential problem  Or[place] Score: 12    Labs:    Recent Labs  Lab 11/20/18  0955  11/19/18  0348  11/13/18  2239   ALBUMIN 2.6*  < > 2.9*  < > 2.7*   HGB 8.8*  < > 9.2*  < > 8.9*   INR 1.21*  < > 1.30*  < > 2.11*   WBC 23.2*  < > 15.2*  < > 13.8*   A1C  --   --   --   --  6.0*   CRP  --   --  170.0*  < >  --    < > = values in this interval not displayed.    Focused Assessment: right Chest ECMO cannula    Pressure Injury Present::No    Education provided to: nurse  Discussed importance of:repositioning every 2 hours  Discussed plan of care with Nurse  WOC Nurse follow-up plan:weekly    Sarah Molina RN, CWOCN

## 2018-11-20 NOTE — PROGRESS NOTES
CV ICU PROGRESS NOTE  11/20/2018    CO-MORBIDITIES:   Severe Aortic stenosis and regurge.   ONOFRE  DM  HTN  HLD  Complete Heart Block    ASSESSMENT: Johan Spence is a 63 year old male now s/p AVR and root [facility]. Was unable to be weaned from CPB so placed on VA ECMO and sent to [facility] for further cares. SANDRA shows adequate biventricular function upon ECMO turndown however did require increased pressor requirements. Brought to OR that day and failed turndown. Chest washout at bedside on 11/7 shows clot tamponade. Cleaned out and pressor requirements improved. Brought to Cath lab and coronaries clean. 11/18 had increased bleeding from chest tubes. Went to OR where cannulation was switched to RA PA RVAD without oxygenator. PA pressures continued to rise into the PA systolics into the 75 range. Brought back down to cannulate central VA ECMO again. Since, the patient has persistently required vasopressors but successfully titrating off.       TODAY'S PROGRESS:     - increase TF to 20ml/hr today, possibly increase to 30ml/hr if vasopressors continue to decrease   - start Flolan given elevated PA pressures during trial of turn-down from ECMO  - likely return to OR 11/21 for temporary BiVAD (see CVTS note for additional pre-procedure planning)     PLAN:  Neuro/pain/sedation:  #Post op pain.   - Monitor neurological status. Notify the MD for any acute changes in exam.  - Fentanyl gtt for pain.   - Propofol gtt. BIS monitoring 40-60.  RASS goal -3 to -4.  - Oxy/dil PRN. Robaxin PRN.   - Lidoderm.     Pulmonary:   #MV postop  #ONOFRE    - Supplemental oxygen to keep saturation above 92 %.  - Mechanically ventilated, will maintain PEEP of 8 during resuscitation.     Cardiovascular:   #AVR and [facility] 11[facility].    #VA ECMO after inability to separate from bypass.   #HTN.   #Complete HB.    #Vasoplegia  - Monitor hemodynamic status.   - Epi/norepi/vaso/phenyl gtts.  Titrate down as BPs tolerate (with goal MAP >65)  - ECMO turndown in OR 11/15 unsuccessful.   - Chest remains open with multiple turndown attempts including 11/20, MAP to 50 and PAP 40/20.   - Holding PTA Lisinopril and Metoprolol.   - 12 Lead EKG shows complete HB, cards aware  - Washed out at Bedside 11/17. Washed out in OR 11/18. RA to PA RVAD without oxygenator. PAP increased into the mid 70's and rising so brought back to OR for central cannulation RA to Aorta.  Plan for return to OR 11/20 for temporary BiVAD per Dr. Neri.    GI care:   - Miralax/senna-colace, suppository PRN    Fluids/ Electrolytes/ Nutrition:   - TKO  - NPO    - Increasing feeds to 20mL/hr, can advance to 30mL/hr this afternoon if vasopressor requirements continue to diminish.  - BMP q6h.  - No indication for parenteral nutrition.    Renal:    #Right groin HD line. Removed 11/18.  - Nephrology following.    - Will continue to monitor intake and output.  - Coronado  - Goal even to negative for today.    - CRRT through ECMO circuit.     Endocrine:    - Insulin gtt.    ID/ Antibiotics:  - Vanc/Zosyn begun 11/19 given persistent leukocytosis (possibly due to SDS) but also vasoplegia in the setting of critical illness.    Heme:     - Hemoglobin stable.   - CBC/coags q6h.  - Anticoagulation: Heparin gtt for ECMO circuit.      Prophylaxis:    - Mechanical prophylaxis for DVT.   - Hep gtt for ECMO -160.   - PPI    Lines/tubes/drains:  - R internal jugular MAC/swan  - ETT  - Left IABP   - VA ECMO  - Chest tubes: 3 meds.    - Coronado  - NJ feeding tube.   - AV TPW's.   - Newly placed right radial A-line    Disposition:  - CV ICU.    Patient seen, findings and plan discussed with attending physician, Dr. Lima.       ====================================    SUBJECTIVE:   Improving vasopressor requirements. Nothing else overnight.    OBJECTIVE:   1. VITAL SIGNS:   Temp:  [97.5  F (36.4  C)-99.1  F (37.3  C)] 98.4  F (36.9  C)  Heart Rate:  [87-96]  89  Resp:  [18] 18  MAP:  [58 mmHg-243 mmHg] 68 mmHg  Arterial Line BP: ()/() 96/42  FiO2 (%):  [40 %] 40 %  SpO2:  [93 %-100 %] 98 %  Ventilation Mode: CMV/AC  (Continuous Mandatory Ventilation/ Assist Control)  FiO2 (%): 40 %  Rate Set (breaths/minute): 18 breaths/min  Tidal Volume Set (mL): 450 mL  PEEP (cm H2O): 8 cmH2O  Oxygen Concentration (%): 40 %  Resp: 18    2. INTAKE/ OUTPUT:   I/O last 3 completed shifts:  In: 2777.05 [I.V.:2187.05; NG/GT:190]  Out: 3459 [Urine:633; Emesis/NG output:150; Other:1876; Chest Tube:800]    3. PHYSICAL EXAMINATION:   General: lying in bed on ECMO and ventilator.   Neuro: sedated and intubated.   Resp: Breathing non-labored on vent.   CV: Paced at 90.   Abdomen: Soft, Non-distended.   Incisions: c/d/i.   Extremities: warm and well perfused    4. INVESTIGATIONS:   Arterial Blood Gases     Recent Labs  Lab 11/20/18  1155 11/20/18  0955 11/20/18  0750 11/20/18  0558   PH 7.40 7.39 7.40 7.39   PCO2 44 45 44 46*   PO2 103 106* 135* 168*   HCO3 27 27 27 28     Complete Blood Count     Recent Labs  Lab 11/20/18  0955 11/20/18  0345 11/19/18  2204 11/19/18  1608   WBC 23.2* 21.0* 20.1* 17.1*   HGB 8.8* 8.1* 8.4* 8.1*   * 94* 107* 116*     Basic Metabolic Panel    Recent Labs  Lab 11/20/18  0955 11/20/18  0345 11/19/18  2204 11/19/18  1608    138 138 140   POTASSIUM 4.6 4.7 4.8 5.1   CHLORIDE 105 104 105 106   CO2 26 27 26 26   BUN 52* 51* 48* 48*   CR 1.42* 1.45* 1.49* 1.42*   * 142* 147* 147*     Liver Function Tests    Recent Labs  Lab 11/20/18  0955 11/20/18  0345 11/19/18  2204 11/19/18  1608   * 439* 442* Unsatisfactory specimen - hemolyzed   * 122* 124* 115*   ALKPHOS 88 83 75 68   BILITOTAL 8.9* 8.5* 8.3* 7.6*   ALBUMIN 2.6* 2.5* 2.6* 2.6*   INR 1.21* 1.28* 1.35* 1.46*     Pancreatic Enzymes  No lab results found in last 7 days.  Coagulation Profile    Recent Labs  Lab 11/20/18  0955 11/20/18  0345 11/19/18  2204 11/19/18  1608   INR  1.21* 1.28* 1.35* 1.46*   PTT 42* 44* 44* 47*         5. RADIOLOGY:   Recent Results (from the past 24 hour(s))   XR Chest Port 1 View    Narrative    EXAM: XR CHEST PORT 1 VW  11/20/2018 1:35 AM      HISTORY: postop open chest;     COMPARISON: 11/19/2018    FINDINGS: Critical repositioned endotracheal tube, right IJ approach  pulmonary artery catheter, partially visualized enteric and gastric  tubes, bibasilar chest tubes, aortic valvular prostheses, venous  arterial ECMO cannulae, intra-aortic balloon pump.    Right basilar calcified granuloma. Stable cardiac silhouette  enlargement and basilar opacities. Lower lung volumes.      Impression    IMPRESSION: Stable support devices. Stable cardiac silhouette  enlargement and basilar airspace opacities.    I have personally reviewed the examination and initial interpretation  and I agree with the findings.    EMILIE RUSSO MD       =========================================

## 2018-11-20 NOTE — PLAN OF CARE
Problem: Patient Care Overview  Goal: Plan of Care/Patient Progress Review  1. Will be hemodynamically stable.  2. Oxygenation will be met.  3. Family will be well informed of all procedures.   Outcome: No Change  Writer hours of care [phone]. Patient remains in CVICU on [facility] ECMO/CRRT/IABP/ Mechanical Ventilation supportive devices.  Patient remains sedated on Propofol /Fentanyl. BIS 50-60s, Propofol weaned slightly overnight. Pupils 3mm and sluggishly reactive.  No changes to vent settings overnight.  Currently on LEVO/VASO/MATT for BP support. ECMO settings and circuit maintenance per ECL[facility] specialist. IABP 1:1.  Chest remains open with IOBAN covering.  Chest tube output is serosanguinous and stable. 1 PLT given this AM. On Trophic feeds @ 10 ml/hr.  Insulin needs stable @ 2 U/hr.  UOP 20-30 ml/hr overnight.  CRRT I=O.  Pre-op scrub done for pending BIVAD placement in  OR.  Pt. Daughter updated via phone overnight.

## 2018-11-20 NOTE — PROGRESS NOTES
Cardiology - Heart failure service    Interval events: No significant events overnight, on [facility] ECMO/CRRT/IABP/Mechanical Ventilation. Chest remains open, 1 PLT given this AM. Started on abx yesterday (vanc/zosyn), replaced arterial line on right radial. OR tomm for temp BiVAD.     Temp:  [97.2  F (36.2  C)-99.1  F (37.3  C)] 97.7  F (36.5  C)  Heart Rate:  [87-96] 89  Resp:  [18] 18  MAP:  [54 mmHg-243 mmHg] 243 mmHg  Arterial Line BP: ()/() 282/280  FiO2 (%):  [40 %] 40 %  SpO2:  [93 %-100 %] 100 %  Tele: No events  Hemodynamics:  CI 3.2, .  IABP via LFA, 1:1, triggered by EKG, augmented 100%, diastolic 115, pump mean 83, L radial and distal pulse in tact   ECMO settings:  Patient remains on VA ECMO. RPM's 3150 with flow range 4.3 L/min. Sweep gas is at 1 LPM, SVO2 68%.   Vent Rate 18, , PEEP 8, 40% oxygen, CMV/AC.   Epicardial pacemaker: DDD, 80, capturing.     I/O last 3 completed shifts:  In: 3115.95 [I.V.:2170.95; Other:5; NG/GT:130]  Out: 3157 [Urine:630; Emesis/NG output:150; Other:1277; Chest Tube:1100], since midnight , urine 200, .   Even yesterday.    Hemodynamic drips: Epi off, levophed 0.04, vaso 2, phenylephrine 1.5.   Selected medications: heparin gtt, pantoprazole 40 mg daily, hydrocortisone 50 mg q6. Protamine 50 mg once.   Sedation: fentanyl 100, insulin 2, propofol 25.    Physical examination:  Vitals:    11/15/18 0400 11/17/18 0300 11/18/18 0500 11/19/18 0500   Weight: 93 kg (205 lb 0.4 oz) 96 kg (211 lb 10.3 oz) 96.5 kg (212 lb 11.9 oz) 99 kg (218 lb 4.1 oz)    11/20/18 0545   Weight: 98.1 kg (216 lb 4.3 oz)     CV: no murmur, open chest  Central cannulation. Chest tubes in place.  LUNGS:  Clear to auscultation bilaterally   ABD:  Active bowel sounds, soft, non-tender/non-distended.  No rebound/guarding/rigidity.  EXT:  No edema or cyanosis.  Hands/feet warm to touch with good signs of peripheral perfusion.      Labs were reviewed.  BMP  Recent Labs  Lab  11/20/18 0345 11/19/18 2204 11/19/18  1608 11/19/18  1010    138 140 141   POTASSIUM 4.7 4.8 5.1 5.3   CHLORIDE 104 105 106 107   TERESA 8.2* 8.2* 8.0* 7.8*   CO2 27 26 26 26   BUN 51* 48* 48* 47*   CR 1.45* 1.49* 1.42* 1.40*   * 147* 147* 151*     LFTs  Recent Labs  Lab 11/20/18 0345 11/19/18 2204 11/19/18  1608 11/19/18  1010   ALKPHOS 83 75 68 66   * 442* Unsatisfactory specimen - hemolyzed Unsatisfactory specimen - hemolyzed   * 124* 115* 118*   BILITOTAL 8.5* 8.3* 7.6* 7.0*   PROTTOTAL 5.1* 5.1* 4.9* 4.9*   ALBUMIN 2.5* 2.6* 2.6* 2.7*      CBC  Recent Labs  Lab 11/20/18 0345 11/19/18 2204 11/19/18  1608 11/19/18  1010   WBC 21.0* 20.1* 17.1* 15.4*   RBC 2.63* 2.72* 2.65* 2.87*   HGB 8.1* 8.4* 8.1* 8.8*   HCT 24.4* 24.7* 24.2* 25.6*   MCV 93 91 91 89   MCH 30.8 30.9 30.6 30.7   MCHC 33.2 34.0 33.5 34.4   RDW 17.0* 17.0* 16.9* 16.6*   PLT 94* 107* 116* 88*     INR  Recent Labs  Lab 11/20/18 0345 11/19/18 2204 11/19/18  1608 11/19/18  1010   INR 1.28* 1.35* 1.46* 1.41*     LA 1.4, CK 3486 (3700 yesterday), triglycerids 343.     Radiology Stable support devices. Stable cardiac silhouette  enlargement and basilar airspace opacities.     Cardiology none.     IMPRESSION & PLAN  Mr. Johan Spence is a 63 year old male with h/o AVR, DM, ONOFRE, obesity, HTN, who [facility] with [facility] of wean off pump. Had re-do AVR and ao repalacement at [facility] but during placing PA cath in OR he was coded and placed pump. AVR and ao graft replacement were successful but he was unable to come off pump. He was transferred here for further management.       Changes today  - start Flolan given elevated PA pressures during trial of turn-down from ECMO  - likely return to OR 11/21 for temporary BiVAD      Cardiovascular:   #AVR and root [facility].    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   #HB  - Monitor hemodynamic status.   - norepi/vaso  gtts.   - MAP >65.   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 Limited information available in chart.   - ECMO turndown in OR 11/15 unsuccessful. On turndown in OR 11/18, was able to be weaned off ECMO; however, was requiring max multiple pressors. Now back on VA-ECMO.   - Went to OR 11/18 again with Dr. Morse, the patient was weaned off VA ECMO.  He required excessively high doses of inotropes and vasopressors.  ECMO circuit was configured RA to PA. Chest was packed, sent upstairs on moderate doses of inotropes and vasopressors. Chest was packed open. Patient then further decompensated, and was sent back to the OR for RA-PA RVAD to VA ECMO by connecting the cannula to the circuit.  The PA cannula placed earlier was removed. Likely return to OR 11/21 for temporary BiVAD.   - Added back stress dose steroids.  - Holding PTA Lisinopril and Metoprolol.   - We have very limited information on his baseline LV/RV function. He is still on 3 pressors, has evidence of pulsatility. Pressor requirement increasing likely because of vasoplegia as well as early tamponade physiology. Chest drained today via Dr. Morse, hemodynamics improved.  - His baseline LV/RV function was normal per [facility] note. During pre-op preparation of AS surgery, severe AS and vasodilation caused hypotension and bradycardia.      Disposition:  - CV ICU.      I discussed the patient with Dr. Peraza.      Thank you for allowing me to care for this patient. This has been discussed with the attending physician.      Winter King MD  Cardiology Fellow, PGY-5        I have reviewed today's vital signs, notes, medications, labs and imaging. I have also seen and examined the patient and agree with the findings and plan as outlined above.    Sylvia Peraza MD  Section Head - Advanced Heart Failure, Transplantation and Mechanical Circulatory Support  Co-Director - Adult Congenital and Cardiovascular [facility]etics [facility]  Associate Professor of Medicine,  [facility]

## 2018-11-20 NOTE — PROGRESS NOTES
ECMO Shift Summary:    Patient remains on VA ECMO, all equipment is functioning and alarms are appropriately set. RPM's 3600 with flow range 3-5 L/min. Sweep gas is at 1.5 LPM and FiO2 70%. Circuit remains free of air, clot and fibrin. Cannulas are secure with no bleeding from site. Extremities are warm. P delta was high @ 125 with good post oxy gases, perfusionist was called.   Significant Shift Events:    Vent settings:  Ventilation Mode: CMV/AC  (Continuous Mandatory Ventilation/ Assist Control)  FiO2 (%): 40 %  Rate Set (breaths/minute): 18 breaths/min  Tidal Volume Set (mL): 450 mL  PEEP (cm H2O): 8 cmH2O  Oxygen Concentration (%): 40 %  Resp: 18.    Heparin is running at 600 u/hr, ACT range 140-160.    Urine output is charted per nursing, blood loss was none. No product given.      Intake/Output Summary (Last 24 hours) at 11/19/18 2241  Last data filed at 11/19/18 2200   Gross per 24 hour   Intake          3369.86 ml   Output             2812 ml   Net           557.86 ml       ECHO:  No results found for this or any previous visit.No results found for this or any previous visit.    CXR:  Recent Results (from the past 24 hour(s))   XR Chest Port 1 View    Narrative    EXAM: XR CHEST PORT 1 VW  11/19/2018 1:25 AM      HISTORY: postop open chest;     COMPARISON: 11/18/2018    FINDINGS: Single AP view of the chest. Venoarterial ECMO cannulae in  stable position. Aortic valve prostheses. Bibasilar chest tubes in  stable position. Epicardial pacer wires. Low lung volumes. Likely  interval removal of the mediastinal drains. Endotracheal and gastric  tubes following expected course. Decreased right pleural effusion.  Improved right basilar opacities. Persistent left basilar opacities.  No pneumothorax. Adequately positioned intra-aortic balloon pump,  endotracheal tube, right IJ approach pulmonary artery catheter.  Calcified granuloma right lower lung.      Impression    IMPRESSION:   1. Adequately positioned support  devices, with possible interval  removal of mediastinal drains.  2. No pneumothorax. Chest remains open.  3. Improved aeration of the right lung, with decreased right pleural  effusion.  4. Persistent residual bibasilar opacities, likely atelectasis/ edema/  aspiration.    I have personally reviewed the examination and initial interpretation  and I agree with the findings.    EMILIE RUSOS MD       Labs:    Recent Labs  Lab 11/19/18 2204 11/19/18 2020 11/19/18  1807 11/19/18  1608   PH 7.46* 7.45 7.44 7.40   PCO2 38 39 40 42   PO2 188* 139* 130* 198*   HCO3 27 27 27 26   O2PER 40% 40% 40% 40%       Lab Results   Component Value Date    HGB 8.1 (L) 11/19/2018    PHGB 120 (H) 11/19/2018     (L) 11/19/2018    FIBR 368 11/19/2018    INR 1.46 (H) 11/19/2018    PTT 47 (H) 11/19/2018    DD 3.3 (H) 11/19/2018    AXA <0.10 11/19/2018    ANTCH 43 (L) 11/19/2018         Plan is go to op for bivad.      Nash Mahmood, RRT  11/19/2018 10:41 PM

## 2018-11-20 NOTE — PROGRESS NOTES
ECMO Shift Summary:    Patient remains on VA ECMO, all equipment is functioning and alarms are appropriately set. RPM's 3600 with flow range 4.3 L/min. Sweep gas is at 1 LPM and FiO2 70%. Circuit remains free of air, clot and fibrin. Cannulas are secure with little bleeding from site. Extremities are warm to the touch. Suctioned ETT for small amount of secretions.    Significant Shift Events:    Vent settings:  Ventilation Mode: CMV/AC  (Continuous Mandatory Ventilation/ Assist Control)  FiO2 (%): 40 %  Rate Set (breaths/minute): 18 breaths/min  Tidal Volume Set (mL): 450 mL  PEEP (cm H2O): 8 cmH2O  Oxygen Concentration (%): 40 %  Resp: 18.    Heparin is running at 600 u/kg/hr, ACT range 164.    Urine output is minimal, remains on CRRT, minimal blood loss from cannulation site. Products given included 1 unit of platelets.      Intake/Output Summary (Last 24 hours) at 11/20/18 0622  Last data filed at 11/20/18 0600   Gross per 24 hour   Intake          2732.23 ml   Output             3417 ml   Net          -684.77 ml       ECHO:  No results found for this or any previous visit.No results found for this or any previous visit.    CXR:  Recent Results (from the past 24 hour(s))   XR Chest Port 1 View    Narrative    EXAM: XR CHEST PORT 1 VW  11/20/2018 1:35 AM      HISTORY: postop open chest;     COMPARISON: 11/19/2018    FINDINGS: Critical repositioned endotracheal tube, right IJ approach  pulmonary artery catheter, partially visualized enteric and gastric  tubes, bibasilar chest tubes, aortic valvular prostheses, venous  arterial ECMO cannulae, intra-aortic balloon pump.    Right basilar calcified granuloma. Stable cardiac silhouette  enlargement and basilar opacities. Lower lung volumes.      Impression    IMPRESSION: Stable support devices. Stable cardiac silhouette  enlargement and basilar airspace opacities.    I have personally reviewed the examination and initial interpretation  and I agree with the  findings.    EMILIE RUSSO MD       Labs:    Recent Labs  Lab 11/20/18  0558 11/20/18  0345 11/20/18  0211 11/20/18  0001   PH 7.39 7.37 7.40 7.50*   PCO2 46* 47* 46* 35   PO2 168* 81 89 145*   HCO3 28 27 28 27   O2PER 40.0 40.0 40.0 40.0       Lab Results   Component Value Date    HGB 8.1 (L) 11/20/2018    PHGB 90 (H) 11/20/2018    PLT 94 (L) 11/20/2018    FIBR 394 11/20/2018    INR 1.28 (H) 11/20/2018    PTT 44 (H) 11/20/2018    DD 2.7 (H) 11/20/2018    AXA <0.10 11/20/2018    ANTCH 43 (L) 11/19/2018         Plan for Bivad today.      Yo Hernández, RRT  11/20/2018 6:22 AM

## 2018-11-20 NOTE — PROGRESS NOTES
CLINICAL NUTRITION SERVICES - REASSESSMENT NOTE     Nutrition Prescription    RECOMMENDATIONS FOR MDs/PROVIDERS TO ORDER:  Recommend continue advancement of TF toward goal if pressors remain stable/decreasing.     Malnutrition Status:    Patient does not meet two of the criteria necessary for diagnosing malnutrition but is at risk for malnutrition    Recommendations already ordered by Registered Dietitian (RD):  1. Increase now to Impact Peptide @ 20 mL/hr. Increase as instructed per provider to goal Impact Peptide @ goal 50 ml/hr (1200 ml/day) to provide 1800 kcals, 113 g PRO, 924 ml free H2O, 77 g Fat (50% from MCTs), 168 g CHO and no Fiber daily.    2. Continue 2 pkts ProSource daily (80 kcal, 22 g PRO) for total 1880 kcal (26 kcal/kg + pending propofol) and 135 g PRO (1.8 g/kg)    Future/Additional Recommendations:  1. If K+ remains >5, recommend change to Nepro @ goal 40 ml/hr (960 ml/day) to provide 1728 kcals, 78 g PRO, 701 ml free H2O, 155 g CHO and 12 g Fiber daily. Increase ProSource to 4 pkts daily (160 kcal and 44 g PRO) for total 1888 kcal (26 kcal/kg) and 122 g PRO (1.7 g/kg).    2. Once off ECMO and meets appropriate criteria,recommend obtain metabolic cart study to assess approp of energy provisions to avoid over/under feeding while on ventilator.       EVALUATION OF THE PROGRESS TOWARD GOALS   Diet: NPO  Enteral Access: NJT   Nutrition Support: Impact currently running @ 10 mL/hr (with goal of 50 mL/hr) + 2 prosource daily  Intake: Average intake over past 5 days (TF + ProSource + Propofol) = 1580 kcal (22 kcal/kg) and 82 g PRO (1.1 g/kg)     NEW FINDINGS   -Resp/CV: Remains intubated w/ open chest on VA ECMO. On Vaso, Norepi, phenylephrine - Epi weaned off yesterday.  -GI: Stooling - x4 on 11/17, x1 yesterday, x1 today so far. On bowel regimen.   -Skin: [place] 12, on Nephronex  -Labs: ; CRP 65 on 11/14 --> 170 on 11/19. Remains appropriate for immune-modulating formula.   -Meds: Propofol gtt.    -Renal: CRRT, changed to 2K bath yesterday. K+ trending down 5.1--> 4.7 today. Phos 5.1.     Updated ASSESSED NUTRITION NEEDS per 73 kg  Estimated Protein Needs: 110 - 146 grams protein/day (1.5 - 2 grams of pro/kg)  Justification: CRRT    MALNUTRITION  % Intake: Decreased intake does not meet criteria  % Weight Loss: None noted  Subcutaneous Fat Loss: None observed  Muscle Loss: None observed  Fluid Accumulation/Edema: Moderate  Malnutrition Diagnosis: Patient does not meet two of the above criteria necessary for diagnosing malnutrition but is at risk for malnutrition    Previous Goals   Diet adv v nutrition support within 2-3 days.  Evaluation: Met    Total avg nutritional intake to meet a minimum of 25 kcal/kg and 1.2 g PRO/kg daily (per dosing wt 73 kg).  Evaluation: Not met    Previous Nutrition Diagnosis  Inadequate protein-energy intake related to NPO status and medical course as evidenced by pt NPO and yet to initiate nutrition support    Evaluation: Improving    CURRENT NUTRITION DIAGNOSIS  Inadequate protein-energy intake related to TF interruptions 2/2 OR and hemodynamic status as evidenced by pt met only 22 kcal/kg and 1.1 g/kg PRO over past 5 days, TF currently only at 10 mL/hr    INTERVENTIONS  Implementation  Collaboration with other providers - Discussed plan for TFs on rounds. MD would like to only increase to 20 mL/hr at this time, possibly increase further later pending pressor needs.   Enteral Nutrition - Modify rate    Goals  Total avg nutritional intake to meet a minimum of 25 kcal/kg and 1.5 g PRO/kg daily (per dosing wt 73 kg).    Monitoring/Evaluation  Progress toward goals will be monitored and evaluated per protocol.    Kristie Pool RD, LD, [facility]  CVICU Dietitian  Pager: [phone]

## 2018-11-21 NOTE — PROGRESS NOTES
Cardiology - Heart failure service    Interval events: No significant events overnight, on VA ECMO/CRRT/IABP/Mechanical Ventilation. Chest remains open, 1 U PLTs. OR today for temp BiVAD. Started inhaled flolan.     Temp:  [97.5  F (36.4  C)-98.4  F (36.9  C)] 97.9  F (36.6  C)  Heart Rate:  [87-90] 89  Resp:  [18-23] 18  MAP:  [55 mmHg-90 mmHg] 69 mmHg  Arterial Line BP: ()/(33-55) 105/42  FiO2 (%):  [40 %] 40 %  SpO2:  [96 %-100 %] 99 %  Tele: None.   Hemodynamics:  CI 3.4, SVR 1000.  IABP via LFA, 1:1, triggered by EKG, augmented 100%, diastolic 105, pump mean 80, L radial and distal pulse in tact   ECMO settings:  Patient remains on VA ECMO. RPM's 3250 with flow range 4.3 L/min. Sweep gas is at 1 LPM, SVO2 68%.   Vent Rate 18, , PEEP 8, 40% oxygen, CMV/AC.   Epicardial pacemaker: DDD, 80, capturing.     I/O last 3 completed shifts:  In: 3615.36 [I.V.:1963.36; NG/GT:285]  Out: 3849 [Urine:636; Emesis/NG output:50; Other:2723; Chest Tube:440], since midnight net neg 400 now.    Hemodynamic drips: Epi off, levophed off, vaso 2, phenylephrine 1  Selected medications: heparin 700 gtt, pantoprazole 40 mg daily, hydrocortisone 50 mg q6, inhaled flolan 20.   Sedation: fentanyl 100, insulin 3, propofol 30    Physical examination:  Vitals:    11/17/18 0300 11/18/18 0500 11/19/18 0500 11/20/18 0545   Weight: 96 kg (211 lb 10.3 oz) 96.5 kg (212 lb 11.9 oz) 99 kg (218 lb 4.1 oz) 98.1 kg (216 lb 4.3 oz)    11/21/18 0300   Weight: 97.4 kg (214 lb 11.7 oz)     Central cannulation. Chest tubes in place.  LUNGS:  Clear to auscultation bilaterally   ABD:  Active bowel sounds, soft, non-tender/non-distended.  No rebound/guarding/rigidity.  EXT:  No edema or cyanosis.  Hands/feet warm to touch with good signs of peripheral perfusion.      Labs were reviewed.  BMP  Recent Labs  Lab 11/21/18  0345 11/20/18  2355 11/20/18  2159 11/20/18  1607 11/20/18  0955     --  139 139 139   POTASSIUM 3.6  --  3.9 4.0 4.6    CHLORIDE 105  --  105 106 105   TERESA 8.7  --  8.7 8.4* 8.5   CO2 27  --  27 26 26   BUN 52*  --  53* 50* 52*   CR 1.39*  --  1.39* 1.42* 1.42*   * 148* 154*  157* 137* 140*     LFTs  Recent Labs  Lab 11/21/18 0345 11/20/18 2159 11/20/18  1607 11/20/18  0955 11/20/18  0345   ALKPHOS  --  100 87 88 83   AST  --  419* 399* 423* 439*   * 146* 126* 129* 122*   BILITOTAL  --  10.2* 9.4* 8.9* 8.5*   PROTTOTAL  --  5.1* 5.0* 5.2* 5.1*   ALBUMIN 2.4* 2.6* 2.4* 2.6* 2.5*      CBC  Recent Labs  Lab 11/21/18 0345 11/20/18 2159 11/20/18  1607 11/20/18  0955   WBC 19.9* 20.8* 21.4* 23.2*   RBC 2.67* 2.84* 2.82* 2.89*   HGB 8.1* 8.5* 8.5* 8.8*   HCT 24.9* 26.3* 26.2* 26.6*   MCV 93 93 93 92   MCH 30.3 29.9 30.1 30.4   MCHC 32.5 32.3 32.4 33.1   RDW 18.5* 18.4* 18.0* 17.2*   * 93* 107* 123*     INR  Recent Labs  Lab 11/21/18 0345 11/20/18 2159 11/20/18  1607 11/20/18  0955   INR 1.21* 1.25* 1.25* 1.21*       Radiology Stable support devices. Stable cardiac silhouette  enlargement and basilar airspace opacities.     Cardiology None         IMPRESSION & PLAN  Mr. Johan Spence is a 63 year old male with h/o AVR, DM, ONOFRE, obesity, HTN, who [facility] with [facility] of wean off pump. Had re-do AVR and ao repalacement at [facility] but during placing PA cath in OR he was coded and placed pump. AVR and ao graft replacement were successful but he was unable to come off pump. He was transferred here for further management.       Changes today  - OR 11/21, failed ECMO turn down and CAB done to LAD. Arrived from OR at 1500 on norepi, epi, vasopressin for pressors.      Cardiovascular:   #AVR and [facility].    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   #HB  - Monitor hemodynamic status.   - norepi/vaso gtts.   - MAP >65.   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 Limited information available in chart.   - ECMO turndown in OR  11/15 unsuccessful. On turndown in OR 11/18, was able to be weaned off ECMO; however, was requiring max multiple pressors. Now back on VA-ECMO.   - Went to OR 11/18 again with Dr. Morse, the patient was weaned off VA ECMO.  He required excessively high doses of inotropes and vasopressors.  ECMO circuit was configured RA to PA. Chest was packed, sent upstairs on moderate doses of inotropes and vasopressors. Chest was packed open. Patient then further decompensated, and was sent back to the OR for RA-PA RVAD to VA ECMO by connecting the cannula to the circuit.  The PA cannula placed earlier was removed. OR 11/21, failed ECMO turn down and CAB done to LAD. Arrived from OR at 1500 on norepi, epi, vasopressin for pressors.  - Continue stress dose steroids.  - Holding PTA Lisinopril and Metoprolol.         Disposition:  - CV ICU.      I discussed the patient with Dr. Peraza.      Thank you for allowing me to care for this patient. This has been discussed with the attending physician.      Winter King MD  Cardiology Fellow, PGY-5        I have reviewed today's vital signs, notes, medications, labs and imaging. I have also seen and examined the patient and agree with the findings and plan as outlined above.    Sylvia Peraza MD  Section Head - Advanced Heart Failure, Transplantation and Mechanical Circulatory Support  Co-Director - Adult Congenital and Cardiovascular Genetics Center  Associate Professor of Medicine, [facility]

## 2018-11-21 NOTE — PROGRESS NOTES
"  CV ICU PROGRESS NOTE  11/21/2018    CO-MORBIDITIES:   Severe Aortic stenosis and regurge.   ONOFRE  DM  HTN  HLD  Complete Heart Block    ASSESSMENT: Johan Spence is a 63 year old male now s/p AVR and root [facility]. Was unable to be weaned from CPB so placed on VA ECMO and sent to [facility] for further cares. SANDRA shows adequate biventricular function upon ECMO turndown however did require increased pressor requirements. Brought to OR that day and failed turndown. Chest washout at bedside on 11/7 shows clot tamponade. Cleaned out and pressor requirements improved. Brought to Cath lab and coronaries clean. 11/18 had increased bleeding from chest tubes. Went to OR where cannulation was switched to RA PA RVAD without oxygenator. PA pressures continued to rise into the PA systolics into the 75 range. Brought back down to cannulate central VA ECMO again. Since, the patient has persistently required vasopressors but successfully titrating off.  OR 11/21, failed ECMO turn down and CAB done to LAD.    TODAY'S PROGRESS:   - Arrived from OR at 1500 on norepi, epi, vasopressin for pressors.  - CPB time 92\"  - EBL 1L.  - Cell Saver: 1L. IVFs: 1.5crystalloid. Other products: 5u pRBCs, 4 FFP, 2 plt.  - VA ECMO  - CRRT, goal net neg  - post op AXR, CXR  - TF at 10/hr once tube position verified    PLAN:  Neuro/pain/sedation:  #Post op pain.   - Monitor neurological status. Notify the MD for any acute changes in exam.  - Fentanyl gtt for pain.   - Propofol gtt. BIS monitoring 40-60.  RASS goal -3 to -4.  - Oxy/dil PRN. Robaxin PRN.   - Lidoderm.     Pulmonary:   #MV postop  #ONOFRE    - Supplemental oxygen to keep saturation above 92 %.  - Mechanically ventilated, will maintain PEEP of 8 during resuscitation.     Cardiovascular:   #AVR and root [facility].    #VA ECMO after inability to separate from bypass.   #HTN.   #Complete HB.    #Vasoplegia  - Monitor hemodynamic " status.   - Epi/norepi/vaso gtts. Titrate down as BPs tolerate (with goal MAP >65)  - ECMO turndown in OR 11/15 and 11/21 unsuccessful.   - Chest remains open with multiple turndown attempts  - Holding PTA Lisinopril and Metoprolol.   - 12 Lead EKG shows complete HB, cards aware  - CAB and wash out 11/21, failed turn down with increasing RV distension and PAP. Back on VA ECMO. Plan to return to OR 11/23 for possible chest closure.    GI care:   - Miralax/senna-colace, suppository PRN    Fluids/ Electrolytes/ Nutrition:   - TKO  - NPO    - TFs at 10/hr when tube placement verified  - BMP q6h.  - No indication for parenteral nutrition.    Renal:    #Right groin HD line. Removed 11/18.  - Nephrology following.    - Will continue to monitor intake and output.  - Coronado  - Goal negative for today.    - CRRT through ECMO circuit.     Endocrine:    - Insulin gtt.    ID/ Antibiotics:  - Vanc/Zosyn begun 11/19 for open chest prophylaxsis given persistent leukocytosis (possibly due to SDS) but also vasoplegia in the setting of critical illness.    Heme:     - Hemoglobin stable.   - CBC/coags q6h.  - Anticoagulation: Heparin gtt for ECMO circuit.      Prophylaxis:    - Mechanical prophylaxis for DVT.   - Hep gtt for ECMO -160.   - PPI    Lines/tubes/drains:  - R internal jugular MAC/swan  - ETT  - Left IABP   - VA ECMO  - Chest tubes: 2 meds, pericardial rachel, bilateral pleural  - Coronado  - NJ feeding tube.   - AV TPW's.   - radial A-line    Disposition:  - CV ICU.    Patient seen, findings and plan discussed with attending physician, Dr. Lima.     Libby Love MD  General Surgery, PGY-3  Pg [phone]    ====================================    SUBJECTIVE:   To OR today.    OBJECTIVE:   1. VITAL SIGNS:   Temp:  [97.5  F (36.4  C)-98.4  F (36.9  C)] 97.9  F (36.6  C)  Heart Rate:  [87-95] 95  Resp:  [14-23] 14  BP: (95)/(42) 95/42  MAP:  [55 mmHg-90 mmHg] 78 mmHg  Arterial Line BP: ()/(33-55) 116/49  FiO2  (%):  [40 %] 40 %  SpO2:  [98 %-100 %] 99 %  Ventilation Mode: CMV/AC  (Continuous Mandatory Ventilation/ Assist Control)  FiO2 (%): 40 %  Rate Set (breaths/minute): 18 breaths/min  Tidal Volume Set (mL): 450 mL  PEEP (cm H2O): 8 cmH2O  Oxygen Concentration (%): 40 %  Resp: 14    2. INTAKE/ OUTPUT:   I/O last 3 completed shifts:  In: 8192.04 [I.V.:2300.04; Other:1114; NG/GT:195]  Out: 3042 [Urine:561; Emesis/NG output:50; Other:2071; Chest Tube:360]    3. PHYSICAL EXAMINATION:   General: lying in bed on ECMO and ventilator.   Neuro: sedated and intubated.   Resp: Breathing non-labored on vent.   CV: Paced  Abdomen: Soft, Non-distended.   Incisions: c/d/i.   Extremities: warm and well perfused    4. INVESTIGATIONS:   Arterial Blood Gases     Recent Labs  Lab 11/21/18  1511 11/21/18  1358 11/21/18  1317 11/21/18  1237   PH 7.37 7.33* 7.33* 7.34*   PCO2 42 44 44 42   PO2 272* 324* 324* 289*   HCO3 24 23 23 23     Complete Blood Count     Recent Labs  Lab 11/21/18  1358 11/21/18  1317 11/21/18  1237 11/21/18  1232 11/21/18  1159  11/21/18  0345 11/20/18 2159 11/20/18  1607 11/20/18  0955   WBC  --   --   --   --   --   --  19.9* 20.8* 21.4* 23.2*   HGB 9.4* 7.4* 6.9*  --  8.4*  < > 8.1* 8.5* 8.5* 8.8*   PLT  --   --   --  87*  --   --  120* 93* 107* 123*   < > = values in this interval not displayed.  Basic Metabolic Panel    Recent Labs  Lab 11/21/18  1358 11/21/18  1317 11/21/18  1237 11/21/18  1159  11/21/18  0345  11/20/18  2159 11/20/18  1607 11/20/18  0955    141 141 141  < > 139  --  139 139 139   POTASSIUM 3.6 3.5 3.8 3.6  < > 3.6  --  3.9 4.0 4.6   CHLORIDE  --   --   --   --   --  105  --  105 106 105   CO2  --   --   --   --   --  27  --  27 26 26   BUN  --   --   --   --   --  52*  --  53* 50* 52*   CR  --   --   --   --   --  1.39*  --  1.39* 1.42* 1.42*   * 154* 160* 160*  < > 130*  < > 154*  157* 137* 140*   < > = values in this interval not displayed.  Liver Function Tests    Recent  Labs  Lab 11/21/18  1232 11/21/18  0345 11/20/18 2159 11/20/18  1607 11/20/18  0955 11/20/18  0345   AST  --   --  419* 399* 423* 439*   ALT  --  150* 146* 126* 129* 122*   ALKPHOS  --   --  100 87 88 83   BILITOTAL  --   --  10.2* 9.4* 8.9* 8.5*   ALBUMIN  --  2.4* 2.6* 2.4* 2.6* 2.5*   INR 1.72* 1.21* 1.25* 1.25* 1.21* 1.28*     Pancreatic Enzymes  No lab results found in last 7 days.  Coagulation Profile    Recent Labs  Lab 11/21/18  1232 11/21/18  0345 11/20/18 2159 11/20/18  1607   INR 1.72* 1.21* 1.25* 1.25*   PTT 34 45* 46* 43*         5. RADIOLOGY:   Recent Results (from the past 24 hour(s))   XR Chest Port 1 View    Narrative    EXAM: XR CHEST PORT 1 VW  11/21/2018 1:20 AM      HISTORY: postop open chest;     COMPARISON: 11/20/2018    FINDINGS: Single AP view of the chest. Adequately positioned  endotracheal tube, stable position of venous arterial ECMO cannulae,  valvular prostheses, right IJ approach pulmonary artery catheter tip  over right pulmonary artery, partially visualized enteric and feeding  tubes, bibasilar chest tubes. No pneumothorax. No significant change  in bibasilar airspace opacities, likely atelectatic in the setting of  low lung volumes. Intra-aortic balloon pump tip projects just superior  to the rudy. Stable cardiac silhouette enlargement.      Impression    IMPRESSION: No significant change since prior, adequately positioned  support devices. Stable cardiac silhouette enlargement and bibasilar  airspace opacities.    I have personally reviewed the examination and initial interpretation  and I agree with the findings.    JOVANNY ROBERT MD       =========================================

## 2018-11-21 NOTE — PROGRESS NOTES
ECMO Shift Summary:    Patient remains on V-A ECMO, all equipment is functioning and alarms are appropriately set. RPM's 3600 with flow range 4.4 L/min. Sweep gas is at 1 LPM and FiO2 65%. Circuit remains free of air, clot and fibrin. Cannulas are secure with no bleeding from site. Extremities are warm.     Significant Shift Events:    Vent settings:  Ventilation Mode: CMV/AC  (Continuous Mandatory Ventilation/ Assist Control)  FiO2 (%): 40 %  Rate Set (breaths/minute): 18 breaths/min  Tidal Volume Set (mL): 450 mL  PEEP (cm H2O): 8 cmH2O  Oxygen Concentration (%): 40 %  Resp: 18.    Heparin is running at 7.1 u/kg/hr, ACT range 144.    Urine output is 25 ml per hour, blood loss was minimal. Product given included a unit of platelets.      Intake/Output Summary (Last 24 hours) at 11/20/18 2243  Last data filed at 11/20/18 2200   Gross per 24 hour   Intake          3295.29 ml   Output             3878 ml   Net          -582.71 ml       ECHO:  No results found for this or any previous visit.No results found for this or any previous visit.    CXR:  Recent Results (from the past 24 hour(s))   XR Chest Port 1 View    Narrative    EXAM: XR CHEST PORT 1 VW  11/20/2018 1:35 AM      HISTORY: postop open chest;     COMPARISON: 11/19/2018    FINDINGS: Critical repositioned endotracheal tube, right IJ approach  pulmonary artery catheter, partially visualized enteric and gastric  tubes, bibasilar chest tubes, aortic valvular prostheses, venous  arterial ECMO cannulae, intra-aortic balloon pump.    Right basilar calcified granuloma. Stable cardiac silhouette  enlargement and basilar opacities. Lower lung volumes.      Impression    IMPRESSION: Stable support devices. Stable cardiac silhouette  enlargement and basilar airspace opacities.    I have personally reviewed the examination and initial interpretation  and I agree with the findings.    EMILIE RUSSO MD       Labs:    Recent Labs  Lab 11/20/18 2159 11/20/18 2000  11/20/18  1757 11/20/18  1607   PH 7.41 7.45 7.43 7.44   PCO2 43 40 41 41   PO2 122* 160* 114* 137*   HCO3 27 28 27 27   O2PER 40 40 40 40  40       Lab Results   Component Value Date    HGB 8.5 (L) 11/20/2018    PHGB 90 (H) 11/20/2018    PLT 93 (L) 11/20/2018    FIBR 407 11/20/2018    INR 1.25 (H) 11/20/2018    PTT 46 (H) 11/20/2018    DD 2.3 (H) 11/20/2018    AXA <0.10 11/20/2018    ANTCH 53 (L) 11/20/2018         Plan is to remain on V-A ECMO overnight, Bivads in the morning.      Wily Cole, RRT  11/20/2018 10:43 PM

## 2018-11-21 NOTE — PROGRESS NOTES
ECMO Shift Summary:    Patient remains on VA ECMO, all equipment is functioning and alarms are appropriately set. RPM's 3600 with flow range 4.24-4.45 L/min. Sweep gas is at 1 LPM and FiO2 65%. Circuit remains free of air, clot and fibrin. Cannulas are secure with no bleeding from site. Extremities are warm to touch. Suctioned ETT for a large amount of yellow secretions.    Significant Shift Events:    Vent settings:  Ventilation Mode: CMV/AC  (Continuous Mandatory Ventilation/ Assist Control)  FiO2 (%): 40 %  Rate Set (breaths/minute): 18 breaths/min  Tidal Volume Set (mL): 450 mL  PEEP (cm H2O): 8 cmH2O  Oxygen Concentration (%): 40 %  Resp: 18.    Heparin is running at 700 u/hr, ACT range 135-148.    Urine output is minimal, blood loss was minimal. Product given included 1 unit of PRBC's.      Intake/Output Summary (Last 24 hours) at 11/21/18 0605  Last data filed at 11/21/18 0600   Gross per 24 hour   Intake          3672.41 ml   Output             4027 ml   Net          -354.59 ml       ECHO:  No results found for this or any previous visit.No results found for this or any previous visit.    CXR:  No results found for this or any previous visit (from the past 24 hour(s)).    Labs:    Recent Labs  Lab 11/21/18  0345 11/21/18  0216 11/20/18  2355 11/20/18  2159   PH 7.45 7.47* 7.42 7.41   PCO2 40 40 43 43   PO2 137* 173* 131* 122*   HCO3 28 29* 28 27   O2PER 40 40 40 40       Lab Results   Component Value Date    HGB 8.1 (L) 11/21/2018    PHGB 60 (H) 11/21/2018     (L) 11/21/2018    FIBR 407 11/20/2018    INR 1.21 (H) 11/21/2018    PTT 45 (H) 11/21/2018    DD 2.1 (H) 11/21/2018    AXA <0.10 11/21/2018    ANTCH 53 (L) 11/20/2018         Plan is to go to the OR and place Bi-Vads with possible CABG's.      Edouard Reeves, RRT  11/21/2018 6:05 AM

## 2018-11-21 NOTE — PLAN OF CARE
Problem: Patient Care Overview  Goal: Plan of Care/Patient Progress Review  1. Will be hemodynamically stable.  2. Oxygenation will be met.  3. Family will be well informed of all procedures.   Outcome: No Change  Patient has rested well overnight. Hours of care 9266-7282.  On Propofol/Fent for sedation. Patient opens eyes spontaneously with deep stimulation or when sedation lightened.  No changes to vent settings. Copious amounts of thick white secretions overnight via ETT.  Patient 100% A-V paced via epicardial leads.  ECMO circuit settings and system maintenance per [facility]CL[facility] specialist. IABP 1:1  Plan to give 1 PRBC this AM for HGB ~8.0.  Chest tube output stable overnight with serosanguinous drainage.  Tube feedings @ 30 ml/hr overnight.  Insulin adjusted as needed.15-30 ml urine out per hour overnight. CRRT, tolerating fluid removal well.  Net negative extra fluid this AM. As patient anticipated to receive multiple fluid/blood products in OR today.  BM x 1.  Pre-op scrub done x 2 overnight.  CVTS moonlighter notified RE no stop time for heparin pre-op.  No further orders were obtained.  Daughter updated via phone.  Plan for BIVAD placement and CABG, first case this AM.    Sign-out given to cardiac anesthesia staff @ [phone]

## 2018-11-21 NOTE — ANESTHESIA PROCEDURE NOTES
SANDRA Probe Insertion Note:    Inserted by:  AIDA KEARNEY (Responsible Anesthesiologist)                        CHRISTOPH BILLY (in the presence of a teaching physician )    Probe Status PRE Insertion: NO obvious damage  Probe type:  Adult 3D    Bite block used:   Yes  Insertion Technique: Jaw Lift  Insertion complications: None obvious    Billing Report:SANDRA report by Anesthesiologist (See Separate Report note)    Probe Status POST Removal: NO obvious damage

## 2018-11-21 NOTE — OP NOTE
OPERATIVE DATE: 11/21/2018    PRE-OPERATIVE DIAGNOSIS:  1) S/P redo sternotomy and aortic valve replacement  2) VA ECMO for post-operative cardiac collapse  3) Open chest  4) Hypertension  5) Hyperlipidemia  6) Obesity  7) Obstructive sleep apnea  8) Diabetes  9) Former smoker  10) ETOH abuse      POST-OPERATIVE DIAGNOSIS:  1) S/P redo sternotomy and aortic valve replacement  2) VA ECMO for post-operative cardiac collapse  3) Open chest  4) Hypertension  5) Hyperlipidemia  6) Obesity  7) Obstructive sleep apnea  8) Diabetes  9) Former smoker  10) ETOH abuse    PROCEDURE:  1) Chest washout  2) Coronary artery bypass grafting x 1 - reversed saphenous vein to left anterior descending artery  3) Endoscopic vein harvest left leg  4) Wean from VA ECMO  5) Repeat cannulation for VA ECMO  6) Temporary chest closure    SURGEON: Lebron Neri MD    COSURGEON: Moises Saba MD    ASSISTANT: Stevan Ma MD    ANESTHESIA: GETA    ESTIMATED BLOOD LOSS: 1000cc      OPERATIVE FINDINGS:  1) Ejection fraction: 35-40%  2) Left greater saphenous vein 4-5mm and suitable for bypass.  3) Left anterior descending artery 2mm and free of disease at anastomosis.  4) Decompensation of hemodynamics after one hour    CARDIOPULMONARY BYPASS TIME: 92 minutes    INDICATIONS:  Mr. SHENA GREEN is a 63 year old male admitted with heart failure after redo AVR.  We were asked to evaluate for mechanical support.  Risks and benefits of the operation were explained to the patient and their family including, but not limited to, bleeding, infection, stroke and even death.  They understood these risks and agreed to proceed electively.    OPERATIVE REPORT:  The patient was transferred to the operating room and positioned supine on the OR table.  General anesthesia was initiated by the anesthesia team.  Endotracheal intubation and central venous access was performed by anesthesia.  The patients chest, abdomen and bilateral lower extremities were  clipped, prepped and draped in sterile fashion.  A pre-procedure time-out was performed confirming the correct patient, correct site and correct procedure.    The previous chest dressing was removed.  Simultaneously an incision was made in the left lower leg.  Endoscopic vein harvest was performed of a 15cm section of greater saphenous vein.  The vein was prepared and stored in heparinized saline.    The patient was converted from VA ECMO support to cardiopulmonary bypass via the existing bypass cannulas.  Cardiopulmonary bypass was initiated with good flows.  The left anterior descending artery was isolated with silastic loops.  Next the artery was dissected with round bladed knife.  An arteriotomy was made and extended with iris scissors.  Next a 1mm shunt was placed.  An end to side anastomosis was made using the greater saphenous vein to the left anterior descending artery using running 6-0 prolene.  Next a proximal anastomosis was constructed by placement of a partially occluding aortic clamp.  An arteriotomy was made.  The vein was sized to fit and proximal anastomosis constructed using running 6-0 prolene.    Next we focused on weaning from cardiopulmonary bypass.  The patient was given IV calcium and drips were optimized.  The patient was weaned from bypass and tolerated this well for approximately 90 minutes.  The patient suddenly became unstable hemodynamically.  We quickly recannulated the patient for VA ECMO.  An 20F EOPA cannula was placed in the ascending aorta and connected to the circuit.  A 32F right angle venous cannula was placed into the right atrium via pledgeted 2-0 ethibond.  The patient was started on VA ECMO and immediately improved.    The chest was then packed open.  Two 36F straight argyle mediastinal chest tubes were placed, bilateral 28F pleural chest tubes were placed.  An additional 24F rachel was placed in the posterior pericardium. These were delivered through the anterior abdominal  wall and secured to the skin with 0-ethibond stitches.      The wound was made hemostatic with cautery.  The subcutaneous tissue, sternal edges, thymic fat, pericardial edges and all anastomotic and cannulation sites were inspected and hemostatic.    The wound was irrigated with warm antibiotic saline.  The sternum was packed open with kerlex, esmarch and ioban.    The patient was then transferred from the operating bed to an ICU bed and transferred to the ICU in critical, but stable, condition.    All needle, sponge and instrument counts were correct at the end of the case.    Lebron Neri  Cardiothoracic Surgery  Pager: [phone]

## 2018-11-21 NOTE — PROGRESS NOTES
CRRT STATUS NOTE    DATA:  Time:  5:15 AM  Pressures WNL:  YES  Filter Status:  WDL    Problems Reported/Alarms Noted:  Flow alarms noted.    Supplies Present:  YES    ASSESSMENT:  Patient Net Fluid Balance:  11/20: -230 ml, 11/21: -383 ml  Vital Signs:  Temp: 97.7  F (36.5  C) Temp src: Bladder    Heart Rate: 89 Resp: 18 BP 91/38(61) SpO2: 100 % O2 Device: Mechanical Ventilator    Labs:  K 3.6, Cr 1.39, A 7.45/40/137/28, WBC 19.9, Hgb 8.1, Plat 120, INR 1.21  Goals of Therapy:  Net negative 0-50 as BP allows.    INTERVENTIONS:   Checked in with bedside nurse. Put mat under CRRT machine to address flow alarms.    PLAN:  Continue CVVHDF per renal orders. Contact CRRT RN with questions or concerns at [phone].

## 2018-11-21 NOTE — ANESTHESIA PROCEDURE NOTES
Perioperative SANDRA Report  Anesthesia Information  SANDRA probe placed and report generated by:   CHRISTOPH BILLY in the presence of a teaching physician :  AIDA KEARNEY    I personally reviewed the images and the fellow/resident interpretation.  I agree with the fellow/resident interpretation as amended by myself. AIDA KEARNEY  Images for this study have been archived.   Surgeon:  PENNY JOHNSON      Preanesthesia Checklist:  Patient identified, IV assessed, risks and benefits discussed, monitors and equipment assessed, procedure being performed at surgeon's request and anesthesia consent obtained.  General Procedure Information  Modalities:  2D, 3D, CW Doppler, PW Doppler and Color flow mapping    Possible ECMO decannulation and CABG  Echocardiographic and Doppler Measurements  Right Ventricle:  Cavity size dilated.   Thrombus not present.    Global function moderately impaired.     Left Ventricle:  Hypertrophy present.   Thrombus not present.   Global Function moderately impaired.     Other Ventricular Findings:  On 4L flow via VA-ECMO, LVEF appears moderately to severely depressed, LVEF 25-30% by visual estimate.   RV mildly distended with mild/moderate reduction in function. LVH with posterior wall 1.92 cm  Ventricular Regional Function:  Wall Motion Comments:  On 4L flow via VA-ECMO, akinetic inferior wall, dyskinetic septal and inferoseptal wall, hypokinesis of anterior and anterolateral walls.   When decreased to 2L no change in wall motion, however the RV became more distended and the LV more underfilled.   Valves  Aortic Valve: Annulus bioprosthetic.  Stenosis not present.  Regurgitation absent.  Leaflets normal.  Leaflet motions normal.    Mitral Valve: Annulus calcified.  Stenosis not present.  Regurgitation +2.  Leaflets normal.  Leaflet motions normal.    Tricuspid Valve: Annulus dilated.  Regurgitation +2.  Leaflets normal.  Leaflet motions normal.      Other Valve Findings:  Aortic  valve with previous bioprosthetic valve, leaflets appear to be opening appropriately. No aortic regurgitation or paravalvular leak.   Mitral valve with moderate regurgitation with central jet.   Tricuspid valve with moderate regurgitation and PAC in place.   Aorta: Ascending Aorta: Size normal.  Dissection not present.  Plaque thickness less than 3 mm.  Mobile plaque not present.    Aortic Arch: Size normal.   Dissection not present.   Plaque thickness greater than 3 mm.   Mobile plaque not present.    Descending Aorta: Size normal.   Dissection not present.   Plaque thickness less than 3 mm.   Mobile plaque not present.    Other Aortic Findings:  IABP in place in descending aorta, appears to be appropriately positioned.   Ascending aorta at level of PA measures 2.74 cm.  Right Atrium:  Spontaneous echo contrast not present.   Thrombus not present.   Tumor not present.   Device present.   Left Atrium: Spontaneous echo contrast not present.  Thrombus not present.  Tumor not present.  Device not present.    Left atrial appendage normal.   Other Atria Findings:  PAC visualized in right atrium.   Low velocties in PRASAD however no thrombus visualized with 2D and 4D imaging.   Atrial Septum: Intra-atrial septal morphology normal.   Other atrial septal defect findings:  No PFO with color flow doppler      Other Findings:   Pericardium:  normal.  . Pulmonary Arteries:  normal.  .  No coronary sinus catheter present.  .  .  Post Intervention Findings  Procedure(s) performed:  CABG and ECMO Placement. Global function:  Unchanged.   Regional wall motion:  Unchanged   Surgeon(s) notified of all postintervention findings:  Yes  .  .  .   .  .  .  .  .  .  .    S/p vein graft to LAD patient was able to completely release from CPB however with maximal support with 40 ppm NO, 20 inhaled flolan, 0.125 mcg/kg/min of IV milrinone, 0.12 mcg/kg/min IV epinephrine, 0.12 mcg/kg/min IV norepinephrine and 4 units/hr IV vasopressin LVEF  appeared to be 40% with global hypokinesis and mildly reduced function of RV. Systolic MAPs in 50-60 and mean PA pressures 30-40 mmHg. With time however the RV became more distended, PA pressures increased and systemic pressures began to drop.   Patient was cannulated for VA-ECMO pressors were able to be substantially weaned down and RV appeared more decompressed.   No change in pre-CBP valvular pathology.   IABP still visualized in descending aorta.   Right atrial cannula for VA-ECMO now visualized.     Echocardiogram Comments

## 2018-11-21 NOTE — PLAN OF CARE
Problem: Patient Care Overview  Goal: Plan of Care/Patient Progress Review  1. Will be hemodynamically stable.  2. Oxygenation will be met.  3. Family will be well informed of all procedures.   Outcome: No Change  Neuro:BIS monitoring continues. Propofol weaned to 15mcg/kg/min, pt opening eyes, not following commands, BIS 80's. Propofol increased to 20mcg/kg/min for better sedation, BIS 60's. Fentanyl gtt 100mcg/h. Pupils, equal, sluggish.   Resp: Lungs coarse, CMV, 40%, 18, 450, 8.   Cardiac: Central VA ECMO, flows: 4.4, 65%, Sweep: 1, IABP continues 1:1, 100%, Augmenting 's. 100% paced. Pressors titrating down, MAPs >60. Levo off. Phenylephrine gtt 1mcg/kg/min, Vaso 2 unit/h. 4 CT to sxn, minimal output, dressing changed.    : Coronado in place and patent. CRRT continues, goal: net neg 0-50mL/h.  GI: Bowel sounds hypoactive, no BM this shift. Insulin gtt 2unit/h. TF advanced to 30mL/h. ICU team to advance.   Skin: Zflow, prevelon boots, wedges and sacral mepilex in place. Scrotal skin tear improving. Dressing clean dry intact.     Plan: OR tomorrow 0730. Continue to monitor and notify treatment team as needed.

## 2018-11-21 NOTE — ANESTHESIA CARE TRANSFER NOTE
Patient: Johan Spence    Procedure(s):  Endoscopic Saphenous Vein [place], Coronary Artery Bypass Graft  INSERT EXTRACORPORAL MEMBRANE OXYGENATOR, Temporary Chest Closure    Diagnosis: Aortic Stenosis   Diagnosis Additional Information: No value filed.    Anesthesia Type:   No value filed.     Note:  Airway :ETT and Ventilator  Patient transferred to:ICU  Comments: Transferred with full monitors, O2 10L per ambu with Flolan in line, ECMO per perfusion therapy, to 4E, VSS, placed on ventilator, all IV infusions reviewed, including epinephrine, norepinephrine, vasopressin, propofol and insulin. Report to RN.ICU Handoff: Call for PAUSE to initiate/utilize ICU HANDOFF, Identified Patient, Identified Responsible Provider, Reviewed the Pertinent Medical History, Discussed Surgical Course, Reviewed Intra-OP Anesthesia Management and Issues during Anesthesia, Set Expectations for Post Procedure Period and Allowed Opportunity for Questions and Acknowledgement of Understanding      Vitals: (Last set prior to Anesthesia Care Transfer)    CRNA VITALS  11/21/2018 1418 - 11/21/2018 1518      11/21/2018             Resp Rate (set): 10                Electronically Signed By: JESUS Gimenez CRNA  November 21, 2018  3:20 PM

## 2018-11-21 NOTE — BRIEF OP NOTE
"   [facility], [place]    Brief Operative Note    Pre-operative diagnosis: Aortic Stenosis   Post-operative diagnosis * No post-op diagnosis entered *  Procedure: Procedure(s):  Endoscopic Saphenous Vein [place], Coronary Artery Bypass Graft  INSERT EXTRACORPORAL MEMBRANE OXYGENATOR, Temporary Chest Closure  Surgeon: Surgeon(s) and Role:     * Lebron Neri MD - Primary     * Cam Yoder PA-C - Assisting     * Moises Saba MD - Assisting     * Stevan Ma MD - Assisting  Anesthesia: General   Estimated blood loss: 1000 mL  Drains: 32 Fr med x 2, 24 Fr pericardial rachel, 28 Fr b/l pleural  Specimens: * No specimens in log *  Findings: On pump Beating heart CAB x 1 RSVG to LAD on CPB. RLE EVH, ECMO decannulation. Central VA ECMO recannulation and initiation. Temporary chest closure   CPB: 92\"  Complications: None.  Implants.        "

## 2018-11-21 NOTE — ANESTHESIA POSTPROCEDURE EVALUATION
Anesthesia POST Procedure Evaluation    Patient: Johan Spence   MRN:     [id number] Gender:   male   Age:    63 year old :      1955        Preoperative Diagnosis: Aortic Stenosis    Procedure(s):  Endoscopic Saphenous Vein [place], Coronary Artery Bypass Graft  INSERT EXTRACORPORAL MEMBRANE OXYGENATOR, Temporary Chest Closure   Postop Comments: No value filed.       Anesthesia Type:  General    Reportable Event: NO     PAIN: Uncomplicated   Sign Out status: Comfortable, Well controlled pain     PONV: No PONV   Sign Out status:  No Nausea or Vomiting     Neuro/Psych: Uneventful perioperative course   Sign Out Status: Preoperative baseline; Age appropriate mentation     Airway/Resp.: Uneventful perioperative course   Sign Out Status: Airway Device present     Airway Device: ETT                 Reason: Planned (pre-op)     CV: Uneventful perioperative course   Sign Out status: Appropriate BP and perfusion indices; Appropriate HR/Rhythm     Disposition:   Sign Out in:  ICU  Disposition:  ICU  Recovery Course: Uneventful  Follow-Up: Not required           Last Anesthesia Record Vitals:  CRNA VITALS  2018 1418 - 2018 1517      2018             Resp Rate (set): 10          Last PACU/Preop Vitals:  Vitals:    18 0630 18 0645 18 0700   BP:      Resp:   18   Temp: 36.6  C (97.9  F) 36.6  C (97.9  F) 36.6  C (97.9  F)   SpO2: 98% 99% 99%         Electronically Signed By: Hodan Frazier MD, 2018, 3:17 PM

## 2018-11-21 NOTE — PROGRESS NOTES
ECLS Discontinuation Note:    ECLS was discontinued at 09:25AM 11/21/2018    Shahdi Bell, RRT  11/21/2018 10:12 AM

## 2018-11-21 NOTE — PROGRESS NOTES
CRRT RESTART NOTE    Reason for Restart:  Pt returned from OR  Error Code:  na    Patient s Vascular Access: Catheter ECMO                  Placement Confirmed:  YES  Manufacture:    Model:    Length/Hebrew Size:    Flush Volume:      DATA:  Procedure:  CVVHDF  Start Time:  1538  Machine#:  7  Filter:  M150  Blood Flow:   200 mL/min  Pre-Replacement Solution:  PrismaSol BGK 2/3.5  Post-Replacement Solution:  PrismaSol BGK 2/3.5  Dialysate Solution:  PrismaSol BGK 2/3.5  Pre-Replacement Solution Rate:  1100mL/hr  Post-Replacement Solution Rate:  200mL/hr  Dialysate Flow Rate:  1100mL/hr  Patient Removal Rate:  0 mL/hr  Anticoagulation Type and Rate:  0    ASSESSMENT:  How Patient Tolerated Restart:  well  Vital Signs:  T=35C, HR 95 100% paced, BP= 105/45 with MAP=67.  Initial Pressures:  Access:  -4  Filter:  385  Return:  333  TMP:  75  Change in Filter Pressure:  32    INTERVENTIONS:  Restart after OR. Goals of therapy 0-50 ml net negative / hr as BP allows.    PLAN:  Continue POC. Call CRRT RN at [phone] with any questions or concerns.

## 2018-11-22 NOTE — PROGRESS NOTES
CVTS PROGRESS NOTE  11/22/2018     CO-MORBIDITIES:   Severe Aortic stenosis and regurge.   ONOFRE  DM  HTN  HLD  Complete Heart Block     ASSESSMENT: Johan Spence is a 63 year old male now s/p [facility]. Was unable to be weaned from CPB so placed on VA ECMO and sent to [facility] for further cares. SANDRA shows adequate biventricular function upon ECMO turndown however did require increased pressor requirements. Brought to OR that day and failed turndown. Chest washout at bedside on 11/7 shows clot tamponade. Cleaned out and pressor requirements improved. Brought to Cath lab and coronaries clean. 11/18 had increased bleeding from chest tubes. Went to OR where cannulation was switched to RA PA RVAD without oxygenator. PA pressures continued to rise into the PA systolics into the 75 range. Brought back down to cannulate central VA ECMO again. Since, the patient has persistently required vasopressors but successfully titrating off.  OR 11/21, failed ECMO turn down and CAB done to LAD.     TODAY'S PROGRESS:   - resume heparin with ACT goal of 140-160  - goal net even to negative on CRRT  - ween pressors as able  - sedation holiday  - TF at goal: 50mL/hr     PLAN:  Neuro/pain/sedation:  #Post op pain.   - Monitor neurological status. Notify the MD for any acute changes in exam.  - Fentanyl gtt for pain.   - Sedation holiday.  - Oxy/dil PRN. Robaxin PRN.   - Lidoderm.    Pulmonary:   #MV postop  #ONOFRE    - Supplemental oxygen to keep saturation above 92 %.  - Mechanically ventilated, will maintain PEEP of 8 during resuscitation.      Cardiovascular:   #AVR and [facility] 11/13.    #VA ECMO after inability to separate from bypass.   #HTN.   #Complete HB.    #Vasoplegia  - Monitor hemodynamic status.   - Epi/norepi/vaso gtts. Titrate down as BPs tolerate (with goal MAP >65)  - ECMO turndown in OR 11/15 and 11/21 unsuccessful.   - Chest remains open with  multiple turndown attempts  - Holding PTA Lisinopril and Metoprolol.   - 12 Lead EKG shows complete HB, cards aware  - CAB and wash out 11/21, failed turn down with increasing RV distension and PAP. Back on VA ECMO. Plan to return to OR 11/23 for possible chest closure.     GI care:   - Miralax/senna-colace, suppository PRN     Fluids/ Electrolytes/ Nutrition:   - TKO  - NPO    - TFs at 50/hr, stooling  - BMP q6h.  - No indication for parenteral nutrition.    Renal:    #Right groin HD line. Removed 11/18.  - Nephrology following.    - Will continue to monitor intake and output.  - Coronado  - Goal even to negative for today.    - CRRT through ECMO circuit.      Endocrine:    - Insulin gtt.     ID/ Antibiotics:  - Vanc/Zosyn begun 11/19 for open chest prophylaxsis given persistent leukocytosis (possibly due to SDS) but also vasoplegia in the setting of critical illness.     Heme:     - Hemoglobin stable.   - CBC/coags q6h.  - Anticoagulation: Heparin gtt for ECMO circuit with goal of 140-160.       Prophylaxis:    - Mechanical prophylaxis for DVT.   - Hep gtt for ECMO -160.   - PPI     Lines/tubes/drains:  - R internal jugular MAC/swan  - ETT  - Left IABP   - VA ECMO  - Chest tubes: 3 meds, pericardial rachel, bilateral pleural  - Coronado  - [place] feeding tube.   - AV TPW's.   - radial A-line     Disposition:  - CV ICU.        -----------------------------------  Antonino King DO, MSc  Anesthesia Resident, PGY3        ====================================     SUBJECTIVE:   CT output greatly improved overnight. Minimal mentation off of sedation today.     OBJECTIVE:   1. VITAL SIGNS:   Temp:  [96.1  F (35.6  C)-98.5  F (36.9  C)] 98.5  F (36.9  C)  Heart Rate:  [] 95  Resp:  [14-21] 18  BP: (95)/(42) 95/42  MAP:  [60 mmHg-80 mmHg] 65 mmHg  Arterial Line BP: ()/(36-55) 97/43  FiO2 (%):  [40 %] 40 %  SpO2:  [92 %-100 %] 99 %  Ventilation Mode: CMV/AC  (Continuous Mandatory Ventilation/ Assist Control)  FiO2  (%): 40 %  Rate Set (breaths/minute): 18 breaths/min  Tidal Volume Set (mL): 400 mL  PEEP (cm H2O): 8 cmH2O  Oxygen Concentration (%): 40 %  Resp: 18   2. INTAKE/ OUTPUT:   I/O last 3 completed shifts:  In: 7952.03 [I.V.:2494.03; Other:1121; NG/GT:200]  Out: 2504 [Urine:215; Other:1089; Chest Tube:1200]     3. PHYSICAL EXAMINATION:   General: lying in bed on ECMO and ventilator.   Neuro: sedated and intubated.   Resp: Breathing non-labored on vent.   CV: Paced  Abdomen: Soft, Non-distended.   Incisions: c/d/i.   Extremities: warm and well perfused   4. INVESTIGATIONS:   Arterial Blood Gases      Recent Labs  Lab 11/22/18  0805 11/22/18  0617 11/22/18  0355 11/22/18  0205   PH 7.35 7.32* 7.34* 7.34*   PCO2 36 40 43 45   PO2 93 90 129* 140*   HCO3 20* 21 23 24      Complete Blood Count      Recent Labs  Lab 11/22/18  0355 11/21/18 2155 11/21/18  1826 11/21/18  1511   WBC 22.1* 22.0* 22.7* 23.8*   HGB 10.6* 10.5* 10.7* 10.9*    168 145* 104*      Basic Metabolic Panel     Recent Labs  Lab 11/22/18  0355 11/21/18  2155 11/21/18  1511 11/21/18  1358   11/21/18  0345    141 143 140  < > 139   POTASSIUM 4.5 4.0 4.0 3.6  < > 3.6   CHLORIDE 105 106 109  --   --  105   CO2 23 24 23  --   --  27   BUN 48* 50* 56*  --   --  52*   CR 1.63* 1.63* 1.68*  --   --  1.39*   * 116* 137* 146*  < > 130*   < > = values in this interval not displayed.  Liver Function Tests     Recent Labs  Lab 11/22/18  0355 11/21/18  2155 11/21/18  1826 11/21/18  1511   11/21/18  0345 11/20/18 2159   * 401*  --  312*  --   --  419*   * 129*  --  101*  --  150* 146*   ALKPHOS 102 98  --  99  --   --  100   BILITOTAL 10.7* 9.6*  --  7.7*  --   --  10.2*   ALBUMIN 2.4* 2.4*  --  2.1*  --  2.4* 2.6*   INR 1.45* 1.32* 1.36* 1.59*  < > 1.21* 1.25*   < > = values in this interval not displayed.  Pancreatic Enzymes  No lab results found in last 7 days.  Coagulation Profile     Recent Labs  Lab 11/22/18  0355 11/21/18  8588  11/21/18  1826 11/21/18  1511 11/21/18  1232   INR 1.45* 1.32* 1.36* 1.59* 1.72*   PTT 31 30  --  37 34            5. RADIOLOGY:   Recent Results (from the past 24 hour(s))   XR Abdomen Port 1 View     Narrative     Examination: XR ABDOMEN PORT 1 VW, 11/21/2018 3:51 PM     Comparison: 11/18/2018, chest radiograph 11/21/2018     History: eval OG, [place];      Findings: Orogastric tube proximal sidehole marker projects over the  stomach, tip extends off the field-of-view to the left. This has been  pulled back slightly from the prior abdominal radiograph but remains  well-positioned. Feeding tube tip at the level of the distal duodenum.  Intra-aortic balloon pump inferior marker projects over the L2  vertebral body, not significantly changed. Support devices in the  chest partially visualized including mediastinal drains, chest tubes,  ECMO. Right-sided pleural effusion and right basilar opacities are not  substantially changed. Paucity of bowel gas.     Impression     Impression:   1. Gastric tube slightly pulled back from prior abdominal radiograph,  but remains well-positioned with proximal sidehole marker the beyond  the level of the gastroesophageal junction.  2. Feeding tube tip at the level of the distal duodenum.  3. Support devices in the chest partially visualized. Right-sided  pleural fusion is not substantially changed.     BARRINGTON WHIPPLE MD   XR Chest Port 1 View     Narrative     EXAM: XR CHEST PORT 1 VW  11/21/2018 3:51 PM      HISTORY:  post op ECMO, CAB;      COMPARISON: Chest radiograph dated earlier same day     FINDINGS: A single AP view of the chest is obtained. Endotracheal tube  tip projects over the low thoracic trachea. Right IJ [place]  catheter tip projects over the right main pulmonary artery. Bilateral  chest tubes. Repositioned compared to prior. Gastric and feeding tubes  course below the left hemidiaphragm with tips off the field-of-view.  Stable mediastinal drains and postoperative  changes of aortic repair  and aortic valve replacement. ECMO cannulae appear stable. Epicardial  pacer wires. Intra-aortic balloon pump marker projects at the level of  the rudy.     Stable cardiomegaly. Right pleural effusion, increased compared to  prior. Small amount of pneumomediastinum, decreased from prior. Patchy  right greater than left lower lobe opacities. No appreciable  pneumothorax.     Impression     IMPRESSION:   Increased right pleural effusion with patchy bibasilar atelectasis.  Repositioned chest tubes. Otherwise, support devices are not  significantly changed from prior.     I have personally reviewed the examination and initial interpretation  and I agree with the findings.     DANGELO LOPEZ MD         =========================================

## 2018-11-22 NOTE — PROGRESS NOTES
Brief update:  Worsening lactic acidosis. But no change in hemodynamics and pressor requirements. Extremities wwp, some coolness in the distal digits stable from previous exam. Doppler signal as expected on ECMO in all 4 extremities. Chest wound vac concave. Abdominal exam benign although limited by patient's mental status. No change in chest tube output.    LFTs also worsening. CXR and AXR unchanged. All this may represent a generalized poor perfusion during and following OR yesterday. Also due to liver impairment, there may be poor clearance.     Change CRRT to goal net even. Given bicarb and albumin. Will monitor response and do serial exams.    Discussed the above and the plan with Dr. Ma and Dr. Lucio at patient's bedside.    Libby Love MD  General Surgery, PGY-3  Pg [phone]

## 2018-11-22 NOTE — PHARMACY-VANCOMYCIN DOSING SERVICE
Pharmacy Vancomycin Note  Date of Service 2018  Patient's  1955   63 year old, male    Indication: open chest prophylaxis  Goal Trough Level: 15-20 mg/L  Day of Therapy: 4  Current Vancomycin regimen:  1750 mg IV q24h    Current estimated CrCl = CRRT    Creatinine for last 3 days  2018:  4:08 PM Creatinine 1.42 mg/dL; 10:04 PM Creatinine 1.49 mg/dL  2018:  3:45 AM Creatinine 1.45 mg/dL;  9:55 AM Creatinine 1.42 mg/dL;  4:07 PM Creatinine 1.42 mg/dL;  9:59 PM Creatinine 1.39 mg/dL  2018:  3:45 AM Creatinine 1.39 mg/dL;  3:11 PM Creatinine 1.68 mg/dL;  9:55 PM Creatinine 1.63 mg/dL  2018:  3:55 AM Creatinine 1.63 mg/dL; 10:12 AM Creatinine 1.71 mg/dL    Recent Vancomycin Levels (past 3 days)  2018: 11:15 AM Vancomycin Level 14.3 mg/L    Vancomycin IV Administrations (past 72 hours)                   vancomycin (VANCOCIN) 1,750 mg in sodium chloride 0.9 % 250 mL intermittent infusion (mg) 1,750 mg New Bag 18 1256     1,750 mg Bolus 18 1118     1,750 mg New Bag 18 1126                Nephrotoxins and other renal medications (Future)    Start     Dose/Rate Route Frequency Ordered Stop    18 1200  vancomycin (VANCOCIN) 1,750 mg in sodium chloride 0.9 % 250 mL intermittent infusion      1,750 mg  over 90 Minutes Intravenous EVERY 24 HOURS 18 1149      18 1145  piperacillin-tazobactam (ZOSYN) 3.375 g vial to attach to  mL bag      3.375 g  over 30 Minutes Intravenous EVERY 6 HOURS 18 1133      18 0030  phenylephrine (MATT-SYNEPHRINE) 200 mg in sodium chloride 0.9 % 250 mL infusion      0.5-6 mcg/kg/min × 90 kg (Dosing Weight)  3.4-40.5 mL/hr  Intravenous CONTINUOUS 18 0018      18 2130  norepinephrine (LEVOPHED) 16 mg in D5W 250 mL infusion      0.03-0.4 mcg/kg/min × 90 kg (Dosing Weight)  2.5-33.8 mL/hr  Intravenous CONTINUOUS 18  vasopressin (VASOSTRICT) 40 Units in D5W 40 mL  infusion      0.5-4 Units/hr  0.5-4 mL/hr  Intravenous CONTINUOUS 11/13/18 9761               Contrast Orders - past 72 hours     None          Interpretation of levels and current regimen:  24 hour Trough level is close to Therapeutic    Renal Function: CRRT    Plan:  1. Continue Current Dose  2. Pharmacy will check trough levels as appropriate in 3-5 Days.    3. Serum creatinine levels will be ordered daily for the first week of therapy and at least twice weekly for subsequent weeks.      Airam Boucher, PharmD  Pager [phone]        .

## 2018-11-22 NOTE — PROGRESS NOTES
ECMO Shift Summary:    Patient remains on VA ECMO, all equipment is functioning and alarms are appropriately set. RPM's 3700 with flow range 3.8-4.2 L/min. Sweep gas is at 2 LPM and FiO2 60%. Circuit remains free of air, clot and fibrin. Cannulas are secure with no bleeding from site. Extremities are a little cool. Suctioned ETT for scant red tinged secretions.    Significant Shift Events: Pt went to OR CABGX1. Plan was to place temporary Bivads but remained on VA ECMO     Vent settings:  Ventilation Mode: CMV/AC  (Continuous Mandatory Ventilation/ Assist Control)  FiO2 (%): 40 %  Rate Set (breaths/minute): 18 breaths/min  Tidal Volume Set (mL): 400 mL  PEEP (cm H2O): 8 cmH2O  Oxygen Concentration (%): 40 %  Resp: 18.    Heparin is not running , ACT range 140-160    Urine output is none, blood loss was minimal. Product given included 1 platelet.      Intake/Output Summary (Last 24 hours) at 11/21/18 1830  Last data filed at 11/21/18 1800   Gross per 24 hour   Intake          8385.16 ml   Output             3216 ml   Net          5169.16 ml       ECHO:  No results found for this or any previous visit.No results found for this or any previous visit.    CXR:  Recent Results (from the past 24 hour(s))   XR Chest Port 1 View    Narrative    EXAM: XR CHEST PORT 1 VW  11/21/2018 1:20 AM      HISTORY: postop open chest;     COMPARISON: 11/20/2018    FINDINGS: Single AP view of the chest. Adequately positioned  endotracheal tube, stable position of venous arterial ECMO cannulae,  valvular prostheses, right IJ approach pulmonary artery catheter tip  over right pulmonary artery, partially visualized enteric and feeding  tubes, bibasilar chest tubes. No pneumothorax. No significant change  in bibasilar airspace opacities, likely atelectatic in the setting of  low lung volumes. Intra-aortic balloon pump tip projects just superior  to the rudy. Stable cardiac silhouette enlargement.      Impression    IMPRESSION: No significant  change since prior, adequately positioned  support devices. Stable cardiac silhouette enlargement and bibasilar  airspace opacities.    I have personally reviewed the examination and initial interpretation  and I agree with the findings.    JOVANNY ROBERT MD   XR Abdomen Port 1 View    Narrative    Examination: XR ABDOMEN PORT 1 VW, 11/21/2018 3:51 PM    Comparison: 11/18/2018, chest radiograph 11/21/2018    History: eval [place]G, [place];     Findings: Orogastric tube proximal sidehole marker projects over the  stomach, tip extends off the field-of-view to the left. This has been  pulled back slightly from the prior abdominal radiograph but remains  well-positioned. Feeding tube tip at the level of the distal duodenum.  Intra-aortic balloon pump inferior marker projects over the L2  vertebral body, not significantly changed. Support devices in the  chest partially visualized including mediastinal drains, chest tubes,  ECMO. Right-sided pleural effusion and right basilar opacities are not  substantially changed. Paucity of bowel gas.      Impression    Impression:   1. Gastric tube slightly pulled back from prior abdominal radiograph,  but remains well-positioned with proximal sidehole marker the beyond  the level of the gastroesophageal junction.  2. Feeding tube tip at the level of the distal duodenum.  3. Support devices in the chest partially visualized. Right-sided  pleural fusion is not substantially changed.    BARRINGTON WHIPPLE MD   XR Chest Port 1 View    Narrative    EXAM: XR CHEST PORT 1 VW  11/21/2018 3:51 PM     HISTORY:  post op ECMO, CAB;     COMPARISON: Chest radiograph dated earlier same day    FINDINGS: A single AP view of the chest is obtained. Endotracheal tube  tip projects over the low thoracic trachea. Right IJ [place]  catheter tip projects over the right main pulmonary artery. Bilateral  chest tubes. Repositioned compared to prior. Gastric and feeding tubes  course below the left hemidiaphragm with  tips off the field-of-view.  Stable mediastinal drains and postoperative changes of aortic repair  and aortic valve replacement. ECMO cannulae appear stable. Epicardial  pacer wires. Intra-aortic balloon pump marker projects at the level of  the rudy.    Stable cardiomegaly. Right pleural effusion, increased compared to  prior. Small amount of pneumomediastinum, decreased from prior. Patchy  right greater than left lower lobe opacities. No appreciable  pneumothorax.      Impression    IMPRESSION:   Increased right pleural effusion with patchy bibasilar atelectasis.  Repositioned chest tubes. Otherwise, support devices are not  significantly changed from prior.    I have personally reviewed the examination and initial interpretation  and I agree with the findings.    DANGELO LOPEZ MD       Labs:    Recent Labs  Lab 11/21/18  1725 11/21/18  1511 11/21/18  1358 11/21/18  1317   PH 7.39 7.37 7.33* 7.33*   PCO2 41 42 44 44   PO2 213* 272* 324* 324*   HCO3 25 24 23 23   O2PER 40 40 100.0 100.0       Lab Results   Component Value Date    HGB 10.9 (L) 11/21/2018    PHGB 60 (H) 11/21/2018     (L) 11/21/2018    FIBR 247 11/21/2018    INR 1.59 (H) 11/21/2018    PTT 37 11/21/2018    DD 2.1 (H) 11/21/2018    AXA <0.10 11/21/2018    ANTCH 58 (L) 11/21/2018         Plan is to remain stable on VA ECMO.      Shahid Bell, RRT  11/21/2018 6:30 PM

## 2018-11-22 NOTE — PROGRESS NOTES
Nephrology Progress Note  11/22/2018       Mr Spence is a 63 yom w/hx of HTN, HLP, ONOFRE, DM2, ETOH and AVR 2015 who had repeat AVR on 11/13 with difficulty weaning from CPB, transitioned to ECMO and transferred to [facility].  Nephrology consulted for management of hemodynamic AYE, started CRRT on 11/16.      Interval History :   OR yesterday,failed ECMO turn down. Post op bleeding requiring multiple transfusions. + 5 L overall yesterday.   Continues on CRRT.  ml -ve since midnight.  On 4 pressors      Assessment & Recommendations:   AYE-Baseline Cr reported at 1.1, now stable at 3.3 over past 24h.  AYE is due to hemodynamic injury with cardiogenic shock.  Had planned on holding off on intervention today but K was elevated with late morning BNP check, CK add-on revealed some rhabdo with CK at 11k, given this we are starting CRRT to try to clear myoglobin and reduce renal exposure and further injury.  Will match I=O but unlikely UF will be major contributor as he has other sources of output.  Consent in chart for RRT.                          -Running via ECMO circuit.                         -Continues on CRRT. Minimal UF d/t pressor requirement     Volume status-Overloaded with bilateral elevated filling pressures but on 3-4 pressors with tenuous hemodynamics, trying to pull 0-50cc/hr as able      Electrolytes/pH-K 4.5, continuing on 2k bath.  Would prefer to try to run low given frequent OR trips, can replace per protocol if low overnight.        Ca/phos/pth-Phos up a bit, will clear some with CRRT, Ca and Mg WNL.       Rhabdomyolysis-Unclear source, CK up at ~11k initially along with K and phos, CK1.6K this am with CRRT.          Nutrition-Impact TF      Elaine Sheikh MD  Nephrology Staff  [id number]          Review of Systems:   I reviewed the following systems:  ROS not done due to vent/sedation.     Physical Exam:   I/O last 3 completed shifts:  In: 7952.03 [I.V.:2494.03; Other:1121; NG/GT:200]  Out: 2504  "[Urine:215; Other:1089; Chest Tube:1200]   BP 95/42  Temp 98.5  F (36.9  C) (Oral)  Resp 18  Ht 1.7 m (5' 6.93\")  Wt 97.4 kg (214 lb 11.7 oz)  SpO2 96%  BMI 33.7 kg/m2     GENERAL APPEARANCE: Intubated, sedated, ECMO , critically ill.   EYES:  No scleral icterus, pupils equal  HENT: mouth without ulcers or lesions  PULM: lungs rhonchi to auscultation, equal air movement, no cyanosis or clubbing  CV: regular rhythm, normal rate, no rub     -edema +2 LE  GI: soft,bowel sounds are +  MS: no evidence of inflammation in joints, no muscle tenderness  NEURO: Intubated and sedated.      Labs:   All labs reviewed by me  Electrolytes/Renal -   Recent Labs   Lab Test  11/22/18 0355 11/21/18 2155 11/21/18   1511   NA  140  141  143   POTASSIUM  4.5  4.0  4.0   CHLORIDE  105  106  109   CO2  23  24  23   BUN  48*  50*  56*   CR  1.63*  1.63*  1.68*   GLC  110*  116*  137*   TERESA  8.4*  8.1*  7.8*   MAG  2.5*  2.5*  2.6*   PHOS  6.1*  5.6*  5.2*       CBC -   Recent Labs   Lab Test  11/22/18 0355 11/21/18 2155 11/21/18   1826   WBC  22.1*  22.0*  22.7*   HGB  10.6*  10.5*  10.7*   PLT  150  168  145*       LFTs -   Recent Labs   Lab Test  11/22/18 0355 11/21/18 2155 11/21/18   1511   ALKPHOS  102  98  99   BILITOTAL  10.7*  9.6*  7.7*   ALT  158*  129*  101*   AST  441*  401*  312*   PROTTOTAL  4.8*  4.8*  4.3*   ALBUMIN  2.4*  2.4*  2.1*       Iron Panel - No lab results found.        Current Medications:    B and C vitamin Complex with folic acid  5 mL Per Feeding Tube Daily     escitalopram  10 mg Oral Daily     hydrocortisone sodium succinate PF  50 mg Intravenous Q6H     pantoprazole (PROTONIX) IV  40 mg Intravenous Q24H     piperacillin-tazobactam  3.375 g Intravenous Q6H     polyethylene glycol  17 g Oral Daily     protein modular  1 packet Per Feeding Tube BID     senna-docusate  1-2 tablet Oral or Feeding Tube BID     vancomycin (VANCOCIN) IV  1,750 mg Intravenous Q24H       IV fluid REPLACEMENT " ONLY       IV fluid REPLACEMENT ONLY       CRRT replacement solution 12.5 mL/kg/hr (11/22/18 0535)     EPINEPHrine IV infusion ADULT 0.03 mcg/kg/min (11/22/18 0800)     epoprostenol (VELETRI) 20 mcg/mL in sterile water inhalation solution 20 ng/kg/min (11/22/18 0631)     fentaNYL 50 mcg/hr (11/22/18 0800)     insulin (regular) Stopped (11/22/18 0000)     - MEDICATION INSTRUCTIONS -       norepinephrine 0.08 mcg/kg/min (11/22/18 0800)     phenylephrine IV infusion ADULT 2.5 mcg/kg/min (11/22/18 0800)     CRRT replacement solution 200 mL/hr at 11/21/18 1514     CRRT replacement solution 12.5 mL/kg/hr (11/22/18 0535)     propofol (DIPRIVAN) infusion Stopped (11/22/18 0200)     vasopressin (PITRESSIN) infusion ADULT (40 mL) 2 Units/hr (11/22/18 0800)

## 2018-11-22 NOTE — PROGRESS NOTES
Cardiology - Heart failure service    Interval events: Pt returned from OR at 1500 (1.5crystalloid. Other products: 5u pRBCs, 4 FFP, 2 plt) following CABG and attempt at ECMO decannulation but pt did not tolerate so on VA ECMO. Initially required more pressors when he came back. high output from Chest tubes has resolved, currently around 30-60cc/hr total from tubes. Sedation quite low, propofol off since midnight, only on 50 fentanyl, no neurological activity seen yet. Lactate rising, unclear origin. Consider gut ischemia.     Temp:  [96.1  F (35.6  C)-97.9  F (36.6  C)] 97.7  F (36.5  C)  Heart Rate:  [] 95  Resp:  [14-18] 18  BP: (95)/(42) 95/42  MAP:  [60 mmHg-80 mmHg] 60 mmHg  Arterial Line BP: ()/(36-55) 94/36  FiO2 (%):  [40 %] 40 %  SpO2:  [92 %-100 %] 95 %  Tele: None.   Hemodynamics: CVP 12-13, CI 3.1, SVR 1000.  IABP via LFA, 1:1, triggered by EKG, augmented 100%, diastolic 85, pump mean 63, L radial and distal pulse in tact   ECMO settings:  Patient remains on VA ECMO. RPM's 3700 with flow range 3.8-4 L/min. Sweep gas is at 2 LPM, SVO2 68%.   Vent Rate 18, , PEEP 8, 46% oxygen, CMV/AC.   Epicardial pacemaker: DDD, 80, capturing.     I/O last 3 completed shifts:  In: 8454.27 [I.V.:2596.27; Other:1121; NG/GT:200]  Out: 2921 [Urine:389; Other:1352; Chest Tube:1180], 700 CRT, 200 CT.     Hemodynamic drips: Epi 0.03, levophed 0.1, vaso 2, phenylephrine 2.5  Selected medications: heparin 700 gtt, pantoprazole 40 mg daily, hydrocortisone 50 mg q6, inhaled flolan 20.   Sedation: fentanyl 100, insulin 3, propofol 30  Sedation: 50 fentanyl, off insulin, off propofol.    Physical examination:  Vitals:    11/17/18 0300 11/18/18 0500 11/19/18 0500 11/20/18 0545   Weight: 96 kg (211 lb 10.3 oz) 96.5 kg (212 lb 11.9 oz) 99 kg (218 lb 4.1 oz) 98.1 kg (216 lb 4.3 oz)    11/21/18 0300   Weight: 97.4 kg (214 lb 11.7 oz)     Central cannulation. Chest tubes in place.  LUNGS:  Clear to auscultation  bilaterally   ABD:  Active bowel sounds, soft, non-tender/non-distended.  No rebound/guarding/rigidity.  EXT:  No edema or cyanosis.  Hands/feet warm to touch with good signs of peripheral perfusion.      Labs were reviewed.  BMP  Recent Labs  Lab 11/22/18  0355 11/21/18 2155 11/21/18  1511 11/21/18  1358  11/21/18  0345    141 143 140  < > 139   POTASSIUM 4.5 4.0 4.0 3.6  < > 3.6   CHLORIDE 105 106 109  --   --  105   TERESA 8.4* 8.1* 7.8*  --   --  8.7   CO2 23 24 23  --   --  27   BUN 48* 50* 56*  --   --  52*   CR 1.63* 1.63* 1.68*  --   --  1.39*   * 116* 137* 146*  < > 130*   < > = values in this interval not displayed.  LFTs  Recent Labs  Lab 11/22/18 0355 11/21/18 2155 11/21/18  1511 11/21/18  0345 11/20/18  2159   ALKPHOS 102 98 99  --  100   * 401* 312*  --  419*   * 129* 101* 150* 146*   BILITOTAL 10.7* 9.6* 7.7*  --  10.2*   PROTTOTAL 4.8* 4.8* 4.3*  --  5.1*   ALBUMIN 2.4* 2.4* 2.1* 2.4* 2.6*      CBC  Recent Labs  Lab 11/22/18 0355 11/21/18 2155 11/21/18  1826 11/21/18  1511   WBC 22.1* 22.0* 22.7* 23.8*   RBC 3.48* 3.44* 3.47* 3.61*   HGB 10.6* 10.5* 10.7* 10.9*   HCT 32.4* 31.9* 31.9* 33.5*   MCV 93 93 92 93   MCH 30.5 30.5 30.8 30.2   MCHC 32.7 32.9 33.5 32.5   RDW 17.0* 16.3* 15.8* 15.9*    168 145* 104*     INR  Recent Labs  Lab 11/22/18  0355 11/21/18  2155 11/21/18  1826 11/21/18  1511   INR 1.45* 1.32* 1.36* 1.59*       Radiology ordered but not done, increased right pleural effusion with patchy bibasilar atelectasis. Repositioned chest tubes      IMPRESSION & PLAN  Mr. Johan Spence is a 63 year old male with h/o AVR, DM, ONOFRE, obesity, HTN, who [facility] with inability of wean off pump. Had re-do AVR and ao repalacement at [facility] but during placing PA cath in OR he was coded and placed pump. AVR and ao graft replacement were successful but he was unable to come off pump. He was transferred here for further management.        Changes today  - Plan to return to OR 11/23 for possible chest closure.   - elevated lactate today, will trend for now. Unclear source - possibly gut ischemia. Possible workup pending.       Cardiovascular:   #AVR and root [facility] Luque at [facility] 11/[facility].    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   #HB  - Monitor hemodynamic status.   - norepi/vaso gtts.   - MAP >65.   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 Limited information available in chart.   - ECMO turndown in OR 11/15 unsuccessful. On turndown in OR 11/18, was able to be weaned off ECMO; however, was requiring max multiple pressors. Now back on VA-ECMO.   - Went to OR 11/18 again with Dr. Morse, the patient was weaned off VA ECMO.  He required excessively high doses of inotropes and vasopressors.  ECMO circuit was configured RA to PA. Chest was packed, sent upstairs on moderate doses of inotropes and vasopressors. Chest was packed open. Patient then further decompensated, and was sent back to the OR for RA-PA RVAD to VA ECMO by connecting the cannula to the circuit.  The PA cannula placed earlier was removed. OR 11/21, failed ECMO turn down and CAB done to LAD. Arrived from OR at 1500 on norepi, epi, vasopressin for pressors.  - Continue stress dose steroids.  - Holding PTA Lisinopril and Metoprolol.          Disposition:  - CV ICU.      I discussed the patient with Dr. Peraza.      Thank you for allowing me to care for this patient. This has been discussed with the attending physician.      Winter King MD  Cardiology Fellow, PGY-5      Late entry - pt seen and examined on 11/22/18  I have reviewed today's vital signs, notes, medications, labs and imaging. I have also seen and examined the patient and agree with the findings and plan as outlined above.    Sylvia Peraza MD  Section Head - Advanced Heart Failure, Transplantation and Mechanical Circulatory Support  Co-Director - Adult Congenital and Cardiovascular  Genetics Center  Associate Professor of Medicine, [facility]

## 2018-11-22 NOTE — PLAN OF CARE
Problem: Patient Care Overview  Goal: Plan of Care/Patient Progress Review  1. Will be hemodynamically stable.  2. Oxygenation will be met.  3. Family will be well informed of all procedures.   Outcome: No Change  Able to wean down Epi and some of Norepi after addition of phenylephrine gtt. Since BP responding to phenylephrine, able to pull fluid on CRRT machine to goal of around 50cc/hr. Sedation weaned down, currently only on 50mcg/hr of fentanyl. Unable to get response other than cough with suction. No response to painful stimuli. Previous high output from Chest tubes has resolved, currently around 30-60cc/hr total from tubes. No fluid or blood products given for ECMO, sweep at 3 and 60% oxygenator, Flows around 4-4.5 LPM.     3 soft loose stools overnight, can probably hold bowel regimen for now.

## 2018-11-22 NOTE — PROGRESS NOTES
CV ICU PROGRESS NOTE  11/22/2018    CO-MORBIDITIES:   Severe Aortic stenosis and regurge.   ONOFRE  DM  HTN  HLD  Complete Heart Block    ASSESSMENT: Johan Spence is a 63 year old male now s/p AVR and root [facility]. Was unable to be weaned from CPB so placed on VA ECMO and sent to [facility] for further cares. SANDRA shows adequate biventricular function upon ECMO turndown however did require increased pressor requirements. Brought to OR that day and failed turndown. Chest washout at bedside on 11/7 shows clot tamponade. Cleaned out and pressor requirements improved. Brought to Cath lab and coronaries clean. 11/18 had increased bleeding from chest tubes. Went to OR where cannulation was switched to RA PA RVAD without oxygenator. PA pressures continued to rise into the PA systolics into the 75 range. Brought back down to cannulate central VA ECMO again. Since, the patient has persistently required vasopressors but successfully titrating off.  OR 11/21, failed ECMO turn down and CAB done to LAD.    TODAY'S PROGRESS:   - resume heparin with ACT goal of 140-160  - goal net even to negative on CRRT  - ween pressors as able  - sedation holiday  - TF at goal: 50mL/hr    PLAN:  Neuro/pain/sedation:  #Post op pain.   - Monitor neurological status. Notify the MD for any acute changes in exam.  - Fentanyl gtt for pain.   - Sedation holiday.  - Oxy/dil PRN. Robaxin PRN.   - Lidoderm.     Pulmonary:   #MV postop  #ONOFRE    - Supplemental oxygen to keep saturation above 92 %.  - Mechanically ventilated, will maintain PEEP of 8 during resuscitation.     Cardiovascular:   #AVR and root [facility] 11/[facility].    #VA ECMO after inability to separate from bypass.   #HTN.   #Complete HB.    #Vasoplegia  - Monitor hemodynamic status.   - Epi/norepi/vaso gtts. Titrate down as BPs tolerate (with goal MAP >65)  - ECMO turndown in OR 11/15 and 11/21 unsuccessful.   - Chest remains open with  multiple turndown attempts  - Holding PTA Lisinopril and Metoprolol.   - 12 Lead EKG shows complete HB, cards aware  - CAB and wash out 11/21, failed turn down with increasing RV distension and PAP. Back on VA ECMO. Plan to return to OR 11/23 for possible chest closure.    GI care:   - Miralax/senna-colace, suppository PRN    Fluids/ Electrolytes/ Nutrition:   - TKO  - NPO    - TFs at 50/hr, stooling  - BMP q6h.  - No indication for parenteral nutrition.    Renal:    #Right groin HD line. Removed 11/18.  - Nephrology following.    - Will continue to monitor intake and output.  - Coronado  - Goal even to negative for today.    - CRRT through ECMO circuit.     Endocrine:    - Insulin gtt.    ID/ Antibiotics:  - Vanc/Zosyn begun 11/19 for open chest prophylaxsis given persistent leukocytosis (possibly due to SDS) but also vasoplegia in the setting of critical illness.    Heme:     - Hemoglobin stable.   - CBC/coags q6h.  - Anticoagulation: Heparin gtt for ECMO circuit with goal of 140-160.      Prophylaxis:    - Mechanical prophylaxis for DVT.   - Hep gtt for ECMO -160.   - PPI    Lines/tubes/drains:  - R internal jugular MAC/swan  - ETT  - Left IABP   - VA ECMO  - Chest tubes: 3 meds, pericardial rachel, bilateral pleural  - [place]ado  - NJ feeding tube.   - AV TPW's.   - radial A-line    Disposition:  - CV ICU.    Patient seen, findings and plan discussed with attending physician, Dr. Lima.     -----------------------------------  Antonino King DO, MSc  Anesthesia Resident, PGY3      ====================================    SUBJECTIVE:   CT output greatly improved overnight. Minimal mentation off of sedation today.    OBJECTIVE:   1. VITAL SIGNS:   Temp:  [96.1  F (35.6  C)-98.5  F (36.9  C)] 98.5  F (36.9  C)  Heart Rate:  [] 95  Resp:  [14-21] 18  BP: (95)/(42) 95/42  MAP:  [60 mmHg-80 mmHg] 65 mmHg  Arterial Line BP: ()/(36-55) 97/43  FiO2 (%):  [40 %] 40 %  SpO2:  [92 %-100 %] 99 %  Ventilation Mode:  CMV/AC  (Continuous Mandatory Ventilation/ Assist Control)  FiO2 (%): 40 %  Rate Set (breaths/minute): 18 breaths/min  Tidal Volume Set (mL): 400 mL  PEEP (cm H2O): 8 cmH2O  Oxygen Concentration (%): 40 %  Resp: 18    2. INTAKE/ OUTPUT:   I/O last 3 completed shifts:  In: 7952.03 [I.V.:2494.03; Other:1121; NG/GT:200]  Out: 2504 [Urine:215; Other:1089; Chest Tube:1200]    3. PHYSICAL EXAMINATION:   General: lying in bed on ECMO and ventilator.   Neuro: sedated and intubated.   Resp: Breathing non-labored on vent.   CV: Paced  Abdomen: Soft, Non-distended.   Incisions: c/d/i.   Extremities: warm and well perfused    4. INVESTIGATIONS:   Arterial Blood Gases     Recent Labs  Lab 11/22/18  0805 11/22/18  0617 11/22/18  0355 11/22/18  0205   PH 7.35 7.32* 7.34* 7.34*   PCO2 36 40 43 45   PO2 93 90 129* 140*   HCO3 20* 21 23 24     Complete Blood Count     Recent Labs  Lab 11/22/18  0355 11/21/18 2155 11/21/18  1826 11/21/18  1511   WBC 22.1* 22.0* 22.7* 23.8*   HGB 10.6* 10.5* 10.7* 10.9*    168 145* 104*     Basic Metabolic Panel    Recent Labs  Lab 11/22/18  0355 11/21/18  2155 11/21/18  1511 11/21/18  1358  11/21/18  0345    141 143 140  < > 139   POTASSIUM 4.5 4.0 4.0 3.6  < > 3.6   CHLORIDE 105 106 109  --   --  105   CO2 23 24 23  --   --  27   BUN 48* 50* 56*  --   --  52*   CR 1.63* 1.63* 1.68*  --   --  1.39*   * 116* 137* 146*  < > 130*   < > = values in this interval not displayed.  Liver Function Tests    Recent Labs  Lab 11/22/18  0355 11/21/18 2155 11/21/18  1826 11/21/18  1511  11/21/18  0345 11/20/18  2159   * 401*  --  312*  --   --  419*   * 129*  --  101*  --  150* 146*   ALKPHOS 102 98  --  99  --   --  100   BILITOTAL 10.7* 9.6*  --  7.7*  --   --  10.2*   ALBUMIN 2.4* 2.4*  --  2.1*  --  2.4* 2.6*   INR 1.45* 1.32* 1.36* 1.59*  < > 1.21* 1.25*   < > = values in this interval not displayed.  Pancreatic Enzymes  No lab results found in last 7 days.  Coagulation  Profile    Recent Labs  Lab 11/22/18  0355 11/21/18  2155 11/21/18  1826 11/21/18  1511 11/21/18  1232   INR 1.45* 1.32* 1.36* 1.59* 1.72*   PTT 31 30  --  37 34         5. RADIOLOGY:   Recent Results (from the past 24 hour(s))   XR Abdomen Port 1 View    Narrative    Examination: XR ABDOMEN PORT 1 VW, 11/21/2018 3:51 PM    Comparison: 11/18/2018, chest radiograph 11/21/2018    History: eval OG, NJ;     Findings: Orogastric tube proximal sidehole marker projects over the  stomach, tip extends off the field-of-view to the left. This has been  pulled back slightly from the prior abdominal radiograph but remains  well-positioned. Feeding tube tip at the level of the distal duodenum.  Intra-aortic balloon pump inferior marker projects over the L2  vertebral body, not significantly changed. Support devices in the  chest partially visualized including mediastinal drains, chest tubes,  ECMO. Right-sided pleural effusion and right basilar opacities are not  substantially changed. Paucity of bowel gas.      Impression    Impression:   1. Gastric tube slightly pulled back from prior abdominal radiograph,  but remains well-positioned with proximal sidehole marker the beyond  the level of the gastroesophageal junction.  2. Feeding tube tip at the level of the distal duodenum.  3. Support devices in the chest partially visualized. Right-sided  pleural fusion is not substantially changed.    BARRINGTON WHIPPLE MD   XR Chest Port 1 View    Narrative    EXAM: XR CHEST PORT 1 VW  11/21/2018 3:51 PM     HISTORY:  post op ECMO, CAB;     COMPARISON: Chest radiograph dated earlier same day    FINDINGS: A single AP view of the chest is obtained. Endotracheal tube  tip projects over the low thoracic trachea. Right IJ Aiken-Jenn  catheter tip projects over the right main pulmonary artery. Bilateral  chest tubes. Repositioned compared to prior. Gastric and feeding tubes  course below the left hemidiaphragm with tips off the  field-of-view.  Stable mediastinal drains and postoperative changes of aortic repair  and aortic valve replacement. ECMO cannulae appear stable. Epicardial  pacer wires. Intra-aortic balloon pump marker projects at the level of  the rudy.    Stable cardiomegaly. Right pleural effusion, increased compared to  prior. Small amount of pneumomediastinum, decreased from prior. Patchy  right greater than left lower lobe opacities. No appreciable  pneumothorax.      Impression    IMPRESSION:   Increased right pleural effusion with patchy bibasilar atelectasis.  Repositioned chest tubes. Otherwise, support devices are not  significantly changed from prior.    I have personally reviewed the examination and initial interpretation  and I agree with the findings.    DANGELO LOPEZ MD       =========================================

## 2018-11-22 NOTE — PLAN OF CARE
Problem: Patient Care Overview  Goal: Plan of Care/Patient Progress Review  1. Will be hemodynamically stable.  2. Oxygenation will be met.  3. Family will be well informed of all procedures.   Outcome: Declining  Progress Note:  Pt returned from OR at 1500 following CABG and attempt at ECMO decannulation but pt did not tolerate so on VA ECMO.  Upon arrival pt on low doses of Epi and Levo with Vaso.  Significant increase throughout shift, Epi at 0.12 mcg/kg/min with Levo 0.15 mcg/kg/min with Vaso at 2 units/hr.  CT output increased, mediastinal 160 ml/hr with Pleural 40-50's.  MD updated, one pack of platelets given.  ECMO flows 3.8-4.0 lpm at 3700 rpm, sweep 2 and fi02 60%.  Vent: CMV 18/450/8/40%, Flolan at 20 ng/kg/min .  Propofol decreased to 15 mcg/kg/min and Fentanyl at 100 mcg/hr, decrease to check neuro status as able per Dr. Morse.  CRRT resumed upon arrival.  Continue with plan of care.

## 2018-11-22 NOTE — PROGRESS NOTES
CRRT STATUS NOTE    DATA:  Time:  6:46 AM  Pressures WNL:  {YES  Filter Status:  WDL    Problems Reported/Alarms Noted:  none    Supplies Present:  YES    ASSESSMENT:  Patient Net Fluid Balance:  Net positive 5500 ml 11/21 and net negative 350 since midnight. Net positive 22,800 ml since admit  Vital Signs:  Vitals reviewed.  Pressors, epi, norepi, de and vaso continue as well as ECMO, IABP.  MAP 60's, RR 18-20 on vent, O2 sats >92% on FiO2 40%.  HR 90 and paced  Labs: Labs reviewed.  K 4.5. t bili 10.7. WBC 22.1. hbg 10.6. INR 1.45  Goals of Therapy:  0-50 ml/hr as BP's allow    INTERVENTIONS:   none    PLAN:  Fluid removal per plan of care.  Restart every 72 hours and PRN.  Please call CRRT resource RN at #[phone] with questions and/or concerns

## 2018-11-22 NOTE — PROGRESS NOTES
ECMO Shift Summary:    Patient remains on [facility] ECMO, all equipment is functioning and alarms are appropriately set. RPM's 3700 with flow range 4.4-4.5 L/min. Sweep gas is at 3 LPM and FiO2 60%. Circuit remains free of air, clot and fibrin. Cannulas are secure with no bleeding from site. Extremities are warm to the touch. Suctioned ETT for small amount of secretions.    Significant Shift Events:    Vent settings:  Ventilation Mode: CMV/AC  (Continuous Mandatory Ventilation/ Assist Control)  FiO2 (%): 40 %  Rate Set (breaths/minute): 18 breaths/min  Tidal Volume Set (mL): 400 mL  PEEP (cm H2O): 8 cmH2O  Oxygen Concentration (%): 40 %  Resp: 18.    Heparin is currently held per MD order, ACT range 131.    Urine output is minimal, patient remains on CRRT, no blood loss, no products given.      Intake/Output Summary (Last 24 hours) at 11/22/18 0630  Last data filed at 11/22/18 0600   Gross per 24 hour   Intake          7953.96 ml   Output             2504 ml   Net          5449.96 ml       ECHO:  No results found for this or any previous visit.No results found for this or any previous visit.    CXR:  Recent Results (from the past 24 hour(s))   XR Abdomen Port 1 View    Narrative    Examination: XR ABDOMEN PORT 1 VW, 11/21/2018 3:51 PM    Comparison: 11/18/2018, chest radiograph 11/21/2018    History: eval [place];     Findings: Orogastric tube proximal sidehole marker projects over the  stomach, tip extends off the field-of-view to the left. This has been  pulled back slightly from the prior abdominal radiograph but remains  well-positioned. Feeding tube tip at the level of the distal duodenum.  Intra-aortic balloon pump inferior marker projects over the L2  vertebral body, not significantly changed. Support devices in the  chest partially visualized including mediastinal drains, chest tubes,  ECMO. Right-sided pleural effusion and right basilar opacities are not  substantially changed. Paucity of bowel gas.      Impression     Impression:   1. Gastric tube slightly pulled back from prior abdominal radiograph,  but remains well-positioned with proximal sidehole marker the beyond  the level of the gastroesophageal junction.  2. Feeding tube tip at the level of the distal duodenum.  3. Support devices in the chest partially visualized. Right-sided  pleural fusion is not substantially changed.    BARRINGTON WHIPPLE MD   XR Chest Port 1 View    Narrative    EXAM: XR CHEST PORT 1 VW  11/21/2018 3:51 PM     HISTORY:  post op ECMO, CAB;     COMPARISON: Chest radiograph dated earlier same day    FINDINGS: A single AP view of the chest is obtained. Endotracheal tube  tip projects over the low thoracic trachea. Right IJ [place]-Jenn  catheter tip projects over the right main pulmonary artery. Bilateral  chest tubes. Repositioned compared to prior. Gastric and feeding tubes  course below the left hemidiaphragm with tips off the field-of-view.  Stable mediastinal drains and postoperative changes of aortic repair  and aortic valve replacement. ECMO cannulae appear stable. Epicardial  pacer wires. Intra-aortic balloon pump marker projects at the level of  the rudy.    Stable cardiomegaly. Right pleural effusion, increased compared to  prior. Small amount of pneumomediastinum, decreased from prior. Patchy  right greater than left lower lobe opacities. No appreciable  pneumothorax.      Impression    IMPRESSION:   Increased right pleural effusion with patchy bibasilar atelectasis.  Repositioned chest tubes. Otherwise, support devices are not  significantly changed from prior.    I have personally reviewed the examination and initial interpretation  and I agree with the findings.    DANGELO LOPEZ MD       Labs:    Recent Labs  Lab 11/22/18  0617 11/22/18  0355 11/22/18  0205 11/22/18  0000   PH 7.32* 7.34* 7.34* 7.33*   PCO2 40 43 45 46*   PO2 90 129* 140* 124*   HCO3 21 23 24 24   O2PER 40.0 40.0 40.0 40.0       Lab Results   Component Value Date    HGB  10.6 (L) 11/22/2018    PHGB 110 (H) 11/22/2018     11/22/2018    FIBR 298 11/22/2018    INR 1.45 (H) 11/22/2018    PTT 31 11/22/2018    DD 2.3 (H) 11/22/2018    AXA <0.10 11/22/2018    ANTCH 58 (L) 11/21/2018         Plan is to continue managing patient on ECMO.      Yo Hernández, RRT  11/22/2018 6:30 AM

## 2018-11-23 NOTE — PLAN OF CARE
Problem: Patient Care Overview  Goal: Plan of Care/Patient Progress Review  1. Will be hemodynamically stable.  2. Oxygenation will be met.  3. Family will be well informed of all procedures.   Outcome: Declining  Phenylephrine and Norepi increased overnight. CRRT has been I=O.Lactic acid has increased overnight and LFT have increased significantly overnight and requiring 30cc/hr of D10 to maintain glucose greater than 70. Ballon pump was changed to 1:2 and patient initally had a slightly better BP with change. Unable to get meaningful response and a noted Eye roving now. 1 unit of platelets given for less than 100, no additional fluid. Heparin gtt is currently off to maintain coag goals. INR continues to trend up with decling LFTs. CT output has remained low. MD paged overnight with critical results and at bedside for assessment. Both feet were noted colder around 0400. Very weak doppler pulses.    Patient is scheduled for OR around noon today for washout and closure. MD team will decide this morning if they will proceed with OR given patients declining status

## 2018-11-23 NOTE — PROGRESS NOTES
"CRRT STATUS NOTE    DATA:  Time:  5:59 PM  Pressures WNL:  NO  Filter Status:  WDL and Clogging TMP increasing    Problems Reported/Alarms Noted:  None     Supplies Present:  YES    ASSESSMENT:  Patient Net Fluid Balance:  -272 since 0700  Vital Signs:  BP (!) 83/33  Temp 98.2  F (36.8  C) (Axillary)  Resp 27  Ht 1.7 m (5' 6.93\")  Wt 98.2 kg (216 lb 7.9 oz)  SpO2 100%  BMI 33.98 kg/m2    Labs:  K+5.0 Ionized Ca+ 4.2 Hgb 8.9 Plt 98  Goals of Therapy: Neg 0-50/hr as BP allows      INTERVENTIONS:   None     PLAN:  Most likely need circuit change tonight     "

## 2018-11-23 NOTE — PROGRESS NOTES
CVTS PROGRESS NOTE  11/23/2018     CO-MORBIDITIES:   Severe Aortic stenosis and regurge.   ONOFRE  DM  HTN  HLD  Complete Heart Block     ASSESSMENT: Johan Spence is a 63 year old male now s/p [facility]. Was unable to be weaned from CPB so placed on VA ECMO and sent to [facility] for further cares. SANDRA shows adequate biventricular function upon ECMO turndown however did require increased pressor requirements. Brought to OR that day and failed turndown. Chest washout at bedside on 11/7 shows clot tamponade. Cleaned out and pressor requirements improved. Brought to Cath lab and coronaries clean. 11/18 had increased bleeding from chest tubes. Went to OR where cannulation was switched to RA PA RVAD without oxygenator. PA pressures continued to rise into the PA systolics into the 75 range. Brought back down to cannulate central VA ECMO again. Since, the patient has persistently required vasopressors but successfully titrating off.  OR 11/21, failed ECMO turn down and CAB done to [facility].     TODAY'S PROGRESS:   - currently holding heparin with ACT goal of 140-160 as pt therapeutic without Rx  - decrease RR given pCO2 of 24, decrease sweep  - goal net even on CRRT  - ween pressors as able and add Angiotensin II  - sedation holiday  - TF being held  - care conference today in light of the patient's multi-system organ failure      PLAN:  Neuro/pain/sedation:  #Post op pain.   - Monitor neurological status. Notify the MD for any acute changes in exam.  - Fentanyl gtt for pain.   - Sedation holiday.  - Oxy/dil PRN. Robaxin PRN.   - Lidoderm.   #Decreased mentation off sedation  - Sedation off for 36 hours with BIS in the 80s, minimal responsiveness on exam (does not track, no w/d to pain, corneal reflex in tact).   Pulmonary:   #MV postop  #ONOFRE    - Supplemental oxygen to keep saturation above 92 %.  - Mechanically ventilated, will maintain PEEP of 8 during resuscitation.       Cardiovascular:   #AV[facility].    #VA ECMO after inability to separate from bypass.   #HTN.   #Complete HB.    #Vasoplegia  - Monitor hemodynamic status.   - Epi/norepi/vaso gtts. Titrate down as BPs tolerate (with goal MAP >65)  - Added Angiotensin II on 11/23  - ECMO turndown in OR 11/15 and 11/21 unsuccessful.   - Chest remains open with multiple turndown attempts  - Holding PTA Lisinopril and Metoprolol.   - 12 Lead EKG shows complete HB, cards aware  - CAB and wash out 11/21, failed turn down with increasing RV distension and PAP. Back on VA ECMO. Plan to return to OR 11/23 was not undertaken given worsening lactate and multi-system organ failure.     GI care:   #Shock liver  - Miralax/senna-colace, suppository PRN     Fluids/ Electrolytes/ Nutrition:   - TKO  - NPO    - TFs being held given potential for bowel ischemia causing increased Lactate  - BMP q6h.  - No indication for parenteral nutrition.    Renal:    #Right groin HD line. Removed 11/18.  - Nephrology following.    - Will continue to monitor intake and output.  - Goal even to negative for today.    - CRRT through ECMO circuit.      Endocrine:    - Insulin gtt.     ID/ Antibiotics:  - Vanc/Zosyn begun 11/19 for open chest prophylaxsis given persistent leukocytosis (possibly due to SDS) but also vasoplegia in the setting of critical illness.  - Added micafungin 11/23     Heme:     - Hemoglobin stable.   - CBC/coags q6h. On ECMO transfusion goals.  - Anticoagulation: Heparin gtt for ECMO circuit with goal of 140-160.       Prophylaxis:    - Mechanical prophylaxis for DVT.   - Hep gtt currently being held for ECMO -160 as patient therapeutic currently without gtt.  - PPI     Lines/tubes/drains:  - R internal jugular MAC/swan  - ETT  - Left IABP, considering removal today as it may be contributing to poor perfusion distal to balloon.  - VA ECMO  - Chest tubes: 3 meds, pericardial rachel, bilateral  pleural  - [place]  - [place] feeding tube.   - AV TPW's.   - radial A-line     Disposition:  - [facility].     Social:   - Palliative aware     -----------------------------------  Antonino King DO, MSc  Anesthesia Resident, PGY3        ====================================     SUBJECTIVE:   Minimal mentation off of sedation today. Worsening lactate, LFTs, CK.     OBJECTIVE:   1. VITAL SIGNS:   Temp:  [97.5  F (36.4  C)-98.8  F (37.1  C)] 98  F (36.7  C)  Heart Rate:  [95] 95  Resp:  [18-21] 20  MAP:  [61 mmHg-74 mmHg] 68 mmHg  Arterial Line BP: ()/(21-64) 98/52  FiO2 (%):  [40 %] 40 %  SpO2:  [22 %-100 %] 98 %  Ventilation Mode: CMV/AC  (Continuous Mandatory Ventilation/ Assist Control)  FiO2 (%): 40 %  Rate Set (breaths/minute): 18 breaths/min  Tidal Volume Set (mL): 450 mL  PEEP (cm H2O): 8 cmH2O  Oxygen Concentration (%): 40 %  Resp: 20   2. INTAKE/ OUTPUT:   I/O last 3 completed shifts:  In: 3444.69 [I.V.:2084.69; NG/GT:475]  Out: 2766 [Urine:3; Emesis/NG output:200; Other:2053; Chest Tube:510]     3. PHYSICAL EXAMINATION:   General: lying in bed on ECMO and ventilator.   Neuro: intubated and minimally responsive, does not track with eyes, does not blink to threat   Resp: Breathing non-labored on vent.   CV: Paced  Abdomen: Soft, Non-distended.   Incisions: c/d/i.   Extremities: cool distally in LE below knees, dopplers not appreciated in DP and posterior tib b/l    4. INVESTIGATIONS:   Arterial Blood Gases      Recent Labs  Lab 11/23/18  0737 11/23/18  0557 11/23/18  0347 11/23/18  0154   PH 7.33* 7.34* 7.37 7.36   PCO2 24* 24* 23* 24*   PO2 142* 150* 155* 153*   HCO3 13* 13* 13* 14*      Complete Blood Count      Recent Labs  Lab 11/23/18 0347 11/22/18 2157 11/22/18  1549 11/22/18  1012   WBC 24.3* 23.8* 23.1* 22.9*   HGB 9.3* 9.4* 9.5* 10.3*   PLT 87* 101* 105* 117*      Basic Metabolic Panel     Recent Labs  Lab 11/23/18 0347 11/22/18 2157 11/22/18  1549 11/22/18  1012    139 139 139   POTASSIUM 4.7  4.5 4.3 4.5   CHLORIDE 103 103 104 104   CO2 13* 15* 18* 19*   BUN 44* 48* 48* 47*   CR 1.70* 1.72* 1.69* 1.71*   GLC 69* 79 103* 116*      Liver Function Tests     Recent Labs  Lab 11/23/18  0347 11/22/18  2157 11/22/18  1549 11/22/18  1012   AST 3430* 1940* 950* 567*   * 828* 450* 239*   ALKPHOS 128 106 108 104   BILITOTAL 14.4* 13.1* 11.8* 11.1*   ALBUMIN 2.5* 2.6* 2.5* 2.2*   INR 2.49* 2.37* 2.09* 1.74*      Pancreatic Enzymes  No lab results found in last 7 days.  Coagulation Profile     Recent Labs  Lab 11/23/18  0347 11/22/18 2157 11/22/18  1549 11/22/18  1012   INR 2.49* 2.37* 2.09* 1.74*   PTT 55* 57* 51* 30            5. RADIOLOGY:       Recent Results (from the past 24 hour(s))   XR Chest Port 1 View     Narrative     EXAM: XR CHEST PORT 1 VW  11/22/2018 8:35 AM      HISTORY:  on VA ECMO, intubated;      COMPARISON: Chest radiograph dated 11/21/2018     FINDINGS: A single AP view of the chest was obtained. Endotracheal  tube tip projects over the low thoracic trachea. Right IJ [place]-Jenn  catheter tip projects over the right pulmonary artery. Bilateral chest  tubes stable in position. Gastric and feeding tubes course below the  field-of-view. Stable mediastinal drains and postoperative changes of  aortic repair and aortic valve replacement. ECMO cannulae appear  stable. Epicardial pacer wires partially visualized. Intra-aortic  balloon pump marker projects just above the level of the rudy.     Stable cardiomegaly. Right pleural effusion, relatively stable. Small  amount of pneumomediastinum, continued to decrease from prior. Patchy  right greater than left lower lobe opacities, stable to slightly  improved. Low lung volumes with no appreciable pneumothorax.     Impression     IMPRESSION:   No significant change from prior. Stable support devices, small right  pleural effusion and mild bibasilar opacities.     YUNG CORONADO MD   XR Abdomen Port 1 View     Narrative     Exam: XR ABDOMEN PORT 1  VW, 11/22/2018 1:47 PM     Indication: ECMO, new lactic acidosis;      Comparison: Abdominal radiographs dated 11/21/2018. Chest radiographs  dated 11/22/2018.     Findings:   Single AP view of the lower abdomen which is limited by multiple  overlying tubes. Enteric tube visible in the upper abdomen. The tip  extending beyond the field-of-view, likely in the distal duodenum.  Left femoral approach venous line with the tip visible at the level of  the sacrum. Surgical clips in the pelvis. Rectal temperature probe.  Right femoral approach ECMO cannulae with the distal end extending  beyond the field-of-view. Minimal gas in the abdomen. The lower lungs  are collimated out of the field-of-view. No acute osseous findings.     The bowel gas pattern, although mostly obscured, does not show any  changes would indicate ischemic bowel.     Impression     Impression:   1. Partially visualized ECMO cannulae with the distal end extending  beyond the field-of-view.  2. Additional lines and support devices as detailed above.  3. No acute intra-abdominal findings, no bowel changes to indicate  ischemia or obstruction.     I have personally reviewed the examination and initial interpretation  and I agree with the findings.     JOVANNY ROBERT MD         =========================================

## 2018-11-23 NOTE — PROGRESS NOTES
Care Coordinator Progress Note    Admission Date/Time:  11/13/2018  Attending MD:  Moises Saba MD    Data  Chart reviewed, discussed with interdisciplinary team.   Pt is [facility]. Unable to wean off pump at the end of the case in addition to developing AYE, cannulated centrally for VA ECMO.  Pt transferred to [facility] with VA ECMO for higher level of care.       Assessment  Pt in ICU vented, sedated, with ECMO and CRRT.  Pt failed ECMO turn down on 11/21.  Pt is with worsening liver function.  The team have called pt spouse and updated her current pt status.  Pt spouse is planning to come to the hospital today.  Pt spouse lives in [place].  The team request care conf arranged for today when pt spouse arrives to update family and discuss the goal of care.  RNCC called pt spouse to check her estimated arrival time.  Pt spouse stated they are leaving around 12:30 (pt dtr, sister brother and herself).  Pt spouse stated the drive is about 5hrs.  Arrival time is not known at this time ( it will be between 6:00- 7:00 pm ).  RNCC informed pt spouse bedside RN will page the Dr's when they arrive and will give them update.  Pt spouse agreed.  The above info have been shared with the team.     Plan  Anticipated Discharge Date:  TBD.  Anticipated Discharge Plan:   TBD.  RNCC will cont to follow plan of care.      Rito Dye RN, PHN, BSN  4A and 4E/ ICU  Care Coordinator  Phone: [phone]  Pager: [phone]

## 2018-11-23 NOTE — PROGRESS NOTES
ECMO Shift Summary:    Patient remains on VA ECMO, all equipment is functioning and alarms are appropriately set. RPM's 3700 with flow range 4.29-4.4 L/min. Sweep gas is at 5.5 LPM and FiO2 60%. There are small bits of clot/fibrin at some corners of the oxygenator (more appearing on arterial side versus venous side). Cannulas are secure with no bleeding from site. Extremities are warm to touch. Suctioned ETT for scant kirill secretions.    Significant Shift Events: Lactic acid continues to trend upward. Metabolic acidosis continues to become more significant. Patient had a few intermittent episodes of brief hypotension but was able to recover. Heparin turned off due to elevated ACTs. One unit of platelets was given.    Vent settings:  Ventilation Mode: CMV/AC  (Continuous Mandatory Ventilation/ Assist Control)  FiO2 (%): 40 %  Rate Set (breaths/minute): 18 breaths/min  Tidal Volume Set (mL): 450 mL  PEEP (cm H2O): 8 cmH2O  Oxygen Concentration (%): 40 %  Resp: 20.    Heparin is off, ACT range 144-176.    Patient is on CRRT, blood loss was minimal. Product given included one unit of platelets.      Intake/Output Summary (Last 24 hours) at 11/23/18 0653  Last data filed at 11/23/18 0630   Gross per 24 hour   Intake          3444.69 ml   Output             2766 ml   Net           678.69 ml       ECHO:  No results found for this or any previous visit.No results found for this or any previous visit.    CXR:  Recent Results (from the past 24 hour(s))   XR Chest Port 1 View    Narrative    EXAM: XR CHEST PORT 1 VW  11/22/2018 8:35 AM     HISTORY:  on [facility] ECMO, intubated;     COMPARISON: Chest radiograph dated 11/21/2018    FINDINGS: A single AP view of the chest was obtained. Endotracheal  tube tip projects over the low thoracic trachea. Right IJ [place]-Jenn  catheter tip projects over the right pulmonary artery. Bilateral chest  tubes stable in position. Gastric and feeding tubes course below the  field-of-view. Stable  mediastinal drains and postoperative changes of  aortic repair and aortic valve replacement. ECMO cannulae appear  stable. Epicardial pacer wires partially visualized. Intra-aortic  balloon pump marker projects just above the level of the rudy.    Stable cardiomegaly. Right pleural effusion, relatively stable. Small  amount of pneumomediastinum, continued to decrease from prior. Patchy  right greater than left lower lobe opacities, stable to slightly  improved. Low lung volumes with no appreciable pneumothorax.      Impression    IMPRESSION:   No significant change from prior. Stable support devices, small right  pleural effusion and mild bibasilar opacities.    YUNG CORONADO MD   XR Abdomen Port 1 View    Narrative    Exam: XR ABDOMEN PORT 1 VW, 11/22/2018 1:47 PM    Indication: ECMO, new lactic acidosis;     Comparison: Abdominal radiographs dated 11/21/2018. Chest radiographs  dated 11/22/2018.    Findings:   Single AP view of the lower abdomen which is limited by multiple  overlying tubes. Enteric tube visible in the upper abdomen. The tip  extending beyond the field-of-view, likely in the distal duodenum.  Left femoral approach venous line with the tip visible at the level of  the sacrum. Surgical clips in the pelvis. Rectal temperature probe.  Right femoral approach ECMO cannulae with the distal end extending  beyond the field-of-view. Minimal gas in the abdomen. The lower lungs  are collimated out of the field-of-view. No acute osseous findings.    The bowel gas pattern, although mostly obscured, does not show any  changes would indicate ischemic bowel.      Impression    Impression:   1. Partially visualized ECMO cannulae with the distal end extending  beyond the field-of-view.  2. Additional lines and support devices as detailed above.  3. No acute intra-abdominal findings, no bowel changes to indicate  ischemia or obstruction.    I have personally reviewed the examination and initial  interpretation  and I agree with the findings.    JOVANNY ROBERT MD       Labs:    Recent Labs  Lab 11/23/18  0557 11/23/18  0347 11/23/18  0154 11/22/18  2359   PH 7.34* 7.37 7.36 7.37   PCO2 24* 23* 24* 26*   PO2 150* 155* 153* 134*   HCO3 13* 13* 14* 15*   O2PER 40 40  60  40 40 40       Lab Results   Component Value Date    HGB 9.3 (L) 11/23/2018    PHGB 70 (H) 11/23/2018    PLT 87 (L) 11/23/2018    FIBR 227 11/23/2018    INR 2.49 (H) 11/23/2018    PTT 55 (H) 11/23/2018    DD 3.8 (H) 11/23/2018    AXA <0.10 11/23/2018    ANTCH 61 (L) 11/22/2018         Plan is to re-evaluate patient potentially going down to OR today for a chest washout and possible closure. Will continue VA ECMO at this time.      Steve Garcia, RRT  11/23/2018 6:53 AM

## 2018-11-23 NOTE — PROGRESS NOTES
CRRT STATUS NOTE    DATA:  Time:  6:24 PM  Pressures WNL:  YES  Filter Status:  CRRT Filter Status:WDL    Problems Reported/Alarms Noted:     Supplies Present:  YES    ASSESSMENT:  Patient Net Fluid Balance:  Net even since midnight  Vital Signs:  T 98.4 BP95/42 MAP 58 RR18  Labs:  K 4.3 Lactic 11.7 pH 7.36  Goals of Therapy:  0 to 50cc/hr net negative as BP tolerates    INTERVENTIONS:   Discussed goals of care with bedside RN    PLAN:  Continue plan of care

## 2018-11-23 NOTE — PROGRESS NOTES
"Nephrology Progress Note  11/23/2018       Mr Spence is a 63 yom w/hx of HTN, HLP, ONOFRE, DM2, ETOH and AVR 2015 who had repeat AVR on 11/13 with difficulty weaning from CPB, transitioned to ECMO and transferred to [facility].  Nephrology consulted for management of hemodynamic AYE, started CRRT on 11/16.      Interval History :   Worsening status. On 4 pressors. LFT and Lactic acid rising.   Continues on CRRT. I/O around even yesterday. Unable to UF d/t poor hemodynamics.       Assessment & Recommendations:   AYE-Baseline Cr reported at 1.1, now stable at 3.3 over past 24h.  AYE is due to hemodynamic injury with cardiogenic shock.  Mild Rhabdo. Continues on CRRT.                         -Running via ECMO circuit.                         -Continues on CRRT. Minimal UF d/t pressor requirement     Volume status-Overloaded with bilateral elevated filling pressures but on 3-4 pressors with tenuous hemodynamics, trying to pull 0-50cc/hr as able      Electrolytes/pH-K 4.7, continuing on 2k bath.  Would prefer to try to run low given frequent OR trips, can replace per protocol if low overnight.        Ca/phos/pth-Phos up a bit, will clear some with CRRT, Ca and Mg WNL.       Rhabdomyolysis-Unclear source, CK up at ~11k initially along with K and phos, CK1.6K this am with CRRT.          Nutrition-Impact TF      Elaine Sheikh MD  Nephrology Staff  [id number]          Review of Systems:   I reviewed the following systems:  ROS not done due to vent/sedation.     Physical Exam:   I/O last 3 completed shifts:  In: 3444.69 [I.V.:2084.69; NG/GT:475]  Out: 2766 [Urine:3; Emesis/NG output:200; Other:2053; Chest Tube:510]   BP 95/42  Temp 98  F (36.7  C) (Axillary)  Resp 18  Ht 1.7 m (5' 6.93\")  Wt 98.2 kg (216 lb 7.9 oz)  SpO2 96%  BMI 33.98 kg/m2     GENERAL APPEARANCE: Intubated, sedated, ECMO , critically ill.   EYES:  No scleral icterus, pupils equal  HENT: mouth without ulcers or lesions  PULM: lungs rhonchi to auscultation, equal " air movement, no cyanosis or clubbing  CV: regular rhythm, normal rate, no rub     -edema +2 LE  GI: soft,bowel sounds are +  MS: no evidence of inflammation in joints, no muscle tenderness  NEURO: Intubated and sedated.      Labs:   All labs reviewed by me  Electrolytes/Renal -   Recent Labs   Lab Test  11/23/18 0347 11/22/18 2157 11/22/18   1549   NA  139  139  139   POTASSIUM  4.7  4.5  4.3   CHLORIDE  103  103  104   CO2  13*  15*  18*   BUN  44*  48*  48*   CR  1.70*  1.72*  1.69*   GLC  69*  79  103*   TERESA  8.6  8.6  8.2*   MAG  2.5*  2.4*  2.4*   PHOS  7.6*  6.7*  6.0*       CBC -   Recent Labs   Lab Test  11/23/18 0347 11/22/18 2157 11/22/18   1549   WBC  24.3*  23.8*  23.1*   HGB  9.3*  9.4*  9.5*   PLT  87*  101*  105*       LFTs -   Recent Labs   Lab Test  11/23/18 0347 11/22/18 2157 11/22/18   1549   ALKPHOS  128  106  108   BILITOTAL  14.4*  13.1*  11.8*   ALT  962*  828*  450*   AST  3430*  1940*  950*   PROTTOTAL  4.5*  4.8*  4.6*   ALBUMIN  2.5*  2.6*  2.5*       Iron Panel - No lab results found.        Current Medications:    B and C vitamin Complex with folic acid  5 mL Per Feeding Tube Daily     hydrocortisone sodium succinate PF  50 mg Intravenous Q6H     micafungin  100 mg Intravenous Q24H     pantoprazole (PROTONIX) IV  40 mg Intravenous Q24H     piperacillin-tazobactam  3.375 g Intravenous Q6H     polyethylene glycol  17 g Oral Daily     protein modular  1 packet Per Feeding Tube BID     senna-docusate  1-2 tablet Oral or Feeding Tube BID     vancomycin (VANCOCIN) IV  1,750 mg Intravenous Q24H       IV fluid REPLACEMENT ONLY 30 mL/hr at 11/23/18 1000     IV fluid REPLACEMENT ONLY       CRRT replacement solution 12.5 mL/kg/hr (11/23/18 0916)     EPINEPHrine IV infusion ADULT 0.03 mcg/kg/min (11/23/18 0900)     epoprostenol (VELETRI) 20 mcg/mL in sterile water inhalation solution 20 ng/kg/min (11/23/18 0846)     fentaNYL 50 mcg/hr (11/23/18 0900)     HEParin Stopped (11/23/18  0345)     insulin (regular) Stopped (11/22/18 0000)     norepinephrine 0.1 mcg/kg/min (11/23/18 0900)     phenylephrine IV infusion ADULT 3 mcg/kg/min (11/23/18 0900)     CRRT replacement solution 200 mL/hr at 11/22/18 1655     CRRT replacement solution 12.5 mL/kg/hr (11/23/18 0916)     vasopressin (PITRESSIN) infusion ADULT (40 mL) 2 Units/hr (11/23/18 0900)

## 2018-11-23 NOTE — PROGRESS NOTES
ECMO Shift Summary:    Patient remains on [facility] ECMO, all equipment is functioning and alarms are appropriately set. RPM's 3700 with flow range 4.4-4.6 L/min. Sweep gas is at 5.5 LPM and FiO2 60%. Circuit remains free of air, but a small amt of clot and fibrin are noted. Cannulas are secure with no bleeding from site. Extremities are warm to the touch. Suctioned ETT for a scant amt of thin, kirill secretions.    Significant Shift Events: Sweep was increased from 3 lpm to 5.5 lpm to compensate for a metabolic acidosis. Lactic acid increased throughout my shift from 4.1 to 12. Reconsidering whether he will be a surgical candidate for tomorrow.    Vent settings:  Ventilation Mode: CMV/AC  (Continuous Mandatory Ventilation/ Assist Control)  FiO2 (%): 40 %  Rate Set (breaths/minute): 18 breaths/min  Tidal Volume Set (mL): 450 mL  PEEP (cm H2O): 8 cmH2O  Oxygen Concentration (%): 40 %  Resp: 18.    Heparin is running at 500 u/hr, ACT range 140-160s.    Blood loss was minimal, and no product given. 500 mls of albumin given for volume status.      Intake/Output Summary (Last 24 hours) at 11/22/18 2204  Last data filed at 11/22/18 2200   Gross per 24 hour   Intake          3048.91 ml   Output             2928 ml   Net           120.91 ml       ECHO:  No results found for this or any previous visit.No results found for this or any previous visit.    CXR:  Recent Results (from the past 24 hour(s))   XR Chest Port 1 View    Narrative    EXAM: XR CHEST PORT 1 VW  11/22/2018 8:35 AM     HISTORY:  on [facility] ECMO, intubated;     COMPARISON: Chest radiograph dated 11/21/2018    FINDINGS: A single AP view of the chest was obtained. Endotracheal  tube tip projects over the low thoracic trachea. Right IJ [place]  catheter tip projects over the right pulmonary artery. Bilateral chest  tubes stable in position. Gastric and feeding tubes course below the  field-of-view. Stable mediastinal drains and postoperative changes of  aortic repair and  aortic valve replacement. ECMO cannulae appear  stable. Epicardial pacer wires partially visualized. Intra-aortic  balloon pump marker projects just above the level of the rudy.    Stable cardiomegaly. Right pleural effusion, relatively stable. Small  amount of pneumomediastinum, continued to decrease from prior. Patchy  right greater than left lower lobe opacities, stable to slightly  improved. Low lung volumes with no appreciable pneumothorax.      Impression    IMPRESSION:   No significant change from prior. Stable support devices, small right  pleural effusion and mild bibasilar opacities.    YUNG CORONADO MD   XR Abdomen Port 1 View    Narrative    Exam: XR ABDOMEN PORT 1 VW, 11/22/2018 1:47 PM    Indication: ECMO, new lactic acidosis;     Comparison: Abdominal radiographs dated 11/21/2018. Chest radiographs  dated 11/22/2018.    Findings:   Single AP view of the lower abdomen which is limited by multiple  overlying tubes. Enteric tube visible in the upper abdomen. The tip  extending beyond the field-of-view, likely in the distal duodenum.  Left femoral approach venous line with the tip visible at the level of  the sacrum. Surgical clips in the pelvis. Rectal temperature probe.  Right femoral approach ECMO cannulae with the distal end extending  beyond the field-of-view. Minimal gas in the abdomen. The lower lungs  are collimated out of the field-of-view. No acute osseous findings.    The bowel gas pattern, although mostly obscured, does not show any  changes would indicate ischemic bowel.      Impression    Impression:   1. Partially visualized ECMO cannulae with the distal end extending  beyond the field-of-view.  2. Additional lines and support devices as detailed above.  3. No acute intra-abdominal findings, no bowel changes to indicate  ischemia or obstruction.    I have personally reviewed the examination and initial interpretation  and I agree with the findings.    JOVANNY ROBERT MD        Labs:    Recent Labs  Lab 11/22/18 2001 11/22/18  1807 11/22/18  1549 11/22/18  1357   PH 7.36 7.36 7.36 7.37   PCO2 28* 29* 30* 31*   PO2 103 97 100 95   HCO3 16* 16* 17* 18*   O2PER 40.0 40.0 40.0 40.0       Lab Results   Component Value Date    HGB 9.5 (L) 11/22/2018    PHGB 110 (H) 11/22/2018     (L) 11/22/2018    FIBR 246 11/22/2018    INR 2.09 (H) 11/22/2018    PTT 51 (H) 11/22/2018    DD 3.6 (H) 11/22/2018    AXA <0.10 11/22/2018    ANTCH 61 (L) 11/22/2018         Plan is to continue on VA ECMO, and reconsider surgical status in the morning.      Jo Honeycutt, RRT  11/22/2018 10:04 PM

## 2018-11-23 NOTE — PROGRESS NOTES
CV ICU PROGRESS NOTE  11/23/2018    CO-MORBIDITIES:   Severe Aortic stenosis and regurge.   ONOFRE  DM  HTN  HLD  Complete Heart Block    ASSESSMENT: Johan Spence is a 63 year old male now s/p AV[facility] and [facility]. Was unable to be weaned from CPB so placed on VA ECMO and sent to [facility] for further cares. SANDRA shows adequate biventricular function upon ECMO turndown however did require increased pressor requirements. Brought to OR that day and failed turndown. Chest washout at bedside on 11/7 shows clot tamponade. Cleaned out and pressor requirements improved. Brought to Cath lab and coronaries clean. 11/18 had increased bleeding from chest tubes. Went to OR where cannulation was switched to RA PA RVAD without oxygenator. PA pressures continued to rise into the PA systolics into the 75 range. Brought back down to cannulate central VA ECMO again. Since, the patient has persistently required vasopressors but successfully titrating off.  OR 11/21, failed ECMO turn down and CAB done to LAD.    TODAY'S PROGRESS:   - currently holding heparin with ACT goal of 140-160 as pt therapeutic without Rx  - decrease RR given pCO2 of 24, decrease sweep  - goal net even on CRRT  - ween pressors as able and add Angiotensin II  - sedation holiday  - TF being held  - care conference today in light of the patient's multi-system organ failure     PLAN:  Neuro/pain/sedation:  #Post op pain.   - Monitor neurological status. Notify the MD for any acute changes in exam.  - Fentanyl gtt for pain.   - Sedation holiday.  - Oxy/dil PRN. Robaxin PRN.   - Lidoderm.   #Decreased mentation off sedation  - Sedation off for 36 hours with BIS in the 80s, minimal responsiveness on exam (does not track, no w/d to pain, corneal reflex in tact).    Pulmonary:   #MV postop  #ONOFRE    - Supplemental oxygen to keep saturation above 92 %.  - Mechanically ventilated, will maintain PEEP of 8 during resuscitation.      Cardiovascular:   #AVR and [facility] 11[facility]13.    #VA ECMO after inability to separate from bypass.   #HTN.   #Complete HB.    #Vasoplegia  - Monitor hemodynamic status.   - Epi/norepi/vaso gtts. Titrate down as BPs tolerate (with goal MAP >65)  - Added Angiotensin II on 11/23  - ECMO turndown in OR 11/15 and 11/21 unsuccessful.   - Chest remains open with multiple turndown attempts  - Holding PTA Lisinopril and Metoprolol.   - 12 Lead EKG shows complete HB, cards aware  - CAB and wash out 11/21, failed turn down with increasing RV distension and PAP. Back on VA ECMO. Plan to return to OR 11/23 was not undertaken given worsening lactate and multi-system organ failure.    GI care:   #Shock liver  - Miralax/senna-colace, suppository PRN    Fluids/ Electrolytes/ Nutrition:   - TKO  - NPO    - TFs being held given potential for bowel ischemia causing increased Lactate  - BMP q6h.  - No indication for parenteral nutrition.    Renal:    #Right groin HD line. Removed 11/18.  - Nephrology following.    - Will continue to monitor intake and output.  - Goal even to negative for today.    - CRRT through ECMO circuit.     Endocrine:    - Insulin gtt.    ID/ Antibiotics:  - Vanc/Zosyn begun 11/19 for open chest prophylaxsis given persistent leukocytosis (possibly due to SDS) but also vasoplegia in the setting of critical illness.  - Added micafungin 11/23    Heme:     - Hemoglobin stable.   - CBC/coags q6h. On ECMO transfusion goals.  - Anticoagulation: Heparin gtt for ECMO circuit with goal of 140-160.      Prophylaxis:    - Mechanical prophylaxis for DVT.   - Hep gtt currently being held for ECMO -160 as patient therapeutic currently without gtt.  - PPI    Lines/tubes/drains:  - R internal jugular MAC/swan  - ETT  - Left IABP, considering removal today as it may be contributing to poor perfusion distal to balloon.  - VA ECMO  - Chest tubes: 3 meds, pericardial rachel, bilateral pleural  -  [place]  - [place] feeding tube.   - AV TPW's.   - radial A-line    Disposition:  - CV ICU.    Social:   - Palliative aware    Patient seen, findings and plan discussed with attending physician, Dr. Lima.     -----------------------------------  Antonino King DO, MSc  Anesthesia Resident, PGY3      ====================================    SUBJECTIVE:   Minimal mentation off of sedation today. Worsening lactate, LFTs, CK.    OBJECTIVE:   1. VITAL SIGNS:   Temp:  [97.5  F (36.4  C)-98.8  F (37.1  C)] 98  F (36.7  C)  Heart Rate:  [95] 95  Resp:  [18-21] 20  MAP:  [61 mmHg-74 mmHg] 68 mmHg  Arterial Line BP: ()/(21-64) 98/52  FiO2 (%):  [40 %] 40 %  SpO2:  [22 %-100 %] 98 %  Ventilation Mode: CMV/AC  (Continuous Mandatory Ventilation/ Assist Control)  FiO2 (%): 40 %  Rate Set (breaths/minute): 18 breaths/min  Tidal Volume Set (mL): 450 mL  PEEP (cm H2O): 8 cmH2O  Oxygen Concentration (%): 40 %  Resp: 20    2. INTAKE/ OUTPUT:   I/O last 3 completed shifts:  In: 3444.69 [I.V.:2084.69; NG/GT:475]  Out: 2766 [Urine:3; Emesis/NG output:200; Other:2053; Chest Tube:510]    3. PHYSICAL EXAMINATION:   General: lying in bed on ECMO and ventilator.   Neuro: intubated and minimally responsive, does not track with eyes, does not blink to threat   Resp: Breathing non-labored on vent.   CV: Paced  Abdomen: Soft, Non-distended.   Incisions: c/d/i.   Extremities: cool distally in LE below knees, dopplers not appreciated in DP and posterior tib b/l     4. INVESTIGATIONS:   Arterial Blood Gases     Recent Labs  Lab 11/23/18  0737 11/23/18  0557 11/23/18  0347 11/23/18  0154   PH 7.33* 7.34* 7.37 7.36   PCO2 24* 24* 23* 24*   PO2 142* 150* 155* 153*   HCO3 13* 13* 13* 14*     Complete Blood Count     Recent Labs  Lab 11/23/18 0347 11/22/18 2157 11/22/18  1549 11/22/18  1012   WBC 24.3* 23.8* 23.1* 22.9*   HGB 9.3* 9.4* 9.5* 10.3*   PLT 87* 101* 105* 117*     Basic Metabolic Panel    Recent Labs  Lab 11/23/18 0347 11/22/18 2157  11/22/18  1549 11/22/18  1012    139 139 139   POTASSIUM 4.7 4.5 4.3 4.5   CHLORIDE 103 103 104 104   CO2 13* 15* 18* 19*   BUN 44* 48* 48* 47*   CR 1.70* 1.72* 1.69* 1.71*   GLC 69* 79 103* 116*     Liver Function Tests    Recent Labs  Lab 11/23/18  0347 11/22/18  2157 11/22/18  1549 11/22/18  1012   AST 3430* 1940* 950* 567*   * 828* 450* 239*   ALKPHOS 128 106 108 104   BILITOTAL 14.4* 13.1* 11.8* 11.1*   ALBUMIN 2.5* 2.6* 2.5* 2.2*   INR 2.49* 2.37* 2.09* 1.74*     Pancreatic Enzymes  No lab results found in last 7 days.  Coagulation Profile    Recent Labs  Lab 11/23/18 0347 11/22/18 2157 11/22/18  1549 11/22/18  1012   INR 2.49* 2.37* 2.09* 1.74*   PTT 55* 57* 51* 30         5. RADIOLOGY:   Recent Results (from the past 24 hour(s))   XR Chest Port 1 View    Narrative    EXAM: XR CHEST PORT 1 VW  11/22/2018 8:35 AM     HISTORY:  on VA ECMO, intubated;     COMPARISON: Chest radiograph dated 11/21/2018    FINDINGS: A single AP view of the chest was obtained. Endotracheal  tube tip projects over the low thoracic trachea. Right IJ [place]  catheter tip projects over the right pulmonary artery. Bilateral chest  tubes stable in position. Gastric and feeding tubes course below the  field-of-view. Stable mediastinal drains and postoperative changes of  aortic repair and aortic valve replacement. ECMO cannulae appear  stable. Epicardial pacer wires partially visualized. Intra-aortic  balloon pump marker projects just above the level of the rudy.    Stable cardiomegaly. Right pleural effusion, relatively stable. Small  amount of pneumomediastinum, continued to decrease from prior. Patchy  right greater than left lower lobe opacities, stable to slightly  improved. Low lung volumes with no appreciable pneumothorax.      Impression    IMPRESSION:   No significant change from prior. Stable support devices, small right  pleural effusion and mild bibasilar opacities.    YUNG CORONADO MD   XR Abdomen Port 1  View    Narrative    Exam: XR ABDOMEN PORT 1 VW, 11/22/2018 1:47 PM    Indication: ECMO, new lactic acidosis;     Comparison: Abdominal radiographs dated 11/21/2018. Chest radiographs  dated 11/22/2018.    Findings:   Single AP view of the lower abdomen which is limited by multiple  overlying tubes. Enteric tube visible in the upper abdomen. The tip  extending beyond the field-of-view, likely in the distal duodenum.  Left femoral approach venous line with the tip visible at the level of  the sacrum. Surgical clips in the pelvis. Rectal temperature probe.  Right femoral approach ECMO cannulae with the distal end extending  beyond the field-of-view. Minimal gas in the abdomen. The lower lungs  are collimated out of the field-of-view. No acute osseous findings.    The bowel gas pattern, although mostly obscured, does not show any  changes would indicate ischemic bowel.      Impression    Impression:   1. Partially visualized ECMO cannulae with the distal end extending  beyond the field-of-view.  2. Additional lines and support devices as detailed above.  3. No acute intra-abdominal findings, no bowel changes to indicate  ischemia or obstruction.    I have personally reviewed the examination and initial interpretation  and I agree with the findings.    JOVANNY ROBERT MD       =========================================

## 2018-11-23 NOTE — PLAN OF CARE
Problem: Patient Care Overview  Goal: Plan of Care/Patient Progress Review  1. Will be hemodynamically stable.  2. Oxygenation will be met.  3. Family will be well informed of all procedures.   Outcome: Declining  S:  Pt opens eyes spontaneously. Will open eyes to voice, but does not follow commands or move extremities.  Lactate increased to high 11.9 this afternoon.  TF kept at trophic rate 10, 500cc 5% albumin and pull via CRRT decreased to prevent/stop further increase in lactate.  Pt had BM x3, abdomen soft to palpation, abdominal xray obtained to assess for possible ischemic changes.  No changes to pressors. Fentanyl continues at 50mcg/hour. Sedation off all shift.  Minimal CT output.  [place] d/c'd-no longer indicated d/t oliguria.  B: Pt s/p AVR with inability to wean from pump, currently on VA ECMO.  A:  Pt's lactate increasing, no apparent etiology present.  Pt beginning to become more alert with sedation off.  No change in pressor requirements. 100% paced.  Afebrile.  P:  Washout in OR tomorrow.  Continue to trend lactate.  Continue to monitor I&O's, labs, v/s, neuro status closely.

## 2018-11-23 NOTE — PROGRESS NOTES
Cardiology - Heart failure service    Mr. Johan Spence is a 63 year old male with h/o AVR, DM, ONOFRE, obesity, HTN, who [facility] with [facility] of wean off pump. Had re-do AVR and ao repalacement at [facility] but during placing PA cath in OR he was coded and placed pump. AVR and ao graft replacement were successful but he was unable to come off pump. He was transferred here for further management. ECMO turndown in OR 11/15 unsuccessful. On turndown in OR 11/18, was able to be weaned off ECMO; however, was requiring max multiple pressors. Now back on VA-ECMO. ECMO circuit was configured RA to PA. Chest was packed, sent upstairs on moderate doses of inotropes and vasopressors. Patient then further decompensated, and was sent back to the OR for RA-PA RVAD to VA ECMO by connecting the cannula to the circuit.OR 11/21, failed ECMO turn down and CAB done to LAD.     Interval events: He has significant worsening lactic acidosis (up to 13.8 this am with rising transaminases = , AST 3430. No change in hemodynamics and pressor requirements. Discussed with surgery extensively yesterday (Dr. Lucio, Dr. Morse), ICU team (Dr. Lima), Dr. Peraza - unclear etiology. Extremities wwp, doppler signal as expected on ECMO in all 4 extremities, chest wound vac concave, little concern for tamponade, abdominal exam benign (no changes in bowel movements or composition of BMs), no change in chest tube output. CXR and AXR unchanged. Interventions taken were that propofol was stopped about 32 hours ago; changed CRRT to goal net even, given bicarb and albumin. Tube feeds turned down. Sweep was increased from 3 lpm to 5.5 lpm on ECMO to compensate for a metabolic acidosis. His CO was 6.7, SVR was 670, vasoplegic on antibiotics. Of note, with sedation very minimal, opens eyes to command but no other neurological activity noted.     Overnight, given 1 unit platelets. Getting some d10 to keep glucose above 70.  Some increase on pressors.     Temp:  [97.5  F (36.4  C)-98.8  F (37.1  C)] 98.5  F (36.9  C)  Heart Rate:  [95] 95  Resp:  [18-21] 18  MAP:  [60 mmHg-74 mmHg] 70 mmHg  Arterial Line BP: ()/(21-64) 101/51  FiO2 (%):  [40 %] 40 %  SpO2:  [22 %-100 %] 98 %  Tele: None.    Hemodynamics:  CVP 12, PA -, CO 6.7, CI 3.2, SVO2 65-67,   IABP via LFA, 1:1, triggered by EKG, augmented 100%, diastolic 117, pump mean 75, L radial and distal pulse in tact   ECMO settings:  Patient remains on VA ECMO. RPM's 3700 with flow range 4.4-4.6 L/min. Sweep gas is at 5.5 LPM, SVO2 65-67%.   Vent Rate 18, , PEEP 8, 40% oxygen, CMV/AC.   Epicardial pacemaker: DDD, 80, capturing.     I/O last 3 completed shifts:  In: 3062.59 [I.V.:1847.59; NG/GT:475]  Out: 2947 [Urine:18; Emesis/NG output:100; Other:2209; Chest Tube:620], since midnight 500 through CRT, matching Ins and Outs.  Net even    Hemodynamic drips: Epi 0.03, levophed 0.1 (from 0.08), vaso 2, phenylephrine 3  Selected medications: heparin gtt but currently off, pantoprazole 40 mg daily, hydrocortisone 50 mg q6, inhaled flolan 20. Vanc, zosyn.   Sedation: fentanyl 50.     Physical examination:  Vitals:    11/17/18 0300 11/18/18 0500 11/19/18 0500 11/20/18 0545   Weight: 96 kg (211 lb 10.3 oz) 96.5 kg (212 lb 11.9 oz) 99 kg (218 lb 4.1 oz) 98.1 kg (216 lb 4.3 oz)    11/21/18 0300   Weight: 97.4 kg (214 lb 11.7 oz)     Central cannulation. Chest tubes in place.  LUNGS:  Clear to auscultation bilaterally   ABD:  Active bowel sounds, soft, non-tender/non-distended.  No rebound/guarding/rigidity.  EXT:  No edema or cyanosis. Cold feet b/l. R leg proximally slightly colder than left. Upper extremities also cold by fingers.     Labs were reviewed.  BMP  Recent Labs  Lab 11/23/18  0347 11/22/18  2157 11/22/18  1549 11/22/18  1012    139 139 139   POTASSIUM 4.7 4.5 4.3 4.5   CHLORIDE 103 103 104 104   TERESA 8.6 8.6 8.2* 8.2*   CO2 13* 15* 18* 19*   BUN 44* 48* 48* 47*    CR 1.70* 1.72* 1.69* 1.71*   GLC 69* 79 103* 116*     LFTs  Recent Labs  Lab 11/23/18 0347 11/22/18 2157 11/22/18  1549 11/22/18  1012   ALKPHOS 128 106 108 104   AST 3430* 1940* 950* 567*   * 828* 450* 239*   BILITOTAL 14.4* 13.1* 11.8* 11.1*   PROTTOTAL 4.5* 4.8* 4.6* 4.7*   ALBUMIN 2.5* 2.6* 2.5* 2.2*      CBC  Recent Labs  Lab 11/23/18 0347 11/22/18 2157 11/22/18  1549 11/22/18  1012   WBC 24.3* 23.8* 23.1* 22.9*   RBC 3.04* 3.07* 3.13* 3.33*   HGB 9.3* 9.4* 9.5* 10.3*   HCT 29.0* 29.4* 29.9* 31.3*   MCV 95 96 96 94   MCH 30.6 30.6 30.4 30.9   MCHC 32.1 32.0 31.8 32.9   RDW 19.8* 19.4* 18.6* 18.5*   PLT 87* 101* 105* 117*     INR  Recent Labs  Lab 11/23/18 0347 11/22/18 2157 11/22/18  1549 11/22/18  1012   INR 2.49* 2.37* 2.09* 1.74*       Radiology pending.     Cardiology none.      IMPRESSION & PLAN  Mr. Johan Spence is a 63 year old male with h/o AVR, DM, ONOFRE, obesity, HTN, who [facility] with [facility] of wean off pump. Had re-do AVR and ao repalacement at [facility] but during placing PA cath in OR he was coded and placed pump. AVR and ao graft replacement were successful but he was unable to come off pump. He was transferred here for further management.       Changes today  - Patient is quite sick at this time with elevated lactate; will have a family care conference today with patient's family.  Essentially, all organ are failing at this time systems.  Will come off epinephrine, as this may be vasodilating patient and decreasing SVR making patient more  vaso-plegic.  - wean pressors as able and added Angiotensin II.   - Vasoplegic. Stopped propofol.   - elevated lactate today, will trend for now. Unclear source - possibly gut ischemia. Unlikely leg ischemia (legs colder, pulses difficult to doppler, but bilateral)      Cardiovascular:   #AVR and [facility].    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   #HB  - Monitor  hemodynamic status.   - norepi/vaso gtts.   - MAP >65.   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 Limited information available in chart.   - ECMO turndown in OR 11/15 unsuccessful. On turndown in OR 11/18, was able to be weaned off ECMO; however, was requiring max multiple pressors. Now back on VA-ECMO.   - Went to OR 11/18 again with Dr. Morse, the patient was weaned off VA ECMO.  He required excessively high doses of inotropes and vasopressors.  ECMO circuit was configured RA to PA. Chest was packed, sent upstairs on moderate doses of inotropes and vasopressors. Chest was packed open. Patient then further decompensated, and was sent back to the OR for RA-PA RVAD to VA ECMO by connecting the cannula to the circuit.  The PA cannula placed earlier was removed. OR 11/21, failed ECMO turn down and CAB done to LAD. Arrived from OR at on norepi, epi, vasopressin for pressors.   - Patient in vasoplegic shock. Extremities cold, doppler signal weaker on ECMO in both lower extremities, chest wound vac concave, little concern for tamponade, abdominal exam benign (no changes in bowel movements or composition of BMs), no change in chest tube output. Interventions taken were that propofol was stopped about 38 hours ago; changed CRRT to goal net even, given bicarb and albumin. Tube feeds turned down. Sweep was increased from 3 lpm to 5.5 lpm on ECMO to compensate for a metabolic acidosis. His CO was 6.7, SVR was 670, vasoplegic on antibiotics. Of note, with sedation very minimal, opens eyes to command but no other neurological activity noted. Patient still worsening, family care conference this evening.   - Continue stress dose steroids.  - Holding PTA Lisinopril and Metoprolol.       Disposition:  - CV ICU.      I discussed the patient with Dr. Peraza.      Thank you for allowing me to care for this patient. This has been discussed with the attending physician.      Winter King MD  Cardiology Fellow, PGY-5

## 2018-11-24 NOTE — CONSULTS
[facility] ID SERVICE: NEW CONSULTATION  Patient:  Johan Spence, Date of birth 1955, Medical record number [id number]  Date of Admission: 11/13/2018  Date of Visit:  11/24/2018  Requesting Provider: Moises Saba         Assessment and Recommendations:   Problem List:  1. Diphtheroids on 1/2 blood cultures from 11/17 which grew on day 3. Daily blood cultures since that time with NGTD.    2. Ongoing ECMO due to cardiogenic shock. Increasing pressor requirements on 11/23. Now on 4 pressors.  3. Open chest - On vancomycin and pip/tazo for general open chest prophylaxis with micafungin added 11/23.  4. Acute kidney failure - most likely prerenal from cardiogenic shock. Now on CRRT. Ongoing hyperkalemia.   5. Shock liver    Discussion:  64 yo man with DM and history of AVR now with ongoing cardiogenic shock on ECMO after redo AVR. Leukocytosis and CRP downtrending. The positive blood culture is most likley a contaminant and does not require further workup. However given ongoing open chest, ECMO, and very tenuous clinical status, agree with keeping current broad spectrum antimicrobial coverage for now.     Recommendations:  1. Diptheroids with late growth in a single blood culture are likely a contaminant but are well covered by vancomycin  2. Continue current pip/tazo, vancomycin, and micafungin given critical illness with open chest and ECMO cannulization    Recommendations discussed with CVTS team.     Thank you for this consult. The [facility] ID team will continue to follow this patient. Please feel free to call with any questions.     Karly Soto MD  Infectious Diseases  [phone]        History of Present Illness:   Johan Spence is a 64 yo man with DM, HTN, obesity and history of aortic valve replacement who was [facility] for VA ECMO after complications during redo AVR including inability to wean off bypass pump despite placement of balloon pump. Chest washout 11/17  "showed clot tamponade. Chest remains open. Since admission there has been significant difficulty achieving ECMO turn down. Now with multiorgan failure (heart, kidney, liver), requiring 4 pressors, and family meeting pending. Single blood culture with diphtheroids and subsequent cultures negative. Currently on vancomycin, pip/tazo and micafungin.          Review of Systems:   Unable to obtain - intubated and sedated.        Past Medical History:     Past Medical History:   Diagnosis Date     H/O extracorporeal membrane oxygenation treatment 11/13/2018         Allergies:      Allergies   Allergen Reactions     Simvastatin            Recent Antimicrobials::   Vancomycin 11/19-present  Pip/tazo 11/19-present  Micafungin 11/23-present       Family History:   Reviewed and non-contributory       Social History:     Social History     Social History     Marital status:      Spouse name: N/A     Number of children: N/A     Years of education: N/A     Occupational History     Not on file.     Social History Main Topics     Smoking status: Not on file     Smokeless tobacco: Not on file     Alcohol use Not on file     Drug use: Not on file     Sexual activity: Not on file     Other Topics Concern     Not on file     Social History Narrative          Physical Exam:   BP (!) 83/33  Temp 98  F (36.7  C) (Axillary)  Resp 21  Ht 1.7 m (5' 6.93\")  Wt 98.3 kg (216 lb 11.4 oz)  SpO2 95%  BMI 34.01 kg/m2   Exam:  Gen: Intubated and sedated.   HEENT: Eyes open. Pupils reactive. Anterior oropharynx without lesions.  CV: Regular rate and rhythm without m/r/g.   Chest: Chest open. Coarse breath sounds bilaterally. Chest tubes in place with serosanguinous drainage.   Abdomen: Soft, non-distended.   Extremities: Cold toes. Left second toe dusky.   Skin: No rashes or lesions noted.  ECMO access not visualized. Right I J without erythema.          Laboratory Data:     Creatinine   Date Value Ref Range Status   11/24/2018 1.17 0.66 " - 1.25 mg/dL Final   11/23/2018 1.53 (H) 0.66 - 1.25 mg/dL Final   11/23/2018 1.65 (H) 0.66 - 1.25 mg/dL Final   11/23/2018 1.72 (H) 0.66 - 1.25 mg/dL Final   11/23/2018 1.70 (H) 0.66 - 1.25 mg/dL Final     WBC   Date Value Ref Range Status   11/24/2018 19.2 (H) 4.0 - 11.0 10e9/L Final   11/23/2018 23.9 (H) 4.0 - 11.0 10e9/L Final   11/23/2018 24.2 (H) 4.0 - 11.0 10e9/L Final   11/23/2018 23.6 (H) 4.0 - 11.0 10e9/L Final   11/23/2018 24.3 (H) 4.0 - 11.0 10e9/L Final     Hemoglobin   Date Value Ref Range Status   11/24/2018 7.6 (L) 13.3 - 17.7 g/dL Final     Platelet Count   Date Value Ref Range Status   11/24/2018 85 (L) 150 - 450 10e9/L Final     Lab Results   Component Value Date     11/24/2018    BUN 29 11/24/2018    CO2 13 (L) 11/24/2018     CRP Inflammation   Date Value Ref Range Status   11/24/2018 77.0 (H) 0.0 - 8.0 mg/L Final   11/23/2018 94.0 (H) 0.0 - 8.0 mg/L Final   11/19/2018 170.0 (H) 0.0 - 8.0 mg/L Final   11/14/2018 65.0 (H) 0.0 - 8.0 mg/L Final           Pertinent Recent Microbiology Data:     Recent Labs  Lab 11/23/18  0514 11/22/18  0729 11/21/18  0333 11/20/18  0426 11/19/18  0323 11/18/18  0340 11/17/18  2128   CULT No growth after 12 hours No growth after 1 day No growth after 2 days No growth after 3 days No growth after 4 days No growth after 5 days No growth   SDES Blood Left Hand Blood Left Hand Blood Left Arm Blood Right Arm Blood Right Arm Blood Right Arm Blood Left Arm            Imaging:     Recent Results (from the past 48 hour(s))   XR Chest Port 1 View    Narrative    EXAM: XR CHEST PORT 1 VW  11/22/2018 8:35 AM     HISTORY:  on VA ECMO, intubated;     COMPARISON: Chest radiograph dated 11/21/2018    FINDINGS: A single AP view of the chest was obtained. Endotracheal  tube tip projects over the low thoracic trachea. Right IJ [place]  catheter tip projects over the right pulmonary artery. Bilateral chest  tubes stable in position. Gastric and feeding tubes course below  the  field-of-view. Stable mediastinal drains and postoperative changes of  aortic repair and aortic valve replacement. ECMO cannulae appear  stable. Epicardial pacer wires partially visualized. Intra-aortic  balloon pump marker projects just above the level of the rudy.    Stable cardiomegaly. Right pleural effusion, relatively stable. Small  amount of pneumomediastinum, continued to decrease from prior. Patchy  right greater than left lower lobe opacities, stable to slightly  improved. Low lung volumes with no appreciable pneumothorax.      Impression    IMPRESSION:   No significant change from prior. Stable support devices, small right  pleural effusion and mild bibasilar opacities.    YUNG CORONADO MD   XR Abdomen Port 1 View    Narrative    Exam: XR ABDOMEN PORT 1 VW, 11/22/2018 1:47 PM    Indication: ECMO, new lactic acidosis;     Comparison: Abdominal radiographs dated 11/21/2018. Chest radiographs  dated 11/22/2018.    Findings:   Single AP view of the lower abdomen which is limited by multiple  overlying tubes. Enteric tube visible in the upper abdomen. The tip  extending beyond the field-of-view, likely in the distal duodenum.  Left femoral approach venous line with the tip visible at the level of  the sacrum. Surgical clips in the pelvis. Rectal temperature probe.  Right femoral approach ECMO cannulae with the distal end extending  beyond the field-of-view. Minimal gas in the abdomen. The lower lungs  are collimated out of the field-of-view. No acute osseous findings.    The bowel gas pattern, although mostly obscured, does not show any  changes would indicate ischemic bowel.      Impression    Impression:   1. Partially visualized ECMO cannulae with the distal end extending  beyond the field-of-view.  2. Additional lines and support devices as detailed above.  3. No acute intra-abdominal findings, no bowel changes to indicate  ischemia or obstruction.    I have personally reviewed the examination and  initial interpretation  and I agree with the findings.    JOVANNY ROBERT MD

## 2018-11-24 NOTE — PROGRESS NOTES
CRRT STATUS NOTE    DATA:  Time:  1531 PM  Pressures WNL:  YES  Filter Status:  WDL    Problems Reported/Alarms Noted:  None  Supplies Present:  YES    INTERVENTIONS:   CRRT discontinued at 1532 plan to withdraw care of pt.

## 2018-11-24 NOTE — PROGRESS NOTES
"Heart Failure ICU Attending Note     INTERVAL HISTORY: Worsening lactate, K, and no decrease in pressor requirements despite addition of angiotensin. Not withdrawing to pain, not following commands.     MEDICATIONS:   Angiotensin 40mg/hr  NE 0.08  phenyepherine 3  Vaso 4  Fentanyl   Inhaled NO  Hydrocortisone   antimicrobials    EXAM:   Vitals: BP (!) 83/33  Temp 98  F (36.7  C) (Axillary)  Resp 15  Ht 1.7 m (5' 6.93\")  Wt 98.3 kg (216 lb 11.4 oz)  SpO2 96%  BMI 34.01 kg/m2  General: intubated, sedated  Chest: open chest, central ecmo   Lungs: coarse, ventillated  Abdomen: decreased bowel sounds,   Extremities: L IABP, all extremities cool and mottled.   Neuro: does not withdraw to pain    DIAGNOSTICS:   Results for SHENA GREEN (MRN [id number]) as of 11/24/2018 15:52   Ref. Range 11/24/2018 09:45   Sodium Latest Ref Range: 133 - 144 mmol/L 136   Potassium Latest Ref Range: 3.4 - 5.3 mmol/L 6.6 (HH)   Chloride Latest Ref Range: 94 - 109 mmol/L 100   Carbon Dioxide Latest Ref Range: 20 - 32 mmol/L 15 (L)   Urea Nitrogen Latest Ref Range: 7 - 30 mg/dL 24   Creatinine Latest Ref Range: 0.66 - 1.25 mg/dL 0.97   GFR Estimate Latest Ref Range: >60 mL/min/1.7m2 78   GFR Estimate If Black Latest Ref Range: >60 mL/min/1.7m2 >90   Calcium Latest Ref Range: 8.5 - 10.1 mg/dL 7.7 (L)   Anion Gap Latest Ref Range: 3 - 14 mmol/L 21 (H)   Magnesium Latest Ref Range: 1.6 - 2.3 mg/dL 2.5 (H)   Phosphorus Latest Ref Range: 2.5 - 4.5 mg/dL 8.8 (H)   Albumin Latest Ref Range: 3.4 - 5.0 g/dL 2.5 (L)   Protein Total Latest Ref Range: 6.8 - 8.8 g/dL 4.3 (L)   Bilirubin Total Latest Ref Range: 0.2 - 1.3 mg/dL 19.5 (HH)   Alkaline Phosphatase Latest Ref Range: 40 - 150 U/L 670 (H)   ALT Latest Ref Range: 0 - 70 U/L 5805 ()   AST Latest Ref Range: 0 - 45 U/L 28600 ()   CK Total Latest Ref Range: 30 - 300 U/L 71609 ()      Ref. Range 11/23/2018 03:47 11/23/2018 07:37 11/23/2018 09:28 11/23/2018 11:44 11/23/2018 15:43 " 11/23/2018 19:58 11/23/2018 21:52 11/24/2018 00:05 11/24/2018 01:53 11/24/2018 03:43 11/24/2018 06:00 11/24/2018 09:45 11/24/2018 11:53 11/24/2018 14:03   Lactic Acid Latest Ref Range: 0.7 - 2.0 mmol/L 13.8 (HH) 14.4 (HH) 14.8 (HH) 15.0 (HH) 14.7 (HH) 14.9 (HH) 16.0 (HH) 16.0 (HH) 16.0 (HH) 16.0 (HH) 16.0 (HH) 16.0 (HH) 16.0 (HH) 17.0 (HH)      63 year-old man with vasoplegic and cardiogenic shock secondary to biventricular heart failure with severe lactic acidosis and evidence of poor perfusion after a redo AVR with inability to wean from cardiopulmonary bypass and need for VA ECMO and subsequently SVG to LAD with conversion to central ECMO with open chest.     1. Cardiogenic shock   A. Central ECMO   B. S/p SVG to LAD    2. Vasoplegic shock on multiple pressors    3. Anuric AYE on CVVHD  4. Shock liver, with hypoglycemia   5. Persistent lactic acidosis  6. DIC  7. Worsening hyperkalemia  8. Unclear neurological status    He now has worsening multisystem organ failure including severe shock liver and AYE on CRRT, worsening lactic acidosis and hyperkalemia despite full ECMO support, CRRT, and multiple vasopressors including the addition of angiotensin II. I do not feel at this stage this is a recoverable process and death is likely imminent    A family conference was held. Please see the family conference note for full details.     60 minutes of critical time was spent today reviewing data and adjusting life saving medications, discussion with CVTS and CVICU teams,  and serially assessing the patient.     40 minutes of family conference time.     Alexander Robbins MD   Section of Advanced Heart Failure and Transplantation  Cardiovascular Division  [phone]

## 2018-11-24 NOTE — PROCEDURES
Arterial Line Procedure Note    DATE OF OPERATION: 11/23/2018    PREOPERATIVE DIAGNOSIS: Severe aortic stenosis, ECMO    POSTOPERATIVE DIAGNOSIS: same    OPERATION PERFORMED: arterial line placement    SURGEON: Dr Clarence Mejía, supervising    ANESTHESIA: Sedation    EBL: 2 mL    COMPLICATIONS: none    INDICATIONS FOR PROCEDURE:   The patient is a 63 year old male with sever aortic stenosis s/p AVR and root replacement on ECMO, who requires an arterial line for invasive BP monitoring and frequent ABG monitoring.    DESCRIPTION OF OPERATIVE PROCEDURE:   Pre-procedure consent for the procedure was obtained.  The patient was prepped and draped in the usual sterile manner. The left brachial artery was palpated and visualized with ultrasound and cannulated. Pulsatile, arterial blood was visualized and the artery was then threaded with arterial quick cath. Catheter was sutured into place. A good wave-form was obtained. The patient tolerated the procedure well without any immediate complications. The area was cleaned and sterile dressing was applied. Dr Lima is the supervising surgeon and was available for assistance.    DISPOSITION: critically ill, OK to use arterial line    Libby Love MD  General Surgery, PGY-3  Pg [phone]

## 2018-11-24 NOTE — PLAN OF CARE
Problem: Extracorporeal Life Support/Membrane Oxygenation (Adult)  Goal: Signs and Symptoms of Listed Potential Problems Will be Absent, Minimized or Managed (Extracorporeal Life Support/Membrane Oxygenation)  Signs and symptoms of listed potential problems will be absent, minimized or managed by discharge/transition of care (reference Extracorporeal Life Support/Membrane Oxygenation (Adult) CPG).   Outcome: Declining  Pt unresponsive. Opens eyes and closes occasionally, . Nothing to command, no movement of extremities, does not withdraw. Pt went into slow -140's around 2300 x 1 hr, accompanied by hypotension, & chugging. Responded after fluid/product administration & remained paced at 95 with no further ectopy noted. Total products given/shift:1 L albumin for chugging, 2 amps of bicarb, 2 RBC's, 2 PLT, 1 FFP, 2 cryo. ECMO sweep 4.5, FIO2: 60%, Speed: 3250, Flow: 3.61-4.4. IABP 1:2. Unable to obtain blood return on newly inserted left brachial A-line & waveform dampened. Titrating gtts per  Right radial a-line-- waveform now appropriate. K+ remained elevated throughout shift, last K+ 6.6, nephrology fellow notified, orders to increase dialysate rate, see eMAR. CRRT circuit changed per resource RN. CRRT goal I=O as BP tolerates, BP not tolerating, unable to pull, see flowsheets.  D10 increased to 50/hr. 1.5 amps D50 for hypoglycemia. Will continue to monitor and notify team with concerns. POC discussed with Dr. Lanier & nephrology fellow.

## 2018-11-24 NOTE — PROGRESS NOTES
CRRT STATUS NOTE    DATA:  Time:  6:21 AM  Pressures WNL:  WNL  Filter Status: WDL    Problems Reported/Alarms Noted: NONE    Supplies Present:  YES    ASSESSMENT:  Patient Net Fluid Balance:  +2.2L since midnight - cannot pull without dropping MAP  Vital Signs:  HR 95, BP 86/50 (63)  Labs: K+ 6.6 on 2K bath. Increased dialysate rate twice. Lac in teens (16 currently).   Goals of Therapy:  0-50cc net neg/h.    INTERVENTIONS:   CVVHDF    PLAN:  Call CRRT RN at [phone].

## 2018-11-24 NOTE — PROGRESS NOTES
"Nephrology Progress Note  11/24/2018       Mr Spence is a 63 yom w/hx of HTN, HLP, ONOFRE, DM2, ETOH and AVR 2015 who had repeat AVR on 11/13 with difficulty weaning from CPB, transitioned to ECMO and transferred to [facility].  Nephrology consulted for management of hemodynamic AYE, started CRRT on 11/16.      Interval History :   Worsening status. On 4 pressors.   Lactate 16. K 6.5 despite increasing  CRRT rate significantly . CK increasing.   On Bicarb drip as well.   No UF        Assessment & Recommendations:   AYE-Baseline Cr reported at 1.1, now stable at 3.3 over past 24h.  AYE is due to hemodynamic injury with cardiogenic shock.  Mild Rhabdo. Continues on CRRT.                         -Running via ECMO circuit.                         -Continues on CRRT. Worsening status. Poor clearance of K despite increasing CRRT rate significantly and on 2 K bath . WOrsning lactic acidosis and rhabdo.   Awaiting family meeting and goals of care discussion at this time. Not much more we can do at this time with CRRT other than possibily switching him to custom made 0K bath, which is unlikely to change outcome.         Elaine Sheikh MD  Nephrology Staff  [phone]      Review of Systems:   I reviewed the following systems:  ROS not done due to vent/sedation.     Physical Exam:   I/O last 3 completed shifts:  In: 6543.01 [I.V.:3550.01; Other:19; NG/GT:300]  Out: 3015 [Emesis/NG output:100; Other:2535; Chest Tube:380]   BP (!) 83/33  Temp 98  F (36.7  C) (Axillary)  Resp 17  Ht 1.7 m (5' 6.93\")  Wt 98.3 kg (216 lb 11.4 oz)  SpO2 100%  BMI 34.01 kg/m2     GENERAL APPEARANCE: Intubated, sedated, ECMO , critically ill.   EYES:  No scleral icterus, pupils equal  HENT: mouth without ulcers or lesions  PULM: lungs rhonchi to auscultation, equal air movement, no cyanosis or clubbing  CV: regular rhythm, normal rate, no rub     -edema +2 LE  GI: soft,bowel sounds are +  MS: no evidence of inflammation in joints, no muscle " tenderness  NEURO: Intubated and sedated.      Labs:   All labs reviewed by me  Electrolytes/Renal -   Recent Labs   Lab Test  11/24/18   1153  11/24/18   0945  11/24/18   0743   11/24/18   0343   11/23/18   2152   NA   --   136   --    --   137   --   137   POTASSIUM  6.4*  6.6*  6.5*  6.5*   < >  6.1*  6.0*   < >  6.2*   CHLORIDE   --   100   --    --   101   --   104   CO2   --   15*   --    --   13*   --   11*   BUN   --   24   --    --   29   --   38*   CR   --   0.97   --    --   1.17   --   1.53*   GLC   --   90   --    --   64*   --   67*   TERESA   --   7.7*   --    --   10.2*   --   8.1*   MAG   --   2.5*   --    --   2.6*   --   2.6*   PHOS   --   8.8*   --    --   7.5*   --   8.7*    < > = values in this interval not displayed.       CBC -   Recent Labs   Lab Test  11/24/18   0945  11/24/18   0343  11/23/18   2152   WBC  17.5*  19.2*  23.9*   HGB  7.8*  7.6*  8.2*   PLT  113*  85*  81*       LFTs -   Recent Labs   Lab Test  11/24/18   0945  11/24/18   0343  11/23/18   2152   ALKPHOS  670*  607*  485*   BILITOTAL  19.5*  18.7*  17.3*   ALT  5805*  5566*  5913*   AST  49826*  32801*  51015*   PROTTOTAL  4.3*  5.0*  4.0*   ALBUMIN  2.5*  3.1*  2.2*       Iron Panel - No lab results found.        Current Medications:    albumin human         B and C vitamin Complex with folic acid  5 mL Per Feeding Tube Daily     hydrocortisone sodium succinate PF  50 mg Intravenous Q6H     micafungin  100 mg Intravenous Q24H     pantoprazole (PROTONIX) IV  40 mg Intravenous Q24H     piperacillin-tazobactam  4.5 g Intravenous Q6H     polyethylene glycol  17 g Oral Daily     protein modular  1 packet Per Feeding Tube BID     senna-docusate  1-2 tablet Oral or Feeding Tube BID     vancomycin (VANCOCIN) IV  1,750 mg Intravenous Q24H       angiotensin II (GIAPREZA) 2.5 mg in NS 250mL (MAX CONC) 40 ng/kg/min (11/24/18 1300)     IV fluid REPLACEMENT ONLY 50 mL/hr at 11/24/18 1300     IV fluid REPLACEMENT ONLY       CRRT replacement  solution 25 mL/kg/hr (11/24/18 1139)     EPINEPHrine IV infusion ADULT Stopped (11/23/18 1005)     epoprostenol (VELETRI) 20 mcg/mL in sterile water inhalation solution 20 ng/kg/min (11/24/18 0909)     fentaNYL 50 mcg/hr (11/24/18 1300)     HEParin Stopped (11/23/18 0345)     insulin (regular) Stopped (11/22/18 0000)     norepinephrine 0.08 mcg/kg/min (11/24/18 1300)     phenylephrine IV infusion ADULT 3 mcg/kg/min (11/24/18 1300)     CRRT replacement solution 200 mL/hr at 11/23/18 2053     CRRT replacement solution 25 mL/kg/hr (11/24/18 1139)     sodium bicarbonate 25 mEq/hr (11/24/18 1300)     vasopressin (PITRESSIN) infusion ADULT (40 mL) 2 Units/hr (11/24/18 1200)

## 2018-11-24 NOTE — PROGRESS NOTES
CRRT RESTART NOTE    Reason for Restart:  Filter clogged      Patient s Vascular Access: ECMO      DATA:  Procedure:  Restart CVVHDF  Start Time:  2117  Machine#:  [id number]  Filter:  M150    Blood Flow:   200 mL/min  Pre-Replacement Solution: prismasol bgk 2/3.5  Post-Replacement Solution: prismasol bgk 2/3.5  Dialysate Solution:  prismasol bgk 2/3.5  Pre-Replacement Solution Rate:  1600mL/hr  Post-Replacement Solution Rate:  200mL/hr  Dialysate Flow Rate:  1600mL/hr  Patient Removal Rate:  150mL/hr  Anticoagulation Type and Rate:  none    ASSESSMENT:  How Patient Tolerated Restart: tolerated  Vital Signs: HR 95, BP 70/65 (70)  Initial Pressures:  Access: 22  Filter: 104  Return: 51  TMP: 66  Change in Filter Pressure: 41    INTERVENTIONS:  Restarted CVVHDF through ECMO circuit.    PLAN:  Call CRRT RN at [phone] with questions.

## 2018-11-24 NOTE — PROGRESS NOTES
CVTS PROGRESS NOTE  11/24/2018    CO-MORBIDITIES:   Severe Aortic stenosis and regurge.   ONOFRE  DM  HTN  HLD  Complete Heart Block    ASSESSMENT: Johan Spence is a 63 year old male now s/p AV[facility] and [facility]. Was unable to be weaned from CPB so placed on VA ECMO and sent to [facility] for further cares. SANDRA shows adequate biventricular function upon ECMO turndown however did require increased pressor requirements. Brought to OR that day and failed turndown. Chest washout at bedside on 11/7 shows clot tamponade. Cleaned out and pressor requirements improved. Brought to Cath lab and coronaries clean. 11/18 had increased bleeding from chest tubes. Went to OR where cannulation was switched to RA PA RVAD without oxygenator. PA pressures continued to rise into the PA systolics into the 75 range. Brought back down to cannulate central VA ECMO again. Since, the patient has persistently required vasopressors but successfully titrating off.  OR 11/21, failed ECMO turn down and CAB done to LAD. Worsening multiorgan system failure, care conference 11/24.    TODAY'S PROGRESS:   - currently holding heparin with ACT goal of 140-160 as pt therapeutic without Rx  - bicarb 2 amps, bicarb drip  - CRRT per nephrology, having difficulty clearing K  - Phenyl, norepi, vaso, AT III  - sedation holiday  - care conference today    PLAN:  Neuro/pain/sedation:  #Post op pain.   - Monitor neurological status. Notify the MD for any acute changes in exam.  - Fentanyl gtt for pain.   - Sedation holiday.  - Oxy/dil PRN. Robaxin PRN.   - Lidoderm.     #Decreased mentation off sedation  - Sedation off for 36 hour (does not track, no w/d to pain)    Pulmonary:   #MV postop  #ONOFRE    - Supplemental oxygen to keep saturation above 92 %.  - Mechanically ventilated, will maintain PEEP of 8 during resuscitation.     Cardiovascular:   #AVR and root [facility] 11[facility]13.    #VA ECMO after inability to  separate from bypass.   #HTN.   #Complete HB.    #Vasoplegia  - Monitor hemodynamic status.   - phenylephrine/norepi/vaso gtts. Titrate down as BPs tolerate (with goal MAP >65)  - Added Angiotensin II on 11/23  - ECMO turndown in OR 11/15 and 11/21 unsuccessful.   - Chest remains open with multiple turndown attempts  - Holding PTA Lisinopril and Metoprolol.   - 12 Lead EKG shows complete HB, cards aware  - CAB and wash out 11/21, failed turn down with increasing RV distension and PAP. Back on VA ECMO. Plan to return to OR 11/23 was not undertaken given worsening lactate and multi-system organ failure.    GI care:   #Shock liver  - Miralax/senna-colace, suppository PRN    Fluids/ Electrolytes/ Nutrition:   - TKO  - NPO    - TFs being held given potential for bowel ischemia causing increased lactate  - BMP q6h.  - No indication for parenteral nutrition.    Renal:    #Right groin HD line. Removed 11/18.  - Nephrology following.    - Will continue to monitor intake and output.  - Goal even today.    - CRRT through ECMO circuit. Having difficulty clearing K on full support CRRT    Endocrine:    #hypokalemia  - Insulin gtt.    ID/ Antibiotics:  - Vanc/Zosyn begun 11/19 for open chest prophylaxsis given persistent leukocytosis (possibly due to SDS) but also vasoplegia in the setting of critical illness.  - Added micafungin 11/23    Heme:     - Hemoglobin stable.   - CBC/coags q6h. On ECMO transfusion goals.  - Anticoagulation: Heparin gtt for ECMO circuit with goal of 140-160, on hold     Prophylaxis:    - Mechanical prophylaxis for DVT.   - Hep gtt currently being held for ECMO -160 as patient therapeutic currently without gtt.  - PPI    Lines/tubes/drains:  - R internal jugular MAC/swan  - ETT  - Left IABP  - VA ECMO  - Chest tubes: 3 meds, pericardial rachel, bilateral pleural  - [place]  - [place] feeding tube.   - AV TPW's.   - radial/brachial A-line    Disposition:  - CV ICU.    Social:   - Palliative aware. Care  conference today.    Libby Love MD  General Surgery, PGY-3  Pg [phone]    ====================================    SUBJECTIVE:   Remains on 4 pressors, liver function worsening. Unable to keep up with hyperkalemia with CRRT.    OBJECTIVE:   1. VITAL SIGNS:   Temp:  [97.5  F (36.4  C)-98.2  F (36.8  C)] 98.1  F (36.7  C)  Heart Rate:  [] 95  Resp:  [12-31] 18  BP: (83-87)/(33-42) 83/33  MAP:  [45 mmHg-79 mmHg] 63 mmHg  Arterial Line BP: ()/(8-75) 93/48  FiO2 (%):  [40 %] 40 %  SpO2:  [66 %-100 %] 100 %  Ventilation Mode: CMV/AC  (Continuous Mandatory Ventilation/ Assist Control)  FiO2 (%): 40 %  Rate Set (breaths/minute): 10 breaths/min  Tidal Volume Set (mL): 450 mL  PEEP (cm H2O): 8 cmH2O  Oxygen Concentration (%): 40 %  Resp: 18    2. INTAKE/ OUTPUT:   I/O last 3 completed shifts:  In: 6543.01 [I.V.:3550.01; Other:19; NG/GT:300]  Out: 3015 [Emesis/NG output:100; Other:2535; Chest Tube:380]    3. PHYSICAL EXAMINATION:   General: lying in bed on ECMO and ventilator.   Neuro: intubated and non-responsive, does not track with eyes, does not blink to threat   Resp: Breathing non-labored on vent.   CV: Paced  Abdomen: Soft, Non-distended.   Incisions: c/d/i, chest dressing concave  Extremities: cool distally in LE below knees, dopplers not appreciated in DP and posterior tib b/l, poor perfusion distally in BUE    4. INVESTIGATIONS:   Arterial Blood Gases     Recent Labs  Lab 11/24/18  0945 11/24/18  0743 11/24/18  0600 11/24/18  0343   PH 7.28* 7.29* 7.31* 7.31*   PCO2 31* 26* 24* 26*   PO2 161* 207* 234* 177*   HCO3 14* 13* 12* 13*     Complete Blood Count     Recent Labs  Lab 11/24/18  0945 11/24/18  0343 11/23/18 2152 11/23/18  1543   WBC 17.5* 19.2* 23.9* 24.2*   HGB 7.8* 7.6* 8.2* 8.9*   * 85* 81* 98*     Basic Metabolic Panel    Recent Labs  Lab 11/24/18  0945 11/24/18  0743 11/24/18  0600 11/24/18  0343  11/23/18 2152 11/23/18  1543     --   --  137  --  137  --  136    POTASSIUM 6.6*  6.5* 6.5* 6.6* 6.1*  6.0*  < > 6.2*  < > 6.0*   CHLORIDE 100  --   --  101  --  104  --  102   CO2 15*  --   --  13*  --  11*  --  11*   BUN 24  --   --  29  --  38*  --  42*   CR 0.97  --   --  1.17  --  1.53*  --  1.65*   GLC 90  --   --  64*  --  67*  --  70   < > = values in this interval not displayed.  Liver Function Tests    Recent Labs  Lab 11/24/18  0945 11/24/18  0343 11/23/18 2152 11/23/18  1543   AST 16664* 48308* 27116* Unsatisfactory specimen - hemolyzed   ALT 5805* 5566* 5913* 4059*   ALKPHOS 670* 607* 485* 274*   BILITOTAL 19.5* 18.7* 17.3* 17.3*   ALBUMIN 2.5* 3.1* 2.2* 2.3*   INR 4.07* 3.73* 3.92* 3.07*     Pancreatic Enzymes  No lab results found in last 7 days.  Coagulation Profile    Recent Labs  Lab 11/24/18  0945 11/24/18  0343 11/23/18 2152 11/23/18  1543   INR 4.07* 3.73* 3.92* 3.07*   PTT 57* 51* 51* 40*         5. RADIOLOGY:   Recent Results (from the past 24 hour(s))   XR Chest Port 1 View    Narrative    EXAM: XR CHEST PORT 1 VW  11/24/2018 4:36 AM      HISTORY: daily imaging;     COMPARISON: 11/22/2018    FINDINGS: Endotracheal tube tip over mid thoracic trachea. Right IJ  pulmonary artery catheter tip over right pulmonary artery. Bibasilar  chest tubes.  Gastric tube sidehole over proximal stomach, feeding  tube courses out of field of view.    Aortic valve replacement. Superior marker of intra-aortic balloon pump  just superior to the rudy. Mediastinal drains. Venous arterial ECMO  cannulae in stable position. Epicardial pacer wires. Pericardial  drain.    Stable cardiac silhouette enlargement. At least small bilateral  pleural effusions. Stable bibasilar opacities. Low lung volumes. No  pneumothorax.      Impression    IMPRESSION: No significant change since prior. Stable support devices,  at least small bilateral pleural effusions and basilar airspace  opacities.    I have personally reviewed the examination and initial interpretation  and I agree with the  findings.    KAILA MINA MD       =========================================

## 2018-11-24 NOTE — SIGNIFICANT EVENT
MD DEATH PRONOUNCEMENT    Called to pronounce Johan Spence dead.    Physical Exam: Unresponsive to noxious stimuli, Spontaneous respirations absent, Breath sounds absent, Carotid pulse absent and Heart sounds absent    Patient was pronounced dead at 1614.    Active Problems:    H/O extracorporeal membrane oxygenation treatment       Infectious disease present?: NO    Communicable disease present? (examples: HIV, chicken pox, TB, Ebola, CJD) :  NO    Multi-drug resistant organism present? (example: MRSA): NO    Please consider an autopsy if any of the following exist:  NO Unexpected or unexplained death during or following any dental, medical, or surgical diagnostic treatment procedures.   NO Death of mother at or up to seven days after delivery.     NO All  and pediatric deaths.     NO Death where the cause is sufficiently obscure to delay completion of the death certificate.   NO Deaths in which autopsy would confirm a suspected illness/condition that would affect surviving family members or recipients of transplanted organs.     The following deaths must be reported to the 's Office:  NO A death that may be due entirely or in part to any factors other than natural disease (recent surgery, recent trauma, suspected abuse/neglect).   NO A death that may be an accident, suicide, or homicide.     NO Any sudden, unexpected death in which there is no prior history of significant heart disease or any other condition associated with sudden death.   NO A death under suspicious, unusual, or unexpected circumstances.    NO Any death which is apparently due to natural causes but in which the  does not have a personal physician familiar with the patient s medical history, social, or environmental situation or the circumstances of the terminal event.   NO Any death apparently due to Sudden Infant Death Syndrome.     NO Deaths that occur during, in association with, or as  consequences of a diagnostic, therapeutic, or anesthetic procedure.   NO Any death in which a fracture of a major bone has occurred within the past (6) six months.   NO A death of persons note seen by their physician within 120 days of demise.     NO Any death in which the  was an inmate of a public institution or was in the custody of Law Enforcement personnel.   NO  All unexpected deaths of children   NO Solid organ donors   NO Unidentified bodies   NO Deaths of persons whose bodies are to be cremated or otherwise disposed of so that the bodies will later be unavailable for examination;   NO Deaths unattended by a physician outside of a licensed healthcare facility or licensed residential hospice program   NO Deaths occurring within 24 hours of arrival to a health care facility if death is unexpected.    NO Deaths associated with the decedent s employment.   NO Deaths attributed to acts of terrorism.   NO Any death in which there is uncertainty as to whether it is a medical examiner s care should be discussed with the medical investigator.      Life source contacted, not a candidate for organ donation  Body disposition: Body released to the morgue.

## 2018-11-24 NOTE — PROGRESS NOTES
"SPIRITUAL HEALTH SERVICES  SPIRITUAL ASSESSMENT Progress Note  [facility]   ON-CALL VISIT    REFERRAL SOURCE: On-call     I attended a conference with family members and physicians of patient, Johan Spence. The conference was led by Dr. Lima, with two other physicians in attendance. Family members heard descriptions of the numerous medical issues (such as multiple organ failures) that are making Johan's prognosis extremely poor. They listened attentively and asked thoughtful questions.  Dr. Lima asked what the family's wishes were if Johna coded. When one of the family members said, \"We understand, but these are hard decisions to make,\" Dr. Lima responded that the family was not being asked to make a decision immediately, but these were important concerns. As the conference was ending, I let the family know that Spiritual Health/ support was available 24/7, and they should ask the nurse to page the , if they desired a visit.    PLAN: I will advise tomorrow's on-call  of the situation. Will respond to visit requests, if received.     Vasile Ramirez  Chaplain Resident  Pager [phone]    "

## 2018-11-24 NOTE — PLAN OF CARE
Problem: Extracorporeal Life Support/Membrane Oxygenation (Adult)  Goal: Signs and Symptoms of Listed Potential Problems Will be Absent, Minimized or Managed (Extracorporeal Life Support/Membrane Oxygenation)  Signs and symptoms of listed potential problems will be absent, minimized or managed by discharge/transition of care (reference Extracorporeal Life Support/Membrane Oxygenation (Adult) CPG).   Outcome: Declining      D/I/A: Pt remains on pressors, VA-ECMO, IABP and ventilator support. ECMO flow 4.4lpm, speed 3700rpm, sweep and 60%. IABP 1:2 ratio and 100% augmentation via L fem. Vent setting CMV 40%, 10, 450, 8. Bilateral LE cold and capillary refill>4, not able to doppler. Dr. Lima aware. Epi weaned off. Started angiotensin II gtt with goal MAP >60.   Eyes move left and right, pt does not follow commands, no withdrawal to painful stimuli. Toes and both foot as well as fingertips are turning mottled.   Pt on CRRT with goal I=O, currently net +300cc, high TMP pressure alarm 1x. TF stopped.   Family driving up from [place] today. Care conference between MDs,  and family members at 1830.   K of 6, CK, and LFT all elevated, CVTS team made aware, waiting for new orders.   New a-line placed via left brachial.   P: Continue to monitor/assess pt,  contact MD with questions/concerns.     Brad Cosme  RN, BSN

## 2018-11-24 NOTE — PROGRESS NOTES
2018:    Social Work Services Progress Note    Hospital Day: 12  Collaborated with:  Patient's family members    Data:  Family has decided to withdraw life support this afternoon.     Intervention:    Family has concerns about the financial cost of transportation of patient back to [place], [place].    Family identified  home they will be working with:  Dmitry Gorman, and Tejal Adams  HOme  [address] [place] [address]  Ph: [phone]    RN has  home information.  Per - after initial 25 miles, transportation cost is $2.75 per mile. (250-25miles=225x2.75=618.75).   SW will follow up on  regarding [place] [facility] coordination.    Called  home -advised to confirm with [facility] regarding [facility] coverage. Family informed of coordination with security regarding return to [place].       Assessment:  SW providing support and resource information for family- patient expected to die quickly once life support is removed.    Plan:    Follow Up:  Family has SW contact information.    JENNIFER Guerrero, LGSW  ICU 4A & 4C   Ph: [phone]  Pager: [phone]

## 2018-11-24 NOTE — PROGRESS NOTES
ECLS Discontinuation Note:    ECLS was discontinued 11/24/2018. Time of death 1614.    Jo Honeycutt, RRT  11/24/2018 4:19 PM      [facility] [place]  Procedure Note           Intra-Aortic Balloon Pump Discontinuation:       Johanprimitivo Spence  MRN# [id number]   November 24, 2018, 4:20 PM             IABP discontinued: November 24, 2018, 4:14 PM   Removed by: Left in Pt. Time of death 1614.   Catheter saved: Yes      Recorded by Jo Honeycutt

## 2018-11-24 NOTE — PROGRESS NOTES
[facility] ICU PROGRESS NOTE  11/24/2018    CO-MORBIDITIES:   Severe Aortic stenosis and regurge.   ONOFRE  DM  HTN  HLD  Complete Heart Block    ASSESSMENT: Johan Spence is a 63 year old male now s/p AV[facility] and [facility]. Was unable to be weaned from CPB so placed on VA ECMO and sent to [facility] for further cares. SANDRA shows adequate biventricular function upon ECMO turndown however did require increased pressor requirements. Brought to OR that day and failed turndown. Chest washout at bedside on 11/7 shows clot tamponade. Cleaned out and pressor requirements improved. Brought to Cath lab and coronaries clean. 11/18 had increased bleeding from chest tubes. Went to OR where cannulation was switched to RA PA RVAD without oxygenator. PA pressures continued to rise into the PA systolics into the 75 range. Brought back down to cannulate central VA ECMO again. Since, the patient has persistently required vasopressors but successfully titrating off.  OR 11/21, failed ECMO turn down and CAB done to LAD. Worsening multiorgan system failure, care conference 11/24.    TODAY'S PROGRESS:   - currently holding heparin with ACT goal of 140-160 as pt therapeutic without Rx  - bicarb 2 amps, bicarb drip  - CRRT per nephrology, having difficulty clearing K  - Phenyl, norepi, vaso, AT III  - sedation holiday  - care conference today    PLAN:  Neuro/pain/sedation:  #Post op pain.   - Monitor neurological status. Notify the MD for any acute changes in exam.  - Fentanyl gtt for pain.   - Sedation holiday.  - Oxy/dil PRN. Robaxin PRN.   - Lidoderm.     #Decreased mentation off sedation  - Sedation off for 36 hour (does not track, no w/d to pain)    Pulmonary:   #MV postop  #ONOFRE    - Supplemental oxygen to keep saturation above 92 %.  - Mechanically ventilated, will maintain PEEP of 8 during resuscitation.     Cardiovascular:   #AV[facility].    #VA ECMO after inability  to separate from bypass.   #HTN.   #Complete HB.    #Vasoplegia  - Monitor hemodynamic status.   - phenylephrine/norepi/vaso gtts. Titrate down as BPs tolerate (with goal MAP >65)  - Added Angiotensin II on 11/23  - ECMO turndown in OR 11/15 and 11/21 unsuccessful.   - Chest remains open with multiple turndown attempts  - Holding PTA Lisinopril and Metoprolol.   - 12 Lead EKG shows complete HB, cards aware  - CAB and wash out 11/21, failed turn down with increasing RV distension and PAP. Back on VA ECMO. Plan to return to OR 11/23 was not undertaken given worsening lactate and multi-system organ failure.    GI care:   #Shock liver  - Miralax/senna-colace, suppository PRN    Fluids/ Electrolytes/ Nutrition:   - TKO  - NPO    - TFs being held given potential for bowel ischemia causing increased lactate  - BMP q6h.  - No indication for parenteral nutrition.    Renal:    #Right groin HD line. Removed 11/18.  - Nephrology following.    - Will continue to monitor intake and output.  - Goal even today.    - CRRT through ECMO circuit. Having difficulty clearing K on full support CRRT    Endocrine:    #hypokalemia  - Insulin gtt.    ID/ Antibiotics:  - Vanc/Zosyn begun 11/19 for open chest prophylaxsis given persistent leukocytosis (possibly due to SDS) but also vasoplegia in the setting of critical illness.  - Added micafungin 11/23    Heme:     - Hemoglobin stable.   - CBC/coags q6h. On ECMO transfusion goals.  - Anticoagulation: Heparin gtt for ECMO circuit with goal of 140-160, on hold     Prophylaxis:    - Mechanical prophylaxis for DVT.   - Hep gtt currently being held for ECMO -160 as patient therapeutic currently without gtt.  - PPI    Lines/tubes/drains:  - R internal jugular MAC/swan  - ETT  - Left IABP  - VA ECMO  - Chest tubes: 3 meds, pericardial rachel, bilateral pleural  - [place]  - NJ feeding tube.   - AV TPW's.   - radial/brachial A-line    Disposition:  - CV ICU.    Social:   - Palliative aware. Care  conference today.    Libby Love MD  General Surgery, PGY-3  Pg [phone]    ====================================    SUBJECTIVE:   Remains on 4 pressors, liver function worsening. Unable to keep up with hyperkalemia with CRRT.    OBJECTIVE:   1. VITAL SIGNS:   Temp:  [97.5  F (36.4  C)-98.2  F (36.8  C)] 98.1  F (36.7  C)  Heart Rate:  [] 95  Resp:  [12-31] 18  BP: (83-87)/(33-42) 83/33  MAP:  [45 mmHg-79 mmHg] 63 mmHg  Arterial Line BP: ()/(8-75) 93/48  FiO2 (%):  [40 %] 40 %  SpO2:  [66 %-100 %] 100 %  Ventilation Mode: CMV/AC  (Continuous Mandatory Ventilation/ Assist Control)  FiO2 (%): 40 %  Rate Set (breaths/minute): 10 breaths/min  Tidal Volume Set (mL): 450 mL  PEEP (cm H2O): 8 cmH2O  Oxygen Concentration (%): 40 %  Resp: 18    2. INTAKE/ OUTPUT:   I/O last 3 completed shifts:  In: 6543.01 [I.V.:3550.01; Other:19; NG/GT:300]  Out: 3015 [Emesis/NG output:100; Other:2535; Chest Tube:380]    3. PHYSICAL EXAMINATION:   General: lying in bed on ECMO and ventilator.   Neuro: intubated and non-responsive, does not track with eyes, does not blink to threat   Resp: Breathing non-labored on vent.   CV: Paced  Abdomen: Soft, Non-distended.   Incisions: c/d/i, chest dressing concave  Extremities: cool distally in LE below knees, dopplers not appreciated in DP and posterior tib b/l, poor perfusion distally in BUE    4. INVESTIGATIONS:   Arterial Blood Gases     Recent Labs  Lab 11/24/18  0945 11/24/18  0743 11/24/18  0600 11/24/18  0343   PH 7.28* 7.29* 7.31* 7.31*   PCO2 31* 26* 24* 26*   PO2 161* 207* 234* 177*   HCO3 14* 13* 12* 13*     Complete Blood Count     Recent Labs  Lab 11/24/18  0945 11/24/18  0343 11/23/18 2152 11/23/18  1543   WBC 17.5* 19.2* 23.9* 24.2*   HGB 7.8* 7.6* 8.2* 8.9*   * 85* 81* 98*     Basic Metabolic Panel    Recent Labs  Lab 11/24/18  0945 11/24/18  0743 11/24/18  0600 11/24/18  0343  11/23/18 2152 11/23/18  1543     --   --  137  --  137  --  136    POTASSIUM 6.6*  6.5* 6.5* 6.6* 6.1*  6.0*  < > 6.2*  < > 6.0*   CHLORIDE 100  --   --  101  --  104  --  102   CO2 15*  --   --  13*  --  11*  --  11*   BUN 24  --   --  29  --  38*  --  42*   CR 0.97  --   --  1.17  --  1.53*  --  1.65*   GLC 90  --   --  64*  --  67*  --  70   < > = values in this interval not displayed.  Liver Function Tests    Recent Labs  Lab 11/24/18  0945 11/24/18  0343 11/23/18 2152 11/23/18  1543   AST PENDING 12281* 29328* Unsatisfactory specimen - hemolyzed   ALT PENDING 5566* 5913* 4059*   ALKPHOS 670* 607* 485* 274*   BILITOTAL 19.5* 18.7* 17.3* 17.3*   ALBUMIN 2.5* 3.1* 2.2* 2.3*   INR 4.07* 3.73* 3.92* 3.07*     Pancreatic Enzymes  No lab results found in last 7 days.  Coagulation Profile    Recent Labs  Lab 11/24/18  0945 11/24/18  0343 11/23/18 2152 11/23/18  1543   INR 4.07* 3.73* 3.92* 3.07*   PTT 57* 51* 51* 40*         5. RADIOLOGY:   Recent Results (from the past 24 hour(s))   XR Chest Port 1 View    Narrative    EXAM: XR CHEST PORT 1 VW  11/24/2018 4:36 AM      HISTORY: daily imaging;     COMPARISON: 11/22/2018    FINDINGS: Endotracheal tube tip over mid thoracic trachea. Right IJ  pulmonary artery catheter tip over right pulmonary artery. Bibasilar  chest tubes.  Gastric tube sidehole over proximal stomach, feeding  tube courses out of field of view.    Aortic valve replacement. Superior marker of intra-aortic balloon pump  just superior to the rudy. Mediastinal drains. Venous arterial ECMO  cannulae in stable position. Epicardial pacer wires. Pericardial  drain.    Stable cardiac silhouette enlargement. At least small bilateral  pleural effusions. Stable bibasilar opacities. Low lung volumes. No  pneumothorax.      Impression    IMPRESSION: No significant change since prior. Stable support devices,  at least small bilateral pleural effusions and basilar airspace  opacities.    I have personally reviewed the examination and initial interpretation  and I agree with the  findings.    KAILA MINA MD       =========================================

## 2018-11-24 NOTE — PROGRESS NOTES
ECMO Shift Summary:    Patient remains on VA ECMO, all equipment is functioning and alarms are appropriately set. RPM's 3700 with flow range 4.3-4.4 L/min. Sweep gas is at 4.5 LPM and FiO2 60%. Circuit remains free of air, clot and fibrin. Cannulas are secure with no bleeding from site. Extremities are perfused. Suctioned ETT for small amount.    Significant Shift Events:  None    Vent settings:  Ventilation Mode: CMV/AC  (Continuous Mandatory Ventilation/ Assist Control)  FiO2 (%): 40 %  Rate Set (breaths/minute): 10 breaths/min  Tidal Volume Set (mL): 450 mL  PEEP (cm H2O): 8 cmH2O  Oxygen Concentration (%): 40 %  Resp: 27.    Heparin is not running. ACT range 164-168.    Blood loss was minimal. No product was given.      Intake/Output Summary (Last 24 hours) at 11/23/18 2149  Last data filed at 11/23/18 2100   Gross per 24 hour   Intake          3581.83 ml   Output             3057 ml   Net           524.83 ml       ECHO:      CXR:  No results found for this or any previous visit (from the past 24 hour(s)).    Labs:    Recent Labs  Lab 11/23/18  1958 11/23/18  1753 11/23/18  1543 11/23/18  1410   PH 7.26* 7.32* 7.28* 7.28*   PCO2 26* 23* 27* 27*   PO2 207* 168* 109* 118*   HCO3 12* 12* 13* 12*   O2PER 40 40 40 40       Lab Results   Component Value Date    HGB 8.9 (L) 11/23/2018    PHGB 70 (H) 11/23/2018    PLT 98 (L) 11/23/2018    FIBR 202 11/23/2018    INR 3.07 (H) 11/23/2018    PTT 40 (H) 11/23/2018    DD 7.5 (H) 11/23/2018    AXA <0.10 11/23/2018    ANTCH 40 (L) 11/23/2018         Plan is to continue VA ECMO and CT scan tomorrow.      Jeffrey Osman, RRT  11/23/2018 9:49 PM

## 2018-11-24 NOTE — PLAN OF CARE
Problem: Extracorporeal Life Support/Membrane Oxygenation (Adult)  Goal: Signs and Symptoms of Listed Potential Problems Will be Absent, Minimized or Managed (Extracorporeal Life Support/Membrane Oxygenation)  Signs and symptoms of listed potential problems will be absent, minimized or managed by discharge/transition of care (reference Extracorporeal Life Support/Membrane Oxygenation (Adult) CPG).   Outcome: Declining      D/I/A: Family care conference today at 11am. Care withdrawal at 1600 per family request. TOD 1614. Life source notified and ruled out organ/eyes donation.     P: Body transported by security to [facility] [place].     Brad Cosme  RN, BSN

## 2018-11-24 NOTE — PROGRESS NOTES
ECMO Shift Summary:    Patient remains on V/A ECMO, all equipment is functioning and alarms are appropriately set. RPM's 3250 with flow range 3.61-3.72 L/min. Sweep gas is at 4.5 LPM and FiO2 60%. Circuit remains free of air, while the oxygenator shows fibrin forming in the corners. Cannulas are secure with no bleeding from site. Extremities are cool and mottled. Suctioned ETT for scant clear secretions.    Significant Shift Events: Pt extremities becoming more mottled throughout shift. New A line on the Left intermittently stopped working. Pt continues to be non-responsive to commands and stimuli.    Vent settings:  Ventilation Mode: CMV/AC  (Continuous Mandatory Ventilation/ Assist Control)  FiO2 (%): 40 %  Rate Set (breaths/minute): 10 breaths/min  Tidal Volume Set (mL): 450 mL  PEEP (cm H2O): 8 cmH2O  Oxygen Concentration (%): 40 %  Resp: 18.    Heparin is off , ACT range 160-168.    Patient is aneuric, blood loss was minimal. Product given included 2 PRBC, 1 FFP, 2 Platelets, 750 ml Albumin, and cryo as charted.      Intake/Output Summary (Last 24 hours) at 11/24/18 0542  Last data filed at 11/24/18 0500   Gross per 24 hour   Intake          6069.41 ml   Output             3039 ml   Net          3030.41 ml       ECHO:  No results found for this or any previous visit.No results found for this or any previous visit.    CXR:  No results found for this or any previous visit (from the past 24 hour(s)).    Labs:    Recent Labs  Lab 11/24/18  0343 11/24/18  0153 11/24/18  0005 11/23/18  2152   PH 7.31* 7.30* 7.30* 7.22*   PCO2 26* 26* 27* 26*   PO2 177* 164* 158* 192*   HCO3 13* 13* 13* 11*   O2PER 40.0 40.0 40.0 40       Lab Results   Component Value Date    HGB 7.6 (L) 11/24/2018    PHGB Canceled, Test credited 11/24/2018    PLT 85 (L) 11/24/2018    FIBR 176 (L) 11/24/2018    INR 3.73 (H) 11/24/2018    PTT 51 (H) 11/24/2018    DD 14.3 (H) 11/24/2018    AXA <0.10 11/24/2018    ANTCH 40 (L) 11/23/2018         Plan is  to remain stable on V/A ECMO.      Digna Chaudhry, RRT  11/24/2018 5:42 AM

## 2018-11-24 NOTE — PROGRESS NOTES
I got paged for increase K level, I increased dialysate rate from 17.5 to 25, K level continue increasing, so I increase dialysate rate to 35 ml/kg/hr.  Patient is on 4 pressors, so no room to increase blood flow rate.  Continue monitoring.

## 2018-11-24 NOTE — PROGRESS NOTES
11/24/2018:    Social Work Services Progress Note    Hospital Day: 12  Collaborated with:  Family Care conference at request of ICU cardiac team.    Members present at Family conference, 11/24 at 11am:  Vijay Love, Cardiothoracic Resident  Bedside RN: Brad  ICU Cardiac Attending:  Cardiothorasic team members  Cadriology team  Family: Spouse Candice, Brother Adriano, sister Yuki, with brother Tuan on the phone  ERNESTINE-Dalia Tan  Palliative unable to attend, rosario not requested by family    Data:  Johan Spence was admitted [facility]. Patient is critically ill.    Intervention:    Sw paged regarding urgent care conference over the weekend as patient has multiple organ failure.    Members attended conference- Information was presented to the family that, due to the multiple systems organ failure and all attempts at various life support services, the patient is not improving. Team members provided data and unfortunate news that there is little else that can be offered as all life support is at a maximum. Options of comfort care or ongoing life supports with eventual end of life.    Family understanding of information, appropriately grieving. SW offered additional support at the end of conference. Family will be in contact with additional people who are en route to the hospital. They will be in contact with team regarding decisions.     Assessment:  Family digesting difficult end of life discussion from 11am care conference. They will be in contact with the medical team moving forward with patient's care and comfort care decisions.    Plan:    Follow Up:  ERNESTINE following for support and coordination.    JENNIFER Guerrero, [facility]  ICU 4A & 4C   Ph: [phone]  Pager: [phone]

## 2018-11-25 LAB
BACTERIA SPEC CULT: NO GROWTH
Lab: NORMAL
SPECIMEN SOURCE: NORMAL

## 2018-11-25 NOTE — DISCHARGE SUMMARY
[facility] DEATH SUMMARY    NAME: Johan Spence   MRN: [id number]   : 1955     DATE OF ADMISSION: 2018     ADMISSION DIAGNOSIS:  Severe aortic stenosis and regurgitation s/p porcine AVR and root [facility]. Unable to wean from CPB, placed on VA ECMO for postoperative cardiac collapse and transferred to [facility] for further cares.     PROCEDURES PERFORMED,   18 AVR and root [facility]  18 Unable to wean CPB, place on VA ECMO       SUBSEQUENT PROCEDURES:   - 11/15 SANDRA with adequate biventricular function upon ECMO turndown w/increased pressor requirements, brought to OR and failed turndown   -  Chest washout, evacuation of cardiac tamponade at bedside   -  Coronary angiography and aortography - no obvious impingement of coronary artery flow, competent aortic tissue valve   -  Chest washout, Cannulation switched to RA PA RVAD without oxygenator, PA pressures eric to 75 systolic, returned to OR to cannulate central VA ECMO again.   -  Failed ECMO turndown with increasing RV distension and PAP, RSVG to LAD, chest washout      POSTOPERATIVE COMPLICATIONS:   1. Failure to wean from CPB, requiring VA ECMO   2. Postoperative cardiac collapse   3. Anuric AYE on CRRT w/worsening hyperkalemia   4. Shock liver with hypoglycemia  5. Persistent lactic acidosis   6. DIC   7. Vasoplegic shock on multiple pressors   8. Multi-system organ failure     DATE OF DEATH: 2018 at 1614    HOSPITAL COURSE: Johan Spence was a 63 year old year old male with hx of severe aortic stenosis and reguritation s/p AVR in , ONOFRE, DM, HTN, HLD, and complete heart block. On 18, he [facility] Luque and was unable to wean from CPB, placed on VA ECMO for postoperative cardiac collapse and transferred to [facility] for further cares. His postop course was complicated by failure to tolerate ECMO turndown,  postoperative cardiac collapse, anuric AYE on CRRT with worsening hyperkalemia, shock liver, DIC, lactic acidosis, vasoplegic shock on multiple pressors and multisystem organ failure as well as the additional items listed above. He remained critically ill the full duration of his ICU admission upon transfer here 11/13. Given his poor prognosis, lack of  progression, and multi-system organ failure, on central VA ECMO his family and the surgical team concluded that transition to comfort cares was the most appropriate course of action during family conference 11/24/18. All non-comfort measures were withdrawn. He passed away shortly after withdrawal of cares.     Libby Love MD  General Surgery, PGY-3  Pg [phone]    11:27 AM   November 25, 2018

## 2018-11-26 LAB
BACTERIA SPEC CULT: NO GROWTH
Lab: NORMAL
SPECIMEN SOURCE: NORMAL

## 2018-11-27 LAB
BACTERIA SPEC CULT: NO GROWTH
Lab: NORMAL
SPECIMEN SOURCE: NORMAL

## 2018-11-28 LAB
BACTERIA SPEC CULT: NO GROWTH
Lab: NORMAL
SPECIMEN SOURCE: NORMAL

## 2018-11-29 LAB
BACTERIA SPEC CULT: NO GROWTH
Lab: NORMAL
SPECIMEN SOURCE: NORMAL

## 2021-04-18 NOTE — PROGRESS NOTES
ECMO Shift Summary:    Patient remains on VA ECMO, all equipment is functioning and alarms are appropriately set. RPM's 9662-8314 with flow range 2.5-3.7 L/min. Sweep gas is at 1 LPM and FiO2 80%. Circuit remains free of air, clot and fibrin. Cannulas are secure with no bleeding from site. Extremities are cool.     Significant Shift Events:    Advanced IABP in by 6 cm due to concern of IABP occluding renal artery with decreased urine output overnight.  Urine output has increased.     Turn down ECHO done today.  See earlier note.      Take down to OR for chest washout and tunneling of ECMO cannulas.  Attempted another turndown in the OR but mean blood pressure dropped to 52.  Returned to ICU at 1600.    Vent settings:  Ventilation Mode: CMV/AC  (Continuous Mandatory Ventilation/ Assist Control)  FiO2 (%): 40 %  Rate Set (breaths/minute): 18 breaths/min  Tidal Volume Set (mL): 450 mL  PEEP (cm H2O): 8 cmH2O  Oxygen Concentration (%): 40 %  Resp: 18.    Heparin is running at 500 u/kg/hr, ACT range 134-160.  Heparin off 6375-6555 for OR case.  Last .  Titrated Heparin up slowly due to increased chest tube output post-op.    Urine output is straw colored, blood loss was moderate post washout. Product given included one unit platelets and 500 mL albumin for low values and circuit sway.      Intake/Output Summary (Last 24 hours) at 11/15/18 1737  Last data filed at 11/15/18 1700   Gross per 24 hour   Intake          6617.91 ml   Output             3340 ml   Net          3277.91 ml       ECHO:  No results found for this or any previous visit.No results found for this or any previous visit.    CXR:  Recent Results (from the past 24 hour(s))   XR Chest Port 1 View    Narrative    Exam: XR CHEST PORT 1 VW, 11/15/2018 4:29 AM    Indication: postop open chest    Comparison: 11/14/2018, 11/13/2018    Findings:   A single AP view the chest was obtained. The right IJ [place]-Jenn  catheter tip projects of the proximal pulmonary  artery. The enteric  tubes pass below the field-of-view. The endotracheal tube tip projects  over the mid trachea. Stable position of the ECMO cannulae. Prosthetic  aortic valve. The intra-aortic balloon pump tip projects over the  lower descending thoracic aorta. Surgical instruments project over the  mediastinum. Stable right apical chest tube. The cardiac silhouette  remains enlarged.      Impression    Impression:   1. Stable support devices. The intra-aortic balloon pump tip projects  over the lower descending thoracic aorta, consider advancement by  approximately 6 cm.  2. Stable enlargement of the cardiac silhouette with mild retrocardiac  atelectasis/consolidation.  3. Interval increase in pulmonary edema.    I have personally reviewed the examination and initial interpretation  and I agree with the findings.    JOVANNY ROBERT MD   XR Chest Port 1 View    Narrative    XR CHEST PORT 1 VW  11/15/2018 9:19 AM    History:  IABP placement; .     Comparison: Chest radiograph earlier today, 11/14/2018, 11/13/2018    Findings:   Supine portable AP chest radiograph. Endotracheal tube with the tip  projects 2.2 cm above rudy. Right IJ [place]-Jenn catheter with the tip  projects over right ventricle outflow tract. Unchanged intra-aortic  pump tip projects over 4.7 cm below the rudy, unchanged. Stable  positioning of gastric tube, feeding tube, right apical directed chest  tube, and ECMO cannulae. Unchanged 3 clamps projecting over  mediastinum.    Stable mild cardiomegaly. Persistent low lung volumes. Pulmonary  vasculature is mildly indistinct. Unchanged bibasilar basilar hazy  opacities. Unchanged small right pneumothorax without significant  pleural effusion.      Impression    IMPRESSION:  1.  Unchanged intraaortic pump with the tip 4.7 cm below the rudy.  Consider advancement. Otherwise stable supportive lines and tubes.  2.  Cardiomegaly with possible pulmonary interstitial edema.  3.  Persistent low lung  volumes. Unchanged bibasilar hazy opacities,  likely atelectasis.    I have personally reviewed the examination and initial interpretation  and I agree with the findings.    MATT SCHMITT DO   XR Chest Port 1 View    Narrative    Exam: XR CHEST PORT 1 VW, 11/15/2018 10:36 AM    Indication: Balloon pump advanced.  Confirm placement.;     Comparison: 11/15/2018    Findings:   Approximately 2.5 cm above the rudy. Right IJ [place]-Jenn catheter  projecting over the right ventricular outflow tract. ECMO cannula in  similar position. Right chest tube in similar position. Both enteric  tubes projecting over the esophagus and stomach. 3 surgical clamps in  unchanged position over the mediastinum. Aortic valve prosthesis in  place. Aortic balloon pump now approximately level of the rudy.    Unchanged cardiomegaly. Persistent low lung volumes. Unchanged  bibasilar and bilateral perihilar hazy opacities and small right  pneumothorax.      Impression    Impression: Balloon pump now projects at the level of the rudy.  Otherwise no significant interval change.    I have personally reviewed the examination and initial interpretation  and I agree with the findings.    MATT SCHMITT DO       Labs:    Recent Labs  Lab 11/15/18  1634 11/15/18  1530 11/15/18  1418 11/15/18  1301   PH 7.49* 7.43 7.46* 7.51*   PCO2 37 45 42 40   PO2 247* 262* 231* 227*   HCO3 28 30* 30* 33*   O2PER 40 40  40 91.0 100.0       Lab Results   Component Value Date    HGB 9.2 (L) 11/15/2018    PHGB 30 (H) 11/15/2018    PLT 75 (L) 11/15/2018    FIBR 397 11/15/2018    INR 1.71 (H) 11/15/2018    PTT 38 (H) 11/15/2018    DD 2.2 (H) 11/15/2018    AXA <0.10 11/15/2018    ANTCH 43 (L) 11/15/2018         Plan is to continue ECMO support.      Shey Knox, RRT  11/15/2018 5:37 PM                 Ambulatory

## 2023-06-27 NOTE — PROGRESS NOTES
ECMO Attending Progress Note  11/14/2018    Johan Spence is a 63 year old male who was [facility] post op day # 1due to severe cardiac failure S/P redo AVR..   Interval events: transferred last night, overnight stable  The patients vital signs today are as follows:    Temp:  [96.3  F (35.7  C)-99.5  F (37.5  C)] 98.4  F (36.9  C)  Heart Rate:  [77-96] 89  Resp:  [18] 18  BP: (111)/(55) 111/55  MAP:  [48 mmHg-76 mmHg] 57 mmHg  Arterial Line BP: ()/(21-50) 99/32  FiO2 (%):  [40 %-60 %] 40 %  SpO2:  [91 %-100 %] 100 %    Intake/Output Summary (Last 24 hours) at 11/14/18 1718  Last data filed at 11/14/18 1700   Gross per 24 hour   Intake          9210.03 ml   Output             6825 ml   Net          2385.03 ml    Ventilation Mode: CMV/AC  (Continuous Mandatory Ventilation/ Assist Control)  FiO2 (%): 40 %  Rate Set (breaths/minute): 18 breaths/min  Tidal Volume Set (mL): 450 mL  PEEP (cm H2O): 8 cmH2O  Oxygen Concentration (%): 40 %  Resp: 18   Recent Labs  Lab 11/14/18  1612 11/14/18  1410 11/14/18  1210 11/14/18  1020   PH 7.45 7.39 7.45 7.51*   PCO2 52* 60* 46* 33*   PO2 146* 143* 140* 171*   HCO3 36* 36* 32* 27   O2PER 40.0 40 40 40      Recent Labs  Lab 11/14/18  1612 11/14/18  1020 11/14/18  0354 11/13/18  2239   WBC 15.8* 15.2* 15.7* 13.8*   HGB 8.2* 8.1* 9.3* 8.9*     Creatinine   Date Value Ref Range Status   11/14/2018 2.90 (H) 0.66 - 1.25 mg/dL Final   11/14/2018 2.78 (H) 0.66 - 1.25 mg/dL Final   11/14/2018 2.55 (H) 0.66 - 1.25 mg/dL Final   11/13/2018 2.23 (H) 0.66 - 1.25 mg/dL Final     Physical Exam: stable  Cannula unchanged.  Minimal oozing.  Minimal air movement  Extremities edematous    ECMO Issues including assessments and plan on DOS 11/14/2018:    Neuro: Sedated for mechanical ventilation and ECMO.  NIRS 72  RASS goal:   CV: Hemodynamically stable, still on 3 pressors   Pulm: Severe respiratory failure requiring ECMO and mechanical ventilation. Flows unchanged at 4.3    Keep vent settings at rest settings: PC 25, PEEP10, FiO2 60%.  FEN/Renal: Creatinine  Lab Results   Component Value Date    CR 2.90 11/14/2018    UOP increased   Heme: Hemoglobin 8.9.  Goals: if O2 sat >85% Hgb may drift to 7-8.  If O2 Sat <85% keep Hgb 10-12.  ID: No issues at present     All pertinent labs, imaging studies, physical exam and medications have been reviewed by me.     This patient requires continued ICU monitoring and cares. Will wean pressors as tolerated, lactate slowly decreasing. Will do SANDRA ecmo turn down tomorrow.  I have discussed patient care and treatment plan with the ICU team and the patient's family.     ECMO [id number]    Moises Saba MD  Cardiac Surgery  Pager [phone]   na

## 2024-05-22 NOTE — PROGRESS NOTES
CLINICAL NUTRITION SERVICES - ASSESSMENT NOTE     Nutrition Prescription    RECOMMENDATIONS FOR MDs/PROVIDERS TO ORDER:  1. Monitor BG with initiation of TF, and adjust insulin appropriately.  2. Free water flush per team for pending hydration    Malnutrition Status:    Unable to determine due to unable to obtain nutrition hx and assess muscle/fat depletion    Recommendations already ordered by Registered Dietitian (RD):  Pending placement of FT by XR, initiate Impact Peptide @ 10 mL/hr and advance 10 mL every 8 hours until at goal Impact Peptide @ goal 50 ml/hr (1200 ml/day) to provide 1800 kcals (25 kcal/kg/day), 113 g PRO (1.5 gm/kg/day), 924 ml free H2O, 77 g Fat (50% from MCTs), 168 g CHO and no Fiber daily.  - 30 mL of free water q4h for tube patency  - Certavite 15 mL     Future/Additional Recommendations:  1. If lytes (K+,Mg++, Phos) trending high, would switch to low-lyte formula, Nepro. Goal Nepro @ goal 40 ml/hr (960 ml/day) to provide 1728 kcals (24 kcal/kg/day), 78 g PRO (1.1 g/kg/day), 701 ml free H2O, 155 g CHO and 12 g Fiber daily.  - Plus 1 pkt Prosource BID to increase total EN provisions to 1808 kcal/day (25 kcal/day) and 100 gm PRO/day (1.4 gm/kg)    2. Monitor BG, pt with h/o T2DM and hyperglycemic today  3. Change MVI to Nephronex if start CRRT  4. Check baseline and weekly CRP with use of Impact Peptide 1.5     REASON FOR ASSESSMENT  Johan Spence is a/an 63 year old male assessed by the dietitian for Provider Order - Registered Dietitian to Assess and Order TF per Medical Nutrition Therapy Protocol    NUTRITION HISTORY  Pt has a h/o T2DM, is s/p re-do AVR on 11/13 and is centrally cannulated for [facility] ECMO.    Unable to obtain diet history from pt due to medical status and no family present at this time.     CURRENT NUTRITION ORDERS  Diet: NPO    LABS  Lactic acid 14.9 (H) at 1020 on 11/14 --8 at 1210 . Trending down in past 24 hours.   Na+ 152 (H) on 11/14  Mg++ 2.4 (H) on 11/14  Phos 6.6  Pt called to convey lab and US results.      No visits with results within 1 Day(s) from this visit.   Latest known visit with results is:   Lab Services on 05/21/2024   Component Date Value Ref Range Status    Cholesterol 05/21/2024 213 (H)  <=199 mg/dL Final      Desirable         <200  Borderline High   200 to 239  High              >=240    Triglycerides 05/21/2024 90  <=149 mg/dL Final      Normal            <150  Borderline High   150 to 199  High              200 to 499  Very High         >=500    HDL 05/21/2024 42  >=40 mg/dL Final      Low              <40  Borderline Low   40 to 49  Near Optimal     50 to 59  Optimal          >=60    LDL 05/21/2024 153 (H)  <=129 mg/dL Final      Optimal           <100  Near Optimal      100 to 129  Borderline High   130 to 159  High              160 to 189  Very High         >=190    Non-HDL Cholesterol 05/21/2024 171  mg/dL Final      Therapeutic Target:  CHD and risk equivalents  <130  Multiple risk factors     <160  0 to 1 risk factor        <190    Cholesterol/ HDL Ratio 05/21/2024 5.1 (H)  <=4.4 Final    Fasting Status 05/21/2024 13  0 - 999 Hours Final    Sodium 05/21/2024 139  135 - 145 mmol/L Final    Potassium 05/21/2024 4.6  3.4 - 5.1 mmol/L Final    Chloride 05/21/2024 109  97 - 110 mmol/L Final    Carbon Dioxide 05/21/2024 25  21 - 32 mmol/L Final    Anion Gap 05/21/2024 10  7 - 19 mmol/L Final    Glucose 05/21/2024 104 (H)  70 - 99 mg/dL Final    BUN 05/21/2024 12  6 - 20 mg/dL Final    Creatinine 05/21/2024 0.99  0.67 - 1.17 mg/dL Final    Glomerular Filtration Rate 05/21/2024 >90  >=60 Final    eGFR results = or >60 mL/min/1.73m2 = Normal kidney function. Estimated GFR calculated using the CKD-EPI-R (2021) equation that does not include race in the creatinine calculation.    BUN/Cr 05/21/2024 12  7 - 25 Final    Calcium 05/21/2024 9.2  8.4 - 10.2 mg/dL Final    Bilirubin, Total 05/21/2024 1.2 (H)  0.2 - 1.0 mg/dL Final    GOT/AST 05/21/2024 24  <=37 Units/L  "(H) on 11/4  Cr 2.55 (H) on 11/14, BUN 48 (H) on 11/14, GFR 26 (L) on 11/14 - Pt with AYE  BG - 221-271 on 11/14  MAP >60    MEDICATIONS  Epi/norepi - Increased from yesterday  Vaso - Dose decreasing  Insulin gtt  Bowel regimen  IV D5 (175 mL/hr) - 714 kcal/day at this rate    ANTHROPOMETRICS  Height: 170 cm (5' 6.929\"[per OSH[)  Most Recent Weight: 89.8 kg (197 lb 15.6 oz)    IBW: 67 kg (134 %)  BMI: Obesity Grade I BMI 30-34.9  Weight History: No previous weights on record  Wt Readings from Last 5 Encounters:   11/14/18 89.8 kg (197 lb 15.6 oz)     Dosing Weight: 73 kg (adjusted, based upon lowest wt this adm of 89.8 kg on 11/13)    ASSESSED NUTRITION NEEDS  Estimated Energy Needs: 7176-6963 kcals/day (25 - 30 kcals/kg)  Justification: Maintenance  Estimated Protein Needs:  grams protein/day (1.2 - 1.5 grams of pro/kg)  Justification: Post-op  Estimated Fluid Needs: 0901-5672 mL/day (25 - 30 mL/kg)   Justification: Maintenance    PHYSICAL FINDINGS  See malnutrition section below.    MALNUTRITION  % Intake: Unable to assess - Unable to discuss intake PTA with pt/ptfamily  % Weight Loss: Unable to assess - No previous wt on record  Subcutaneous Fat Loss: Unable to assess - pt wrapped up from head down  Muscle Loss: Unable to assess - pt wrapped up from head down  Fluid Accumulation/Edema: None noted  Malnutrition Diagnosis: Unable to determine due to unable to obtain nutrition hx and assess muscle/fat depletion    NUTRITION DIAGNOSIS  Inadequate protein-energy intake related to NPO status and medical course as evidenced by pt NPO and yet to initiate nutrition support      INTERVENTIONS  Implementation  1. Nutrition Education: Unable to complete at this time  2. Collaboration with other providers - Discussed nutrition plan of care in team rounds  3. Enteral Nutrition - Initiate  4. Feeding tube flush  5. Multivitamin/mineral supplement therapy     Goals  Diet adv v nutrition support within 2-3 days.  Total avg " Final    GPT/ALT 05/21/2024 69 (H)  <64 Units/L Final    Alkaline Phosphatase 05/21/2024 71  45 - 117 Units/L Final    Albumin 05/21/2024 4.1  3.6 - 5.1 g/dL Final    Protein, Total 05/21/2024 6.9  6.4 - 8.2 g/dL Final    Globulin 05/21/2024 2.8  2.0 - 4.0 g/dL Final    A/G Ratio 05/21/2024 1.5  1.0 - 2.4 Final    TSH 05/21/2024 1.461  0.350 - 5.000 mcUnits/mL Final    WBC 05/21/2024 5.4  4.2 - 11.0 K/mcL Final    RBC 05/21/2024 4.93  4.50 - 5.90 mil/mcL Final    HGB 05/21/2024 15.6  13.0 - 17.0 g/dL Final    HCT 05/21/2024 43.8  39.0 - 51.0 % Final    MCV 05/21/2024 88.8  78.0 - 100.0 fl Final    MCH 05/21/2024 31.6  26.0 - 34.0 pg Final    MCHC 05/21/2024 35.6  32.0 - 36.5 g/dL Final    RDW-CV 05/21/2024 12.1  11.0 - 15.0 % Final    RDW-SD 05/21/2024 39.2  39.0 - 50.0 fL Final    PLT 05/21/2024 236  140 - 450 K/mcL Final    NRBC 05/21/2024 0  <=0 /100 WBC Final    Neutrophil, Percent 05/21/2024 63  % Final    Lymphocytes, Percent 05/21/2024 26  % Final    Mono, Percent 05/21/2024 8  % Final    Eosinophils, Percent 05/21/2024 2  % Final    Basophils, Percent 05/21/2024 1  % Final    Immature Granulocytes 05/21/2024 0  % Final    Absolute Neutrophils 05/21/2024 3.4  1.8 - 7.7 K/mcL Final    Absolute Lymphocytes 05/21/2024 1.4  1.0 - 4.8 K/mcL Final    Absolute Monocytes 05/21/2024 0.4  0.3 - 0.9 K/mcL Final    Absolute Eosinophils  05/21/2024 0.1  0.0 - 0.5 K/mcL Final    Absolute Basophils 05/21/2024 0.1  0.0 - 0.3 K/mcL Final    Absolute Immature Granulocytes 05/21/2024 0.0  0.0 - 0.2 K/mcL Final     Discussed that labs overall look good. Mildly elevated total cholesterol and glucose. LDL is elevated, but not concerning.    The 10-year CVD risk score (PRAVEENA'Tomas, et al., 2008) is: 7.4%    Values used to calculate the score:      Age: 40 years      Sex: Male      Diabetic: No      Tobacco smoker: No      Systolic Blood Pressure: 135 mmHg      Is BP treated: Yes      HDL Cholesterol: 42 mg/dL      Total Cholesterol:  213 mg/dL    Pt does not have metabolic syndrome based on labs and exam from 5/20, which rules out that cause.     Discussed liver US.    Impression:  The liver is heterogeneous in echotexture.   No sonographic evidence for cholelithiasis or cholecystitis.     Liver showing signs of damage, fatty infiltration, or something to cause it to not look uniform.  Permission received from patient to add labs to existing labs to rule out possible causes including autoimmune issues. Will call when resulted.    Abnormal liver function tests  (primary encounter diagnosis)  Plan: Iron And total Iron Binding Capacity, Ferritin,        TRESA Screen With Antibody And IFA Reflex, Anti         Smooth Muscle Antibody, F Actin, Mitochondrial         Antibody, SERVICE TO GASTROENTEROLOGY, IgG         Quantitative    Number given to schedule with Gastroenterology for further eval and potential testing including fibroscan.  Patient agreeable to plan    MIGNON Zheng       nutritional intake to meet a minimum of 25 kcal/kg and 1.2 g PRO/kg daily (per dosing wt 73 kg).     Monitoring/Evaluation  Progress toward goals will be monitored and evaluated per protocol.    Maggie West RD        I have read and agree with above.   Kristie Pool RD, LD, [facility]  CVICU Dietitian  Pager: [phone]

## (undated) DEVICE — PACK ADULT HEART UMMC PV15CG92D

## (undated) DEVICE — PROTECTOR ARM ONE-STEP TRENDELENBURG 40418

## (undated) DEVICE — SYR BULB IRRIG 50ML LATEX FREE 0035280

## (undated) DEVICE — SU PLEDGET SOFT TFE 13MMX7MMX1.5MM D7044

## (undated) DEVICE — SOL WATER IRRIG 1000ML BOTTLE 07139-09

## (undated) DEVICE — SOL NACL 0.9% 10ML VIAL 0409-4888-02

## (undated) DEVICE — PREP CHLORAPREP 26ML TINTED ORANGE  260815

## (undated) DEVICE — DRAIN CHEST TUBE 36FR STR 8036

## (undated) DEVICE — PREP POVIDONE IODINE SCRUB 7.5% 120ML

## (undated) DEVICE — SU PROLENE 3-0 SHDA 36" 8522H

## (undated) DEVICE — TAPE MEDIPORE 4"X2YD 2864

## (undated) DEVICE — ADH FLOSEAL W/HUMAN THROMBIN 5ML

## (undated) DEVICE — VESSEL LOOPS YELLOW MAXI 31145694

## (undated) DEVICE — Device

## (undated) DEVICE — ESU ELEC BLADE 2.75" COATED/INSULATED E1455

## (undated) DEVICE — ESU GROUND PAD ADULT W/CORD E7507

## (undated) DEVICE — SUCTION CATH AIRLIFE TRI-FLO W/CONTROL PORT 14FR  T60C

## (undated) DEVICE — PREP POVIDONE IODINE SCRUB 7.5% 4OZ APL82212

## (undated) DEVICE — SOL NACL 0.9% IRRIG 1000ML BOTTLE 2F7124

## (undated) DEVICE — DRSG ABDOMINAL 07 1/2X8" 7197D

## (undated) DEVICE — DEVICE CATH STABILIZATION STATLOCK FOLEY 3-WAY FOL0105

## (undated) DEVICE — SU ETHIBOND 0 CT-1 CR 8X18" CX21D

## (undated) DEVICE — BLADE KNIFE BEAVER MICROSHARP GREEN 377515

## (undated) DEVICE — ESU PENCIL W/COATED BLADE E2450H

## (undated) DEVICE — SPONGE PEANUT

## (undated) DEVICE — SU STEEL 6 CCS 4X18" M654G

## (undated) DEVICE — BNDG ESMARK 6" STERILE LF 820-3612

## (undated) DEVICE — SU VICRYL 2-0 CT-1 27" UND J259H

## (undated) DEVICE — SU PROLENE 7-0 BV-1DA 30" 8703H

## (undated) DEVICE — DRAPE SHEET REV FOLD 3/4 9349

## (undated) DEVICE — PREP POVIDONE IODINE SOLUTION 10% 4OZ

## (undated) DEVICE — PREP SKIN SCRUB TRAY 4461A

## (undated) DEVICE — DRAIN CHEST TUBE RIGHT ANGLED 28FR 8128

## (undated) DEVICE — GLOVE PROTEXIS POWDER FREE SMT 7.5  2D72PT75X

## (undated) DEVICE — DRAPE SLUSH/WARMER 66X44" ORS-320

## (undated) DEVICE — BLADE KNIFE BEAVER MINI STR BEAVER6900

## (undated) DEVICE — PUNCH AORTIC 4.0MM LONG AP-540

## (undated) DEVICE — SU ETHIBOND 2-0 SHDA 30" X563H

## (undated) DEVICE — CLIP HORIZON LG ORANGE 004200

## (undated) DEVICE — KIT ENDO VASOVIEW HEMOPRO 2 VH-4000

## (undated) DEVICE — SOL NACL 0.9% IRRIG 3000ML BAG 2B7477

## (undated) DEVICE — DRAPE TIBURON CARDIOVASCULAR PERI-GROIN LF 9154

## (undated) DEVICE — PREP DURAPREP 26ML APL 8630

## (undated) DEVICE — DEFIB PRO-PADZ LVP LQD GEL ADULT 8900-2105-01

## (undated) DEVICE — DRSG KERLIX 4 1/2"X4YDS ROLL 6715

## (undated) DEVICE — BNDG ELASTIC 6"X5YDS STERILE 6611-6S

## (undated) DEVICE — TIES BANDING T50R

## (undated) DEVICE — DRSG TEGADERM 8X12" 1629

## (undated) DEVICE — ESU EYE HIGH TEMP 65410-183

## (undated) DEVICE — LINEN TOWEL PACK X30 5481

## (undated) DEVICE — CLIP HORIZON SM RED WIDE SLOT 001201

## (undated) DEVICE — SUCTION MANIFOLD DORNOCH ULTRA CART UL-CL500

## (undated) DEVICE — DRSG GAUZE 4X4" 2187

## (undated) DEVICE — SU ETHIBOND 0 TIE 6X30" X306H

## (undated) DEVICE — SU PROLENE 6-0 C-1DA 30" 8706H

## (undated) DEVICE — CLIP SPRING FOGARTY SOFTJAW CSOFT6

## (undated) DEVICE — CONNECTOR DRAIN CHEST Y EXTENSION SET 19909

## (undated) DEVICE — ESU HOLSTER PLASTIC DISP E2400

## (undated) DEVICE — SUTURE BOOTS 051003PBX

## (undated) DEVICE — DRSG TELFA 3X8" 1238

## (undated) DEVICE — CANNULA VESSEL DLP  30001

## (undated) DEVICE — SU STEEL MYO/WIRE II STERNOTOMY 8 BE-1 3X14" 048-217

## (undated) DEVICE — DRSG GAUZE 4X4" TRAY 6939

## (undated) DEVICE — SU RETRACT-O-TAPE 1041

## (undated) DEVICE — SU PROLENE 8-0 BV130-5DA 24" 8732H

## (undated) DEVICE — PREP POVIDONE IODINE SOLUTION 10% 120ML

## (undated) DEVICE — SOL WATER IRRIG 1000ML BOTTLE 2F7114

## (undated) DEVICE — SU DERMABOND ADVANCED .7ML DNX12

## (undated) DEVICE — SU VICRYL 3-0 FS-1 27" J442H

## (undated) DEVICE — SPONGE RAY-TEC 4X8" 7318

## (undated) DEVICE — WAND SUCTION LP SOFT 15.2CM SU-22702

## (undated) DEVICE — SUCTION DRY CHEST DRAIN OASIS 3600-100

## (undated) DEVICE — LINEN TOWEL PACK X6 WHITE 5487

## (undated) DEVICE — SU PLEDGET SOFT TFE 3/8"X3/26"X1/16" PCP40

## (undated) DEVICE — SURGICEL HEMOSTAT 4X8" 1952

## (undated) DEVICE — BONE WAX 2.5GM W31G

## (undated) DEVICE — SU PROLENE 4-0 SHDA 36" 8521H

## (undated) DEVICE — DRAPE IOBAN INCISE 23X17" 6650EZ

## (undated) DEVICE — DRSG DRAIN 4X4" 7086

## (undated) DEVICE — SU ETHIBOND 3-0 BBDA 36" X588H

## (undated) DEVICE — SYR 10ML LL W/O NDL 302995

## (undated) DEVICE — CLIP HORIZON MED BLUE 002200

## (undated) DEVICE — TUBING INSUFFLATION W/FILTER CPC TO LUER 620-030-301

## (undated) DEVICE — NDL ANGIOCATH 14GA 1.25" 4048

## (undated) DEVICE — TIES CABLE STERILE 8" 17133/30

## (undated) DEVICE — SURGICEL POWDER ABSORBABLE HEMOSTAT 3GM 3013SP

## (undated) DEVICE — SU PROLENE 4-0 RB-1DA 36" 8557H

## (undated) DEVICE — WIPES FOLEY CARE SURESTEP PROVON DFC100

## (undated) DEVICE — TOURNIQUET VASCULAR KIT 7 1/2" 79012

## (undated) DEVICE — DRAIN CHEST TUBE 32FR STR 8032

## (undated) DEVICE — SU PROLENE 5-0 RB-2DA 30" 8710H

## (undated) RX ORDER — HEPARIN SODIUM 1000 [USP'U]/ML
INJECTION, SOLUTION INTRAVENOUS; SUBCUTANEOUS
Status: DISPENSED
Start: 2018-01-01

## (undated) RX ORDER — CEFAZOLIN SODIUM 1 G/3ML
INJECTION, POWDER, FOR SOLUTION INTRAMUSCULAR; INTRAVENOUS
Status: DISPENSED
Start: 2018-01-01

## (undated) RX ORDER — ALBUMIN, HUMAN INJ 5% 5 %
SOLUTION INTRAVENOUS
Status: DISPENSED
Start: 2018-01-01

## (undated) RX ORDER — PHENYLEPHRINE HCL IN 0.9% NACL 1 MG/10 ML
SYRINGE (ML) INTRAVENOUS
Status: DISPENSED
Start: 2018-01-01

## (undated) RX ORDER — PROTAMINE SULFATE 10 MG/ML
INJECTION, SOLUTION INTRAVENOUS
Status: DISPENSED
Start: 2018-01-01

## (undated) RX ORDER — EPHEDRINE SULFATE 50 MG/ML
INJECTION, SOLUTION INTRAMUSCULAR; INTRAVENOUS; SUBCUTANEOUS
Status: DISPENSED
Start: 2018-01-01

## (undated) RX ORDER — LIDOCAINE HYDROCHLORIDE 20 MG/ML
INJECTION, SOLUTION EPIDURAL; INFILTRATION; INTRACAUDAL; PERINEURAL
Status: DISPENSED
Start: 2018-01-01

## (undated) RX ORDER — PROPOFOL 10 MG/ML
INJECTION, EMULSION INTRAVENOUS
Status: DISPENSED
Start: 2018-01-01

## (undated) RX ORDER — FENTANYL CITRATE 50 UG/ML
INJECTION, SOLUTION INTRAMUSCULAR; INTRAVENOUS
Status: DISPENSED
Start: 2018-01-01

## (undated) RX ORDER — CALCIUM CHLORIDE 100 MG/ML
INJECTION INTRAVENOUS; INTRAVENTRICULAR
Status: DISPENSED
Start: 2018-01-01

## (undated) RX ORDER — LIDOCAINE HYDROCHLORIDE 10 MG/ML
INJECTION, SOLUTION EPIDURAL; INFILTRATION; INTRACAUDAL; PERINEURAL
Status: DISPENSED
Start: 2018-01-01

## (undated) RX ORDER — PAPAVERINE HYDROCHLORIDE 30 MG/ML
INJECTION INTRAMUSCULAR; INTRAVENOUS
Status: DISPENSED
Start: 2018-01-01